# Patient Record
Sex: MALE | Race: WHITE | NOT HISPANIC OR LATINO | Employment: OTHER | URBAN - METROPOLITAN AREA
[De-identification: names, ages, dates, MRNs, and addresses within clinical notes are randomized per-mention and may not be internally consistent; named-entity substitution may affect disease eponyms.]

---

## 2020-12-20 ENCOUNTER — HOSPITAL ENCOUNTER (EMERGENCY)
Facility: HOSPITAL | Age: 60
Discharge: HOME | End: 2020-12-20
Attending: EMERGENCY MEDICINE

## 2020-12-20 VITALS
DIASTOLIC BLOOD PRESSURE: 80 MMHG | SYSTOLIC BLOOD PRESSURE: 143 MMHG | OXYGEN SATURATION: 99 % | WEIGHT: 160 LBS | TEMPERATURE: 98 F | RESPIRATION RATE: 18 BRPM | HEIGHT: 67 IN | BODY MASS INDEX: 25.11 KG/M2 | HEART RATE: 99 BPM

## 2020-12-20 DIAGNOSIS — J06.9 VIRAL URI: Primary | ICD-10-CM

## 2020-12-20 DIAGNOSIS — I10 HYPERTENSION, UNSPECIFIED TYPE: ICD-10-CM

## 2020-12-20 DIAGNOSIS — R09.81 NASAL CONGESTION: ICD-10-CM

## 2020-12-20 LAB — GROUP A STREP, MOLECULAR: NEGATIVE

## 2020-12-20 PROCEDURE — 87651 STREP A DNA AMP PROBE: CPT

## 2020-12-20 PROCEDURE — 25000003 PHARM REV CODE 250: Performed by: PHYSICIAN ASSISTANT

## 2020-12-20 PROCEDURE — 99284 EMERGENCY DEPT VISIT MOD MDM: CPT

## 2020-12-20 RX ORDER — HYDRALAZINE HYDROCHLORIDE 25 MG/1
25 TABLET, FILM COATED ORAL
Status: COMPLETED | OUTPATIENT
Start: 2020-12-20 | End: 2020-12-20

## 2020-12-20 RX ORDER — IBUPROFEN 600 MG/1
600 TABLET ORAL EVERY 6 HOURS PRN
Qty: 20 TABLET | Refills: 0 | Status: SHIPPED | OUTPATIENT
Start: 2020-12-20 | End: 2022-01-13

## 2020-12-20 RX ORDER — AMLODIPINE BESYLATE 5 MG/1
5 TABLET ORAL DAILY
Qty: 30 TABLET | Refills: 0 | Status: ON HOLD | OUTPATIENT
Start: 2020-12-20 | End: 2022-01-14 | Stop reason: HOSPADM

## 2020-12-20 RX ORDER — FLUTICASONE PROPIONATE 50 MCG
1 SPRAY, SUSPENSION (ML) NASAL 2 TIMES DAILY PRN
Qty: 15 G | Refills: 0 | Status: SHIPPED | OUTPATIENT
Start: 2020-12-20 | End: 2022-01-13

## 2020-12-20 RX ADMIN — HYDRALAZINE HYDROCHLORIDE 25 MG: 25 TABLET, FILM COATED ORAL at 02:12

## 2020-12-20 NOTE — ED PROVIDER NOTES
Encounter Date: 12/20/2020       History     Chief Complaint   Patient presents with    Nasal Congestion     c/o runny nose, sinus congestion, and salty taste when eating or drinking anything. symptoms began about 1-2 months ago. also requests prescription for medication for chronic right shoulder pain     60-year-old male presents to ED with complaint of runny nose, sinus congestion, itchy eyes, dry throat, and intermittent frontal headaches that have continued intermittently over past 1-2 months.  He has attempted OTC allergy medications with minimal improvement of his symptoms.  He also reports having occasional salty taste in his mouth after eating or drinking.  He denies fever, chills, nausea, vomiting, ear pain, facial swelling, drooling, voice changes, visual changes, chest pain, cough, shortness of breath, abdominal pain, diarrhea, sick contacts.  He also reports history of chronic right shoulder arthritis, and is requesting medication to help with his daily right shoulder discomfort.  No recent shoulder injury or trauma. No other acute complaints at this time.       The history is provided by the patient.     Review of patient's allergies indicates:  No Known Allergies  Past Medical History:   Diagnosis Date    Hypertension      History reviewed. No pertinent surgical history.  No family history on file.  Social History     Tobacco Use    Smoking status: Never Smoker   Substance Use Topics    Alcohol use: Yes     Comment: everyday    Drug use: No     Review of Systems   Constitutional: Negative for chills and fever.   HENT: Positive for congestion, rhinorrhea and sore throat. Negative for drooling, ear pain, facial swelling, trouble swallowing and voice change.    Eyes: Negative for visual disturbance.   Respiratory: Negative for cough and shortness of breath.    Cardiovascular: Negative for chest pain.   Gastrointestinal: Negative for abdominal pain, nausea and vomiting.   Musculoskeletal: Negative  for myalgias.   Neurological: Positive for headaches. Negative for dizziness and light-headedness.       Physical Exam     Initial Vitals [12/20/20 1107]   BP Pulse Resp Temp SpO2   (!) 224/105 80 18 98 °F (36.7 °C) 97 %      MAP       --         Physical Exam    Nursing note and vitals reviewed.  Constitutional: He appears well-developed and well-nourished. He is cooperative.  Non-toxic appearance. He does not have a sickly appearance. He does not appear ill. No distress.   HENT:   Head: Normocephalic and atraumatic.   Right Ear: External ear normal.   Left Ear: External ear normal.   Nose: Rhinorrhea (clear) present.   Mouth/Throat: Uvula is midline.   No frontal or maxillary sinus tenderness.  Moist mucous membranes.  Oropharynx does have erythema but no edema or exudates.  Uvula midline with no swelling.  No trismus.   Eyes: Conjunctivae and EOM are normal.   Neck: Normal range of motion. Neck supple.   Cardiovascular: Normal rate, regular rhythm and normal heart sounds.   Pulmonary/Chest: Effort normal and breath sounds normal.   Abdominal: Soft. Normal appearance.   Musculoskeletal: No tenderness.   Neurological: He is alert and oriented to person, place, and time. GCS score is 15. GCS eye subscore is 4. GCS verbal subscore is 5. GCS motor subscore is 6.   Skin: Skin is warm and dry.   Psychiatric: He has a normal mood and affect. His speech is normal and behavior is normal. Thought content normal.         ED Course   Procedures  Labs Reviewed   GROUP A STREP, MOLECULAR          Imaging Results    None          Medical Decision Making:   Differential Diagnosis:   URI, viral, sinusitis, allergy/irritant, pharyngitis  ED Management:  Patient presents with history of URI symptoms including nasal congestion, rhinorrhea, intermittent headaches, dry throat, itchy eyes that have continued intermittently over past 1-2 months.  No sick contacts.  Minimal prevent from OTC medications.  Afebrile on arrival.  On exam,  mild oropharyngeal erythema noted with no swelling or exudates.  Remaining exam unremarkable.  Strep test negative.  Patient did not wish to proceed with COVID testing today.  I do suspect symptoms are viral URI verses allergy.  He will be prescribed Motrin and Flonase, encouraged Tylenol as needed, oral hydration, monitor symptoms closely, close PCP follow-up.  ED return precautions discussed.  Patient understands instructions and agrees with plan.    Patient was noted to have elevated blood pressure while in the ED today.  The patient has no associated signs or symptoms of hypertension.  Patient's blood pressure is likely elevated due to situation.  He was given hydralazine in ED with significant improvement.  He will be discharged home with Norvasc.  Advised blood pressure recheck by PCP within 1 week.    Other:   I have discussed this case with another health care provider.       <> Summary of the Discussion: Patient discussed with Dr. Kyle, who agrees with ED course and dispo                             Clinical Impression:     ICD-10-CM ICD-9-CM   1. Viral URI  J06.9 465.9   2. Nasal congestion  R09.81 478.19   3. Hypertension, unspecified type  I10 401.9                          ED Disposition Condition    Discharge Stable        ED Prescriptions     Medication Sig Dispense Start Date End Date Auth. Provider    amLODIPine (NORVASC) 5 MG tablet Take 1 tablet (5 mg total) by mouth once daily. 30 tablet 12/20/2020 1/19/2021 Marc Joy PA-C    fluticasone propionate (FLONASE) 50 mcg/actuation nasal spray 1 spray (50 mcg total) by Each Nostril route 2 (two) times daily as needed for Rhinitis. 15 g 12/20/2020  Marc Joy PA-C    ibuprofen (ADVIL,MOTRIN) 600 MG tablet Take 1 tablet (600 mg total) by mouth every 6 (six) hours as needed. 20 tablet 12/20/2020  Marc Joy PA-C        Follow-up Information     Follow up With Specialties Details Why Contact Info    Your Doctor  Call                                           Marc Joy PA-C  12/20/20 1505       Marc Joy PA-C  12/20/20 1507       Marc Joy PA-C  12/20/20 1505

## 2020-12-20 NOTE — DISCHARGE INSTRUCTIONS
Take medications as discussed, follow-up with your doctor    Return to closest emergency department if symptoms do not improve, change, worsen, or for any new concerns.

## 2021-04-07 ENCOUNTER — IMMUNIZATION (OUTPATIENT)
Dept: PHARMACY | Facility: CLINIC | Age: 61
End: 2021-04-07

## 2021-04-07 DIAGNOSIS — Z23 NEED FOR VACCINATION: Primary | ICD-10-CM

## 2021-05-05 ENCOUNTER — IMMUNIZATION (OUTPATIENT)
Dept: PHARMACY | Facility: CLINIC | Age: 61
End: 2021-05-05

## 2021-05-05 DIAGNOSIS — Z23 NEED FOR VACCINATION: Primary | ICD-10-CM

## 2022-01-13 ENCOUNTER — HOSPITAL ENCOUNTER (OUTPATIENT)
Facility: HOSPITAL | Age: 62
Discharge: HOME | End: 2022-01-16
Attending: EMERGENCY MEDICINE | Admitting: INTERNAL MEDICINE
Payer: MEDICAID

## 2022-01-13 DIAGNOSIS — U07.1 COVID: ICD-10-CM

## 2022-01-13 DIAGNOSIS — I10 HYPERTENSION: ICD-10-CM

## 2022-01-13 DIAGNOSIS — R07.9 CHEST PAIN, UNSPECIFIED TYPE: Primary | ICD-10-CM

## 2022-01-13 DIAGNOSIS — I16.0 HYPERTENSIVE URGENCY: ICD-10-CM

## 2022-01-13 DIAGNOSIS — R51.9 NONINTRACTABLE HEADACHE, UNSPECIFIED CHRONICITY PATTERN, UNSPECIFIED HEADACHE TYPE: ICD-10-CM

## 2022-01-13 LAB
ALBUMIN SERPL BCP-MCNC: 3.6 G/DL (ref 3.5–5.2)
ALP SERPL-CCNC: 109 U/L (ref 55–135)
ALT SERPL W/O P-5'-P-CCNC: 101 U/L (ref 10–44)
ANION GAP SERPL CALC-SCNC: 10 MMOL/L (ref 8–16)
AST SERPL-CCNC: 131 U/L (ref 10–40)
BASOPHILS # BLD AUTO: 0.04 K/UL (ref 0–0.2)
BASOPHILS NFR BLD: 0.7 % (ref 0–1.9)
BILIRUB SERPL-MCNC: 1.6 MG/DL (ref 0.1–1)
BNP SERPL-MCNC: 87 PG/ML (ref 0–99)
BUN SERPL-MCNC: 13 MG/DL (ref 8–23)
CALCIUM SERPL-MCNC: 9.1 MG/DL (ref 8.7–10.5)
CHLORIDE SERPL-SCNC: 103 MMOL/L (ref 95–110)
CO2 SERPL-SCNC: 25 MMOL/L (ref 23–29)
CREAT SERPL-MCNC: 0.8 MG/DL (ref 0.5–1.4)
DIFFERENTIAL METHOD: ABNORMAL
EOSINOPHIL # BLD AUTO: 0.1 K/UL (ref 0–0.5)
EOSINOPHIL NFR BLD: 1.9 % (ref 0–8)
ERYTHROCYTE [DISTWIDTH] IN BLOOD BY AUTOMATED COUNT: 13.1 % (ref 11.5–14.5)
EST. GFR  (AFRICAN AMERICAN): >60 ML/MIN/1.73 M^2
EST. GFR  (NON AFRICAN AMERICAN): >60 ML/MIN/1.73 M^2
GLUCOSE SERPL-MCNC: 127 MG/DL (ref 70–110)
HCT VFR BLD AUTO: 45.5 % (ref 40–54)
HGB BLD-MCNC: 16.3 G/DL (ref 14–18)
IMM GRANULOCYTES # BLD AUTO: 0.01 K/UL (ref 0–0.04)
IMM GRANULOCYTES NFR BLD AUTO: 0.2 % (ref 0–0.5)
LYMPHOCYTES # BLD AUTO: 2.5 K/UL (ref 1–4.8)
LYMPHOCYTES NFR BLD: 46 % (ref 18–48)
MCH RBC QN AUTO: 34.1 PG (ref 27–31)
MCHC RBC AUTO-ENTMCNC: 35.8 G/DL (ref 32–36)
MCV RBC AUTO: 95 FL (ref 82–98)
MONOCYTES # BLD AUTO: 0.9 K/UL (ref 0.3–1)
MONOCYTES NFR BLD: 16.4 % (ref 4–15)
NEUTROPHILS # BLD AUTO: 1.9 K/UL (ref 1.8–7.7)
NEUTROPHILS NFR BLD: 34.8 % (ref 38–73)
NRBC BLD-RTO: 0 /100 WBC
PLATELET # BLD AUTO: 92 K/UL (ref 150–450)
PMV BLD AUTO: 12.2 FL (ref 9.2–12.9)
POCT GLUCOSE: 127 MG/DL (ref 70–110)
POTASSIUM SERPL-SCNC: 3.9 MMOL/L (ref 3.5–5.1)
PROT SERPL-MCNC: 7.5 G/DL (ref 6–8.4)
RBC # BLD AUTO: 4.78 M/UL (ref 4.6–6.2)
SARS-COV-2 RDRP RESP QL NAA+PROBE: POSITIVE
SODIUM SERPL-SCNC: 138 MMOL/L (ref 136–145)
TROPONIN I SERPL DL<=0.01 NG/ML-MCNC: 0.03 NG/ML (ref 0–0.03)
TROPONIN I SERPL DL<=0.01 NG/ML-MCNC: 0.04 NG/ML (ref 0–0.03)
WBC # BLD AUTO: 5.35 K/UL (ref 3.9–12.7)

## 2022-01-13 PROCEDURE — 93010 EKG 12-LEAD: ICD-10-PCS | Mod: ,,, | Performed by: INTERNAL MEDICINE

## 2022-01-13 PROCEDURE — 80053 COMPREHEN METABOLIC PANEL: CPT | Performed by: NURSE PRACTITIONER

## 2022-01-13 PROCEDURE — U0002 COVID-19 LAB TEST NON-CDC: HCPCS | Performed by: EMERGENCY MEDICINE

## 2022-01-13 PROCEDURE — G0378 HOSPITAL OBSERVATION PER HR: HCPCS

## 2022-01-13 PROCEDURE — 84484 ASSAY OF TROPONIN QUANT: CPT | Mod: 91 | Performed by: STUDENT IN AN ORGANIZED HEALTH CARE EDUCATION/TRAINING PROGRAM

## 2022-01-13 PROCEDURE — 83880 ASSAY OF NATRIURETIC PEPTIDE: CPT | Performed by: NURSE PRACTITIONER

## 2022-01-13 PROCEDURE — 93005 ELECTROCARDIOGRAM TRACING: CPT

## 2022-01-13 PROCEDURE — 99285 EMERGENCY DEPT VISIT HI MDM: CPT | Mod: 25

## 2022-01-13 PROCEDURE — 84484 ASSAY OF TROPONIN QUANT: CPT | Performed by: NURSE PRACTITIONER

## 2022-01-13 PROCEDURE — 93010 ELECTROCARDIOGRAM REPORT: CPT | Mod: ,,, | Performed by: INTERNAL MEDICINE

## 2022-01-13 PROCEDURE — 25000003 PHARM REV CODE 250: Performed by: STUDENT IN AN ORGANIZED HEALTH CARE EDUCATION/TRAINING PROGRAM

## 2022-01-13 PROCEDURE — 85025 COMPLETE CBC W/AUTO DIFF WBC: CPT | Performed by: NURSE PRACTITIONER

## 2022-01-13 PROCEDURE — 25000003 PHARM REV CODE 250: Performed by: EMERGENCY MEDICINE

## 2022-01-13 RX ORDER — NALOXONE HCL 0.4 MG/ML
0.02 VIAL (ML) INJECTION
Status: DISCONTINUED | OUTPATIENT
Start: 2022-01-13 | End: 2022-01-16 | Stop reason: HOSPADM

## 2022-01-13 RX ORDER — HYDRALAZINE HYDROCHLORIDE 25 MG/1
25 TABLET, FILM COATED ORAL EVERY 8 HOURS
Status: DISCONTINUED | OUTPATIENT
Start: 2022-01-13 | End: 2022-01-13

## 2022-01-13 RX ORDER — GLUCAGON 1 MG
1 KIT INJECTION
Status: DISCONTINUED | OUTPATIENT
Start: 2022-01-13 | End: 2022-01-16 | Stop reason: HOSPADM

## 2022-01-13 RX ORDER — ACETAMINOPHEN 325 MG/1
650 TABLET ORAL EVERY 6 HOURS PRN
Status: DISCONTINUED | OUTPATIENT
Start: 2022-01-13 | End: 2022-01-15

## 2022-01-13 RX ORDER — ASPIRIN 325 MG
325 TABLET, DELAYED RELEASE (ENTERIC COATED) ORAL
Status: COMPLETED | OUTPATIENT
Start: 2022-01-13 | End: 2022-01-13

## 2022-01-13 RX ORDER — IBUPROFEN 200 MG
16 TABLET ORAL
Status: DISCONTINUED | OUTPATIENT
Start: 2022-01-13 | End: 2022-01-16 | Stop reason: HOSPADM

## 2022-01-13 RX ORDER — ENOXAPARIN SODIUM 100 MG/ML
40 INJECTION SUBCUTANEOUS EVERY 24 HOURS
Status: DISCONTINUED | OUTPATIENT
Start: 2022-01-14 | End: 2022-01-16 | Stop reason: HOSPADM

## 2022-01-13 RX ORDER — LISINOPRIL 5 MG/1
10 TABLET ORAL DAILY
Status: DISCONTINUED | OUTPATIENT
Start: 2022-01-14 | End: 2022-01-14

## 2022-01-13 RX ORDER — SODIUM CHLORIDE 0.9 % (FLUSH) 0.9 %
10 SYRINGE (ML) INJECTION EVERY 12 HOURS PRN
Status: DISCONTINUED | OUTPATIENT
Start: 2022-01-13 | End: 2022-01-16 | Stop reason: HOSPADM

## 2022-01-13 RX ORDER — IBUPROFEN 200 MG
24 TABLET ORAL
Status: DISCONTINUED | OUTPATIENT
Start: 2022-01-13 | End: 2022-01-16 | Stop reason: HOSPADM

## 2022-01-13 RX ORDER — HYDRALAZINE HYDROCHLORIDE 25 MG/1
25 TABLET, FILM COATED ORAL ONCE
Status: COMPLETED | OUTPATIENT
Start: 2022-01-13 | End: 2022-01-13

## 2022-01-13 RX ADMIN — ASPIRIN 325 MG: 325 TABLET, COATED ORAL at 03:01

## 2022-01-13 RX ADMIN — HYDRALAZINE HYDROCHLORIDE 25 MG: 25 TABLET, FILM COATED ORAL at 08:01

## 2022-01-13 NOTE — ED PROVIDER NOTES
Encounter Date: 1/13/2022    SCRIBE #1 NOTE: I, Bisi Aguilar and am scribing for, and in the presence of, Jason Hewitt MD.       History     Chief Complaint   Patient presents with    Headache     Pt presents to ED with c/o HA for 4 weeks. Pt states it feels like his head is being squeezed. Pt was seen in  yesterday and tested negative for COVID. Pt is requesting a CT.      Eitan Cody is a 61 y.o. male who  has a past medical history of Hypertension.    The patient presents to the ED due to headache.   Patient reports symptoms started 4 weeks ago, are located diffusely to his head, and have been present constantly. Described as squeezing. Associated symptoms include subjective fevers, trouble sleeping, night sweats, and dizziness with getting up from rest. Patient has not tried anything for relief. Patient denies one-sided weakness, visual disturbance, gait problems, or speech difficulty. Patient denies history of head trauma. He reports he drinks 5 to 6 beers every night but denies history of smoking cigarettes or drug usage. Patient notes he was seen at an Urgent Care yesterday and tested negative for COVID-19. He denies taking any daily medications or any other medical problems.         The history is provided by the patient.     Review of patient's allergies indicates:  No Known Allergies  Past Medical History:   Diagnosis Date    Hypertension      No past surgical history on file.  No family history on file.  Social History     Tobacco Use    Smoking status: Never Smoker   Substance Use Topics    Alcohol use: Yes     Comment: everyday    Drug use: No     Review of Systems   Constitutional: Positive for diaphoresis and fever. Negative for chills.   HENT: Negative for rhinorrhea, sore throat and trouble swallowing.    Eyes: Negative for visual disturbance.   Respiratory: Negative for cough and shortness of breath.    Cardiovascular: Negative for chest pain.   Gastrointestinal: Negative for  abdominal pain, diarrhea and vomiting.   Genitourinary: Negative for dysuria and hematuria.   Musculoskeletal: Negative for back pain and gait problem.   Skin: Negative for rash.   Neurological: Positive for dizziness and headaches. Negative for speech difficulty, weakness and numbness.   Hematological: Negative for adenopathy.   Psychiatric/Behavioral: Positive for sleep disturbance.   All other systems reviewed and are negative.      Physical Exam     Initial Vitals   BP Pulse Resp Temp SpO2   01/13/22 1034 01/13/22 1103 01/13/22 1103 01/13/22 1034 01/13/22 1034   (!) 213/137 75 18 98.6 °F (37 °C) 98 %      MAP       --                Physical Exam    Nursing note and vitals reviewed.  Constitutional: He appears well-developed and well-nourished. He is not diaphoretic. No distress.   HENT:   Head: Normocephalic and atraumatic.   Mouth/Throat: Oropharynx is clear and moist.   Eyes: EOM are normal. Pupils are equal, round, and reactive to light.   Neck: No tracheal deviation present.   Cardiovascular: Normal rate, regular rhythm, normal heart sounds and intact distal pulses.   Pulmonary/Chest: Breath sounds normal. No stridor. No respiratory distress.   Abdominal: Abdomen is soft. He exhibits no distension and no mass. There is no abdominal tenderness.   Musculoskeletal:         General: No edema. Normal range of motion.     Neurological: He is alert and oriented to person, place, and time. No cranial nerve deficit or sensory deficit.   Skin: Skin is warm and dry. Capillary refill takes less than 2 seconds. No rash noted.   Psychiatric: He has a normal mood and affect. His behavior is normal. Thought content normal.         ED Course   Procedures  Labs Reviewed   CBC W/ AUTO DIFFERENTIAL - Abnormal; Notable for the following components:       Result Value    MCH 34.1 (*)     Platelets 92 (*)     Gran % 34.8 (*)     Mono % 16.4 (*)     All other components within normal limits   COMPREHENSIVE METABOLIC PANEL -  Abnormal; Notable for the following components:    Glucose 127 (*)     Total Bilirubin 1.6 (*)      (*)      (*)     All other components within normal limits   TROPONIN I - Abnormal; Notable for the following components:    Troponin I 0.040 (*)     All other components within normal limits   SARS-COV-2 RNA AMPLIFICATION, QUAL - Abnormal; Notable for the following components:    SARS-CoV-2 RNA, Amplification, Qual Positive (*)     All other components within normal limits    Narrative:     For [id number]   B-TYPE NATRIURETIC PEPTIDE   TROPONIN I     EKG Readings: (Independently Interpreted)   Normal sinus rhythm. No pathological ST elevations. Normal intervals. QTC of 428     ECG Results          EKG 12-lead (In process)  Result time 01/13/22 11:28:50    In process by Interface, Lab In [facility] (01/13/22 11:28:50)                 Narrative:    Test Reason : I10,    Vent. Rate : 074 BPM     Atrial Rate : 074 BPM     P-R Int : 150 ms          QRS Dur : 096 ms      QT Int : 386 ms       P-R-T Axes : 045 -19 040 degrees     QTc Int : 428 ms    Normal sinus rhythm  Normal ECG  No previous ECGs available    Referred By: [email or web address]   SELF           Confirmed By:                   In process by Interface, Lab In [facility] (01/13/22 11:23:43)                 Narrative:    Test Reason : I10,    Vent. Rate : 074 BPM     Atrial Rate : 074 BPM     P-R Int : 150 ms          QRS Dur : 096 ms      QT Int : 386 ms       P-R-T Axes : 045 -19 040 degrees     QTc Int : 428 ms    Normal sinus rhythm  Normal ECG  No previous ECGs available    Referred By: [email or web address]   SELF           Confirmed By:                             Imaging Results          X-Ray Chest 1 View (Final result)  Result time 01/13/22 14:39:00    Final result by Rodrigo Lancaster MD (01/13/22 14:39:00)                 Impression:      No radiographic evidence of pneumonia or other source of cough/shortness of breath on this single view, noting that early/mild  viral pneumonia may be radiographically occult.      Electronically signed by: Rodrigo Lancaster MD  Date:    01/13/2022  Time:    14:39             Narrative:    EXAMINATION:  XR CHEST 1 VIEW    CLINICAL HISTORY:  COVID-19    TECHNIQUE:  Single frontal view of the chest was performed.    COMPARISON:  Chest radiograph 10/26/2016    FINDINGS:  Monitoring leads overlie the chest.  Patient is slightly rotated.  No detrimental change.    Chronic nonspecific elevation the right hemidiaphragm similar to prior.  Cardiomediastinal silhouette is midline and stable.  Pulmonary vasculature and hilar contours are within normal limits.  Lungs are well expanded without consolidation, pleural effusion or pneumothorax.  Osseous structures appear stable without acute process seen.  PA and lateral views can be obtained.                               CT Head Without Contrast (Final result)  Result time 01/13/22 12:28:37    Final result by Rodrigo Lancaster MD (01/13/22 12:28:37)                 Impression:      No acute infarct or intracranial hemorrhage seen.      Electronically signed by: Rodrigo Lancaster MD  Date:    01/13/2022  Time:    12:28             Narrative:    EXAMINATION:  CT HEAD WITHOUT CONTRAST    CLINICAL HISTORY:  Headache, new or worsening (Age >= 50y);    TECHNIQUE:  Low dose axial CT images obtained throughout the head without intravenous contrast. Sagittal and coronal reconstructions were performed.    COMPARISON:  Head CT 10/26/2016    FINDINGS:  Intracranial compartment:    Ventricles and sulci are normal in size for age without evidence of hydrocephalus. No extra-axial blood or fluid collections.    Mild patchy hypoattenuation of the supratentorial white matter suggesting sequela of chronic microvascular ischemic change.  No definite new focal areas of abnormal parenchymal attenuation.  Skull base atherosclerotic vascular calcifications noted.  No parenchymal mass, hemorrhage, edema or major vascular distribution  infarct.    Skull/extracranial contents (limited evaluation): No fracture. Mastoid air cells and paranasal sinuses are essentially clear.  Imaged portions of the orbits are within normal limits.                                 Medications   sodium chloride 0.9% flush 10 mL (has no administration in time range)   glucose chewable tablet 16 g (has no administration in time range)   glucose chewable tablet 24 g (has no administration in time range)   dextrose 50% injection 12.5 g (has no administration in time range)   dextrose 50% injection 25 g (has no administration in time range)   glucagon (human recombinant) injection 1 mg (has no administration in time range)   naloxone 0.4 mg/mL injection 0.02 mg (has no administration in time range)   enoxaparin injection 40 mg (has no administration in time range)   aspirin EC tablet 325 mg (325 mg Oral Given 1/13/22 1531)     Medical Decision Making:   Initial Assessment:   The patient presents to the ED due to headache.   Clinical Tests:   Lab Tests: Ordered and Reviewed  Radiological Study: Ordered and Reviewed  Medical Tests: Ordered and Reviewed  ED Management:  Elevated troponin noted.  Upon further discussion, patient disclosed that he does indeed experience occasional heaviness in his chest that is nonexertional in nature.  COVID test returned positive which likely accounts for his headache complaint.  No acute findings on CT.  No focal neurologic deficits.  Will admit to [facility] for trending of troponins and further management.  An aspirin has been given.  Patient remains hemodynamically stable.  Blood pressure improved though patient will require hypertension control.          Scribe Attestation:   Scribe #1: I performed the above scribed service and the documentation accurately describes the services I performed. I attest to the accuracy of the note.        ED Course as of 01/13/22 1605   Thu Jan 13, 2022   1253 Upon further questioning patient  reports intermittent chest heaviness regardless of no activity. No shortness of breath or nausea. [GT]      ED Course User Index  [GT] Bisi Aguilar             Clinical Impression:   Final diagnoses:  [I10] Hypertension  [U07.1] COVID  [R07.9] Chest pain, unspecified type (Primary)  [R51.9] Nonintractable headache, unspecified chronicity pattern, unspecified headache type          ED Disposition Condition    Observation             I, Dr. Jason Hewitt, personally performed the services described in this documentation. All medical record entries made by the scribe were at my direction and in my presence.  I have reviewed the chart and agree that the record reflects my personal performance and is accurate and complete    I, Dr. Jason Hewitt, personally performed the services described in this documentation. All medical record entries made by the scribe were at my direction and in my presence.  I have reviewed the chart and agree that the record reflects my personal performance and is accurate and complete     Jason Hewitt MD  01/13/22 [id number]

## 2022-01-13 NOTE — PHARMACY MED REC
"Admission Medication History     The home medication history was taken by Angelia Darnell CPhT.    Medication history obtained from,Patient states he is not taking any medications    You may go to "Admission" then "Reconcile Home Medications" tabs to review and/or act upon these items.      The home medication list has been updated by the Pharmacy department.    Please read ALL comments highlighted in yellow.    Please address this information as you see fit.     Feel free to contact us if you have any questions or require assistance.      The medications listed below were removed from the home medication list.  Please reorder if appropriate:  Patient reports no longer taking the following medication(s):   flonase nasal spray   Ibuprofen 600mg   Naproxen 375mg    Phenergan 25mg        Angelia Darnell CPhT.  Ext [phone]                  .          "

## 2022-01-13 NOTE — H&P
[facility] Medicine H&P Note     Admitting Team: [facility] Hospitalist Team B  Attending Physician: Dr. Chai Renee   Resident:  Marissa  Intern: Wellington       Date of Admit: 1/13/2022    Chief Complaint   Headache for 4 weeks, COVID +ve     Subjective:      History of Present Illness:  Eitan Cody is a 61 y.o. male  has a past medical history of Hypertension. The [place] [facility] [place] with a primary complaint of diffuse constant squeezing headache for 4 weeks. Patient did not try to any medication to relief the headache. Patient denies any focal weakness, visual disturbance, gait problems, speech difficulty or any history of head trauma. The headache was affecting his sleep. He also notice that he begin to feel more dizzy recently when he is getting up from seating position. He recently start having chest heaviness, which inconstant, can come in rest and stays for few seconds and goes by itself, the last time he felt it was early today. He denies any SOB , palpations or leg swellings. He also has been tested +ve for COVID with complains of subjective fever, night sweats. He denies any nausea, vomiting or any change in his BMs or urinary habits. He denies ever taking his BP medications which might by the cause of his BP of 213/137 on presentation. He also denies taking any other meds on daily basis.     Past Medical History:  Past Medical History:   Diagnosis Date    Hypertension        Past Surgical History:  No past surgical history on file.    Allergies:  Review of patient's allergies indicates:  No Known Allergies    Home Medications:  He denies taking any home meds     Family History:  No family history on file.    Social History:  Social History     Tobacco Use    Smoking status: Never Smoker   Substance Use Topics    Alcohol use: Yes     Comment: everyday    Drug use: No       Review of Systems:    Review of Systems   Constitutional: Positive for diaphoresis and fever.   HENT: Positive  "for ear pain. Negative for ear discharge, hearing loss and sinus pain.    Eyes: Positive for redness. Negative for blurred vision and double vision.   Respiratory: Negative for cough, shortness of breath and wheezing.    Cardiovascular: Positive for chest pain. Negative for palpitations, orthopnea, claudication, leg swelling and PND.   Gastrointestinal: Negative for abdominal pain, constipation, diarrhea, nausea and vomiting.   Genitourinary: Negative for dysuria and flank pain.   Musculoskeletal: Negative for falls and myalgias.   Skin: Negative for itching and rash.   Neurological: Positive for headaches. Negative for dizziness, tingling, sensory change, focal weakness and loss of consciousness.   Endo/Heme/Allergies: Does not bruise/bleed easily.   Psychiatric/Behavioral: Negative for depression and suicidal ideas.      Health Maintaince :   Primary Care Physician: Do not have one.      Immunizations:   TDap UTD   Flu UTD ,   COVID:  had both doses of Moderna Vaccine ( by 6 moths between both doses ).     Cancer Screening:  Colonoscopy: never.     Objective:   Last 24 Hour Vital Signs:  BP  Min: 154/93  Max: 213/137  Temp  Av.6 °F (37 °C)  Min: 98.6 °F (37 °C)  Max: 98.6 °F (37 °C)  Pulse  Av.1  Min: 66  Max: 110  Resp  Av.8  Min: 16  Max: 25  SpO2  Av.1 %  Min: 95 %  Max: 98 %  Height  Av' 7" (170.2 cm)  Min: 5' 7" (170.2 cm)  Max: 5' 7" (170.2 cm)  Weight  Av.6 kg (160 lb)  Min: 72.6 kg (160 lb)  Max: 72.6 kg (160 lb)  Body mass index is 25.06 kg/m².  No intake/output data recorded.    Physical Examination:  Physical Exam    Constitutional: He appears well-developed. He is not diaphoretic. No distress.   HENT:   Head: Normocephalic and atraumatic.   Eyes: Conjunctivae and EOM are normal. Pupils are equal, round, and reactive to light. Right eye exhibits no discharge. Left eye exhibits no discharge.   Neck: Neck supple. No thyromegaly present.   Normal range of " motion.  Cardiovascular: Normal rate, regular rhythm and normal heart sounds. Exam reveals no gallop.    No murmur heard.  Pulmonary/Chest: No respiratory distress. He has no wheezes. He has no rhonchi. He exhibits no tenderness.   Abdominal: Abdomen is soft. Bowel sounds are normal. He exhibits no distension. There is no abdominal tenderness.   Musculoskeletal:         General: No tenderness or edema. Normal range of motion.      Cervical back: Normal range of motion and neck supple.     Lymphadenopathy:     He has no cervical adenopathy.   Neurological: He is alert and oriented to person, place, and time. He has normal strength. No cranial nerve deficit.   Skin: Skin is warm. Capillary refill takes 2 to 3 seconds. There is erythema.   Psychiatric: He has a normal mood and affect. His behavior is normal.        Laboratory:  CBC:   Lab Results   Component Value Date    WBC 5.35 01/13/2022    HGB 16.3 01/13/2022    HCT 45.5 01/13/2022    PLT 92 (L) 01/13/2022    MCV 95 01/13/2022    RDW 13.1 01/13/2022     BMP:   Lab Results   Component Value Date     01/13/2022    K 3.9 01/13/2022     01/13/2022    CO2 25 01/13/2022    BUN 13 01/13/2022    CREATININE 0.8 01/13/2022     (H) 01/13/2022    CALCIUM 9.1 01/13/2022     LFTs:   Lab Results   Component Value Date    PROT 7.5 01/13/2022    ALBUMIN 3.6 01/13/2022    BILITOT 1.6 (H) 01/13/2022     (H) 01/13/2022    ALKPHOS 109 01/13/2022     (H) 01/13/2022     Coags: No results found for: INR, PROTIME, PTT  FLP: No results found for: CHOL, HDL, LDLCALC, TRIG, CHOLHDL  DM:   Lab Results   Component Value Date    CREATININE 0.8 01/13/2022     Thyroid: No results found for: TSH, FREET4, R2MJDYC, S8RUIKQ, THYROIDAB  Anemia: No results found for: IRON, TIBC, FERRITIN, PRVAGLGT99, FOLATE  Cardiac:   Lab Results   Component Value Date    TROPONINI 0.034 (H) 01/13/2022    BNP 87 01/13/2022     Urinalysis: No results found for: LABURIN, COLORU,  CLARITYU, SPECGRAV, LABSPEC, NITRITE, PROTEINUR, GLUCOSEU, KETONESU, UROBILINOGEN, BILIRUBINUR, BLOODU, RBCU, WBCUA    Trended Lab Data:  Recent Labs   Lab 01/13/22  1104   WBC 5.35   HGB 16.3   HCT 45.5   PLT 92*   MCV 95   RDW 13.1      K 3.9      CO2 25   BUN 13   CREATININE 0.8   *   PROT 7.5   ALBUMIN 3.6   BILITOT 1.6*   *   ALKPHOS 109   *       Trended Cardiac Data:  Recent Labs   Lab 01/13/22  1104 01/13/22  1650   TROPONINI 0.040* 0.034*   BNP 87  --        Microbiology:  Microbiology Results (last 7 days)     ** No results found for the last 168 hours. **           Other Results:  EKG :   ECG Results          EKG 12-lead (In process)  Result time 01/13/22 11:28:50    In process by Interface, Lab In [facility] (01/13/22 11:28:50)                 Narrative:    Test Reason : I10,    Vent. Rate : 074 BPM     Atrial Rate : 074 BPM     P-R Int : 150 ms          QRS Dur : 096 ms      QT Int : 386 ms       P-R-T Axes : 045 -19 040 degrees     QTc Int : 428 ms    Normal sinus rhythm  Normal ECG  No previous ECGs available    Referred By: [email or web address]   SELF           Confirmed By:                   In process by Interface, Lab In [facility] (01/13/22 11:23:43)                 Narrative:    Test Reason : I10,    Vent. Rate : 074 BPM     Atrial Rate : 074 BPM     P-R Int : 150 ms          QRS Dur : 096 ms      QT Int : 386 ms       P-R-T Axes : 045 -19 040 degrees     QTc Int : 428 ms    Normal sinus rhythm  Normal ECG  No previous ECGs available    Referred By: [email or web address]   SELF           Confirmed By:                                Radiology:  Imaging Results          X-Ray Chest 1 View (Final result)  Result time 01/13/22 14:39:00    Final result by Rodrigo Lancaster MD (01/13/22 14:39:00)                 Impression:      No radiographic evidence of pneumonia or other source of cough/shortness of breath on this single view, noting that early/mild viral pneumonia may be radiographically  occult.      Electronically signed by: Rodrigo Lancaster MD  Date:    01/13/2022  Time:    14:39             Narrative:    EXAMINATION:  XR CHEST 1 VIEW    CLINICAL HISTORY:  COVID-19    TECHNIQUE:  Single frontal view of the chest was performed.    COMPARISON:  Chest radiograph 10/26/2016    FINDINGS:  Monitoring leads overlie the chest.  Patient is slightly rotated.  No detrimental change.    Chronic nonspecific elevation the right hemidiaphragm similar to prior.  Cardiomediastinal silhouette is midline and stable.  Pulmonary vasculature and hilar contours are within normal limits.  Lungs are well expanded without consolidation, pleural effusion or pneumothorax.  Osseous structures appear stable without acute process seen.  PA and lateral views can be obtained.                               CT Head Without Contrast (Final result)  Result time 01/13/22 12:28:37    Final result by Rodrigo Lancaster MD (01/13/22 12:28:37)                 Impression:      No acute infarct or intracranial hemorrhage seen.      Electronically signed by: Rodrigo Lancaster MD  Date:    01/13/2022  Time:    12:28             Narrative:    EXAMINATION:  CT HEAD WITHOUT CONTRAST    CLINICAL HISTORY:  Headache, new or worsening (Age >= 50y);    TECHNIQUE:  Low dose axial CT images obtained throughout the head without intravenous contrast. Sagittal and coronal reconstructions were performed.    COMPARISON:  Head CT 10/26/2016    FINDINGS:  Intracranial compartment:    Ventricles and sulci are normal in size for age without evidence of hydrocephalus. No extra-axial blood or fluid collections.    Mild patchy hypoattenuation of the supratentorial white matter suggesting sequela of chronic microvascular ischemic change.  No definite new focal areas of abnormal parenchymal attenuation.  Skull base atherosclerotic vascular calcifications noted.  No parenchymal mass, hemorrhage, edema or major vascular distribution infarct.    Skull/extracranial contents  (limited evaluation): No fracture. Mastoid air cells and paranasal sinuses are essentially clear.  Imaged portions of the orbits are within normal limits.                                Assessment:      Eitan Cody is a 61 y.o. male with past medical history of Hypertension presented to [facility] on 1/13/2022 with a primary complaint of diffuse constant squeezing headache for 4 weeks.  Patient denies any focal weakness, visual disturbance, gait problems, speech difficulty or history of head trauma.  He recently start having in constant chest heaviness which can have it in rest and stays for few seconds and goes by itself, the last time he feels it was early today.. He also has been tested +ve for COVID with complains of subjective fever, night sweats. SBP on presentation was 213/137. mildly elevated trops of 0.04. EKG with normal Sinus rhythm. CT head with no acute abnormalities. [facility] medicine where consulted for admission for chest pain with elevated trops in COVID +ve patient.    Plan:     Chest pain:   - He recently start having in constant chest heaviness, can have it  rest and stays for few seconds and goes by itself.  - The last time he feels it was early today.   -He also has been tested +ve for COVID yesterday .  - SBP on presentation was 213/137.   - mildly elevated trops of 0.04. , BNP of 87  - EKG with normal Sinus rhythm.  - Will order seral trops and EKG   - Will order TTE   - Will order Stress test   - Start on ASA, Lisinopril   - PRN tylenol.      Hypertension  - Patient ha been diagnosed with hypertension, has prescriptions for Norvasc 5 mg, hydrochlorothiazide 25 mg.  - He denies taking any meds   - SBP on presentation was 213/137.   - Had persistent headache for 4 weeks   - He denies any SOB or Palpations or leg swellings.  - Will start on lisinopril   - ordered lipid panel, HbA1C    Headache   -Diffuse constant squeezing headache for 4 weeks.    - Patient denies any focal weakness, visual  disturbance, gait problems, speech difficulty or history of head trauma.   - No Nausea or vomiting  - Did not try any medications   - SBP on presentation was 213/137.   - report feeling better in the ER once his BP has been controlled    - Tylenol PRN    COVID   - Tested +ve for COVID with complains of subjective fever, night sweats. -  - He denies any SOB or Palpations or leg swellings.  - SpO2 of 98 % on RA, temp 98.6  - Chest Xray  - no need for oxygen now  - will continue to monitor by SpO2 checks.      Health Maintaince   - Need to establish a primary care physician at discharge for good blood pressure control.  - COVID booster dose needed.     Diet: Cardiac Diet   Code: Full   PPx: Enoxaparin   Dispo: observation for chest pain with elevated trops. Pending echo and stress test.      Clement Paris MD  [facility] Neurology  HO-1  [facility] Internal Medicine Service  [facility] Hospitalist Medicine Team B    [facility] Medicine Hospitalist Pager numbers:   [facility] Hospitalist Medicine Team A (Margarito/Thien): [phone]  [facility] Hospitalist Medicine Team B (Víctor/Deep):  [phone]

## 2022-01-13 NOTE — ED TRIAGE NOTES
"Pt reports having " squeezing type  Headache" x several weeks, denies change in vision, no n/v pt denies any medical hx of daily meds use   "

## 2022-01-13 NOTE — FIRST PROVIDER EVALUATION
Emergency Department TeleTriage Encounter Note      CHIEF COMPLAINT    Chief Complaint   Patient presents with    Headache     Pt presents to ED with c/o HA for 4 weeks. Pt states it feels like his head is being squeezed. Pt was seen in  yesterday and tested negative for COVID. Pt is requesting a CT.        VITAL SIGNS   Initial Vitals [01/13/22 1034]   BP Pulse Resp Temp SpO2   (!) 213/137 -- -- 98.6 °F (37 °C) 98 %      MAP       --            ALLERGIES    Review of patient's allergies indicates:  No Known Allergies    PROVIDER TRIAGE NOTE  TeleTriage Note: Eitan Cody, a nontoxic/well appearing, 61 y.o. male, presented to the ED with c/o headaches for the past several months. Worsened over the past 4 weeks limiting his daily activities.     All ED beds are full at present; patient notified of this status.  Patient seen and medically screened by Nurse Practitioner via teletriage. Orders initiated at triage to expedite care.  Patient is stable to return to the waiting room and will be placed in an ED bed when available.  Care will be transferred to an alternate provider when patient has been placed in an Exam Room from the [facility] for physical exam, additional orders, and disposition.  10:38 AM Ashlyn Donaldson, DNP, FNP-C        ORDERS  Labs Reviewed - No data to display    ED Orders (720h ago, onward)    Start Ordered     Status Ordering Provider    01/13/22 1040 01/13/22 1039  Insert peripheral IV  Continuous         Ordered ASHLYN DONALDSON    01/13/22 1040 01/13/22 1039  CBC auto differential  STAT         Ordered ASHLYN DONALDSON    01/13/22 1040 01/13/22 1039  Comprehensive metabolic panel  STAT         Ordered ASHLYN DONALDSON    01/13/22 1040 01/13/22 1039  Troponin I  STAT         Ordered ASHLYN DONALDSON    01/13/22 1040 01/13/22 1039  Brain natriuretic peptide  STAT         Ordered ASHLYN DONALDSON    01/13/22 1040 01/13/22 1039  EKG 12-lead  Once         Ordered ASHLYN DONALDSON             Virtual Visit Note: The provider triage portion of this emergency department evaluation and documentation was performed via [employer], a HIPAA-compliant telemedicine application, in concert with a tele-presenter in the room. A face to face patient evaluation with one of my colleagues will occur once the patient is placed in an emergency department room.      DISCLAIMER: This note was prepared with [employer] voice recognition transcription software. Garbled syntax, mangled pronouns, and other bizarre constructions may be attributed to that software system.

## 2022-01-14 PROBLEM — I16.0 HYPERTENSIVE URGENCY: Status: ACTIVE | Noted: 2022-01-14

## 2022-01-14 PROBLEM — I16.0 HYPERTENSIVE URGENCY: Status: RESOLVED | Noted: 2022-01-14 | Resolved: 2022-01-14

## 2022-01-14 PROBLEM — R07.9 CHEST PAIN: Status: RESOLVED | Noted: 2022-01-14 | Resolved: 2022-01-14

## 2022-01-14 PROBLEM — R07.9 CHEST PAIN: Status: ACTIVE | Noted: 2022-01-14

## 2022-01-14 LAB
ALBUMIN SERPL BCP-MCNC: 3.3 G/DL (ref 3.5–5.2)
ALP SERPL-CCNC: 97 U/L (ref 55–135)
ALT SERPL W/O P-5'-P-CCNC: 85 U/L (ref 10–44)
ANION GAP SERPL CALC-SCNC: 15 MMOL/L (ref 8–16)
AST SERPL-CCNC: 105 U/L (ref 10–40)
AV INDEX (PROSTH): 0.91
AV MEAN GRADIENT: 5 MMHG
AV PEAK GRADIENT: 8 MMHG
AV VALVE AREA: 3.89 CM2
AV VELOCITY RATIO: 0.84
BASOPHILS # BLD AUTO: 0.03 K/UL (ref 0–0.2)
BASOPHILS NFR BLD: 0.6 % (ref 0–1.9)
BILIRUB SERPL-MCNC: 1.5 MG/DL (ref 0.1–1)
BSA FOR ECHO PROCEDURE: 1.85 M2
BUN SERPL-MCNC: 14 MG/DL (ref 8–23)
CALCIUM SERPL-MCNC: 8.6 MG/DL (ref 8.7–10.5)
CHLORIDE SERPL-SCNC: 103 MMOL/L (ref 95–110)
CHOLEST SERPL-MCNC: 139 MG/DL (ref 120–199)
CHOLEST/HDLC SERPL: 3.7 {RATIO} (ref 2–5)
CO2 SERPL-SCNC: 20 MMOL/L (ref 23–29)
CREAT SERPL-MCNC: 0.8 MG/DL (ref 0.5–1.4)
CV ECHO LV RWT: 0.59 CM
DIFFERENTIAL METHOD: ABNORMAL
DOP CALC AO PEAK VEL: 1.4 M/S
DOP CALC AO VTI: 22.41 CM
DOP CALC LVOT AREA: 4.3 CM2
DOP CALC LVOT DIAMETER: 2.33 CM
DOP CALC LVOT PEAK VEL: 1.17 M/S
DOP CALC LVOT STROKE VOLUME: 87.15 CM3
DOP CALC MV VTI: 15.59 CM
DOP CALCLVOT PEAK VEL VTI: 20.45 CM
E WAVE DECELERATION TIME: 270.48 MSEC
E/A RATIO: 0.45
E/E' RATIO: 5.18 M/S
ECHO LV POSTERIOR WALL: 1.17 CM (ref 0.6–1.1)
EJECTION FRACTION: 75 %
EOSINOPHIL # BLD AUTO: 0.1 K/UL (ref 0–0.5)
EOSINOPHIL NFR BLD: 1.3 % (ref 0–8)
ERYTHROCYTE [DISTWIDTH] IN BLOOD BY AUTOMATED COUNT: 13.2 % (ref 11.5–14.5)
EST. GFR  (AFRICAN AMERICAN): >60 ML/MIN/1.73 M^2
EST. GFR  (NON AFRICAN AMERICAN): >60 ML/MIN/1.73 M^2
ESTIMATED AVG GLUCOSE: 105 MG/DL (ref 68–131)
FRACTIONAL SHORTENING: 33 % (ref 28–44)
GLUCOSE SERPL-MCNC: 115 MG/DL (ref 70–110)
HBA1C MFR BLD: 5.3 % (ref 4–5.6)
HCT VFR BLD AUTO: 43.1 % (ref 40–54)
HDLC SERPL-MCNC: 38 MG/DL (ref 40–75)
HDLC SERPL: 27.3 % (ref 20–50)
HGB BLD-MCNC: 15.5 G/DL (ref 14–18)
IMM GRANULOCYTES # BLD AUTO: 0.01 K/UL (ref 0–0.04)
IMM GRANULOCYTES NFR BLD AUTO: 0.2 % (ref 0–0.5)
INTERVENTRICULAR SEPTUM: 1.3 CM (ref 0.6–1.1)
LA MAJOR: 4.19 CM
LA MINOR: 5.24 CM
LA WIDTH: 3.59 CM
LDLC SERPL CALC-MCNC: 87 MG/DL (ref 63–159)
LEFT ATRIUM SIZE: 3.54 CM
LEFT ATRIUM VOLUME INDEX MOD: 21.7 ML/M2
LEFT ATRIUM VOLUME INDEX: 27.5 ML/M2
LEFT ATRIUM VOLUME MOD: 39.75 CM3
LEFT ATRIUM VOLUME: 50.3 CM3
LEFT INTERNAL DIMENSION IN SYSTOLE: 2.66 CM (ref 2.1–4)
LEFT VENTRICLE DIASTOLIC VOLUME INDEX: 38.12 ML/M2
LEFT VENTRICLE DIASTOLIC VOLUME: 69.76 ML
LEFT VENTRICLE MASS INDEX: 94 G/M2
LEFT VENTRICLE SYSTOLIC VOLUME INDEX: 14.2 ML/M2
LEFT VENTRICLE SYSTOLIC VOLUME: 25.93 ML
LEFT VENTRICULAR INTERNAL DIMENSION IN DIASTOLE: 3.99 CM (ref 3.5–6)
LEFT VENTRICULAR MASS: 172.05 G
LV LATERAL E/E' RATIO: 4 M/S
LV SEPTAL E/E' RATIO: 7.33 M/S
LYMPHOCYTES # BLD AUTO: 1.9 K/UL (ref 1–4.8)
LYMPHOCYTES NFR BLD: 34.8 % (ref 18–48)
MCH RBC QN AUTO: 34.4 PG (ref 27–31)
MCHC RBC AUTO-ENTMCNC: 36 G/DL (ref 32–36)
MCV RBC AUTO: 96 FL (ref 82–98)
MONOCYTES # BLD AUTO: 0.8 K/UL (ref 0.3–1)
MONOCYTES NFR BLD: 14.2 % (ref 4–15)
MV MEAN GRADIENT: 1 MMHG
MV PEAK A VEL: 0.98 M/S
MV PEAK E VEL: 0.44 M/S
MV PEAK GRADIENT: 4 MMHG
MV STENOSIS PRESSURE HALF TIME: 78.44 MS
MV VALVE AREA BY CONTINUITY EQUATION: 5.59 CM2
MV VALVE AREA P 1/2 METHOD: 2.8 CM2
NEUTROPHILS # BLD AUTO: 2.6 K/UL (ref 1.8–7.7)
NEUTROPHILS NFR BLD: 48.9 % (ref 38–73)
NONHDLC SERPL-MCNC: 101 MG/DL
NRBC BLD-RTO: 0 /100 WBC
PISA TR MAX VEL: 2.66 M/S
PLATELET # BLD AUTO: 88 K/UL (ref 150–450)
PMV BLD AUTO: 13 FL (ref 9.2–12.9)
POTASSIUM SERPL-SCNC: 3.8 MMOL/L (ref 3.5–5.1)
PROT SERPL-MCNC: 6.8 G/DL (ref 6–8.4)
PV PEAK VELOCITY: 1.23 CM/S
RA MAJOR: 4.4 CM
RA PRESSURE: 8 MMHG
RA WIDTH: 3.71 CM
RBC # BLD AUTO: 4.5 M/UL (ref 4.6–6.2)
RIGHT VENTRICULAR END-DIASTOLIC DIMENSION: 2.56 CM
RV TISSUE DOPPLER FREE WALL SYSTOLIC VELOCITY 1 (APICAL 4 CHAMBER VIEW): 18.66 CM/S
SINUS: 3.8 CM
SODIUM SERPL-SCNC: 138 MMOL/L (ref 136–145)
STJ: 3.61 CM
TDI LATERAL: 0.11 M/S
TDI SEPTAL: 0.06 M/S
TDI: 0.09 M/S
TR MAX PG: 28 MMHG
TRIGL SERPL-MCNC: 70 MG/DL (ref 30–150)
TSH SERPL DL<=0.005 MIU/L-ACNC: 3.05 UIU/ML (ref 0.4–4)
TV REST PULMONARY ARTERY PRESSURE: 36 MMHG
WBC # BLD AUTO: 5.37 K/UL (ref 3.9–12.7)

## 2022-01-14 PROCEDURE — 80061 LIPID PANEL: CPT | Performed by: STUDENT IN AN ORGANIZED HEALTH CARE EDUCATION/TRAINING PROGRAM

## 2022-01-14 PROCEDURE — 83036 HEMOGLOBIN GLYCOSYLATED A1C: CPT | Performed by: STUDENT IN AN ORGANIZED HEALTH CARE EDUCATION/TRAINING PROGRAM

## 2022-01-14 PROCEDURE — 87389 HIV-1 AG W/HIV-1&-2 AB AG IA: CPT | Performed by: STUDENT IN AN ORGANIZED HEALTH CARE EDUCATION/TRAINING PROGRAM

## 2022-01-14 PROCEDURE — 36415 COLL VENOUS BLD VENIPUNCTURE: CPT | Performed by: STUDENT IN AN ORGANIZED HEALTH CARE EDUCATION/TRAINING PROGRAM

## 2022-01-14 PROCEDURE — 96376 TX/PRO/DX INJ SAME DRUG ADON: CPT | Mod: 59

## 2022-01-14 PROCEDURE — 25000003 PHARM REV CODE 250: Performed by: STUDENT IN AN ORGANIZED HEALTH CARE EDUCATION/TRAINING PROGRAM

## 2022-01-14 PROCEDURE — 63600175 PHARM REV CODE 636 W HCPCS: Performed by: STUDENT IN AN ORGANIZED HEALTH CARE EDUCATION/TRAINING PROGRAM

## 2022-01-14 PROCEDURE — 80053 COMPREHEN METABOLIC PANEL: CPT | Performed by: STUDENT IN AN ORGANIZED HEALTH CARE EDUCATION/TRAINING PROGRAM

## 2022-01-14 PROCEDURE — 96374 THER/PROPH/DIAG INJ IV PUSH: CPT | Mod: 59

## 2022-01-14 PROCEDURE — 85025 COMPLETE CBC W/AUTO DIFF WBC: CPT | Performed by: STUDENT IN AN ORGANIZED HEALTH CARE EDUCATION/TRAINING PROGRAM

## 2022-01-14 PROCEDURE — G0378 HOSPITAL OBSERVATION PER HR: HCPCS

## 2022-01-14 PROCEDURE — 86803 HEPATITIS C AB TEST: CPT | Performed by: STUDENT IN AN ORGANIZED HEALTH CARE EDUCATION/TRAINING PROGRAM

## 2022-01-14 PROCEDURE — 96361 HYDRATE IV INFUSION ADD-ON: CPT

## 2022-01-14 PROCEDURE — 84443 ASSAY THYROID STIM HORMONE: CPT | Performed by: STUDENT IN AN ORGANIZED HEALTH CARE EDUCATION/TRAINING PROGRAM

## 2022-01-14 PROCEDURE — 96372 THER/PROPH/DIAG INJ SC/IM: CPT | Mod: 59

## 2022-01-14 PROCEDURE — 96375 TX/PRO/DX INJ NEW DRUG ADDON: CPT

## 2022-01-14 RX ORDER — LISINOPRIL 5 MG/1
10 TABLET ORAL ONCE
Status: COMPLETED | OUTPATIENT
Start: 2022-01-14 | End: 2022-01-14

## 2022-01-14 RX ORDER — NAPROXEN SODIUM 220 MG/1
81 TABLET, FILM COATED ORAL DAILY
Qty: 30 TABLET | Refills: 1 | Status: SHIPPED | OUTPATIENT
Start: 2022-01-14 | End: 2023-04-06 | Stop reason: SDUPTHER

## 2022-01-14 RX ORDER — SODIUM CHLORIDE, SODIUM LACTATE, POTASSIUM CHLORIDE, CALCIUM CHLORIDE 600; 310; 30; 20 MG/100ML; MG/100ML; MG/100ML; MG/100ML
INJECTION, SOLUTION INTRAVENOUS CONTINUOUS
Status: ACTIVE | OUTPATIENT
Start: 2022-01-14 | End: 2022-01-15

## 2022-01-14 RX ORDER — LISINOPRIL 20 MG/1
20 TABLET ORAL DAILY
Status: DISCONTINUED | OUTPATIENT
Start: 2022-01-15 | End: 2022-01-15

## 2022-01-14 RX ORDER — LISINOPRIL 5 MG/1
10 TABLET ORAL DAILY
Status: DISCONTINUED | OUTPATIENT
Start: 2022-01-14 | End: 2022-01-14

## 2022-01-14 RX ORDER — ATORVASTATIN CALCIUM 20 MG/1
20 TABLET, FILM COATED ORAL DAILY
Status: DISCONTINUED | OUTPATIENT
Start: 2022-01-14 | End: 2022-01-14

## 2022-01-14 RX ORDER — LISINOPRIL 20 MG/1
20 TABLET ORAL DAILY
Qty: 90 TABLET | Refills: 3 | Status: SHIPPED | OUTPATIENT
Start: 2022-01-15 | End: 2022-01-16 | Stop reason: HOSPADM

## 2022-01-14 RX ORDER — HYDRALAZINE HYDROCHLORIDE 25 MG/1
25 TABLET, FILM COATED ORAL EVERY 8 HOURS
Status: DISCONTINUED | OUTPATIENT
Start: 2022-01-14 | End: 2022-01-15

## 2022-01-14 RX ORDER — ONDANSETRON 2 MG/ML
4 INJECTION INTRAMUSCULAR; INTRAVENOUS EVERY 6 HOURS PRN
Status: DISCONTINUED | OUTPATIENT
Start: 2022-01-14 | End: 2022-01-16 | Stop reason: HOSPADM

## 2022-01-14 RX ORDER — NAPROXEN SODIUM 220 MG/1
81 TABLET, FILM COATED ORAL DAILY
Status: DISCONTINUED | OUTPATIENT
Start: 2022-01-14 | End: 2022-01-16 | Stop reason: HOSPADM

## 2022-01-14 RX ADMIN — ACETAMINOPHEN 650 MG: 325 TABLET ORAL at 03:01

## 2022-01-14 RX ADMIN — ONDANSETRON 4 MG: 2 INJECTION INTRAMUSCULAR; INTRAVENOUS at 03:01

## 2022-01-14 RX ADMIN — ASPIRIN 81 MG CHEWABLE TABLET 81 MG: 81 TABLET CHEWABLE at 01:01

## 2022-01-14 RX ADMIN — SODIUM CHLORIDE, SODIUM LACTATE, POTASSIUM CHLORIDE, AND CALCIUM CHLORIDE: .6; .31; .03; .02 INJECTION, SOLUTION INTRAVENOUS at 04:01

## 2022-01-14 RX ADMIN — ENOXAPARIN SODIUM 40 MG: 100 INJECTION SUBCUTANEOUS at 04:01

## 2022-01-14 RX ADMIN — LISINOPRIL 10 MG: 5 TABLET ORAL at 08:01

## 2022-01-14 RX ADMIN — HYDRALAZINE HYDROCHLORIDE 25 MG: 25 TABLET, FILM COATED ORAL at 09:01

## 2022-01-14 RX ADMIN — LORAZEPAM 1 MG: 2 INJECTION INTRAMUSCULAR; INTRAVENOUS at 03:01

## 2022-01-14 RX ADMIN — HYDRALAZINE HYDROCHLORIDE 25 MG: 25 TABLET, FILM COATED ORAL at 01:01

## 2022-01-14 RX ADMIN — LISINOPRIL 10 MG: 5 TABLET ORAL at 06:01

## 2022-01-14 RX ADMIN — SODIUM CHLORIDE, SODIUM LACTATE, POTASSIUM CHLORIDE, AND CALCIUM CHLORIDE 500 ML: .6; .31; .03; .02 INJECTION, SOLUTION INTRAVENOUS at 03:01

## 2022-01-14 RX ADMIN — HYDRALAZINE HYDROCHLORIDE 25 MG: 25 TABLET, FILM COATED ORAL at 08:01

## 2022-01-14 RX ADMIN — LORAZEPAM 1 MG: 2 INJECTION INTRAMUSCULAR; INTRAVENOUS at 09:01

## 2022-01-14 RX ADMIN — ACETAMINOPHEN 650 MG: 325 TABLET ORAL at 08:01

## 2022-01-14 RX ADMIN — SODIUM CHLORIDE, SODIUM LACTATE, POTASSIUM CHLORIDE, AND CALCIUM CHLORIDE: .6; .31; .03; .02 INJECTION, SOLUTION INTRAVENOUS at 09:01

## 2022-01-14 NOTE — PLAN OF CARE
Plan of care reviewed with the patient. Scheduled medicines given and the patient tolerated well. Blood Pressure was 178/97, given Hydralazine 25 mg on the orders of Dr. Nathan. Acu check was 127 mg/dl. Fall and safety precautions are in place. Patient's BP was 186/102, @ 0530 hrs, informed . Not in acute distress throughout the shift. Advised the patient to call for assistance. Continued monitoring the patient.

## 2022-01-14 NOTE — PROGRESS NOTES
"[facility] Resident HO-I Progress Note  Subjective:      Eitan Cody is a 61 y.o. male who is [place] the [facility] [place] [facility], Hypertension and Chest Pain .     Patient had no acute events overnight. This morning Mr. Cody still has headache and high blood pressure of 186/102 at time of evaluation. He denies any SOB , cough, chilis. No fever. No chest pain. Added hydralazine 25 mg and increased his lisinopril to 20 mg. Pending echo and stress test.    Objective:   Last 24 Hour Vital Signs:  BP  Min: 154/93  Max: 213/137  Temp  Av.1 °F (36.7 °C)  Min: 96.5 °F (35.8 °C)  Max: 99.7 °F (37.6 °C)  Pulse  Av.7  Min: 66  Max: 110  Resp  Av.6  Min: 16  Max: 25  SpO2  Av.9 %  Min: 95 %  Max: 98 %  Height  Av' 7" (170.2 cm)  Min: 5' 7" (170.2 cm)  Max: 5' 7" (170.2 cm)  Weight  Av.9 kg (158 lb 9.5 oz)  Min: 71.3 kg (157 lb 3 oz)  Max: 72.6 kg (160 lb)  No intake/output data recorded.    Physical Examination:    Constitutional: He appears well-developed. He is not diaphoretic. No distress.   HENT:   Head: Normocephalic and atraumatic.   Eyes: Conjunctivae and EOM are normal. Pupils are equal, round, and reactive to light. Right eye exhibits no discharge. Left eye exhibits no discharge.   Neck: Neck supple. No thyromegaly present.   Normal range of motion.  Cardiovascular: Normal rate, regular rhythm and normal heart sounds. Exam reveals no gallop.    No murmur heard.  Pulmonary/Chest: No respiratory distress. He has no wheezes. He has no rhonchi. He exhibits no tenderness.   Abdominal: Abdomen is soft. Bowel sounds are normal. He exhibits no distension. There is no abdominal tenderness.   Musculoskeletal:         General: No tenderness or edema. Normal range of motion.      Cervical back: Normal range of motion and neck supple.     Lymphadenopathy:     He has no cervical adenopathy.   Neurological: He is alert and oriented to person, place, and time. He has normal strength. " No cranial nerve deficit.   Skin: Skin is warm. Capillary refill takes 2 to 3 seconds. There is erythema.   Psychiatric: He has a normal mood and affect. His behavior is normal.     Laboratory:  Trended Lab Data:  Recent Labs   Lab 01/13/22  1104   WBC 5.35   HGB 16.3   HCT 45.5   PLT 92*   MCV 95   RDW 13.1      K 3.9      CO2 25   BUN 13   CREATININE 0.8   *   PROT 7.5   ALBUMIN 3.6   BILITOT 1.6*   *   ALKPHOS 109   *       Trended Cardiac Data:  Recent Labs   Lab 01/13/22  1104 01/13/22  1650   TROPONINI 0.040* 0.034*   BNP 87  --        Microbiology:  Microbiology Results (last 7 days)     ** No results found for the last 168 hours. **           Other Results:  EKG (my interpretation):  ECG Results          EKG 12-lead (Final result)  Result time 01/14/22 05:05:34    Final result by Interface, Lab In [facility] (01/14/22 05:05:34)                 Narrative:    Test Reason : I10,    Vent. Rate : 074 BPM     Atrial Rate : 074 BPM     P-R Int : 150 ms          QRS Dur : 096 ms      QT Int : 386 ms       P-R-T Axes : 045 -19 040 degrees     QTc Int : 428 ms    Normal sinus rhythm  Normal ECG  No previous ECGs available  Confirmed by MISSY REYNAGA MD ([id number]) on 1/14/2022 5:05:26 AM    Referred By: [email or web address]   SELF           Confirmed By:MISSY REYNAGA MD                               Radiology:  Imaging Results          X-Ray Chest 1 View (Final result)  Result time 01/13/22 14:39:00    Final result by Rodrigo Lancaster MD (01/13/22 14:39:00)                 Impression:      No radiographic evidence of pneumonia or other source of cough/shortness of breath on this single view, noting that early/mild viral pneumonia may be radiographically occult.      Electronically signed by: Rodrigo Lancaster MD  Date:    01/13/2022  Time:    14:39             Narrative:    EXAMINATION:  XR CHEST 1 VIEW    CLINICAL HISTORY:  COVID-19    TECHNIQUE:  Single frontal view of the chest was  performed.    COMPARISON:  Chest radiograph 10/26/2016    FINDINGS:  Monitoring leads overlie the chest.  Patient is slightly rotated.  No detrimental change.    Chronic nonspecific elevation the right hemidiaphragm similar to prior.  Cardiomediastinal silhouette is midline and stable.  Pulmonary vasculature and hilar contours are within normal limits.  Lungs are well expanded without consolidation, pleural effusion or pneumothorax.  Osseous structures appear stable without acute process seen.  PA and lateral views can be obtained.                               CT Head Without Contrast (Final result)  Result time 01/13/22 12:28:37    Final result by Rodrigo Lancaster MD (01/13/22 12:28:37)                 Impression:      No acute infarct or intracranial hemorrhage seen.      Electronically signed by: Rodrigo Lancaster MD  Date:    01/13/2022  Time:    12:28             Narrative:    EXAMINATION:  CT HEAD WITHOUT CONTRAST    CLINICAL HISTORY:  Headache, new or worsening (Age >= 50y);    TECHNIQUE:  Low dose axial CT images obtained throughout the head without intravenous contrast. Sagittal and coronal reconstructions were performed.    COMPARISON:  Head CT 10/26/2016    FINDINGS:  Intracranial compartment:    Ventricles and sulci are normal in size for age without evidence of hydrocephalus. No extra-axial blood or fluid collections.    Mild patchy hypoattenuation of the supratentorial white matter suggesting sequela of chronic microvascular ischemic change.  No definite new focal areas of abnormal parenchymal attenuation.  Skull base atherosclerotic vascular calcifications noted.  No parenchymal mass, hemorrhage, edema or major vascular distribution infarct.    Skull/extracranial contents (limited evaluation): No fracture. Mastoid air cells and paranasal sinuses are essentially clear.  Imaged portions of the orbits are within normal limits.                                 Current Medications:     Infusions:       Scheduled:   enoxaparin  40 mg Subcutaneous Daily    lisinopriL  10 mg Oral Daily        PRN:  acetaminophen, dextrose 50%, dextrose 50%, glucagon (human recombinant), glucose, glucose, naloxone, sars-cov-2 (covid-19), sodium chloride 0.9%    Assessment:    Eitan Cody is a 61 y.o. male with past medical history of Hypertension presented to [facility] on 1/13/2022 with a primary complaint of diffuse constant squeezing headache for 4 weeks.  Patient denies any focal weakness, visual disturbance, gait problems, speech difficulty or history of head trauma.  He recently start having in constant chest heaviness which can have it in rest and stays for few seconds and goes by itself, the last time he feels it was early today.. He also has been tested +ve for COVID with complains of subjective fever, night sweats. SBP on presentation was 213/137. mildly elevated trops of 0.04. EKG with normal Sinus rhythm. CT head with no acute abnormalities. [facility] internal medicine where consulted for admission for chest pain with elevated trops in COVID +ve patient.       Plan:     Chest pain:   - He recently start having in constant chest heaviness, can have it  rest and stays for few seconds and goes by itself.  - The last time he feels it was early today.   -He also has been tested +ve for COVID yesterday .  - SBP on presentation was 213/137.   - mildly elevated trops of 0.04. , BNP of 87  - EKG with normal Sinus rhythm.  - trend seral trops of shows decrease of trop to 0.034  - pending TTE   - pending Stress test   - Received Hydralazine 25 mg   - Started on ASA, Lisinopril 10 mg and increased to 20 mg today   - PRN tylenol.       Hypertension  - Patient ha been diagnosed with hypertension, has prescriptions for Norvasc 5 mg, hydrochlorothiazide 25 mg.  - He denies taking any meds   - SBP on presentation was 213/137.   - Had persistent headache for 4 weeks   - He denies any SOB or Palpations or leg swellings.  - Received Hydralazine 25 mg    - Started on ASA, Lisinopril 10 mg  - pending lipid panel, HbA1C     Headache   -Diffuse constant squeezing headache for 4 weeks.    - Patient denies any focal weakness, visual disturbance, gait problems, speech difficulty or history of head trauma.   - No Nausea or vomiting  - Did not try any medications   - SBP on presentation was 213/137.   - report feeling better in the ER once his BP has been controlled    - Tylenol PRN     COVID   - Tested +ve for COVID with complains of subjective fever, night sweats. -  - He denies any SOB or Palpations or leg swellings.  - SpO2 of 98 % on RA, temp 98.6  - Chest Xray  - no need for oxygen now  - will continue to monitor by SpO2 checks.       Health Maintaince   - Need to establish a primary care physician at discharge for good blood pressure control.  - COVID booster dose needed.      Diet: Cardiac Diet   Code: Full   PPx: Enoxaparin   Dispo: observation for chest pain with elevated trops. Pending echo and stress test.    Clement Paris MD  [facility] HO-1  [facility] Internal Medicine Service   [facility] Hospitalist Team     [facility] Medicine Hospitalist Pager numbers:   [facility] Hospitalist Medicine Team A (Margarito/Thien): [phone]  [facility] Hospitalist Medicine Team B (Víctor/Deep):  [phone]

## 2022-01-14 NOTE — PROGRESS NOTES
"Pt is c/o of headache 10/10. Wife stated, "I've been watching him go down since I got here. He was talking and joking around. He doesn't look good. I don't feel ok taking him home like this." Pt skin is flushed and warm to touch. Pt can follow directions but eyes remain closed through out assessment. HR sustaining 110s-120s. Notified team. No further orders. Wellington ADAM at bedside.       01/14/22 1409   Vital Signs   Temp 96.3 °F (35.7 °C)   Temp src Oral   Pulse (!) 118   Resp 18   SpO2 97 %   BP (!) 161/79   MAP (mmHg) 110   BP Location Right arm   BP Method Automatic   Patient Position Lying     "

## 2022-01-14 NOTE — PLAN OF CARE
I have talked with Dr. Johnson about Mr. Cody. That he has been presented with headaches 10/10 and chest heaviness and chest pains, blood BP in admission was 213/137. And echo from today shows Left ventricular mid-cavity obstruction is present. She advised the echo finding might be due to Left ventricular hypertrophy. Advice to give fluids and the patient might be withdrawing. She confrimed that  will be seen by the cardiology team today. Started orders to cover for the withdrawal symptoms.

## 2022-01-14 NOTE — PLAN OF CARE
Plan of care reviewed with patient. Patient voiced understanding. ST on monitor, sustaining 110s-120s with c/o of nausea, headaches and fatigue. MD aware. Side rails x 2, bed in lowest position, call bell within reach, pt advised to call for assistance. Maintain bed alarm for patient safety.

## 2022-01-14 NOTE — PLAN OF CARE
"Pt was to DC today but will be observed overnight. F/Us obtained. [facility] Cards referral info sent. Self pay Cards appt scheduled in case not seen by [facility]. Pt was to call and apply for Medicaid today.    Future Appointments   Date Time Provider Department Center   1/31/2022  2:00 PM Ney Hernandez NP [facility] CARDIO [place]   2/2/2022  1:30 PM Cheri Bhakta MD [facility] IMPRI [facility]     BP (!) 161/79 (BP Location: Right arm, Patient Position: Lying)   Pulse (!) 118   Temp 96.3 °F (35.7 °C) (Oral)   Resp 18   Ht 5' 7" (1.702 m)   Wt 71.3 kg (157 lb 3 oz)   SpO2 97%   BMI 24.62 kg/m²     [facility]  Go on 2/2/2022  FOLLOW UP WITH PCP AFTER PRIORITY CARE CLINIC APPT  [address], [address]  [facility] [address] [phone]   [facility]  Call today  ESTABLISH CARE WITH PRIMARY PROVIDER APPOINTMENT  [address]  [facility] [address]  [phone]   [facility] - Cardiology  Schedule an appointment as soon as possible for a visit  FOLLOW UP WITH CARDIOLOGIST AFTER DISCHARGE, CLINICALS FAXED TO OFFICE, If date/time not listed,  will contact  [address] [facility] [address] [phone]   [facility] - Cardiology  Go on 1/31/2022  at 2 pm; FOLLOW UP WITH CARDIOLOGIST AFTER DISCHARGE  [address] , [address]  [facility] [address] [phone]        01/14/22 1537   Post-Acute Status   Post-Acute Authorization Other   Other Status Awaiting f/u Appts   Discharge Delays None known at this time   Discharge Plan   Discharge Plan A Home        Medication List        START taking these medications      aspirin 81 MG Chew  Take 1 tablet (81 mg total) by mouth once daily.     lisinopriL 20 MG tablet  Commonly known as: PRINIVIL,ZESTRIL  Take 1 tablet (20 mg total) by mouth once daily.  Start taking on: January 15, 2022            CONTINUE taking these medications      ofloxacin 0.3 % ophthalmic " solution  Commonly known as: OCUFLOX  Instill 2 drops in both eyes four times a day; apply for 7 days            STOP taking these medications      amLODIPine 5 MG tablet  Commonly known as: NORVASC     hydroCHLOROthiazide 25 MG tablet  Commonly known as: HYDRODIURIL               Where to Get Your Medications        These medications were sent to [facility] [address], [place] [address]      Hours: Mon-Fri, 8a-5:30p Phone: [phone]   aspirin 81 MG Chew  lisinopriL 20 MG tablet

## 2022-01-14 NOTE — PLAN OF CARE
"Pt under Covid isolation. DCA done virtually with patient with [employer] on unit. Pt granted permission to virtually enter the room. Demographics/Ins/NextofKin/PCP verified with patient/family. Denies any DME needs at present from pt/family. Patient/family plans to return home with family. Educated on bedside RX. Patient consents for [place] to discuss discharge plan with next of kin. Preferences appointment times and location obtained. Patient reports they will have transportation home upon discharge.    Pt given Medicaid yocasta line number to call. Pt agreeable to [facility] program and F/U with PCC. Text link to pt for sign up.    No future appointments.    BP (!) 152/81   Pulse 103   Temp 96.3 °F (35.7 °C) (Oral)   Resp 18   Ht 5' 7" (1.702 m)   Wt 71.3 kg (157 lb 3 oz)   SpO2 95%   BMI 24.62 kg/m²      aspirin  81 mg Oral Daily    enoxaparin  40 mg Subcutaneous Daily    hydrALAZINE  25 mg Oral Q8H    [START ON 1/15/2022] lisinopriL  20 mg Oral Daily   '     01/14/22 1152   Discharge Planning   Assessment Type Discharge Planning Brief Assessment   Resource/Environmental Concerns financial   Financial Concerns insurance, none   Support Systems Spouse/significant other   Equipment Currently Used at Home none   Current Living Arrangements home/apartment/condo   Patient/Family Anticipates Transition to home;home with family   Patient/Family Anticipated Services at Transition none   DME Needed Upon Discharge  none   Discharge Plan A Home;Home with family     "

## 2022-01-15 LAB
ALBUMIN SERPL BCP-MCNC: 3.1 G/DL (ref 3.5–5.2)
ALP SERPL-CCNC: 88 U/L (ref 55–135)
ALT SERPL W/O P-5'-P-CCNC: 75 U/L (ref 10–44)
ANION GAP SERPL CALC-SCNC: 12 MMOL/L (ref 8–16)
AST SERPL-CCNC: 90 U/L (ref 10–40)
BILIRUB SERPL-MCNC: 1.5 MG/DL (ref 0.1–1)
BUN SERPL-MCNC: 16 MG/DL (ref 8–23)
CALCIUM SERPL-MCNC: 8.4 MG/DL (ref 8.7–10.5)
CHLORIDE SERPL-SCNC: 105 MMOL/L (ref 95–110)
CO2 SERPL-SCNC: 20 MMOL/L (ref 23–29)
CREAT SERPL-MCNC: 0.9 MG/DL (ref 0.5–1.4)
ERYTHROCYTE [DISTWIDTH] IN BLOOD BY AUTOMATED COUNT: 13.3 % (ref 11.5–14.5)
EST. GFR  (AFRICAN AMERICAN): >60 ML/MIN/1.73 M^2
EST. GFR  (NON AFRICAN AMERICAN): >60 ML/MIN/1.73 M^2
GLUCOSE SERPL-MCNC: 112 MG/DL (ref 70–110)
HCT VFR BLD AUTO: 41.9 % (ref 40–54)
HGB BLD-MCNC: 15.1 G/DL (ref 14–18)
MCH RBC QN AUTO: 34.6 PG (ref 27–31)
MCHC RBC AUTO-ENTMCNC: 36 G/DL (ref 32–36)
MCV RBC AUTO: 96 FL (ref 82–98)
PLATELET # BLD AUTO: 94 K/UL (ref 150–450)
PMV BLD AUTO: 11.8 FL (ref 9.2–12.9)
POTASSIUM SERPL-SCNC: 4.1 MMOL/L (ref 3.5–5.1)
PROT SERPL-MCNC: 6.5 G/DL (ref 6–8.4)
RBC # BLD AUTO: 4.37 M/UL (ref 4.6–6.2)
SODIUM SERPL-SCNC: 137 MMOL/L (ref 136–145)
WBC # BLD AUTO: 7.07 K/UL (ref 3.9–12.7)

## 2022-01-15 PROCEDURE — G0378 HOSPITAL OBSERVATION PER HR: HCPCS

## 2022-01-15 PROCEDURE — 80053 COMPREHEN METABOLIC PANEL: CPT | Performed by: STUDENT IN AN ORGANIZED HEALTH CARE EDUCATION/TRAINING PROGRAM

## 2022-01-15 PROCEDURE — 63600175 PHARM REV CODE 636 W HCPCS: Performed by: STUDENT IN AN ORGANIZED HEALTH CARE EDUCATION/TRAINING PROGRAM

## 2022-01-15 PROCEDURE — 96376 TX/PRO/DX INJ SAME DRUG ADON: CPT

## 2022-01-15 PROCEDURE — 25000003 PHARM REV CODE 250: Performed by: STUDENT IN AN ORGANIZED HEALTH CARE EDUCATION/TRAINING PROGRAM

## 2022-01-15 PROCEDURE — 85027 COMPLETE CBC AUTOMATED: CPT | Performed by: STUDENT IN AN ORGANIZED HEALTH CARE EDUCATION/TRAINING PROGRAM

## 2022-01-15 PROCEDURE — 96372 THER/PROPH/DIAG INJ SC/IM: CPT | Mod: 59

## 2022-01-15 PROCEDURE — 36415 COLL VENOUS BLD VENIPUNCTURE: CPT | Performed by: STUDENT IN AN ORGANIZED HEALTH CARE EDUCATION/TRAINING PROGRAM

## 2022-01-15 PROCEDURE — 96361 HYDRATE IV INFUSION ADD-ON: CPT

## 2022-01-15 RX ORDER — GABAPENTIN 300 MG/1
600 CAPSULE ORAL 3 TIMES DAILY
Status: DISCONTINUED | OUTPATIENT
Start: 2022-01-15 | End: 2022-01-15

## 2022-01-15 RX ORDER — ATORVASTATIN CALCIUM 20 MG/1
20 TABLET, FILM COATED ORAL NIGHTLY
Status: DISCONTINUED | OUTPATIENT
Start: 2022-01-15 | End: 2022-01-16 | Stop reason: HOSPADM

## 2022-01-15 RX ORDER — ACETAMINOPHEN 325 MG/1
650 TABLET ORAL EVERY 6 HOURS PRN
Status: DISCONTINUED | OUTPATIENT
Start: 2022-01-15 | End: 2022-01-16 | Stop reason: HOSPADM

## 2022-01-15 RX ORDER — LOSARTAN POTASSIUM 50 MG/1
100 TABLET ORAL DAILY
Status: DISCONTINUED | OUTPATIENT
Start: 2022-01-15 | End: 2022-01-16 | Stop reason: HOSPADM

## 2022-01-15 RX ORDER — GABAPENTIN 300 MG/1
300 CAPSULE ORAL 3 TIMES DAILY
Status: DISCONTINUED | OUTPATIENT
Start: 2022-01-15 | End: 2022-01-16

## 2022-01-15 RX ADMIN — GABAPENTIN 300 MG: 300 CAPSULE ORAL at 08:01

## 2022-01-15 RX ADMIN — ASPIRIN 81 MG CHEWABLE TABLET 81 MG: 81 TABLET CHEWABLE at 09:01

## 2022-01-15 RX ADMIN — LORAZEPAM 1 MG: 2 INJECTION INTRAMUSCULAR; INTRAVENOUS at 12:01

## 2022-01-15 RX ADMIN — LOSARTAN POTASSIUM 100 MG: 50 TABLET, FILM COATED ORAL at 09:01

## 2022-01-15 RX ADMIN — ATORVASTATIN CALCIUM 20 MG: 20 TABLET, FILM COATED ORAL at 08:01

## 2022-01-15 RX ADMIN — SODIUM CHLORIDE, SODIUM LACTATE, POTASSIUM CHLORIDE, AND CALCIUM CHLORIDE 500 ML: .6; .31; .03; .02 INJECTION, SOLUTION INTRAVENOUS at 05:01

## 2022-01-15 RX ADMIN — ACETAMINOPHEN 650 MG: 325 TABLET ORAL at 05:01

## 2022-01-15 RX ADMIN — GABAPENTIN 300 MG: 300 CAPSULE ORAL at 05:01

## 2022-01-15 RX ADMIN — HYDRALAZINE HYDROCHLORIDE 25 MG: 25 TABLET, FILM COATED ORAL at 05:01

## 2022-01-15 RX ADMIN — ENOXAPARIN SODIUM 40 MG: 100 INJECTION SUBCUTANEOUS at 05:01

## 2022-01-15 NOTE — PLAN OF CARE
"Plan of care reviewed with the patient. Scheduled medicines given and the patient tolerated well. Given Lorazepam 1 mg IV for anxiety.  Patient's HR was in 140s, reported to MD, Dr. Sharpe. On continuous IV infusion lactated Ringer 150 ml/hr. Fall and safety precautions are in place. On Telemetry monitoring with Sinus rhythm to Sinus Tachycardia, no true "red alarms" noted in the shift. Not in acute distress in the shift. Advised the patient to call for assistance. Continued monitoring the patient.  "

## 2022-01-15 NOTE — PLAN OF CARE
Seen at bedside. Chart reviewed. BP better controlled from admission but still high.     Chest pain has resolved with better BP control.     Recommendations:  Outpatient stress test.   Outpatient cardiology follow up.   Needs better blood pressure control. Defer to internal medicine.   Sinus tachycardia likely secondary to active COVID infection (febrile at 101.4 this afternoon). HR will improve with resolution of acute process.    Cardiology will sign off.   Please call with questions.

## 2022-01-15 NOTE — CONSULTS
[facility] CARDIOLOGY Initial Consultation Note    Reason for Consultation:  Chest pain    HPI:  61 y.o. admitted for evalution and treatment of headache x 4 weeks, Covid 19 positivity and frequent episodes of chest pain along with postural dizziness.  Episodes of chest pain are characterized as fleeting with spontaneous onset and offset.  He denies orthopnea, PND, lower extremity edema, palpitations, hemoptysis.  He admits to non adherence to antihypertensive therapy.    Past Medical History:   Diagnosis Date    Hypertension      History reviewed. No pertinent surgical history.  Social History     Socioeconomic History    Marital status: Single   Tobacco Use    Smoking status: Never Smoker    Smokeless tobacco: Never Used   Substance and Sexual Activity    Alcohol use: Yes     Alcohol/week: 6.0 standard drinks     Types: 6 Cans of beer per week     Comment: everyday last drink Monday night    Drug use: No     History reviewed. No pertinent family history.    Review of patient's allergies indicates:  No Known Allergies    ROS:  Cardiovascular system reviewed in detail as per HPI.   No bowel or bladder issues.  +night sweats.  Other systems negative.       Current Hospital Medications  Prior to Admission medications    Medication Sig Start Date End Date Taking? Authorizing Provider   aspirin 81 MG Chew Take 1 tablet (81 mg total) by mouth once daily. 1/14/22 1/14/23  Venita Ann MD   lisinopriL (PRINIVIL,ZESTRIL) 20 MG tablet Take 1 tablet (20 mg total) by mouth once daily. 1/15/22 1/15/23  Venita Ann MD   ofloxacin (OCUFLOX) 0.3 % ophthalmic solution Instill 2 drops in both eyes four times a day; apply for 7 days 1/12/22      pulse oximeter (PULSE OXIMETER) device by Apply Externally route 2 (two) times a day. Use twice daily at 8 AM and 3 PM and record the value in MyChart as directed. 1/14/22   Chai Renee MD   amLODIPine (NORVASC) 5 MG tablet Take 1 tablet (5 mg total) by mouth once  "daily.  Patient not taking: Reported on 1/13/2022 12/20/20 1/14/22  Marc Joy PA-C   hydrochlorothiazide (HYDRODIURIL) 25 MG tablet Take 1 tablet (25 mg total) by mouth once daily.  Patient not taking: Reported on 1/13/2022 2/25/16 1/14/22  Stacie Talbot MD       Scheduled Meds:   aspirin  81 mg Oral Daily    enoxaparin  40 mg Subcutaneous Daily    hydrALAZINE  25 mg Oral Q8H    [START ON 1/15/2022] lisinopriL  20 mg Oral Daily     Continuous Infusions:   lactated ringers 150 mL/hr at 01/14/22 1655     PRN: acetaminophen, dextrose 50%, dextrose 50%, glucagon (human recombinant), glucose, glucose, lorazepam, naloxone, ondansetron, sars-cov-2 (covid-19), sodium chloride 0.9%    VITAL SIGNS  BP (!) 140/77 (Patient Position: Lying)   Pulse 95   Temp 96.4 °F (35.8 °C) (Oral)   Resp 18   Ht 5' 7" (1.702 m)   Wt 71.3 kg (157 lb 3 oz)   SpO2 97%   BMI 24.62 kg/m²   No intake or output data in the 24 hours ending 01/14/22 1923    OBJECTIVE    Physical Exam  Temp:  [96.3 °F (35.7 °C)-99.7 °F (37.6 °C)] 96.4 °F (35.8 °C)  Pulse:  [] 95  Resp:  [18] 18  SpO2:  [95 %-98 %] 97 %  BP: (140-203)/() 140/77    Gen: Patient awake and alert, in NAD  Eyes: Pupils equal and round.  Sclerae anicteric, noninjected conjunctivae.  HENT: NC/AT, nasal septum midline, MMM, OP clear and without exudates.  CV: Regular rhythm.  Normal S1, S2.  No murmurs, rub or gallop.  JVP normal.  Chest:  Symmetric chest wall expansion.  Good air movement.  No wheezes or crackles.  Abd:  Soft, Non-tender.  Non distended.  Normoactive bowel sounds, no rebound  Ext: +2 radial pulses, no C/C/E, warm to touch  Skin: intact, no lesions or rashes noted.  No decubitus ulcer.  Neuro:  Moves all extremities grossly.  Tongue midline.  Psych: Appropriate affect    Lab Results  Recent Labs   Lab 01/14/22  0344      K 3.8      CO2 20*   BUN 14   CREATININE 0.8     No results for input(s): CPK, CPKMB, TROPONINI, MB in the last 24 " hours.  Recent Labs   Lab 01/14/22  0344   WBC 5.37   RBC 4.50*   HGB 15.5   HCT 43.1   PLT 88*   MCV 96   MCH 34.4*   MCHC 36.0     No results for input(s): PT, INR, APTT in the last 24 hours.    EKG 1/13/22:  NSR.  Normal limits EKG.      Echo 1/14/22:  · Concentric remodeling and hyperdynamic systolic function.  · The estimated ejection fraction is 75%.  · Left ventricular mid-cavity obstruction is present. Mid-cavity gradient 23 mmHg.  · Normal left ventricular diastolic function.  · Normal right ventricular size with normal right ventricular systolic function.  · The sinuses of Valsalva is mildly dilated.  · Intermediate central venous pressure (8 mmHg).  · The estimated PA systolic pressure is 36 mmHg.      LABS:         ASSESSMENT/PLAN    1.  Chest pain:  Patient has symptoms that are likely non-cardiac to possibly cardiac in nature.  Given the duration of symptoms and the negativity of blood work, EKG and echo, and based upon October 2021 chest pain guidelines, additional work up for ischemic heart disease can be conducted in the outpatient setting.  --  Can stop ACS protocol.  --  No additional inpatient testing.    2.  HTN:  Patient has HTN and needs to get his systolic blood pressures below 130 mm Hg.  He is currently on hydralazine and lisinopril.  We believe that his blood pressure is to a large extent related to his chest pain.  --  Hydralazine compliance will be an issue.  --  Might be best due to give him losartan 100 mg once daily instead of lisinopril as planned for the outpatient setting.  --  His LV function is hyperdynamic.     3.  Covid test positivity:  Patient tested positive for Covid 19 on 1/13/22.  There is no lab, ECG or echo evidence of adverse cardiac effect from Covid 19 to the extent this can be determined.       Osbaldo Muniz MD  [facility]

## 2022-01-15 NOTE — PROGRESS NOTES
[facility] Resident KATE Progress Note  Subjective:      Eitan Cody is a 61 y.o. male who is being [place] by the [facility] [place] [facility] [place], [place]pertension and Chest Pain.     Patient still with headaches this morning. States it is a aching all over headache. Denies visual changes, dizziness, and focal weakness. Denies any further episodes of chest pain. Denies palpitations, SOB, anxiety, and tremulousness.     Objective:   Last 24 Hour Vital Signs:  BP  Min: 140/77  Max: 178/84  Temp  Av.9 °F (36.6 °C)  Min: 96.3 °F (35.7 °C)  Max: 99.9 °F (37.7 °C)  Pulse  Av.2  Min: 77  Max: 129  Resp  Av  Min: 18  Max: 18  SpO2  Av %  Min: 93 %  Max: 97 %  No intake/output data recorded.    Physical Examination:    Constitutional: He is not diaphoretic. No distress.   HENT:   Head: Normocephalic and atraumatic.   Eyes: Conjunctivae and EOM are normal. Pupils are equal, round, and reactive to light. Right eye exhibits no discharge. Left eye exhibits no discharge.   Neck: Neck supple. No thyromegaly present. Normal range of motion.  Cardiovascular: Tachycardic. Difficult to assess murmurs.   Pulmonary/Chest: No respiratory distress. He has no wheezes. He has no rhonchi. He exhibits no tenderness.   Abdominal: Abdomen is soft. Bowel sounds are normal. He exhibits no distension. There is no abdominal tenderness.   Musculoskeletal:         General: No tenderness or edema. Normal range of motion.      Cervical back: Normal range of motion and neck supple.   Neurological: He is alert and oriented to person, place, and time. He has normal strength in upper and lower extremities. He is mild resting tremor.   Skin: Skin is warm. Capillary refill <3 sec  Psychiatric: He has a normal mood and affect. His behavior is normal.      Laboratory:  Trended Lab Data:  Recent Labs   Lab 22  1104 22  0344 01/15/22  0758   WBC 5.35 5.37 7.07   HGB 16.3 15.5 15.1   HCT 45.5 43.1 41.9   PLT 92* 88* 94*   MCV 95 96  96   RDW 13.1 13.2 13.3    138 137   K 3.9 3.8 4.1    103 105   CO2 25 20* 20*   BUN 13 14 16   CREATININE 0.8 0.8 0.9   * 115* 112*   PROT 7.5 6.8 6.5   ALBUMIN 3.6 3.3* 3.1*   BILITOT 1.6* 1.5* 1.5*   * 105* 90*   ALKPHOS 109 97 88   * 85* 75*       Trended Cardiac Data:  Recent Labs   Lab 01/13/22  1104 01/13/22  1650   TROPONINI 0.040* 0.034*   BNP 87  --        Microbiology:  Microbiology Results (last 7 days)     ** No results found for the last 168 hours. **           Other Results:  EKG (my interpretation):  ECG Results          EKG 12-lead (Final result)  Result time 01/14/22 05:05:34    Final result by Interface, Lab In [facility] (01/14/22 05:05:34)                 Narrative:    Test Reason : I10,    Vent. Rate : 074 BPM     Atrial Rate : 074 BPM     P-R Int : 150 ms          QRS Dur : 096 ms      QT Int : 386 ms       P-R-T Axes : 045 -19 040 degrees     QTc Int : 428 ms    Normal sinus rhythm  Normal ECG  No previous ECGs available  Confirmed by RONNELL ADAM, MISSY ([id number]) on 1/14/2022 5:05:26 AM    Referred By: [email or web address]   SELF           Confirmed By:MISSY REYNAGA MD                               Radiology:  Imaging Results          X-Ray Chest 1 View (Final result)  Result time 01/13/22 14:39:00    Final result by Rodrigo Lancaster MD (01/13/22 14:39:00)                 Impression:      No radiographic evidence of pneumonia or other source of cough/shortness of breath on this single view, noting that early/mild viral pneumonia may be radiographically occult.      Electronically signed by: Rodrigo Lancaster MD  Date:    01/13/2022  Time:    14:39             Narrative:    EXAMINATION:  XR CHEST 1 VIEW    CLINICAL HISTORY:  COVID-19    TECHNIQUE:  Single frontal view of the chest was performed.    COMPARISON:  Chest radiograph 10/26/2016    FINDINGS:  Monitoring leads overlie the chest.  Patient is slightly rotated.  No detrimental change.    Chronic nonspecific elevation the right  hemidiaphragm similar to prior.  Cardiomediastinal silhouette is midline and stable.  Pulmonary vasculature and hilar contours are within normal limits.  Lungs are well expanded without consolidation, pleural effusion or pneumothorax.  Osseous structures appear stable without acute process seen.  PA and lateral views can be obtained.                               CT Head Without Contrast (Final result)  Result time 01/13/22 12:28:37    Final result by Rodrigo Lancaster MD (01/13/22 12:28:37)                 Impression:      No acute infarct or intracranial hemorrhage seen.      Electronically signed by: Rodrigo Lancaster MD  Date:    01/13/2022  Time:    12:28             Narrative:    EXAMINATION:  CT HEAD WITHOUT CONTRAST    CLINICAL HISTORY:  Headache, new or worsening (Age >= 50y);    TECHNIQUE:  Low dose axial CT images obtained throughout the head without intravenous contrast. Sagittal and coronal reconstructions were performed.    COMPARISON:  Head CT 10/26/2016    FINDINGS:  Intracranial compartment:    Ventricles and sulci are normal in size for age without evidence of hydrocephalus. No extra-axial blood or fluid collections.    Mild patchy hypoattenuation of the supratentorial white matter suggesting sequela of chronic microvascular ischemic change.  No definite new focal areas of abnormal parenchymal attenuation.  Skull base atherosclerotic vascular calcifications noted.  No parenchymal mass, hemorrhage, edema or major vascular distribution infarct.    Skull/extracranial contents (limited evaluation): No fracture. Mastoid air cells and paranasal sinuses are essentially clear.  Imaged portions of the orbits are within normal limits.                                 Current Medications:     Infusions:      Scheduled:   aspirin  81 mg Oral Daily    enoxaparin  40 mg Subcutaneous Daily    lorazepam  1 mg Intravenous Once    losartan  100 mg Oral Daily        PRN:  acetaminophen, dextrose 50%, dextrose 50%,  glucagon (human recombinant), glucose, glucose, lorazepam, naloxone, ondansetron, sars-cov-2 (covid-19), sodium chloride 0.9%    Assessment:   Eitan Cody is a 61 y.o. male with past medical history of Hypertension [place] [facility] [place] with a primary complaint of diffuse constant squeezing headache for 4 weeks and chest pain. SBP on presentation was 213/137. Mildly elevated trops of 0.04. EKG with normal Sinus rhythm. CT head with no acute abnormalities. Admitted to [facility] medicine for ACS rule out.     Plan:     Chest pain with troponemia   - He recently start having in constant chest heaviness, can have it rest and stays for few seconds and goes by itself.  - He also has been tested COVID+ yesterday.  - SBP on presentation was 213/137.   - mildly elevated trops of 0.04 > 0.034, BNP of 87  - elevated troponin likely due to demand ischemia in the setting of hypertensive emergency  - EKG with normal Sinus rhythm.  - Received Hydralazine 25 mg and lisinopril yesterday with better control of Bps,   - TTE: hyperdynamic systolic function EF 75%, LV mid-cavitary obstruction, normal LV diastolic function; normal RV systolic function, PAP 36  - Cardiology consulted for LV mid-cavitary lesion- likely due to ventricular hypertrophy  - will need cardiology follow up to discuss outpatient ischemic work up  - ASCVD 17.3%, moderate-high intensity statin recommended- will discuss risks/benefits with patient and start if amendable   - started on asa daily    Hypertensive emergency, resolved  - SBP on presentation was 213/137. Had persistent headache for 4 weeks. He denies any SOB or Palpations or leg swellings.  - Home meds: prescriptions for Norvasc 5 mg, hydrochlorothiazide 25 mg. He denies taking any meds   - Received Hydralazine 25 mg and lisinopril with better blood pressure control, will switch to losartan 100 daily today as patient mildly tachycardic     Headache   - Diffuse constant squeezing headache for 4 weeks.  Denies any focal weakness, visual disturbance, gait problems, speech difficulty or history of head trauma. No Nausea or vomiting  - Did not try any medications   - SBP on presentation was 213/137.   - report feeling better in the ER once his BP has been controlled   - suspect headaches 2/2 to uncontrolled BPs and likely component of alcohol withdrawal and caffeine headache withdrawal  - Tylenol PRN     COVID   - Tested +COVID with complains of subjective fever, night sweats.  - He denies any SOB or Palpations or leg swellings.  - SpO2 of 98 % on RA, temp 98.6  - Chest Xray: lungs without consolidation, pleural effusion, opr pneumothorax.   - remdesivir/dex not indicated  - will continue to monitor by SpO2 checks     Alcohol use  - drinks 6 beers every night for the last year. Prior to he was drinking 12 pack + every day. States he has cut back to 6 pack and does not feel he needs to cut back any further.  - likely contributing to tachycardia seen on vitals  - Start Ativan PRN for withdrawal symptoms/vitals  - required x2 PRN in past 24 hours  - patient not interested in reducing alcohol intake or gabapentin taper     Health Maintaince   - Need to establish a primary care physician  - COVID booster dose needed.   - colonoscopy not UTD     Diet: Cardiac Diet   Code: Full   PPx: Enoxaparin   Dispo: likely home today, will need PCP and cardiology follow up    Harika Thompson MD  [facility] HO-I  [facility] Hospitalist Team B    [facility] Hospitalist Pager numbers:   [facility] Hospital[facility] Medicine Team A (Margarito/Thien): [phone]  [facility] Hospitalist Medicine Team B (Víctor/Deep):  [phone]

## 2022-01-15 NOTE — DISCHARGE SUMMARY
[facility] Hospital Medicine Discharge Summary    Primary Team: [facility] Hospitalist Team B  Attending Physician: Chai Renee MD  Resident: De Churchill MD  Intern: JESSICA Thompson    Date of Admit: 1/13/2022  Date of Discharge: 1/16/2022    Discharge to: Home, quarantine for additional 6 days  Condition: Stable    Discharge Diagnoses     Patient Active Problem List   Diagnosis    Hypertensive urgency    COVID    Nonintractable headache       Consultants and Procedures     Consultants:  Cardiology    Procedures:   TTE    Imaging:  CT head wout contrast  CXR    Brief History of Present Illness      Eitan Cody is a 61 y.o. male with past medical history of Hypertension [place] [facility] [place]13/[place]22 with a primary complaint of diffuse constant squeezing headache for 4 weeks and chest pain. SBP on presentation was 213/137. Mildly elevated trops of 0.04 which downtrended on repeat. EKG with normal Sinus rhythm. TTE withy hyperdynamic LV function. CT head with no acute abnormalities. [facility] for ACS rule out. Given characterization/duration of chest pain, labs, echo, and EKG low suspicion for ACS. Further work up for ischemic heart disease deemed to be appropriate on outpatient basis.    For the full HPI please refer to the History & Physical from this admission.    Hospital Course By Problem with Pertinent Findings     Chest pain with troponemia   - He recently started having constant chest heaviness, can have it at rest and stays for few seconds  - He also has been tested COVID+ yesterday.  - SBP on presentation was 213/137.   - mildly elevated trops of 0.04 > 0.034, BNP of 87; elevated troponin likely due to demand ischemia in the setting of hypertensive emergency  - EKG with normal Sinus rhythm.  - Received Hydralazine 25 mg and lisinopril yesterday with better control of Bps   - TTE: hyperdynamic systolic function EF 75%, LV mid-cavitary obstruction, normal LV diastolic function; normal RV systolic function,  PAP 36  - Cardiology consulted for LV mid-cavitary lesion- likely due to ventricular hypertrophy  - will need cardiology follow up to discuss outpatient ischemic work up  - ASCVD 17.3%, moderate-high intensity statin recommended  - Will discharge with asa, statin, losartan, amlodipine  - cardiology appointment made     Hypertensive emergency, resolved  - SBP on presentation was 213/137. Had persistent headache for 4 weeks. He denies any SOB or Palpations or leg swellings.  - Home meds: prescriptions for Norvasc 5 mg, hydrochlorothiazide 25 mg. He denies taking any meds   - Received Hydralazine 25 mg and lisinopril with better blood pressure control, will switch to losartan 100 daily as patient mildly tachycardic and likely better medication adherence with once a day dosing  - BP better controlled with losartan daily though still elevated. Will add additional antihypertensive  - Discharge with losartan 100 daily, amlodipine 10 mg daily. Up titrate on outpatient basis.   - Spoke extensively about importance of medication adherence  - cardiology and PCP appointments made     Headache   - Diffuse constant squeezing headache for 4 weeks. Denies any focal weakness, visual disturbance, gait problems, speech difficulty or history of head trauma. No Nausea or vomiting. Did not try any medications   - SBP on presentation was 213/137.   - reports feeling better in the ER once his BP has been controlled   - suspect headaches 2/2 to uncontrolled BPs and likely component of alcohol withdrawal and caffeine headache withdrawal  - Tylenol PRN  - improved over last 2 days     COVID positive  - Tested +COVID with complains of subjective fever, night sweats.  - He denies any SOB or Palpations or leg swellings.  - SpO2 of 98 % on RA, temp 98.6  - Chest Xray: lungs without consolidation, pleural effusion, opr pneumothorax.   - remdesivir/dex not indicated  - Quarantine for 10 days. COVID monitoring program on d/c- patient has pulse ox at  bedside     Alcohol use  - drinks 6 beers every night for the last year. Prior to he was drinking 12 pack + every day. States he has cut back to 6 pack and does not feel he needs to cut back any further.  - likely contributing to tachycardia seen on vitals  - Started Ativan PRN for withdrawal symptoms/vitals; Required x2 PRN overnight during hospital stay  - tachycardia improved with low dose gabapentin  - patient not interested in reducing alcohol intake or gabapentin taper on discharge     Health Maintaince   - Need to establish a primary care physician- appointment made with Dr. Bhakta priority clinic  - COVID booster dose needed- will receive outpatient after acute illness  - colonoscopy not UTD, discuss with PCP about routine screening      Discharge Medications        Medication List      START taking these medications    aspirin 81 MG Chew  Take 1 tablet (81 mg total) by mouth once daily.     atorvastatin 20 MG tablet  Commonly known as: LIPITOR  Take 1 tablet (20 mg total) by mouth every evening.     losartan 100 MG tablet  Commonly known as: COZAAR  Take 1 tablet (100 mg total) by mouth once daily.  Start taking on: January 17, 2022     * pulse oximeter device  Commonly known as: pulse oximeter  by Apply Externally route 2 (two) times a day. Use twice daily at 8 AM and 3 PM and record the value in MyChart as directed.     * pulse oximeter device  Commonly known as: pulse oximeter  by Apply Externally route 2 (two) times a day. Use twice daily at 8 AM and 3 PM and record the value in MyChart as directed.         * This list has 2 medication(s) that are the same as other medications prescribed for you. Read the directions carefully, and ask your doctor or other care provider to review them with you.            CHANGE how you take these medications    amLODIPine 5 MG tablet  Commonly known as: NORVASC  Take 2 tablets (10 mg total) by mouth once daily.  What changed: how much to take        CONTINUE taking  these medications    ofloxacin 0.3 % ophthalmic solution  Commonly known as: OCUFLOX  Instill 2 drops in both eyes four times a day; apply for 7 days        STOP taking these medications    hydroCHLOROthiazide 25 MG tablet  Commonly known as: HYDRODIURIL           Where to Get Your Medications      These medications were sent to [facility]  [address] [address]    Hours: Mon-Fri, 8a-5:30p Phone: [phone]   · aspirin 81 MG Chew  · pulse oximeter device  · pulse oximeter device     These medications were sent to [facility] [facility], [place] - [address]  [address] [place] [address]    Phone: [phone]   · amLODIPine 5 MG tablet  · atorvastatin 20 MG tablet  · losartan 100 MG tablet          Discharge Information:   Diet:  Low sodium/ cardiac    Physical Activity:  As tolerated             Instructions:  1. Take all medications as prescribed  2. Keep all follow-up appointments  3. Return to the hospital or call your primary care physicians if any worsening symptoms such as fever, chest pain, shortness of breath, return of symptoms, or any other concerns.    Follow-Up Appointments:  PCP and Cardiology. Appontments made.    Harika Thompson MD  [facility], [facility]

## 2022-01-16 VITALS
DIASTOLIC BLOOD PRESSURE: 102 MMHG | BODY MASS INDEX: 24.67 KG/M2 | TEMPERATURE: 98 F | WEIGHT: 157.19 LBS | HEART RATE: 82 BPM | RESPIRATION RATE: 20 BRPM | SYSTOLIC BLOOD PRESSURE: 186 MMHG | HEIGHT: 67 IN | OXYGEN SATURATION: 97 %

## 2022-01-16 PROCEDURE — 25000003 PHARM REV CODE 250: Performed by: STUDENT IN AN ORGANIZED HEALTH CARE EDUCATION/TRAINING PROGRAM

## 2022-01-16 PROCEDURE — G0378 HOSPITAL OBSERVATION PER HR: HCPCS

## 2022-01-16 RX ORDER — AMLODIPINE BESYLATE 5 MG/1
5 TABLET ORAL DAILY
Qty: 90 TABLET | Refills: 0 | Status: SHIPPED | OUTPATIENT
Start: 2022-01-16 | End: 2022-01-16

## 2022-01-16 RX ORDER — AMLODIPINE BESYLATE 5 MG/1
5 TABLET ORAL DAILY
Status: DISCONTINUED | OUTPATIENT
Start: 2022-01-16 | End: 2022-01-16 | Stop reason: HOSPADM

## 2022-01-16 RX ORDER — GABAPENTIN 300 MG/1
300 CAPSULE ORAL 3 TIMES DAILY
Status: DISCONTINUED | OUTPATIENT
Start: 2022-01-16 | End: 2022-01-16 | Stop reason: HOSPADM

## 2022-01-16 RX ORDER — LOSARTAN POTASSIUM 100 MG/1
100 TABLET ORAL DAILY
Qty: 90 TABLET | Refills: 3 | Status: SHIPPED | OUTPATIENT
Start: 2022-01-17 | End: 2022-03-25 | Stop reason: SDUPTHER

## 2022-01-16 RX ORDER — AMLODIPINE BESYLATE 5 MG/1
5 TABLET ORAL ONCE
Status: COMPLETED | OUTPATIENT
Start: 2022-01-16 | End: 2022-01-16

## 2022-01-16 RX ORDER — AMLODIPINE BESYLATE 5 MG/1
10 TABLET ORAL DAILY
Qty: 180 TABLET | Refills: 0 | Status: SHIPPED | OUTPATIENT
Start: 2022-01-16 | End: 2022-02-02 | Stop reason: SDUPTHER

## 2022-01-16 RX ORDER — ATORVASTATIN CALCIUM 20 MG/1
20 TABLET, FILM COATED ORAL NIGHTLY
Qty: 90 TABLET | Refills: 3 | Status: SHIPPED | OUTPATIENT
Start: 2022-01-16 | End: 2023-04-06 | Stop reason: SDUPTHER

## 2022-01-16 RX ADMIN — GABAPENTIN 300 MG: 300 CAPSULE ORAL at 09:01

## 2022-01-16 RX ADMIN — AMLODIPINE BESYLATE 5 MG: 5 TABLET ORAL at 03:01

## 2022-01-16 RX ADMIN — ASPIRIN 81 MG CHEWABLE TABLET 81 MG: 81 TABLET CHEWABLE at 09:01

## 2022-01-16 RX ADMIN — AMLODIPINE BESYLATE 5 MG: 5 TABLET ORAL at 01:01

## 2022-01-16 RX ADMIN — GABAPENTIN 300 MG: 300 CAPSULE ORAL at 03:01

## 2022-01-16 RX ADMIN — LOSARTAN POTASSIUM 100 MG: 50 TABLET, FILM COATED ORAL at 09:01

## 2022-01-16 NOTE — PROGRESS NOTES
[facility] Resident KATE Progress Note  Subjective:      Eitan Cody is a 61 y.o. male who is being followed by the [facility] IM service at [facility] for headaches, Hypertension and Chest Pain.     Patient states headache has improved. No further episodes of chest pain. Denies shortness of breath or other complaints. States he feels at his baseline.    Objective:   Last 24 Hour Vital Signs:  BP  Min: 138/71  Max: 178/84  Temp  Av.6 °F (37 °C)  Min: 97.5 °F (36.4 °C)  Max: 101.4 °F (38.6 °C)  Pulse  Av  Min: 80  Max: 129  Resp  Av  Min: 17  Max: 20  SpO2  Av.8 %  Min: 93 %  Max: 98 %  I/O last 3 completed shifts:  In: -   Out: 850 [Urine:850]    Physical Examination:    Constitutional: He is not diaphoretic. No distress.   HENT:   Head: Normocephalic and atraumatic.   Eyes: Conjunctivae and EOM are normal. Pupils are equal, round, and reactive to light. Right eye exhibits no discharge. Left eye exhibits no discharge.   Neck: Neck supple. No thyromegaly present. Normal range of motion.  Cardiovascular: Tachycardic. Difficult to assess murmurs.   Pulmonary/Chest: No respiratory distress. He has no wheezes. He has no rhonchi. He exhibits no tenderness.   Abdominal: Abdomen is soft. Bowel sounds are normal. He exhibits no distension. There is no abdominal tenderness.   Musculoskeletal:         General: No tenderness or edema. Normal range of motion.      Cervical back: Normal range of motion and neck supple.   Neurological: He is alert and oriented to person, place, and time. He has normal strength in upper and lower extremities. He is mild resting tremor.   Skin: Skin is warm. Capillary refill <3 sec  Psychiatric: He has a normal mood and affect. His behavior is normal.      Laboratory:  Trended Lab Data:  Recent Labs   Lab 22  1104 22  0344 01/15/22  0758   WBC 5.35 5.37 7.07   HGB 16.3 15.5 15.1   HCT 45.5 43.1 41.9   PLT 92* 88* 94*   MCV 95 96 96   RDW 13.1 13.2 13.3    138 137   K 3.9 3.8  4.1    103 105   CO2 25 20* 20*   BUN 13 14 16   CREATININE 0.8 0.8 0.9   * 115* 112*   PROT 7.5 6.8 6.5   ALBUMIN 3.6 3.3* 3.1*   BILITOT 1.6* 1.5* 1.5*   * 105* 90*   ALKPHOS 109 97 88   * 85* 75*       Trended Cardiac Data:  Recent Labs   Lab 01/13/22  1104 01/13/22  1650   TROPONINI 0.040* 0.034*   BNP 87  --        Microbiology:  Microbiology Results (last 7 days)     ** No results found for the last 168 hours. **           Other Results:  EKG (my interpretation):  ECG Results          EKG 12-lead (Final result)  Result time 01/14/22 05:05:34    Final result by Interface, Lab In [facility] (01/14/22 05:05:34)                 Narrative:    Test Reason : I10,    Vent. Rate : 074 BPM     Atrial Rate : 074 BPM     P-R Int : 150 ms          QRS Dur : 096 ms      QT Int : 386 ms       P-R-T Axes : 045 -19 040 degrees     QTc Int : 428 ms    Normal sinus rhythm  Normal ECG  No previous ECGs available  Confirmed by MISSY REYNAGA MD ([id number]) on 1/14/2022 5:05:26 AM    Referred By: [email or web address]   SELF           Confirmed By:MISSY REYNAGA MD                               Radiology:  Imaging Results          X-Ray Chest 1 View (Final result)  Result time 01/13/22 14:39:00    Final result by Rodrigo Lancaster MD (01/13/22 14:39:00)                 Impression:      No radiographic evidence of pneumonia or other source of cough/shortness of breath on this single view, noting that early/mild viral pneumonia may be radiographically occult.      Electronically signed by: Rodrigo Lancaster MD  Date:    01/13/2022  Time:    14:39             Narrative:    EXAMINATION:  XR CHEST 1 VIEW    CLINICAL HISTORY:  COVID-19    TECHNIQUE:  Single frontal view of the chest was performed.    COMPARISON:  Chest radiograph 10/26/2016    FINDINGS:  Monitoring leads overlie the chest.  Patient is slightly rotated.  No detrimental change.    Chronic nonspecific elevation the right hemidiaphragm similar to prior.  Cardiomediastinal  silhouette is midline and stable.  Pulmonary vasculature and hilar contours are within normal limits.  Lungs are well expanded without consolidation, pleural effusion or pneumothorax.  Osseous structures appear stable without acute process seen.  PA and lateral views can be obtained.                               CT Head Without Contrast (Final result)  Result time 01/13/22 12:28:37    Final result by Rodrigo Lancaster MD (01/13/22 12:28:37)                 Impression:      No acute infarct or intracranial hemorrhage seen.      Electronically signed by: Rodrigo Lancaster MD  Date:    01/13/2022  Time:    12:28             Narrative:    EXAMINATION:  CT HEAD WITHOUT CONTRAST    CLINICAL HISTORY:  Headache, new or worsening (Age >= 50y);    TECHNIQUE:  Low dose axial CT images obtained throughout the head without intravenous contrast. Sagittal and coronal reconstructions were performed.    COMPARISON:  Head CT 10/26/2016    FINDINGS:  Intracranial compartment:    Ventricles and sulci are normal in size for age without evidence of hydrocephalus. No extra-axial blood or fluid collections.    Mild patchy hypoattenuation of the supratentorial white matter suggesting sequela of chronic microvascular ischemic change.  No definite new focal areas of abnormal parenchymal attenuation.  Skull base atherosclerotic vascular calcifications noted.  No parenchymal mass, hemorrhage, edema or major vascular distribution infarct.    Skull/extracranial contents (limited evaluation): No fracture. Mastoid air cells and paranasal sinuses are essentially clear.  Imaged portions of the orbits are within normal limits.                                 Current Medications:     Infusions:      Scheduled:   aspirin  81 mg Oral Daily    atorvastatin  20 mg Oral QHS    enoxaparin  40 mg Subcutaneous Daily    gabapentin  300 mg Oral TID    losartan  100 mg Oral Daily        PRN:  acetaminophen, dextrose 50%, dextrose 50%, glucagon (human recombinant),  glucose, glucose, lorazepam, naloxone, ondansetron, sars-cov-2 (covid-19), sodium chloride 0.9%    Assessment:   Eitan Cody is a 61 y.o. male with past medical history of Hypertension presented to [facility] on 1/13/2022 with a primary complaint of diffuse constant squeezing headache for 4 weeks and chest pain. SBP on presentation was 213/137. Mildly elevated trops of 0.04. EKG with normal Sinus rhythm. CT head with no acute abnormalities. Admitted to [facility] medicine for ACS rule out.     Plan:     Chest pain with troponemia   - He recently start having in constant chest heaviness, can have it rest and stays for few seconds and goes by itself.  - He also has been tested COVID+ yesterday.  - SBP on presentation was 213/137.   - mildly elevated trops of 0.04 > 0.034, BNP of 87  - elevated troponin likely due to demand ischemia in the setting of hypertensive emergency  - EKG with normal Sinus rhythm.  - Received Hydralazine 25 mg and lisinopril yesterday with better control of Bps,   - TTE: hyperdynamic systolic function EF 75%, LV mid-cavitary obstruction, normal LV diastolic function; normal RV systolic function, PAP 36  - Cardiology consulted for LV mid-cavitary lesion- likely due to ventricular hypertrophy  - will need cardiology follow up to discuss outpatient ischemic work up  - ASCVD 17.3%, moderate-high intensity statin recommended  - continue asa/statin    Hypertensive emergency, resolved  - SBP on presentation was 213/137. Had persistent headache for 4 weeks. He denies any SOB or Palpations or leg swellings.  - Home meds: prescriptions for Norvasc 5 mg, hydrochlorothiazide 25 mg. He denies taking any meds   - Received Hydralazine 25 mg and lisinopril with better blood pressure control, will switch to losartan 100 yesterday as patient mildly tachycardic  - BP improving 140s-150s, HR 80s. Will need further titration of antihypertensives on outpatient basis.     Headache, resolved   - Diffuse constant squeezing headache  for 4 weeks. Denies any focal weakness, visual disturbance, gait problems, speech difficulty or history of head trauma. No Nausea or vomiting  - Did not try any medications   - SBP on presentation was 213/137.   - report feeling better in the ER once his BP has been controlled   - suspect headaches 2/2 to uncontrolled BPs and likely component of alcohol withdrawal and caffeine headache withdrawal  - Tylenol PRN     COVID (1/13/21)  - Tested +COVID with complains of subjective fever, night sweats.  - He denies any SOB or Palpations or leg swellings.  - SpO2 of 98 % on RA, temp 98.6  - Chest Xray: lungs without consolidation, pleural effusion, opr pneumothorax.   - remdesivir/dex not indicated  - will continue to monitor by SpO2 checks     Alcohol use  - drinks 6 beers every night for the last year. Prior to he was drinking 12 pack + every day. States he has cut back to 6 pack and does not feel he needs to cut back any further.  - likely contributing to tachycardia seen on vitals  - Start Ativan PRN for withdrawal symptoms/vitals  - required x2 PRN in past 24 hours  - started on low dose gabapentin yesterday with resolution of tachycardia and improvement in BPs  - patient not interested in reducing alcohol intake or gabapentin taper     Health Maintaince   - Need to establish a primary care physician  - COVID booster dose needed.   - colonoscopy not UTD     Diet: Cardiac Diet   Code: Full   PPx: Enoxaparin   Dispo: likely home today, will need PCP and cardiology follow up    Harika Thompson MD  [facility] HO-I  U Hospitalist Team B    [facility] Hospitalist Pager numbers:   [facility] Hospitalist Medicine Team A (Margarito/Thien): [phone]  [facility] Hospitalist Medicine Team B (Víctor/Deep):  [phone]

## 2022-01-16 NOTE — PLAN OF CARE
Problem: Adult Inpatient Plan of Care  Goal: Plan of Care Review  Outcome: Ongoing, Progressing     Problem: Alcohol Withdrawal  Goal: Alcohol Withdrawal Symptom Control  Outcome: Ongoing, Progressing

## 2022-01-16 NOTE — NURSING
Blood Pressure medications administered. IV removed. AVS reviewed and questions answered. Medications delivered at the bedside Friday.

## 2022-01-16 NOTE — PLAN OF CARE
Discharge orders noted, no HH or HME ordered.    Future Appointments   Date Time Provider Department Center   1/31/2022  2:00 PM Ney Hernandez NP [facility] [place]   2/2/2022  1:30 PM Cheri Bhakta MD [facility]       Pt's nurse will go over medications/signs and symptoms prior to discharge       01/16/22 1233   Final Note   Assessment Type Final Discharge Note   Anticipated Discharge Disposition Home   What phone number can be called within the next 1-3 days to see how you are doing after discharge? [phone]   Hospital Resources/Appts/Education Provided   (Offices closed for weekend. Patient to schedule own follow up appointment.)   Post-Acute Status   Discharge Delays None known at this time     Jelly Christine RN Transitional Navigator  [phone]

## 2022-01-16 NOTE — PLAN OF CARE
POC REVIEWED WITH PATIENT. HE V/U. MEDS ADMINISTERED PER MD ORDER. VSS. BED LOCKED AND IN LOWEST POSITION. BED ALARM ON. CALL BELL WITHIN REACH. INSTRUCTED TO CALL FOR ANY NEEDS. HE V/U.

## 2022-01-17 ENCOUNTER — TELEPHONE (OUTPATIENT)
Dept: ADMINISTRATIVE | Facility: CLINIC | Age: 62
End: 2022-01-17
Payer: MEDICAID

## 2022-01-17 LAB
HCV AB SERPL QL IA: POSITIVE
HIV 1+2 AB+HIV1 P24 AG SERPL QL IA: NEGATIVE

## 2022-01-19 ENCOUNTER — PATIENT MESSAGE (OUTPATIENT)
Dept: ADMINISTRATIVE | Facility: OTHER | Age: 62
End: 2022-01-19
Payer: MEDICAID

## 2022-01-19 ENCOUNTER — PATIENT MESSAGE (OUTPATIENT)
Dept: ADMINISTRATIVE | Facility: CLINIC | Age: 62
End: 2022-01-19
Payer: MEDICAID

## 2022-01-19 ENCOUNTER — TELEPHONE (OUTPATIENT)
Dept: ADMINISTRATIVE | Facility: CLINIC | Age: 62
End: 2022-01-19
Payer: MEDICAID

## 2022-01-19 ENCOUNTER — NURSE TRIAGE (OUTPATIENT)
Dept: ADMINISTRATIVE | Facility: CLINIC | Age: 62
End: 2022-01-19
Payer: MEDICAID

## 2022-01-19 NOTE — TELEPHONE ENCOUNTER
Pt called and was just enrolled and he escalated for fever symptoms pt said that his temp is 98 and not having any problems answered question wrong will continue to monitor and call us back as needed.  Reason for Disposition   Information only question and nurse able to answer    Protocols used: INFORMATION ONLY CALL - NO TRIAGE-A-[place]

## 2022-01-20 ENCOUNTER — PATIENT MESSAGE (OUTPATIENT)
Dept: ADMINISTRATIVE | Facility: OTHER | Age: 62
End: 2022-01-20
Payer: MEDICAID

## 2022-01-21 ENCOUNTER — PATIENT MESSAGE (OUTPATIENT)
Dept: ADMINISTRATIVE | Facility: CLINIC | Age: 62
End: 2022-01-21
Payer: MEDICAID

## 2022-01-21 ENCOUNTER — PATIENT MESSAGE (OUTPATIENT)
Dept: ADMINISTRATIVE | Facility: OTHER | Age: 62
End: 2022-01-21
Payer: MEDICAID

## 2022-01-22 ENCOUNTER — PATIENT MESSAGE (OUTPATIENT)
Dept: ADMINISTRATIVE | Facility: OTHER | Age: 62
End: 2022-01-22
Payer: MEDICAID

## 2022-01-23 ENCOUNTER — PATIENT MESSAGE (OUTPATIENT)
Dept: ADMINISTRATIVE | Facility: OTHER | Age: 62
End: 2022-01-23
Payer: MEDICAID

## 2022-01-24 ENCOUNTER — PATIENT MESSAGE (OUTPATIENT)
Dept: ADMINISTRATIVE | Facility: OTHER | Age: 62
End: 2022-01-24
Payer: MEDICAID

## 2022-01-25 ENCOUNTER — PATIENT MESSAGE (OUTPATIENT)
Dept: ADMINISTRATIVE | Facility: OTHER | Age: 62
End: 2022-01-25
Payer: MEDICAID

## 2022-01-26 ENCOUNTER — PATIENT MESSAGE (OUTPATIENT)
Dept: ADMINISTRATIVE | Facility: OTHER | Age: 62
End: 2022-01-26
Payer: MEDICAID

## 2022-01-27 ENCOUNTER — PATIENT MESSAGE (OUTPATIENT)
Dept: ADMINISTRATIVE | Facility: OTHER | Age: 62
End: 2022-01-27
Payer: MEDICAID

## 2022-01-27 ENCOUNTER — TELEPHONE (OUTPATIENT)
Dept: PRIMARY CARE CLINIC | Facility: CLINIC | Age: 62
End: 2022-01-27
Payer: MEDICAID

## 2022-01-28 ENCOUNTER — PATIENT MESSAGE (OUTPATIENT)
Dept: ADMINISTRATIVE | Facility: OTHER | Age: 62
End: 2022-01-28
Payer: MEDICAID

## 2022-01-28 ENCOUNTER — PATIENT MESSAGE (OUTPATIENT)
Dept: ADMINISTRATIVE | Facility: CLINIC | Age: 62
End: 2022-01-28
Payer: MEDICAID

## 2022-01-29 ENCOUNTER — PATIENT MESSAGE (OUTPATIENT)
Dept: ADMINISTRATIVE | Facility: OTHER | Age: 62
End: 2022-01-29
Payer: MEDICAID

## 2022-01-30 ENCOUNTER — PATIENT MESSAGE (OUTPATIENT)
Dept: ADMINISTRATIVE | Facility: OTHER | Age: 62
End: 2022-01-30
Payer: MEDICAID

## 2022-01-31 ENCOUNTER — OFFICE VISIT (OUTPATIENT)
Dept: CARDIOLOGY | Facility: CLINIC | Age: 62
End: 2022-01-31
Payer: MEDICAID

## 2022-01-31 VITALS
SYSTOLIC BLOOD PRESSURE: 134 MMHG | HEART RATE: 101 BPM | WEIGHT: 157.13 LBS | BODY MASS INDEX: 24.66 KG/M2 | HEIGHT: 67 IN | DIASTOLIC BLOOD PRESSURE: 80 MMHG | OXYGEN SATURATION: 97 %

## 2022-01-31 DIAGNOSIS — I24.89 DEMAND ISCHEMIA: ICD-10-CM

## 2022-01-31 DIAGNOSIS — I10 HYPERTENSION, UNSPECIFIED TYPE: ICD-10-CM

## 2022-01-31 DIAGNOSIS — I10 PRIMARY HYPERTENSION: ICD-10-CM

## 2022-01-31 DIAGNOSIS — I16.0 HYPERTENSIVE URGENCY: ICD-10-CM

## 2022-01-31 DIAGNOSIS — U07.1 COVID: ICD-10-CM

## 2022-01-31 DIAGNOSIS — R51.9 NONINTRACTABLE HEADACHE, UNSPECIFIED CHRONICITY PATTERN, UNSPECIFIED HEADACHE TYPE: ICD-10-CM

## 2022-01-31 DIAGNOSIS — R07.9 CHEST PAIN, UNSPECIFIED TYPE: Primary | ICD-10-CM

## 2022-01-31 PROCEDURE — 1160F RVW MEDS BY RX/DR IN RCRD: CPT | Mod: CPTII,,,

## 2022-01-31 PROCEDURE — 99214 OFFICE O/P EST MOD 30 MIN: CPT | Mod: PBBFAC,PN

## 2022-01-31 PROCEDURE — 99999 PR PBB SHADOW E&M-EST. PATIENT-LVL IV: ICD-10-PCS | Mod: PBBFAC,,,

## 2022-01-31 PROCEDURE — 3079F DIAST BP 80-89 MM HG: CPT | Mod: CPTII,,,

## 2022-01-31 PROCEDURE — 1160F PR REVIEW ALL MEDS BY PRESCRIBER/CLIN PHARMACIST DOCUMENTED: ICD-10-PCS | Mod: CPTII,,,

## 2022-01-31 PROCEDURE — 3075F PR MOST RECENT SYSTOLIC BLOOD PRESS GE 130-139MM HG: ICD-10-PCS | Mod: CPTII,,,

## 2022-01-31 PROCEDURE — 3044F HG A1C LEVEL LT 7.0%: CPT | Mod: CPTII,,,

## 2022-01-31 PROCEDURE — 99214 OFFICE O/P EST MOD 30 MIN: CPT | Mod: S$PBB,,,

## 2022-01-31 PROCEDURE — 1159F MED LIST DOCD IN RCRD: CPT | Mod: CPTII,,,

## 2022-01-31 PROCEDURE — 99214 PR OFFICE/OUTPT VISIT, EST, LEVL IV, 30-39 MIN: ICD-10-PCS | Mod: S$PBB,,,

## 2022-01-31 PROCEDURE — 3008F BODY MASS INDEX DOCD: CPT | Mod: CPTII,,,

## 2022-01-31 PROCEDURE — 3075F SYST BP GE 130 - 139MM HG: CPT | Mod: CPTII,,,

## 2022-01-31 PROCEDURE — 4010F PR ACE/ARB THEARPY RXD/TAKEN: ICD-10-PCS | Mod: CPTII,,,

## 2022-01-31 PROCEDURE — 4010F ACE/ARB THERAPY RXD/TAKEN: CPT | Mod: CPTII,,,

## 2022-01-31 PROCEDURE — 1159F PR MEDICATION LIST DOCUMENTED IN MEDICAL RECORD: ICD-10-PCS | Mod: CPTII,,,

## 2022-01-31 PROCEDURE — 3079F PR MOST RECENT DIASTOLIC BLOOD PRESSURE 80-89 MM HG: ICD-10-PCS | Mod: CPTII,,,

## 2022-01-31 PROCEDURE — 3044F PR MOST RECENT HEMOGLOBIN A1C LEVEL <7.0%: ICD-10-PCS | Mod: CPTII,,,

## 2022-01-31 PROCEDURE — 99999 PR PBB SHADOW E&M-EST. PATIENT-LVL IV: CPT | Mod: PBBFAC,,,

## 2022-01-31 PROCEDURE — 3008F PR BODY MASS INDEX (BMI) DOCUMENTED: ICD-10-PCS | Mod: CPTII,,,

## 2022-01-31 NOTE — ASSESSMENT & PLAN NOTE
HTN emergency on admission 1/13/22 /137  -Controlled with medications and discharged on losartan 100 mg daily, amlodipine 10 mg daily   -Continue medication regimen    -Discussed  extensively about importance of medication adherence  -Cardiac echo :1/14/22   Summary    · Concentric remodeling and hyperdynamic systolic function.  · The estimated ejection fraction is 75%.  · Left ventricular mid-cavity obstruction is present. Mid-cavity gradient 23 mmHg.  · Normal left ventricular diastolic function.  · Normal right ventricular size with normal right ventricular systolic function.  · The sinuses of Valsalva is mildly dilated.  · Intermediate central venous pressure (8 mmHg).  · The estimated PA systolic pressure is 36 mmHg.   - Exercise stress test to evaluate for ischemia

## 2022-01-31 NOTE — PATIENT INSTRUCTIONS
"Patient Education       Low Salt Diet   About this topic   Sodium is a type of mineral found in many foods. It may also be called "salt." Sodium helps balance fluids in your body. Too much sodium may be bad for your health. You may have to limit the amount of sodium in your food.  Salt is known as sodium chloride. It is measured in grams (g) or milligrams (mg). Salt or sodium in our diet comes from 3 main sources:  · Some sodium is naturally found in food.  · We may add salt to our food when we eat or cook.  · Processed foods give us most of the sodium in our diet.         What will the results be?   This diet may help lower your blood pressure. It may also help reduce extra water in your body. This may help kidney, heart, or liver problems.  What changes to diet are needed?   You need to know how much sodium is in the food you eat. Read food labels with care. Choose foods that have 5% or less sodium in one serving. Remember, if you eat more than one serving, you will be getting more sodium. It may take a while for your sense of taste to get used to food with less sodium. Be patient with yourself. You may be surprised at how well you will do.  Try to aim for a diet that has 2,300 mg (2.3 g) or less sodium in it each day. The Food & Drug Administration (FDA) has set up guidelines for food labels. These will help you make healthy choices. Look for these terms on food packages:  · Sodium-free: Less than 5 mg in each serving. These are safe to eat.  · Very low sodium: 35 mg of sodium or less in each serving. These are safe to eat.  · Low sodium: 140 mg of sodium or less in each serving. You need to eat these with care.  · Reduced sodium: At least 25% less sodium than is most often found in each serving. These foods can still be high in sodium.  · Light in sodium: 50% less sodium in each serving.  · Unsalted, no added salt, and without added salt: No salt is added during processing, but the food may still have sodium. " Read the food label and check the sodium content before eating.  What foods are good to eat?   You can control the amount of sodium in foods you make at home. Fresh foods that you cook are most often lower in sodium.  · Regular bread, unsalted crackers, dry cereal, cooked rice, pasta, quinoa, and corn tortillas.  · Fresh, frozen, low sodium, or salt-free canned vegetables. Limit vegetable juice or tomato juice to 1/2 cup (120 mL) each day.  · Fresh, frozen, canned, or dried fruit without salt added  · Nonfat or low-fat milk and yogurt, low-sodium cottage cheese, and other cheeses low in sodium.  · Fresh or frozen beef, veal, lamb, pork, poultry, fish, shellfish  · Low sodium canned meats, frozen dinners with less than 600 mg sodium  · Corn, safflower, sunflower, and soybean oils and unsalted nuts and seeds  · Egg and egg substitutes without added sodium  · Dried peas, beans, and low-sodium peanut butter  · All plain oils and low-sodium salad dressing  · Low-sodium broths, soups, soy sauce, condiments, and snack foods  · Pepper, herbs, spices, vinegar, lemon or lime juice  · Low-sodium carbonated drinks  What foods should be limited or avoided?   Foods that are prepackaged or canned are most often high in sodium. Foods to limit or avoid include:  · Salted breads, rolls, crackers, biscuits, cornbread  · Quick breads, self-rising flours, biscuit mixes, regular bread crumbs, instant hot cereals  · Commercially prepared rice, pasta, or stuffing mixes; potatoes and vegetable mixes  · Regular canned vegetables and juices, vegetables with sauce, and pickled vegetables  · Frozen vegetables with seasonings and sauces  · Processed fruits with salt or sodium  · Malted and chocolate milk and buttermilk  · Regular and processed cheese and spreads  · Smoked, cured, salted, or canned meat, fish, or poultry such as sharma, sausages, sardines, chipped beef, hot dogs, cold cuts, and frozen breaded meats  · Salted and canned peas,  beans, and olives  · Salted snack foods and nuts  · Oils mixed with high-sodium parts such as salad dressing  · Meat tenderizers, seasoning salt, and most flavored vinegars  · Ketchup, bouillon cubes, salt, sea salt, kosher salt, onion salt, garlic salt, and pink Himalayan salt  What can be done to prevent this health problem?   Check with your doctor before using salt substitutes. Also, check the labels when taking over-the-counter (OTC) drugs such as laxatives and antacids. These can have a high sodium content.  When do I need to call the doctor?   Call your doctor if you have questions about this diet. Talk to a dietitian. They can help you find hidden sources of sodium in the food you eat.  Helpful tips   · Use the nutrition facts labels as a guide to look for foods lower in sodium.  · Do not use salt when eating or cooking.  · Select fresh fruits and vegetables for snacks.  · If you are eating out, ask the  to cook your food without salt, or choose foods without sauces.  · Season your food with herbs and spices.  · Drain and rinse canned beans and vegetables that contain sodium.  · Eat foods with potassium. Potassium helps counter the effects of sodium. This may help lower your blood pressure. Foods high in potassium include: Potatoes, tomatoes, bananas, oranges, cantaloupe, beans, and greens.  Where can I learn more?   American Heart Association  https://www.heart.org/en/healthy-living/healthy-eating/eat-smart/sodium/how-to-reduce-sodium   American Heart Association  https://www.heart.org/en/healthy-living/healthy-eating/eat-smart/nutrition-basics/food-packaging-claims   NHS  https://www.nhs.uk/live-well/eat-well/tips-for-a-lower-salt-diet/   UpToDate  http://www.[employer]/contents/low-sodium-diet-beyond-the-basics   Last Reviewed Date   2021-10-11  Consumer Information Use and Disclaimer   This information is not specific medical advice and does not replace information you receive from your health care  "provider. This is only a brief summary of general information. It does NOT include all information about conditions, illnesses, injuries, tests, procedures, treatments, therapies, discharge instructions or life-style choices that may apply to you. You must talk with your health care provider for complete information about your health and treatment options. This information should not be used to decide whether or not to accept your health care providers advice, instructions or recommendations. Only your health care provider has the knowledge and training to provide advice that is right for you.  Copyright   Copyright © 2021 Up[employer], Inc. and its affiliates and/or licensors. All rights reserved.  Patient Education       Low Cholesterol, Saturated Fat, and Trans Fat Diet   About this topic   Cholesterol, saturated fat, and trans fat are in many foods. These may raise your blood cholesterol levels. If your cholesterol is too high, this can cause health problems in your heart, liver, kidneys, and even your eyes. The key to lowering your risk of heart problems is to lower your bad fat intake.  Saturated fats and trans fats are the bad fats. These fats clog your arteries and raise your bad cholesterol. Saturated fats and trans fats are solid fats at room temperature. Saturated fats are animal fats. Trans fats are manmade fats. They add flavor to a lot of packaged foods. Staying away from saturated and trans fats will help your heart.  When you do eat foods with fat, make sure they have the good fats. Monounsaturated and polyunsaturated fats are good fats. These fats help raise your good cholesterol and protect your heart.  General   How to Lower Fat and Cholesterol in Your Diet   · Read the labels of the foods you buy from the market to find out how much fat is present. Under 5% of total fat on a label means it is "low fat". Over 20% of total fat on a label means it is high fat.  · Eat high fiber foods, like soluble " fiber. This type of fiber helps lower cholesterol in the body. Choose oatmeal, fruits (like apples), beans, and nuts to get the most soluble fiber.  · Eat foods high in omega-3 fatty acids like nikky seeds, walnuts, salmon, tuna, trout, herring, flaxseed, and soybeans. These foods help keep the heart healthy.  · Limit your bad fat and oil intake.  · Stay away from butter, stick margarine, shortening, lard, and palm and coconut oil. Pick plant-based spreads instead.  · Limit mayonnaise, salad dressings, gravies, and sauces, unless it is made from low-fat ingredients.  · Limit chocolate.  · Do not eat high-fat processed foods like hot dogs, sharma, sausage, ham and other luncheon meats high in fat, and some frozen foods. Pick fish, chicken, turkey, and lean meats instead.  · Eat more dried beans, lentils, and tofu to get your protein.  · Do not eat organ meats, like liver.  · Choose nonfat or low-fat milk, yogurt, and cheese.  · Use light or fat-free cream cheese and sour cream.  · Eat lots of fruits and vegetables.  · Pick whole grain breads, cereals, pastas, and rice.  · Do not eat snacks that are high in fats like granola, cookies, pies, pastries, doughnuts, and croissants.  · Stay away from deep fried foods.  Help When Cooking   · Remove the fat portion of meats and the skin from poultry before cooking.  · Bake, broil, grill, poach, or roast poultry, fish, and lean meats.  · Drain and throw away the fat that drains out of meat as you cook it.  · Try to add little or no fat to foods.  · Use olive or canola oil for cooking or baking.  · Steam your vegetables.  · Use herbs or no-oil marinades to flavor foods.         Who should use this diet?   This diet is for people who are at high risk of getting health problems like heart disease, high blood pressure, diabetes, and others. This diet is also good for all people to follow to keep your heart healthy.  What foods are good to eat?   Foods with good fats are:  · Canola,  peanut, and olive oil  · Safflower, soybean, and corn oil  · Walnuts, almonds, cashews, and peanuts  · Pumpkin and sunflower seeds  · [place] and tuna  · Tofu  · Soymilk  · Avocado  What foods should be limited or avoided?   Stay away from these types of foods that have saturated fats:  · Whole fat dairy products like cheese, ice cream, whole milk, and cream  · Palm and coconut oils  · High-fat meats like beef, lamb, poultry with the skin, sharma, and sausage  · Butter and lard  Stay away from these types of foods that may have trans fat:  · Cookies, cakes, candy, doughnuts, baked goods, muffins, pizza dough, and pie crusts that are packaged  · Fried foods  · Frozen dinners  · Chips and crackers  · Microwave popcorn  · Stick margarine and vegetable shortenings  Helpful tips   To help stay away from saturated fat:  · Pick lean cuts of meat  · Take the skin off chicken and turkey or pick skinless  · Pick low-fat cheese, milk, and ice cream  · Use liquid oils when cooking and baking, such as olive oil and canola oil  To help stay away from trans fat:  · Look at your labels. Choose foods with 0% trans fat. Read the ingredient list. Avoid foods with partially hydrogenated oil in the ingredient list. This means there is trans fat in the product.  Where can I learn more?   American Heart Association   http://www.heart.org/HEARTORG/Conditions/Cholesterol/PreventionTreatmentofHighCholesterol/Know-Your-Fats_[facility]_305628_Article.jsp   Last Reviewed Date   2021-10-05  Consumer Information Use and Disclaimer   This information is not specific medical advice and does not replace information you receive from your health care provider. This is only a brief summary of general information. It does NOT include all information about conditions, illnesses, injuries, tests, procedures, treatments, therapies, discharge instructions or life-style choices that may apply to you. You must talk with your health care provider for complete  information about your health and treatment options. This information should not be used to decide whether or not to accept your health care providers advice, instructions or recommendations. Only your health care provider has the knowledge and training to provide advice that is right for you.   Copyright   Copyright © 2021 [employer] and its affiliates and/or licensors. All rights reserved.

## 2022-01-31 NOTE — ASSESSMENT & PLAN NOTE
-Goal < 130/80  -Continue current medication regimen: losartan, amlodipine, ASA  -Instructed to monitor home BP 2x day and record, bring log to all appts  -Discussed lifestyle modifications- diet and exercise   -Low salt/ Low fat diet   -ASCVD score 10.4%

## 2022-01-31 NOTE — ASSESSMENT & PLAN NOTE
-Recent hospital admission, SBP on arrival to ED was  213/137.   -medicall manage in hospital and was discharged home on : Losartan 100 mg and amlodipine 5 mg   - medication adherence with once a day dosing  - Educated on the  importance of medication adherence  -BP goal < 130/84

## 2022-01-31 NOTE — PROGRESS NOTES
Subjective:    Patient ID:  Eitan Cody is a 61 y.o. male who presents for evaluation of Hospital Follow Up      PCP: Primary Doctor No         HPI  62 yo M w/PMH HTN, prior  ETOH abuse, sober presents to clinic today for hospital follow up. Recent hospitalization 1/13/22-  1/16/22  for HTN emergency /137 with C/O severe headache X 4 weeks ,  elevated troponin and tachycardia secondary to COVID-19 virus. EKG NSR. Reports intermittent CP 7/10 mid-sternal sharp, lasting a few seconds, non-radiating, not associated with SOB, diaphoresis,palpitations, or lightheadedness. Reports compliance with medications since discharge without side effects. Currently denies CP, SOB, palpitations, pre-syncope, LOC, or claudication. Reports increased walking but experiences NARAYANAN after 2-3 blocks.     Past Medical History:   Diagnosis Date    Hypertension      No past surgical history on file.  Social History     Socioeconomic History    Marital status: Single   Tobacco Use    Smoking status: Never Smoker    Smokeless tobacco: Never Used   Substance and Sexual Activity    Alcohol use: Yes     Alcohol/week: 6.0 standard drinks     Types: 6 Cans of beer per week     Comment: everyday last drink Monday night    Drug use: No     No family history on file.    Review of patient's allergies indicates:  No Known Allergies    Medication List with Changes/Refills   Current Medications    AMLODIPINE (NORVASC) 5 MG TABLET    Take 2 tablets (10 mg total) by mouth once daily.    ASPIRIN 81 MG CHEW    Take 1 tablet (81 mg total) by mouth once daily.    ATORVASTATIN (LIPITOR) 20 MG TABLET    Take 1 tablet (20 mg total) by mouth every evening.    LOSARTAN (COZAAR) 100 MG TABLET    Take 1 tablet (100 mg total) by mouth once daily.    OFLOXACIN (OCUFLOX) 0.3 % OPHTHALMIC SOLUTION    Instill 2 drops in both eyes four times a day; apply for 7 days    PULSE OXIMETER (PULSE OXIMETER) DEVICE    by Apply Externally route 2 (two) times a day. Use  "twice daily at 8 AM and 3 PM and record the value in MyChart as directed.    PULSE OXIMETER (PULSE OXIMETER) DEVICE    by Apply Externally route 2 (two) times a day. Use twice daily at 8 AM and 3 PM and record the value in MyChart as directed.       Review of Systems   Constitutional: Positive for malaise/fatigue. Negative for diaphoresis and fever.   HENT: Negative for congestion and hearing loss.    Eyes: Negative for blurred vision and pain.   Cardiovascular: Positive for chest pain. Negative for claudication, dyspnea on exertion, leg swelling, near-syncope, palpitations and syncope.   Respiratory: Negative for shortness of breath and sleep disturbances due to breathing.    Endocrine: Polyphagia:    Hematologic/Lymphatic: Negative for bleeding problem. Does not bruise/bleed easily.   Skin: Negative for color change and poor wound healing.   Gastrointestinal: Negative for abdominal pain and nausea.   Genitourinary: Negative for bladder incontinence and flank pain.   Neurological: Negative for focal weakness and light-headedness.        Objective:   /80   Pulse 101   Ht 5' 7" (1.702 m)   Wt 71.3 kg (157 lb 1.6 oz)   SpO2 97%   BMI 24.61 kg/m²    Physical Exam  Vitals reviewed.   Constitutional:       Appearance: He is well-developed and well-nourished. He is not diaphoretic.   HENT:      Head: Normocephalic and atraumatic.      Mouth/Throat:      Mouth: Oropharynx is clear and moist.   Eyes:      General: No scleral icterus.     Extraocular Movements: EOM normal.      Pupils: Pupils are equal, round, and reactive to light.   Neck:      Vascular: No JVD.   Cardiovascular:      Rate and Rhythm: Normal rate and regular rhythm.      Pulses: Intact distal pulses.           Radial pulses are 2+ on the right side and 2+ on the left side.        Dorsalis pedis pulses are 2+ on the right side and 2+ on the left side.        Posterior tibial pulses are 2+ on the right side and 2+ on the left side.      Heart " sounds: S1 normal and S2 normal. No murmur heard.  No friction rub. No gallop.    Pulmonary:      Effort: Pulmonary effort is normal. No respiratory distress.      Breath sounds: Normal breath sounds. No wheezing or rales.   Chest:      Chest wall: No tenderness.   Abdominal:      General: Bowel sounds are normal. There is no distension.      Palpations: Abdomen is soft. There is no mass.      Tenderness: There is no abdominal tenderness. There is no rebound.   Musculoskeletal:         General: No tenderness or edema. Normal range of motion.      Cervical back: Normal range of motion and neck supple.   Skin:     General: Skin is warm and dry.      Coloration: Skin is not pale.   Neurological:      Mental Status: He is alert and oriented to person, place, and time.      Coordination: Coordination normal.      Deep Tendon Reflexes: Reflexes normal.   Psychiatric:         Mood and Affect: Mood and affect normal.         Behavior: Behavior normal.         Judgment: Judgment normal.           Assessment:       1. Chest pain, unspecified type    2. Hypertension    3. Hypertensive urgency    4. Primary hypertension    5. Demand ischemia    6. Nonintractable headache, unspecified chronicity pattern, unspecified headache type    7. COVID         Plan:         Primary hypertension  -Goal < 130/80  -Continue current medication regimen: losartan, amlodipine, ASA  -Instructed to monitor home BP 2x day and record, bring log to all appts  -Discussed lifestyle modifications- diet and exercise   -Low salt/ Low fat diet   -ASCVD score 10.4%     Demand ischemia  HTN emergency on admission 1/13/22 /137  -Controlled with medications and discharged on losartan 100 mg daily, amlodipine 10 mg daily   -Continue medication regimen    -Discussed  extensively about importance of medication adherence  -Cardiac echo :1/14/22   Summary    · Concentric remodeling and hyperdynamic systolic function.  · The estimated ejection fraction is  75%.  · Left ventricular mid-cavity obstruction is present. Mid-cavity gradient 23 mmHg.  · Normal left ventricular diastolic function.  · Normal right ventricular size with normal right ventricular systolic function.  · The sinuses of Valsalva is mildly dilated.  · Intermediate central venous pressure (8 mmHg).  · The estimated PA systolic pressure is 36 mmHg.   - Exercise stress test to evaluate for ischemia         Hypertensive urgency  -Recent hospital admission, SBP on arrival to ED was  213/137.   -medicall manage in hospital and was discharged home on : Losartan 100 mg and amlodipine 5 mg   - medication adherence with once a day dosing  - Educated on the  importance of medication adherence  -BP goal < 130/84          Nonintractable headache  -Since BP management started patient reports resolution of symptoms     COVID  -Reports some intermittent residual fatigue       Total duration of face to face visit time 40 minutes.  Total time spent counseling greater than fifty percent of total visit time.  Counseling included discussion regarding imaging findings, diagnosis, possibilities, treatment options, risks and benefits.  The patient had many questions regarding the options and long-term effects      Ney Hernandez NP  Cardiology

## 2022-02-02 ENCOUNTER — OFFICE VISIT (OUTPATIENT)
Dept: PRIMARY CARE CLINIC | Facility: CLINIC | Age: 62
End: 2022-02-02
Payer: MEDICAID

## 2022-02-02 VITALS
OXYGEN SATURATION: 97 % | HEIGHT: 67 IN | DIASTOLIC BLOOD PRESSURE: 85 MMHG | SYSTOLIC BLOOD PRESSURE: 138 MMHG | HEART RATE: 95 BPM | TEMPERATURE: 98 F | BODY MASS INDEX: 24.63 KG/M2 | WEIGHT: 156.94 LBS

## 2022-02-02 DIAGNOSIS — R51.9 NONINTRACTABLE HEADACHE, UNSPECIFIED CHRONICITY PATTERN, UNSPECIFIED HEADACHE TYPE: ICD-10-CM

## 2022-02-02 DIAGNOSIS — I16.1 HYPERTENSIVE EMERGENCY: Primary | ICD-10-CM

## 2022-02-02 DIAGNOSIS — R07.9 CHEST PAIN, UNSPECIFIED TYPE: ICD-10-CM

## 2022-02-02 DIAGNOSIS — U07.1 COVID: ICD-10-CM

## 2022-02-02 DIAGNOSIS — I10 HYPERTENSION: ICD-10-CM

## 2022-02-02 DIAGNOSIS — J30.2 SEASONAL ALLERGIES: ICD-10-CM

## 2022-02-02 DIAGNOSIS — H57.89 EYE DISCHARGE: ICD-10-CM

## 2022-02-02 PROCEDURE — 3075F SYST BP GE 130 - 139MM HG: CPT | Mod: CPTII,,, | Performed by: INTERNAL MEDICINE

## 2022-02-02 PROCEDURE — 99205 OFFICE O/P NEW HI 60 MIN: CPT | Mod: S$PBB,,, | Performed by: INTERNAL MEDICINE

## 2022-02-02 PROCEDURE — 4010F PR ACE/ARB THEARPY RXD/TAKEN: ICD-10-PCS | Mod: CPTII,,, | Performed by: INTERNAL MEDICINE

## 2022-02-02 PROCEDURE — 3008F BODY MASS INDEX DOCD: CPT | Mod: CPTII,,, | Performed by: INTERNAL MEDICINE

## 2022-02-02 PROCEDURE — 1159F MED LIST DOCD IN RCRD: CPT | Mod: CPTII,,, | Performed by: INTERNAL MEDICINE

## 2022-02-02 PROCEDURE — 1160F PR REVIEW ALL MEDS BY PRESCRIBER/CLIN PHARMACIST DOCUMENTED: ICD-10-PCS | Mod: CPTII,,, | Performed by: INTERNAL MEDICINE

## 2022-02-02 PROCEDURE — 3044F HG A1C LEVEL LT 7.0%: CPT | Mod: CPTII,,, | Performed by: INTERNAL MEDICINE

## 2022-02-02 PROCEDURE — 3079F PR MOST RECENT DIASTOLIC BLOOD PRESSURE 80-89 MM HG: ICD-10-PCS | Mod: CPTII,,, | Performed by: INTERNAL MEDICINE

## 2022-02-02 PROCEDURE — 3075F PR MOST RECENT SYSTOLIC BLOOD PRESS GE 130-139MM HG: ICD-10-PCS | Mod: CPTII,,, | Performed by: INTERNAL MEDICINE

## 2022-02-02 PROCEDURE — 3079F DIAST BP 80-89 MM HG: CPT | Mod: CPTII,,, | Performed by: INTERNAL MEDICINE

## 2022-02-02 PROCEDURE — 99999 PR PBB SHADOW E&M-EST. PATIENT-LVL IV: ICD-10-PCS | Mod: PBBFAC,,, | Performed by: INTERNAL MEDICINE

## 2022-02-02 PROCEDURE — 99214 OFFICE O/P EST MOD 30 MIN: CPT | Mod: PBBFAC,PO | Performed by: INTERNAL MEDICINE

## 2022-02-02 PROCEDURE — 99999 PR PBB SHADOW E&M-EST. PATIENT-LVL IV: CPT | Mod: PBBFAC,,, | Performed by: INTERNAL MEDICINE

## 2022-02-02 PROCEDURE — 1160F RVW MEDS BY RX/DR IN RCRD: CPT | Mod: CPTII,,, | Performed by: INTERNAL MEDICINE

## 2022-02-02 PROCEDURE — 1159F PR MEDICATION LIST DOCUMENTED IN MEDICAL RECORD: ICD-10-PCS | Mod: CPTII,,, | Performed by: INTERNAL MEDICINE

## 2022-02-02 PROCEDURE — 4010F ACE/ARB THERAPY RXD/TAKEN: CPT | Mod: CPTII,,, | Performed by: INTERNAL MEDICINE

## 2022-02-02 PROCEDURE — 99205 PR OFFICE/OUTPT VISIT, NEW, LEVL V, 60-74 MIN: ICD-10-PCS | Mod: S$PBB,,, | Performed by: INTERNAL MEDICINE

## 2022-02-02 PROCEDURE — 3008F PR BODY MASS INDEX (BMI) DOCUMENTED: ICD-10-PCS | Mod: CPTII,,, | Performed by: INTERNAL MEDICINE

## 2022-02-02 PROCEDURE — 3044F PR MOST RECENT HEMOGLOBIN A1C LEVEL <7.0%: ICD-10-PCS | Mod: CPTII,,, | Performed by: INTERNAL MEDICINE

## 2022-02-02 RX ORDER — CETIRIZINE HYDROCHLORIDE 10 MG/1
10 TABLET ORAL DAILY
Qty: 30 TABLET | Refills: 3 | Status: SHIPPED | OUTPATIENT
Start: 2022-02-02 | End: 2022-06-28 | Stop reason: SDUPTHER

## 2022-02-02 RX ORDER — AMLODIPINE BESYLATE 10 MG/1
10 TABLET ORAL DAILY
Qty: 90 TABLET | Refills: 3 | Status: SHIPPED | OUTPATIENT
Start: 2022-02-02 | End: 2022-03-07 | Stop reason: DRUGHIGH

## 2022-02-02 NOTE — PROGRESS NOTES
[facility]   New Visit Progress Note   Recent Hospital Discharge     PRESENTING HISTORY     Chief Complaint/Reason for Admission:  Follow up Hospital Discharge   PCP: Primary Doctor No    History of Present Illness:  Mr. Eitan Cody is a 61 y.o. male who was recently admitted to the hospital.    [facility] Medicine Discharge Summary  Primary Team: [facility] Hospitalist Team B  Attending Physician: Chai Renee MD  Resident: De Churchill MD  Intern: JESSICA Thompson  Date of Admit: 1/13/2022  Date of Discharge: 1/16/2022  Discharge to: Home, quarantine for additional 6 days  Condition: Stable  ___________________________________________________________________    Today:  [facility] for initial hospital follow up.  Recently hospitalized for evaluation of chest pain, headache, and hypertensive emergency.   Blood pressure on arrival 213/137.  Mild troponin elevation 0.04, downtrended.   EKG non ischemic.  Condition complicated by COVID-19 infection, diagnosed in ED.  Respiratory status was stable on room air and COVID-19 was managed conservatively.   No indication for Remdesivir/dexamethasone.   Anti hypertensive regimen adjusted and patient discharged to home.  Patient enrolled in COVID-19 surveillance program.     Unaccompanied today.  Ambulatory and independent with ADL's.   Reports compliance with all medication.  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  Had hospital follow up with Cardiology team- cardiac stress pending.  No additional chest pain since hospitalization.  Endorses intermittent, mild headaches.       Review of Systems  General ROS: negative for chills, fever or weight loss  Psychological ROS: negative for hallucination, depression or suicidal ideation  Ophthalmic ROS:+ bilateral eye irritation, excessive tearing, discharge  ENT ROS: negative for epistaxis, sore throat or rhinorrhea  Respiratory ROS: no cough, shortness of breath, or  "wheezing  Cardiovascular ROS: no chest pain or dyspnea on exertion  Gastrointestinal ROS: no abdominal pain, change in bowel habits, or black/ bloody stools  Genito-Urinary ROS: no dysuria, trouble voiding, or hematuria  Musculoskeletal ROS: negative for gait disturbance or muscular weakness  Neurological ROS: no syncope or seizures; no ataxia  Dermatological ROS: negative for pruritis, rash and jaundice      PAST HISTORY:     Past Medical History:   Diagnosis Date    Hypertension        No past surgical history on file.    No family history on file.      MEDICATIONS & ALLERGIES:     Current Outpatient Medications on File Prior to Visit   Medication Sig Dispense Refill    amLODIPine (NORVASC) 5 MG tablet Take 2 tablets (10 mg total) by mouth once daily. 180 tablet 0    aspirin 81 MG Chew Take 1 tablet (81 mg total) by mouth once daily. 30 tablet 1    atorvastatin (LIPITOR) 20 MG tablet Take 1 tablet (20 mg total) by mouth every evening. 90 tablet 3    losartan (COZAAR) 100 MG tablet Take 1 tablet (100 mg total) by mouth once daily. 90 tablet 3    ofloxacin (OCUFLOX) 0.3 % ophthalmic solution Instill 2 drops in both eyes four times a day; apply for 7 days 5 mL 0    pulse oximeter (PULSE OXIMETER) device by Apply Externally route 2 (two) times a day. Use twice daily at 8 AM and 3 PM and record the value in MyChart as directed. 1 each 0    pulse oximeter (PULSE OXIMETER) device by Apply Externally route 2 (two) times a day. Use twice daily at 8 AM and 3 PM and record the value in MyChart as directed. 1 each 0     No current facility-administered medications on file prior to visit.        Review of patient's allergies indicates:  No Known Allergies    OBJECTIVE:     Vital Signs:  /85   Pulse 95   Temp 98.2 °F (36.8 °C)   Ht 5' 7" (1.702 m)   Wt 71.2 kg (156 lb 15.5 oz)   SpO2 97%   BMI 24.58 kg/m²   Wt Readings from Last 3 Encounters:   02/02/22 1331 71.2 kg (156 lb 15.5 oz)   01/31/22 1357 71.3 kg " "(157 lb 1.6 oz)   01/13/22 1839 71.3 kg (157 lb 3 oz)   01/13/22 1034 72.6 kg (160 lb)     Body mass index is 24.58 kg/m².        Physical Exam:  /85   Pulse 95   Temp 98.2 °F (36.8 °C)   Ht 5' 7" (1.702 m)   Wt 71.2 kg (156 lb 15.5 oz)   SpO2 97%   BMI 24.58 kg/m²   General appearance: alert, cooperative, no distress  Constitutional:Oriented to person, place, and time  + appears well-developed and well-nourished.   HEENT: Normocephalic, atraumatic, neck symmetrical, no nasal discharge   Eyes: conjunctivae/corneas clear, PERRL, EOM's intact  Lungs: clear to auscultation bilaterally, no dullness to percussion bilaterally  Heart: regular rate and rhythm without rub; no displacement of the PMI   Abdomen: soft, non-tender; bowel sounds normoactive; no organomegaly  Extremities: extremities symmetric; no clubbing, cyanosis, or edema  Integument: Skin color, texture, turgor normal; no rashes; hair distrubution normal  Neurologic: Alert and oriented X 3, normal strength, normal coordination and gait  Psychiatric: no pressured speech; normal affect; no evidence of impaired cognition     Laboratory  Lab Results   Component Value Date    WBC 7.07 01/15/2022    HGB 15.1 01/15/2022    HCT 41.9 01/15/2022    MCV 96 01/15/2022    PLT 94 (L) 01/15/2022     BMP  Lab Results   Component Value Date     01/15/2022    K 4.1 01/15/2022     01/15/2022    CO2 20 (L) 01/15/2022    BUN 16 01/15/2022    CREATININE 0.9 01/15/2022    CALCIUM 8.4 (L) 01/15/2022    ANIONGAP 12 01/15/2022    ESTGFRAFRICA >60 01/15/2022    EGFRNONAA >60 01/15/2022     Lab Results   Component Value Date    ALT 75 (H) 01/15/2022    AST 90 (H) 01/15/2022    ALKPHOS 88 01/15/2022    BILITOT 1.5 (H) 01/15/2022     No results found for: INR, PROTIME  Lab Results   Component Value Date    HGBA1C 5.3 01/14/2022       Diagnostic Results:    2 D echo 1/14/22:  · Concentric remodeling and hyperdynamic systolic function.  · The estimated ejection " fraction is 75%.  · Left ventricular mid-cavity obstruction is present. Mid-cavity gradient 23 mmHg.  · Normal left ventricular diastolic function.  · Normal right ventricular size with normal right ventricular systolic function.  · The sinuses of Valsalva is mildly dilated.  · Intermediate central venous pressure (8 mmHg).  · The estimated PA systolic pressure is 36 mmHg.        ASSESSMENT & PLAN:       Hypertensive emergency  Hypertension  - blood pressure at goal on current medication regimen, continue as directed  - sodium restriction   -     Ambulatory referral/consult to Internal Medicine  -     amLODIPine (NORVASC) 10 MG tablet; Take 1 tablet (10 mg total) by mouth once daily.  Dispense: 90 tablet; Refill: 3    Chest pain, unspecified type  - resolved  - continue Aspirin, Statin, anti hypertensive regimen  - cardiac stress test 2/10/22  - cardiology follow up 5/2/22  -     Ambulatory referral/consult to Internal Medicine    Nonintractable headache, unspecified chronicity pattern, unspecified headache type  - now mild, intermittent    COVID  - managed conservatively     Eye discharge  -     Ambulatory referral/consult to Optometry; Future; Expected date: 02/09/2022    Seasonal allergies  -     cetirizine (ZYRTEC) 10 MG tablet; Take 1 tablet (10 mg total) by mouth once daily.  Dispense: 30 tablet; Refill: 3    [facility] will [facility].  He will see Dr Jonana Torres, 3/30/22, to establish new Primary Care.     Instructions for the patient:      Scheduled Follow-up :  Future Appointments   Date Time Provider Department Center   2/10/2022 11:00 AM ECHO TECH, [facility] CARDIOLOGY [facility] CAR PRD [facility]   3/7/2022 11:00 AM Cheri Bhakta MD [facility] IMPRI [facility]   3/30/2022  1:40 PM Joanna Torres, [facility] OPTOMTY [place]   5/2/2022  1:30 PM Thierno Kidd III, MD [facility] CARDIO [place]       Post Visit Medication List:     Medication List          Accurate as of February 2, 2022  2:06 PM. If you  have any questions, ask your nurse or doctor.            START taking these medications    cetirizine 10 MG tablet  Commonly known as: ZYRTEC  Take 1 tablet (10 mg total) by mouth once daily.  Started by: Cheri Bhakta MD        CHANGE how you take these medications    amLODIPine 10 MG tablet  Commonly known as: NORVASC  Take 1 tablet (10 mg total) by mouth once daily.  What changed: medication strength  Changed by: Cheri Bhakta MD        CONTINUE taking these medications    aspirin 81 MG Chew  Take 1 tablet (81 mg total) by mouth once daily.     atorvastatin 20 MG tablet  Commonly known as: LIPITOR  Take 1 tablet (20 mg total) by mouth every evening.     losartan 100 MG tablet  Commonly known as: COZAAR  Take 1 tablet (100 mg total) by mouth once daily.     ofloxacin 0.3 % ophthalmic solution  Commonly known as: OCUFLOX  Instill 2 drops in both eyes four times a day; apply for 7 days     * pulse oximeter device  Commonly known as: pulse oximeter  by Apply Externally route 2 (two) times a day. Use twice daily at 8 AM and 3 PM and record the value in MyChart as directed.     * pulse oximeter device  Commonly known as: pulse oximeter  by Apply Externally route 2 (two) times a day. Use twice daily at 8 AM and 3 PM and record the value in MyChart as directed.         * This list has 2 medication(s) that are the same as other medications prescribed for you. Read the directions carefully, and ask your doctor or other care provider to review them with you.               Where to Get Your Medications      These medications were sent to [facility]  [address] - [address]  [address] [address]    Phone: [phone]   · amLODIPine 10 MG tablet  · cetirizine 10 MG tablet         Signing Physician:  Cheri Bhakta MD

## 2022-02-10 ENCOUNTER — PATIENT MESSAGE (OUTPATIENT)
Dept: CARDIOLOGY | Facility: CLINIC | Age: 62
End: 2022-02-10

## 2022-02-10 ENCOUNTER — TELEPHONE (OUTPATIENT)
Dept: CARDIOLOGY | Facility: CLINIC | Age: 62
End: 2022-02-10

## 2022-02-11 ENCOUNTER — PATIENT MESSAGE (OUTPATIENT)
Dept: ADMINISTRATIVE | Facility: OTHER | Age: 62
End: 2022-02-11
Payer: MEDICAID

## 2022-02-11 ENCOUNTER — TELEPHONE (OUTPATIENT)
Dept: CARDIOLOGY | Facility: CLINIC | Age: 62
End: 2022-02-11

## 2022-02-11 ENCOUNTER — OFFICE VISIT (OUTPATIENT)
Dept: CARDIOLOGY | Facility: CLINIC | Age: 62
End: 2022-02-11
Payer: MEDICAID

## 2022-02-11 VITALS
DIASTOLIC BLOOD PRESSURE: 80 MMHG | BODY MASS INDEX: 24.48 KG/M2 | OXYGEN SATURATION: 98 % | SYSTOLIC BLOOD PRESSURE: 134 MMHG | HEIGHT: 67 IN | HEART RATE: 93 BPM | WEIGHT: 156 LBS

## 2022-02-11 DIAGNOSIS — Z01.818 PRE-OP TESTING: ICD-10-CM

## 2022-02-11 DIAGNOSIS — I42.1 MILD HOCM (HYPERTROPHIC OBSTRUCTIVE CARDIOMYOPATHY): ICD-10-CM

## 2022-02-11 DIAGNOSIS — I24.89 DEMAND ISCHEMIA: ICD-10-CM

## 2022-02-11 DIAGNOSIS — R94.39 ABNORMAL STRESS ECG WITH TREADMILL: ICD-10-CM

## 2022-02-11 DIAGNOSIS — I10 PRIMARY HYPERTENSION: Primary | ICD-10-CM

## 2022-02-11 PROBLEM — I16.1 HYPERTENSIVE EMERGENCY: Status: RESOLVED | Noted: 2022-01-14 | Resolved: 2022-02-11

## 2022-02-11 PROCEDURE — 99213 OFFICE O/P EST LOW 20 MIN: CPT | Mod: PBBFAC,PN | Performed by: INTERNAL MEDICINE

## 2022-02-11 PROCEDURE — 99214 PR OFFICE/OUTPT VISIT, EST, LEVL IV, 30-39 MIN: ICD-10-PCS | Mod: S$PBB,,, | Performed by: INTERNAL MEDICINE

## 2022-02-11 PROCEDURE — 1159F PR MEDICATION LIST DOCUMENTED IN MEDICAL RECORD: ICD-10-PCS | Mod: CPTII,,, | Performed by: INTERNAL MEDICINE

## 2022-02-11 PROCEDURE — 3075F SYST BP GE 130 - 139MM HG: CPT | Mod: CPTII,,, | Performed by: INTERNAL MEDICINE

## 2022-02-11 PROCEDURE — 1160F PR REVIEW ALL MEDS BY PRESCRIBER/CLIN PHARMACIST DOCUMENTED: ICD-10-PCS | Mod: CPTII,,, | Performed by: INTERNAL MEDICINE

## 2022-02-11 PROCEDURE — 99999 PR PBB SHADOW E&M-EST. PATIENT-LVL III: ICD-10-PCS | Mod: PBBFAC,,, | Performed by: INTERNAL MEDICINE

## 2022-02-11 PROCEDURE — 3044F PR MOST RECENT HEMOGLOBIN A1C LEVEL <7.0%: ICD-10-PCS | Mod: CPTII,,, | Performed by: INTERNAL MEDICINE

## 2022-02-11 PROCEDURE — 3075F PR MOST RECENT SYSTOLIC BLOOD PRESS GE 130-139MM HG: ICD-10-PCS | Mod: CPTII,,, | Performed by: INTERNAL MEDICINE

## 2022-02-11 PROCEDURE — 3079F PR MOST RECENT DIASTOLIC BLOOD PRESSURE 80-89 MM HG: ICD-10-PCS | Mod: CPTII,,, | Performed by: INTERNAL MEDICINE

## 2022-02-11 PROCEDURE — 3008F PR BODY MASS INDEX (BMI) DOCUMENTED: ICD-10-PCS | Mod: CPTII,,, | Performed by: INTERNAL MEDICINE

## 2022-02-11 PROCEDURE — 3044F HG A1C LEVEL LT 7.0%: CPT | Mod: CPTII,,, | Performed by: INTERNAL MEDICINE

## 2022-02-11 PROCEDURE — 3079F DIAST BP 80-89 MM HG: CPT | Mod: CPTII,,, | Performed by: INTERNAL MEDICINE

## 2022-02-11 PROCEDURE — 99214 OFFICE O/P EST MOD 30 MIN: CPT | Mod: S$PBB,,, | Performed by: INTERNAL MEDICINE

## 2022-02-11 PROCEDURE — 99999 PR PBB SHADOW E&M-EST. PATIENT-LVL III: CPT | Mod: PBBFAC,,, | Performed by: INTERNAL MEDICINE

## 2022-02-11 PROCEDURE — 1159F MED LIST DOCD IN RCRD: CPT | Mod: CPTII,,, | Performed by: INTERNAL MEDICINE

## 2022-02-11 PROCEDURE — 3008F BODY MASS INDEX DOCD: CPT | Mod: CPTII,,, | Performed by: INTERNAL MEDICINE

## 2022-02-11 PROCEDURE — 4010F ACE/ARB THERAPY RXD/TAKEN: CPT | Mod: CPTII,,, | Performed by: INTERNAL MEDICINE

## 2022-02-11 PROCEDURE — 1160F RVW MEDS BY RX/DR IN RCRD: CPT | Mod: CPTII,,, | Performed by: INTERNAL MEDICINE

## 2022-02-11 PROCEDURE — 4010F PR ACE/ARB THEARPY RXD/TAKEN: ICD-10-PCS | Mod: CPTII,,, | Performed by: INTERNAL MEDICINE

## 2022-02-11 RX ORDER — DIPHENHYDRAMINE HCL 25 MG
50 CAPSULE ORAL ONCE
Status: CANCELLED | OUTPATIENT
Start: 2022-02-11 | End: 2022-02-11

## 2022-02-11 RX ORDER — SODIUM CHLORIDE 9 MG/ML
INJECTION, SOLUTION INTRAVENOUS CONTINUOUS
Status: CANCELLED | OUTPATIENT
Start: 2022-02-11 | End: 2022-02-11

## 2022-02-11 RX ORDER — CARVEDILOL 12.5 MG/1
12.5 TABLET ORAL 2 TIMES DAILY WITH MEALS
Qty: 60 TABLET | Refills: 11 | Status: SHIPPED | OUTPATIENT
Start: 2022-02-11 | End: 2023-02-21 | Stop reason: SDUPTHER

## 2022-02-11 NOTE — ASSESSMENT & PLAN NOTE
See above.  Given elevated trop and abnormal stress w/ cp will plan for angiogram.  - add carvedilol as above  1. Cardiac catheterization with probable PCI.   2. Antiplatelets: ASA, load clopidogrel prn  3. Access: R radial 5/6 Fr sheath  4. Catheters: 5 Fr Tig  5. Pt is a AJIT candidate and understands the importance of taking plavix for at least one year in ACS cases and 6 months in stable CAD. The patient understands that in case of receiving a drug coated stent the failure to comply with dual anti-platelet therapy as prescribed is likely to result in stent clothing, heart attack and death.   6. The risks, benefits, and alternatives of coronary vascular angiography and possible intervention were discussed with the patient. All questions were answered and informed consent was obtained. I had a detailed discussion with the patient regarding risk of stroke, MI, bleeding access site complications including limb loss, allergy, kidney failure including dialysis and death.  7. The patient understands the risks and benefits and wishes to go ahead with the procedure.  8. CSHA Clinical Frailty Scale: Managing well  9. All patient's questions were answered  10. Patient denies personal and family history of anesthesia reactions in past.

## 2022-02-11 NOTE — ASSESSMENT & PLAN NOTE
Pt noted to have mid LV obstruction w/ gradient of 23 mmHg.  Pt also notes perkins.    - start carvedilol 12.5 mg daily

## 2022-02-11 NOTE — ASSESSMENT & PLAN NOTE
Uncontrolled.  Goal BP < 130/80.  Compliant w/ meds.    - add carvedilol 12.5 mg BID  - continue other meds  - enroll in digital medicine prgm  - risk factor and lifestyle modifications

## 2022-02-11 NOTE — PROGRESS NOTES
Subjective:    Patient ID:  Eitan Cody is a 61 y.o. male who presents for follow-up of Results      PCP: Primary Doctor No     HPI  Pt is a 62 yo M w/ PMH of HTN and ETOH abuse (quit 1/12/2022) who presents for abnormal stress test.  Pt was admitted 1/13/22-1/16/22 for HTN emergency w/ /137 and elevated trop and was also found to have COVID.  He was seen by cardiology as an o/p and had an ETT which was abnormal and referred to IC.  He notes that he has episodes of midsternal cp and perkins at times however may not always be related to exertion x1 month.  These episodes may last for 5 secs and resolve spontaneously.  He also had an echo which noted a mid LV cavity obstruction w/ a gradient of 23 mmHg.  He denies edema, orthopnea, PND, presyncope, palpitations, LOC, and claudication.  He notes compliance w/ meds and denies side effects.  He monitors his BP at home and has been running 130-170s/70-80s.  He has not been exercising however notes he was functional prior.          Past Medical History:   Diagnosis Date    Hypertension      History reviewed. No pertinent surgical history.  Social History     Socioeconomic History    Marital status: Single   Tobacco Use    Smoking status: Never Smoker    Smokeless tobacco: Never Used   Substance and Sexual Activity    Alcohol use: Yes     Alcohol/week: 6.0 standard drinks     Types: 6 Cans of beer per week     Comment: everyday last drink Monday night    Drug use: No     History reviewed. No pertinent family history.    Review of patient's allergies indicates:  No Known Allergies    Medication List with Changes/Refills   New Medications    CARVEDILOL (COREG) 12.5 MG TABLET    Take 1 tablet (12.5 mg total) by mouth 2 (two) times daily with meals.   Current Medications    AMLODIPINE (NORVASC) 10 MG TABLET    Take 1 tablet (10 mg total) by mouth once daily.    ASPIRIN 81 MG CHEW    Take 1 tablet (81 mg total) by mouth once daily.    ATORVASTATIN (LIPITOR) 20 MG TABLET   "  Take 1 tablet (20 mg total) by mouth every evening.    CETIRIZINE (ZYRTEC) 10 MG TABLET    Take 1 tablet (10 mg total) by mouth once daily.    LOSARTAN (COZAAR) 100 MG TABLET    Take 1 tablet (100 mg total) by mouth once daily.    OFLOXACIN (OCUFLOX) 0.3 % OPHTHALMIC SOLUTION    Instill 2 drops in both eyes four times a day; apply for 7 days    PULSE OXIMETER (PULSE OXIMETER) DEVICE    by Apply Externally route 2 (two) times a day. Use twice daily at 8 AM and 3 PM and record the value in MyChart as directed.    PULSE OXIMETER (PULSE OXIMETER) DEVICE    by Apply Externally route 2 (two) times a day. Use twice daily at 8 AM and 3 PM and record the value in MyChart as directed.       Review of Systems   Constitutional: Negative for diaphoresis and fever.   HENT: Negative for congestion and hearing loss.    Eyes: Negative for blurred vision and pain.   Cardiovascular: Positive for chest pain and dyspnea on exertion. Negative for claudication, leg swelling, near-syncope, palpitations and syncope.   Respiratory: Negative for shortness of breath and sleep disturbances due to breathing.    Hematologic/Lymphatic: Negative for bleeding problem. Does not bruise/bleed easily.   Skin: Negative for color change and poor wound healing.   Gastrointestinal: Negative for abdominal pain and nausea.   Genitourinary: Negative for bladder incontinence and flank pain.   Neurological: Negative for focal weakness and light-headedness.        Objective:   /80   Pulse 93   Ht 5' 7" (1.702 m)   Wt 70.8 kg (156 lb)   SpO2 98%   BMI 24.43 kg/m²    Physical Exam  Constitutional:       Appearance: He is well-developed and well-nourished. He is not diaphoretic.   HENT:      Head: Normocephalic and atraumatic.      Mouth/Throat:      Mouth: Oropharynx is clear and moist.   Eyes:      General: No scleral icterus.     Extraocular Movements: EOM normal.      Pupils: Pupils are equal, round, and reactive to light.   Neck:      Vascular: No " JVD.   Cardiovascular:      Rate and Rhythm: Normal rate and regular rhythm.      Pulses: Intact distal pulses.      Heart sounds: S1 normal and S2 normal. No murmur heard.  No friction rub. No gallop.    Pulmonary:      Effort: Pulmonary effort is normal. No respiratory distress.      Breath sounds: Normal breath sounds. No wheezing or rales.   Chest:      Chest wall: No tenderness.   Abdominal:      General: Bowel sounds are normal. There is no distension.      Palpations: Abdomen is soft. There is no mass.      Tenderness: There is no abdominal tenderness. There is no rebound.   Musculoskeletal:         General: No tenderness or edema. Normal range of motion.      Cervical back: Normal range of motion and neck supple.   Skin:     General: Skin is warm and dry.      Coloration: Skin is not pale.   Neurological:      Mental Status: He is alert and oriented to person, place, and time.      Coordination: Coordination normal.      Deep Tendon Reflexes: Reflexes normal.   Psychiatric:         Mood and Affect: Mood and affect normal.         Behavior: Behavior normal.         Judgment: Judgment normal.           EC2022- reviewed.  NSR, NLE ECG.      Echo: 2022- reviewed.    Summary    · Concentric remodeling and hyperdynamic systolic function.  · The estimated ejection fraction is 75%.  · Left ventricular mid-cavity obstruction is present. Mid-cavity gradient 23 mmHg.  · Normal left ventricular diastolic function.  · Normal right ventricular size with normal right ventricular systolic function.  · The sinuses of Valsalva is mildly dilated.  · Intermediate central venous pressure (8 mmHg).  · The estimated PA systolic pressure is 36 mmHg.    ETT: 2/10/2022- reviewed.    Conclusion         The EKG portion of this study is positive for ischemia.    The patient reported no chest pain during the stress test.    The blood pressure response to stress was normal.    The Duke Treadmill Score was 1.    The  exercise capacity was average.    Assessment:       1. Primary hypertension    2. Demand ischemia    3. Abnormal stress ECG with treadmill    4. Mild HOCM (hypertrophic obstructive cardiomyopathy)    5. Pre-op testing         Plan:         Primary hypertension  Uncontrolled.  Goal BP < 130/80.  Compliant w/ meds.    - add carvedilol 12.5 mg BID  - continue other meds  - enroll in digital medicine prgm  - risk factor and lifestyle modifications     Demand ischemia  Pt was noted to have hypertensive emergency w/ elevated trop.  Pt also noted to have abnormal ETT.    - plan for angiogram +/- PCI    Abnormal stress ECG with treadmill  See above.  Given elevated trop and abnormal stress w/ cp will plan for angiogram.  - add carvedilol as above  1. Cardiac catheterization with probable PCI.   2. Antiplatelets: ASA, load clopidogrel prn  3. Access: R radial 5/6 Fr sheath  4. Catheters: 5 Fr Tig  5. Pt is a AJIT candidate and understands the importance of taking plavix for at least one year in ACS cases and 6 months in stable CAD. The patient understands that in case of receiving a drug coated stent the failure to comply with dual anti-platelet therapy as prescribed is likely to result in stent clothing, heart attack and death.   6. The risks, benefits, and alternatives of coronary vascular angiography and possible intervention were discussed with the patient. All questions were answered and informed consent was obtained. I had a detailed discussion with the patient regarding risk of stroke, MI, bleeding access site complications including limb loss, allergy, kidney failure including dialysis and death.  7. The patient understands the risks and benefits and wishes to go ahead with the procedure.  8. CSHA Clinical Frailty Scale: Managing well  9. All patient's questions were answered  10. Patient denies personal and family history of anesthesia reactions in past.      Mild HOCM (hypertrophic obstructive cardiomyopathy)  Pt  noted to have mid LV obstruction w/ gradient of 23 mmHg.  Pt also notes perkins.    - start carvedilol 12.5 mg daily       Total duration of face to face visit time 30 minutes.  Total time spent counseling greater than fifty percent of total visit time.  Counseling included discussion regarding imaging findings, diagnosis, possibilities, treatment options, risks and benefits.  The patient had many questions regarding the options and long-term effects      Thierno Kidd M.D.  Interventional Cardiology

## 2022-02-11 NOTE — ASSESSMENT & PLAN NOTE
Pt was noted to have hypertensive emergency w/ elevated trop.  Pt also noted to have abnormal ETT.    - plan for angiogram +/- PCI

## 2022-02-13 ENCOUNTER — LAB VISIT (OUTPATIENT)
Dept: URGENT CARE | Facility: CLINIC | Age: 62
End: 2022-02-13
Payer: MEDICAID

## 2022-02-13 DIAGNOSIS — Z01.818 PRE-OP TESTING: ICD-10-CM

## 2022-02-13 PROCEDURE — U0005 INFEC AGEN DETEC AMPLI PROBE: HCPCS | Performed by: INTERNAL MEDICINE

## 2022-02-13 PROCEDURE — U0003 INFECTIOUS AGENT DETECTION BY NUCLEIC ACID (DNA OR RNA); SEVERE ACUTE RESPIRATORY SYNDROME CORONAVIRUS 2 (SARS-COV-2) (CORONAVIRUS DISEASE [COVID-19]), AMPLIFIED PROBE TECHNIQUE, MAKING USE OF HIGH THROUGHPUT TECHNOLOGIES AS DESCRIBED BY CMS-2020-01-R: HCPCS | Performed by: INTERNAL MEDICINE

## 2022-02-14 LAB
SARS-COV-2 RNA RESP QL NAA+PROBE: NOT DETECTED
SARS-COV-2- CYCLE NUMBER: NORMAL

## 2022-02-23 ENCOUNTER — OFFICE VISIT (OUTPATIENT)
Dept: OPTOMETRY | Facility: CLINIC | Age: 62
End: 2022-02-23
Payer: MEDICAID

## 2022-02-23 DIAGNOSIS — H04.129 DRY EYE: Primary | ICD-10-CM

## 2022-02-23 PROCEDURE — 1159F MED LIST DOCD IN RCRD: CPT | Mod: CPTII,,,

## 2022-02-23 PROCEDURE — 4010F PR ACE/ARB THEARPY RXD/TAKEN: ICD-10-PCS | Mod: CPTII,,,

## 2022-02-23 PROCEDURE — 92002 PR EYE EXAM, NEW PATIENT,INTERMED: ICD-10-PCS | Mod: S$PBB,,,

## 2022-02-23 PROCEDURE — 1160F PR REVIEW ALL MEDS BY PRESCRIBER/CLIN PHARMACIST DOCUMENTED: ICD-10-PCS | Mod: CPTII,,,

## 2022-02-23 PROCEDURE — 99999 PR PBB SHADOW E&M-EST. PATIENT-LVL I: ICD-10-PCS | Mod: PBBFAC,,,

## 2022-02-23 PROCEDURE — 1159F PR MEDICATION LIST DOCUMENTED IN MEDICAL RECORD: ICD-10-PCS | Mod: CPTII,,,

## 2022-02-23 PROCEDURE — 3044F HG A1C LEVEL LT 7.0%: CPT | Mod: CPTII,,,

## 2022-02-23 PROCEDURE — 3044F PR MOST RECENT HEMOGLOBIN A1C LEVEL <7.0%: ICD-10-PCS | Mod: CPTII,,,

## 2022-02-23 PROCEDURE — 1160F RVW MEDS BY RX/DR IN RCRD: CPT | Mod: CPTII,,,

## 2022-02-23 PROCEDURE — 4010F ACE/ARB THERAPY RXD/TAKEN: CPT | Mod: CPTII,,,

## 2022-02-23 PROCEDURE — 99999 PR PBB SHADOW E&M-EST. PATIENT-LVL I: CPT | Mod: PBBFAC,,,

## 2022-02-23 PROCEDURE — 92002 INTRM OPH EXAM NEW PATIENT: CPT | Mod: S$PBB,,,

## 2022-02-23 PROCEDURE — 99211 OFF/OP EST MAY X REQ PHY/QHP: CPT | Mod: PBBFAC

## 2022-02-23 NOTE — PROGRESS NOTES
"HPI     Patient presents with itchy, red, watery eyes OU. Onset 8 months ago. No   hx of allergies. Eyes will have a burning sensation to the point where he   can't watch TV anymore. He does wipe his eyes constantly to get mucous   out. Will often fish out mucous balls or string. Rubbing eyes helps makes   it feel better. Vision is constantly blurry.     Is using an unknown "complete" eye drop for allergies. No improvement in   condition after using drops.     Was seen elsewhere and given Ocuflox about a month ago. Patient has been   on the drops since. No improvement in condition after using drops.     Last edited by Grayson Lu, OD on 2/23/2022 10:09 AM. (History)            Assessment /Plan     For exam results, see Encounter Report.    Dry eye  -Discontinue ocuflox and other allergy drop    -Start warm compresses (Abby mask) twice a day for 10-15 min.  -Start Systane Complete 2-3x/day  -Systane nighttime ointment before bed in both eyes    -Discussed chronicity of condition  -RTC 2 months for dry eye f/u and long-term plan                   "

## 2022-03-03 ENCOUNTER — TELEPHONE (OUTPATIENT)
Dept: PRIMARY CARE CLINIC | Facility: CLINIC | Age: 62
End: 2022-03-03
Payer: MEDICAID

## 2022-03-07 ENCOUNTER — OFFICE VISIT (OUTPATIENT)
Dept: PRIMARY CARE CLINIC | Facility: CLINIC | Age: 62
End: 2022-03-07
Payer: MEDICAID

## 2022-03-07 VITALS
TEMPERATURE: 98 F | OXYGEN SATURATION: 97 % | WEIGHT: 167.88 LBS | BODY MASS INDEX: 26.29 KG/M2 | SYSTOLIC BLOOD PRESSURE: 94 MMHG | DIASTOLIC BLOOD PRESSURE: 58 MMHG | HEART RATE: 69 BPM

## 2022-03-07 DIAGNOSIS — R07.9 CHEST PAIN, UNSPECIFIED TYPE: ICD-10-CM

## 2022-03-07 DIAGNOSIS — I10 PRIMARY HYPERTENSION: Primary | ICD-10-CM

## 2022-03-07 PROCEDURE — 4010F ACE/ARB THERAPY RXD/TAKEN: CPT | Mod: CPTII,,, | Performed by: INTERNAL MEDICINE

## 2022-03-07 PROCEDURE — 1159F MED LIST DOCD IN RCRD: CPT | Mod: CPTII,,, | Performed by: INTERNAL MEDICINE

## 2022-03-07 PROCEDURE — 4010F PR ACE/ARB THEARPY RXD/TAKEN: ICD-10-PCS | Mod: CPTII,,, | Performed by: INTERNAL MEDICINE

## 2022-03-07 PROCEDURE — 1160F PR REVIEW ALL MEDS BY PRESCRIBER/CLIN PHARMACIST DOCUMENTED: ICD-10-PCS | Mod: CPTII,,, | Performed by: INTERNAL MEDICINE

## 2022-03-07 PROCEDURE — 99214 PR OFFICE/OUTPT VISIT, EST, LEVL IV, 30-39 MIN: ICD-10-PCS | Mod: S$PBB,,, | Performed by: INTERNAL MEDICINE

## 2022-03-07 PROCEDURE — 99214 OFFICE O/P EST MOD 30 MIN: CPT | Mod: S$PBB,,, | Performed by: INTERNAL MEDICINE

## 2022-03-07 PROCEDURE — 3074F PR MOST RECENT SYSTOLIC BLOOD PRESSURE < 130 MM HG: ICD-10-PCS | Mod: CPTII,,, | Performed by: INTERNAL MEDICINE

## 2022-03-07 PROCEDURE — 3044F PR MOST RECENT HEMOGLOBIN A1C LEVEL <7.0%: ICD-10-PCS | Mod: CPTII,,, | Performed by: INTERNAL MEDICINE

## 2022-03-07 PROCEDURE — 3008F BODY MASS INDEX DOCD: CPT | Mod: CPTII,,, | Performed by: INTERNAL MEDICINE

## 2022-03-07 PROCEDURE — 99999 PR PBB SHADOW E&M-EST. PATIENT-LVL III: ICD-10-PCS | Mod: PBBFAC,,, | Performed by: INTERNAL MEDICINE

## 2022-03-07 PROCEDURE — 3074F SYST BP LT 130 MM HG: CPT | Mod: CPTII,,, | Performed by: INTERNAL MEDICINE

## 2022-03-07 PROCEDURE — 3008F PR BODY MASS INDEX (BMI) DOCUMENTED: ICD-10-PCS | Mod: CPTII,,, | Performed by: INTERNAL MEDICINE

## 2022-03-07 PROCEDURE — 1159F PR MEDICATION LIST DOCUMENTED IN MEDICAL RECORD: ICD-10-PCS | Mod: CPTII,,, | Performed by: INTERNAL MEDICINE

## 2022-03-07 PROCEDURE — 99999 PR PBB SHADOW E&M-EST. PATIENT-LVL III: CPT | Mod: PBBFAC,,, | Performed by: INTERNAL MEDICINE

## 2022-03-07 PROCEDURE — 3078F DIAST BP <80 MM HG: CPT | Mod: CPTII,,, | Performed by: INTERNAL MEDICINE

## 2022-03-07 PROCEDURE — 99213 OFFICE O/P EST LOW 20 MIN: CPT | Mod: PBBFAC,PO | Performed by: INTERNAL MEDICINE

## 2022-03-07 PROCEDURE — 3078F PR MOST RECENT DIASTOLIC BLOOD PRESSURE < 80 MM HG: ICD-10-PCS | Mod: CPTII,,, | Performed by: INTERNAL MEDICINE

## 2022-03-07 PROCEDURE — 1160F RVW MEDS BY RX/DR IN RCRD: CPT | Mod: CPTII,,, | Performed by: INTERNAL MEDICINE

## 2022-03-07 PROCEDURE — 3044F HG A1C LEVEL LT 7.0%: CPT | Mod: CPTII,,, | Performed by: INTERNAL MEDICINE

## 2022-03-07 NOTE — PROGRESS NOTES
Subjective:       Patient ID: Eitan Cody is a 61 y.o. male.    Chief Complaint: Hospital Follow Up    HPI:  Patient returns to [facility] for continued hospital follow up.  Recently hospitalized for evaluation of chest pain, headache, and hypertensive emergency.   Blood pressure on arrival 213/137.  Mild troponin elevation 0.04, downtrended.   EKG non ischemic.  Condition complicated by COVID-19 infection, diagnosed in ED.  Respiratory status was stable on room air and COVID-19 was managed conservatively.   No indication for Remdesivir/dexamethasone.   Anti hypertensive regimen adjusted and patient discharged to home.  Patient enrolled in COVID-19 surveillance program.     Abnormal cardiac treadmill stress 2/10/22.   Coreg 12.5 mg bid added to regimen.  Planned LHC performed 2/16/22-  Normal coronary arteries.   Prior abnormal stress thought to be false positive.   Continued medical management recommended.     Review of Systems   Constitutional: Positive for malaise/fatigue. Negative for chills and fever.   Respiratory: Negative for cough and shortness of breath.    Cardiovascular: Negative for chest pain and leg swelling.   Gastrointestinal: Negative for abdominal pain, nausea and vomiting.   Genitourinary: Negative for dysuria.   Neurological: Positive for dizziness. Negative for focal weakness and weakness.        + lightheadedness     Psychiatric/Behavioral: The patient is not nervous/anxious.            Objective:      Vital Signs:  Vitals:    03/07/22 1104   BP: (!) 94/58   Pulse: 69   Temp: 97.8 °F (36.6 °C)     Wt Readings from Last 3 Encounters:   03/07/22 1104 76.1 kg (167 lb 14.1 oz)   02/16/22 1042 72.6 kg (160 lb)   02/11/22 1038 70.8 kg (156 lb)     Body mass index is 26.29 kg/m².   SpO2 Readings from Last 1 Encounters:   02/16/22 100%       Physical Exam  Constitutional:       General: He is not in acute distress.     Appearance: He is not ill-appearing.   HENT:      Head: Normocephalic.   Eyes:       Conjunctiva/sclera: Conjunctivae normal.   Cardiovascular:      Rate and Rhythm: Normal rate.   Pulmonary:      Effort: Pulmonary effort is normal.      Breath sounds: No wheezing.   Abdominal:      Palpations: Abdomen is soft.   Musculoskeletal:         General: No swelling. Normal range of motion.      Cervical back: Neck supple.   Skin:     General: Skin is dry.   Neurological:      General: No focal deficit present.      Mental Status: He is oriented to person, place, and time.             Assessment:       1. Primary hypertension    2. Chest pain, unspecified type        Plan:       Diagnoses and all orders for this visit:      Hypertension  - marginal blood pressure presently, symptomatic with episodic dizziness/lightheadedness- more pronounced with positional changes   - discontinue Amlodipine  - continue Coreg, Losartan and current doses, monitor      Chest pain, unspecified type  - [facility]- normal coronary arteries  - continue medical management   - cardiology follow up 3/25/22    Patient will be released from [facility].  He will see Dr Malia Spears, 3/28/22 to establish new Primary Care.       Scheduled Follow-up :  Future Appointments   Date Time Provider Department Center   3/25/2022 10:30 AM Thierno Kidd III, MD [facility] [place]   3/28/2022 11:30 AM [facility]   3/30/2022  1:40 PM CAROLE Haq [facility] [place]       Post Visit Medication List:     Medication List          Accurate as of March 7, 2022  1:07 PM. If you have any questions, ask your nurse or doctor.            CONTINUE taking these medications    aspirin 81 MG Chew  Take 1 tablet (81 mg total) by mouth once daily.     atorvastatin 20 MG tablet  Commonly known as: LIPITOR  Take 1 tablet (20 mg total) by mouth every evening.     carvediloL 12.5 MG tablet  Commonly known as: COREG  Take 1 tablet (12.5 mg total) by mouth 2 (two) times daily with meals.     cetirizine 10 MG  tablet  Commonly known as: ZYRTEC  Take 1 tablet (10 mg total) by mouth once daily.     losartan 100 MG tablet  Commonly known as: COZAAR  Take 1 tablet (100 mg total) by mouth once daily.     ofloxacin 0.3 % ophthalmic solution  Commonly known as: OCUFLOX  Instill 2 drops in both eyes four times a day; apply for 7 days     * pulse oximeter device  Commonly known as: pulse oximeter  by Apply Externally route 2 (two) times a day. Use twice daily at 8 AM and 3 PM and record the value in MyChart as directed.     * pulse oximeter device  Commonly known as: pulse oximeter  by Apply Externally route 2 (two) times a day. Use twice daily at 8 AM and 3 PM and record the value in MyChart as directed.         * This list has 2 medication(s) that are the same as other medications prescribed for you. Read the directions carefully, and ask your doctor or other care provider to review them with you.            STOP taking these medications    amLODIPine 10 MG tablet  Commonly known as: NORVASC  Stopped by: Cheri Bhakta MD

## 2022-03-25 ENCOUNTER — OFFICE VISIT (OUTPATIENT)
Dept: CARDIOLOGY | Facility: CLINIC | Age: 62
End: 2022-03-25
Payer: MEDICAID

## 2022-03-25 VITALS
HEIGHT: 67 IN | SYSTOLIC BLOOD PRESSURE: 98 MMHG | DIASTOLIC BLOOD PRESSURE: 58 MMHG | OXYGEN SATURATION: 98 % | BODY MASS INDEX: 25.9 KG/M2 | HEART RATE: 71 BPM | WEIGHT: 165 LBS

## 2022-03-25 DIAGNOSIS — I42.1 MILD HOCM (HYPERTROPHIC OBSTRUCTIVE CARDIOMYOPATHY): ICD-10-CM

## 2022-03-25 DIAGNOSIS — R55 POSTURAL DIZZINESS WITH PRESYNCOPE: ICD-10-CM

## 2022-03-25 DIAGNOSIS — I10 PRIMARY HYPERTENSION: ICD-10-CM

## 2022-03-25 DIAGNOSIS — R94.39 ABNORMAL STRESS ECG WITH TREADMILL: ICD-10-CM

## 2022-03-25 DIAGNOSIS — R42 POSTURAL DIZZINESS WITH PRESYNCOPE: ICD-10-CM

## 2022-03-25 PROBLEM — I24.89 DEMAND ISCHEMIA: Status: RESOLVED | Noted: 2022-01-31 | Resolved: 2022-03-25

## 2022-03-25 PROCEDURE — 3044F PR MOST RECENT HEMOGLOBIN A1C LEVEL <7.0%: ICD-10-PCS | Mod: CPTII,,, | Performed by: INTERNAL MEDICINE

## 2022-03-25 PROCEDURE — 3078F DIAST BP <80 MM HG: CPT | Mod: CPTII,,, | Performed by: INTERNAL MEDICINE

## 2022-03-25 PROCEDURE — 3008F PR BODY MASS INDEX (BMI) DOCUMENTED: ICD-10-PCS | Mod: CPTII,,, | Performed by: INTERNAL MEDICINE

## 2022-03-25 PROCEDURE — 99214 OFFICE O/P EST MOD 30 MIN: CPT | Mod: S$PBB,,, | Performed by: INTERNAL MEDICINE

## 2022-03-25 PROCEDURE — 4010F ACE/ARB THERAPY RXD/TAKEN: CPT | Mod: CPTII,,, | Performed by: INTERNAL MEDICINE

## 2022-03-25 PROCEDURE — 3078F PR MOST RECENT DIASTOLIC BLOOD PRESSURE < 80 MM HG: ICD-10-PCS | Mod: CPTII,,, | Performed by: INTERNAL MEDICINE

## 2022-03-25 PROCEDURE — 3044F HG A1C LEVEL LT 7.0%: CPT | Mod: CPTII,,, | Performed by: INTERNAL MEDICINE

## 2022-03-25 PROCEDURE — 1159F MED LIST DOCD IN RCRD: CPT | Mod: CPTII,,, | Performed by: INTERNAL MEDICINE

## 2022-03-25 PROCEDURE — 1160F RVW MEDS BY RX/DR IN RCRD: CPT | Mod: CPTII,,, | Performed by: INTERNAL MEDICINE

## 2022-03-25 PROCEDURE — 99999 PR PBB SHADOW E&M-EST. PATIENT-LVL III: CPT | Mod: PBBFAC,,, | Performed by: INTERNAL MEDICINE

## 2022-03-25 PROCEDURE — 3008F BODY MASS INDEX DOCD: CPT | Mod: CPTII,,, | Performed by: INTERNAL MEDICINE

## 2022-03-25 PROCEDURE — 1160F PR REVIEW ALL MEDS BY PRESCRIBER/CLIN PHARMACIST DOCUMENTED: ICD-10-PCS | Mod: CPTII,,, | Performed by: INTERNAL MEDICINE

## 2022-03-25 PROCEDURE — 3074F PR MOST RECENT SYSTOLIC BLOOD PRESSURE < 130 MM HG: ICD-10-PCS | Mod: CPTII,,, | Performed by: INTERNAL MEDICINE

## 2022-03-25 PROCEDURE — 99213 OFFICE O/P EST LOW 20 MIN: CPT | Mod: PBBFAC,PN | Performed by: INTERNAL MEDICINE

## 2022-03-25 PROCEDURE — 3074F SYST BP LT 130 MM HG: CPT | Mod: CPTII,,, | Performed by: INTERNAL MEDICINE

## 2022-03-25 PROCEDURE — 99214 PR OFFICE/OUTPT VISIT, EST, LEVL IV, 30-39 MIN: ICD-10-PCS | Mod: S$PBB,,, | Performed by: INTERNAL MEDICINE

## 2022-03-25 PROCEDURE — 99999 PR PBB SHADOW E&M-EST. PATIENT-LVL III: ICD-10-PCS | Mod: PBBFAC,,, | Performed by: INTERNAL MEDICINE

## 2022-03-25 PROCEDURE — 1159F PR MEDICATION LIST DOCUMENTED IN MEDICAL RECORD: ICD-10-PCS | Mod: CPTII,,, | Performed by: INTERNAL MEDICINE

## 2022-03-25 PROCEDURE — 4010F PR ACE/ARB THEARPY RXD/TAKEN: ICD-10-PCS | Mod: CPTII,,, | Performed by: INTERNAL MEDICINE

## 2022-03-25 RX ORDER — LOSARTAN POTASSIUM 50 MG/1
50 TABLET ORAL DAILY
Qty: 90 TABLET | Refills: 3 | Status: SHIPPED | OUTPATIENT
Start: 2022-03-25 | End: 2023-03-08 | Stop reason: ALTCHOICE

## 2022-03-25 NOTE — PROGRESS NOTES
Subjective:    Patient ID:  Eitan Cody is a 61 y.o. male who presents for follow-up of Follow-up      PCP: Malia Spears PsyD     HPI    Pt is a 62 yo M w/ PMH of HTN and ETOH abuse (quit 1/12/2022) who presents for f/u appt.  Pt was admitted 1/13/22-1/16/22 for HTN emergency w/ /137 and elevated trop and was also found to have COVID.  He was seen by cardiology as an o/p and had an ETT which was abnormal and referred to IC.  He was last seen 2/11/2022 and noted that he has episodes of midsternal cp and perkins at times however may not always be related to exertion x1 month.  He also had an echo which noted a mid LV cavity obstruction w/ a gradient of 23 mmHg and was setup for an angiogram which was negative for CAD w/ an LVEDP of 11 mmHg.  He mentions that he has been doing ok however notes postural presyncope.  He notes sob at times but not always a/w exertion.  He was noted to have mild hypotension and his symptoms of orthostasis [facility] and his amlodipine was d/c'd.  He denies cp, edema, orthopnea, PND, palpitations, LOC, and claudication.  He notes compliance w/ meds and denies side effects.  He has not been monitoring his BP regularly at home.  He has not been exercising however is active at work as a  and walks often and denies limitation.          Past Medical History:   Diagnosis Date    Hypertension      Past Surgical History:   Procedure Laterality Date    LEFT HEART CATHETERIZATION Left 2/16/2022    Procedure: Left heart cath;  Surgeon: Thierno Kidd III, MD;  [facility];  Service: Cardiology;  Laterality: Left;     Social History     Socioeconomic History    Marital status: Single   Tobacco Use    Smoking status: Never Smoker    Smokeless tobacco: Never Used   Substance and Sexual Activity    Alcohol use: Yes     Alcohol/week: 6.0 standard drinks     Types: 6 Cans of beer per week     Comment: everyday last drink Monday night     Drug use: No     History reviewed. No pertinent family history.    Review of patient's allergies indicates:  No Known Allergies    Medication List with Changes/Refills   Current Medications    ASPIRIN 81 MG CHEW    Take 1 tablet (81 mg total) by mouth once daily.    ATORVASTATIN (LIPITOR) 20 MG TABLET    Take 1 tablet (20 mg total) by mouth every evening.    CARVEDILOL (COREG) 12.5 MG TABLET    Take 1 tablet (12.5 mg total) by mouth 2 (two) times daily with meals.    CETIRIZINE (ZYRTEC) 10 MG TABLET    Take 1 tablet (10 mg total) by mouth once daily.    OFLOXACIN (OCUFLOX) 0.3 % OPHTHALMIC SOLUTION    Instill 2 drops in both eyes four times a day; apply for 7 days    PULSE OXIMETER (PULSE OXIMETER) DEVICE    by Apply Externally route 2 (two) times a day. Use twice daily at 8 AM and 3 PM and record the value in MyChart as directed.    PULSE OXIMETER (PULSE OXIMETER) DEVICE    by Apply Externally route 2 (two) times a day. Use twice daily at 8 AM and 3 PM and record the value in MyChart as directed.   Changed and/or Refilled Medications    Modified Medication Previous Medication    LOSARTAN (COZAAR) 50 MG TABLET losartan (COZAAR) 100 MG tablet       Take 1 tablet (50 mg total) by mouth once daily.    Take 1 tablet (100 mg total) by mouth once daily.       Review of Systems   Constitutional: Negative for diaphoresis and fever.   HENT: Negative for congestion and hearing loss.    Eyes: Negative for blurred vision and pain.   Cardiovascular: Positive for near-syncope. Negative for chest pain, claudication, dyspnea on exertion, leg swelling, palpitations and syncope.   Respiratory: Positive for shortness of breath. Negative for sleep disturbances due to breathing.    Hematologic/Lymphatic: Negative for bleeding problem. Does not bruise/bleed easily.   Skin: Negative for color change and poor wound healing.   Gastrointestinal: Negative for abdominal pain and nausea.   Genitourinary: Negative for bladder incontinence and  "flank pain.   Neurological: Negative for focal weakness and light-headedness.        Objective:   BP (!) 98/58 (BP Location: Right arm, Patient Position: Sitting)   Pulse 71   Ht 5' 7" (1.702 m)   Wt 74.8 kg (165 lb)   SpO2 98%   BMI 25.84 kg/m²    Physical Exam  Constitutional:       Appearance: He is well-developed. He is not diaphoretic.   HENT:      Head: Normocephalic and atraumatic.   Eyes:      General: No scleral icterus.     Pupils: Pupils are equal, round, and reactive to light.   Neck:      Vascular: No JVD.   Cardiovascular:      Rate and Rhythm: Normal rate and regular rhythm.      Pulses: Intact distal pulses.      Heart sounds: S1 normal and S2 normal. No murmur heard.    No friction rub. No gallop.   Pulmonary:      Effort: Pulmonary effort is normal. No respiratory distress.      Breath sounds: Normal breath sounds. No wheezing or rales.   Chest:      Chest wall: No tenderness.   Abdominal:      General: Bowel sounds are normal. There is no distension.      Palpations: Abdomen is soft. There is no mass.      Tenderness: There is no abdominal tenderness. There is no rebound.   Musculoskeletal:         General: No tenderness. Normal range of motion.      Cervical back: Normal range of motion and neck supple.   Skin:     General: Skin is warm and dry.      Coloration: Skin is not pale.   Neurological:      Mental Status: He is alert and oriented to person, place, and time.      Coordination: Coordination normal.      Deep Tendon Reflexes: Reflexes normal.   Psychiatric:         Behavior: Behavior normal.         Judgment: Judgment normal.           EC2022- reviewed.  NSR, NLE ECG.      Echo: 2022- reviewed.    Summary    · Concentric remodeling and hyperdynamic systolic function.  · The estimated ejection fraction is 75%.  · Left ventricular mid-cavity obstruction is present. Mid-cavity gradient 23 mmHg.  · Normal left ventricular diastolic function.  · Normal right ventricular size " with normal right ventricular systolic function.  · The sinuses of Valsalva is mildly dilated.  · Intermediate central venous pressure (8 mmHg).  · The estimated PA systolic pressure is 36 mmHg.    ETT: 2/10/2022- reviewed.    Conclusion         The EKG portion of this study is positive for ischemia.    The patient reported no chest pain during the stress test.    The blood pressure response to stress was normal.    The Duke Treadmill Score was 1.    The exercise capacity was average.    LHC: 2/16/2022- reviewed.    Summary       · The coronary arteries were normal.  · The left ventricular end diastolic pressure was normal, LVEDP 11 mmHg.  · The estimated blood loss was <50 mL.       Assessment:       1. Primary hypertension    2. Mild HOCM (hypertrophic obstructive cardiomyopathy)    3. Abnormal stress ECG with treadmill    4. Postural dizziness with presyncope         Plan:         Primary hypertension  Overtreated.  Goal BP < 130/80.  Compliant w/ meds.    - continue carvedilol 12.5 mg BID but will decrease losartan to 50 mg daily  - continue to monitor BP at home and record  - risk factor and lifestyle modifications   - BP check in 1 wk    Mild HOCM (hypertrophic obstructive cardiomyopathy)  Pt noted to have mid LV obstruction w/ gradient of 23 mmHg.  Pt also notes perkins.    - cont carvedilol 12.5 mg daily     Abnormal stress ECG with treadmill  False positive.  Pt w/ angiogram negative for CAD.   - continue medical therapy for primary prevention of CAD     Postural dizziness with presyncope  2/2 orthostatic hypotension due to over treatment of BP.   - continue carvedilol at 12.5 mg BID however will decrease losartan to 50 mg daily  - pt to monitor BP at home and record   - RN BP check in 1 week       Total duration of face to face visit time 30 minutes.  Total time spent counseling greater than fifty percent of total visit time.  Counseling included discussion regarding imaging findings, diagnosis,  possibilities, treatment options, risks and benefits.  The patient had many questions regarding the options and long-term effects      Thierno Kidd M.D.  Interventional Cardiology

## 2022-03-25 NOTE — ASSESSMENT & PLAN NOTE
Pt noted to have mid LV obstruction w/ gradient of 23 mmHg.  Pt also notes perkins.    - cont carvedilol 12.5 mg daily

## 2022-03-25 NOTE — ASSESSMENT & PLAN NOTE
Overtreated.  Goal BP < 130/80.  Compliant w/ meds.    - continue carvedilol 12.5 mg BID but will decrease losartan to 50 mg daily  - continue to monitor BP at home and record  - risk factor and lifestyle modifications   - BP check in 1 wk

## 2022-03-25 NOTE — ASSESSMENT & PLAN NOTE
False positive.  Pt w/ angiogram negative for CAD.   - continue medical therapy for primary prevention of CAD

## 2022-03-25 NOTE — ASSESSMENT & PLAN NOTE
2/2 orthostatic hypotension due to over treatment of BP.   - continue carvedilol at 12.5 mg BID however will decrease losartan to 50 mg daily  - pt to monitor BP at home and record   - RN BP check in 1 week

## 2022-03-30 ENCOUNTER — OFFICE VISIT (OUTPATIENT)
Dept: OPTOMETRY | Facility: CLINIC | Age: 62
End: 2022-03-30
Payer: MEDICAID

## 2022-03-30 DIAGNOSIS — H57.89 EYE DISCHARGE: ICD-10-CM

## 2022-03-30 DIAGNOSIS — H01.004 BLEPHARITIS OF UPPER EYELIDS OF BOTH EYES, UNSPECIFIED TYPE: ICD-10-CM

## 2022-03-30 DIAGNOSIS — H02.889 MGD (MEIBOMIAN GLAND DYSFUNCTION): ICD-10-CM

## 2022-03-30 DIAGNOSIS — H01.001 BLEPHARITIS OF UPPER EYELIDS OF BOTH EYES, UNSPECIFIED TYPE: ICD-10-CM

## 2022-03-30 DIAGNOSIS — H10.13 ALLERGIC CONJUNCTIVITIS OF BOTH EYES: ICD-10-CM

## 2022-03-30 DIAGNOSIS — H04.123 DRY EYE SYNDROME OF BOTH EYES: Primary | ICD-10-CM

## 2022-03-30 PROCEDURE — 92002 INTRM OPH EXAM NEW PATIENT: CPT | Mod: S$PBB,,, | Performed by: OPTOMETRIST

## 2022-03-30 PROCEDURE — 3044F PR MOST RECENT HEMOGLOBIN A1C LEVEL <7.0%: ICD-10-PCS | Mod: CPTII,,, | Performed by: OPTOMETRIST

## 2022-03-30 PROCEDURE — 1159F PR MEDICATION LIST DOCUMENTED IN MEDICAL RECORD: ICD-10-PCS | Mod: CPTII,,, | Performed by: OPTOMETRIST

## 2022-03-30 PROCEDURE — 4010F ACE/ARB THERAPY RXD/TAKEN: CPT | Mod: CPTII,,, | Performed by: OPTOMETRIST

## 2022-03-30 PROCEDURE — 99999 PR PBB SHADOW E&M-EST. PATIENT-LVL III: CPT | Mod: PBBFAC,,, | Performed by: OPTOMETRIST

## 2022-03-30 PROCEDURE — 4010F PR ACE/ARB THEARPY RXD/TAKEN: ICD-10-PCS | Mod: CPTII,,, | Performed by: OPTOMETRIST

## 2022-03-30 PROCEDURE — 99213 OFFICE O/P EST LOW 20 MIN: CPT | Mod: PBBFAC,PN | Performed by: OPTOMETRIST

## 2022-03-30 PROCEDURE — 99999 PR PBB SHADOW E&M-EST. PATIENT-LVL III: ICD-10-PCS | Mod: PBBFAC,,, | Performed by: OPTOMETRIST

## 2022-03-30 PROCEDURE — 3044F HG A1C LEVEL LT 7.0%: CPT | Mod: CPTII,,, | Performed by: OPTOMETRIST

## 2022-03-30 PROCEDURE — 1159F MED LIST DOCD IN RCRD: CPT | Mod: CPTII,,, | Performed by: OPTOMETRIST

## 2022-03-30 PROCEDURE — 92002 PR EYE EXAM, NEW PATIENT,INTERMED: ICD-10-PCS | Mod: S$PBB,,, | Performed by: OPTOMETRIST

## 2022-03-30 RX ORDER — HYDROCHLOROTHIAZIDE 25 MG/1
TABLET ORAL
COMMUNITY
End: 2022-06-28 | Stop reason: SDUPTHER

## 2022-03-30 RX ORDER — FLUOXETINE HYDROCHLORIDE 20 MG/1
CAPSULE ORAL
COMMUNITY
End: 2022-06-28 | Stop reason: SDUPTHER

## 2022-03-30 RX ORDER — LISINOPRIL 20 MG/1
TABLET ORAL
COMMUNITY
End: 2022-06-28

## 2022-03-30 RX ORDER — LORATADINE 10 MG/1
TABLET ORAL
COMMUNITY
End: 2022-07-26

## 2022-03-30 RX ORDER — MECLIZINE HYDROCHLORIDE 25 MG/1
25 TABLET ORAL 3 TIMES DAILY PRN
COMMUNITY

## 2022-03-30 NOTE — PROGRESS NOTES
HPI     Pt is here today for a dry eye f/u.    He saw someone at [facility] for red, itchy, watering eyes. They told him   he had dry eye and he was told to follow up with us. His eyes are   irritated and he has been getting white, stringy blobs out of his eyes. He   has been fishing the debris out of his eye. He was given some Systane   drops and ointment to use. He states that the OTC Tx has helped, but they   are still a little irritated/have debris. He also reports itching eyes OU.       Eye Meds:  Systane gtts BID OU  Tear mary QHS OU    Patient is also using warm compresses Qday           Last edited by Joanna Torres, OD on 3/30/2022  2:48 PM. (History)            Assessment /Plan     For exam results, see Encounter Report.    Dry eye syndrome of both eyes    Eye discharge  -     Ambulatory referral/consult to Optometry    MGD (meibomian gland dysfunction)    Blepharitis of upper eyelids of both eyes, unspecified type    Allergic conjunctivitis of both eyes      1-2. Educated pt on findings. Recommend to increase ATs to QID and continue mary/gel QHS for added lubrication and comfort. May need restasis/xiidra in the future.   May have mucous fishing disorder ---- stressed importance of not fishing for and removing mucous/debris from eyes. May use ATs for added lubrication or saline eyewash to rinse eyes if excess debris noted.   If symptoms worsen or dont improve, RTC. Monitor 1-2 months.     3. Educated pt on findings. Recommend to continue warm compresses/corwin mask along eyelid margin with light massage for ~10 min Qday.  If symptoms worsen or dont improve, RTC. Monitor.     4. Educated pt on findings. Recommend J&J baby shampoo or Ocusoft lid scrubs along eyelid margin Qday. Monitor.     5. Educated pt on findings. Recommend OTC pataday prn for allergy symptoms. Monitor.         RTC in ~1 month for dry eye f/u + annual DFE or sooner if needed.

## 2022-03-31 ENCOUNTER — TELEPHONE (OUTPATIENT)
Dept: FAMILY MEDICINE | Facility: CLINIC | Age: 62
End: 2022-03-31

## 2022-03-31 ENCOUNTER — CLINICAL SUPPORT (OUTPATIENT)
Dept: FAMILY MEDICINE | Facility: CLINIC | Age: 62
End: 2022-03-31
Payer: MEDICAID

## 2022-03-31 VITALS — DIASTOLIC BLOOD PRESSURE: 72 MMHG | SYSTOLIC BLOOD PRESSURE: 126 MMHG

## 2022-03-31 NOTE — TELEPHONE ENCOUNTER
Patient came in for BP check.  Was within normal range. 126/72  Patient states he feels fine.  Will forward to Dr. Kidd.

## 2022-03-31 NOTE — PROGRESS NOTES
BP check done as ordered.  126/72.  Within normal range.  Patient denies any symptoms of hypotension. Will report to Dr. Kidd.

## 2022-04-06 ENCOUNTER — OFFICE VISIT (OUTPATIENT)
Dept: FAMILY MEDICINE | Facility: HOSPITAL | Age: 62
End: 2022-04-06
Attending: FAMILY MEDICINE
Payer: MEDICAID

## 2022-04-06 ENCOUNTER — LAB VISIT (OUTPATIENT)
Dept: LAB | Facility: HOSPITAL | Age: 62
End: 2022-04-06
Attending: FAMILY MEDICINE
Payer: MEDICAID

## 2022-04-06 VITALS
WEIGHT: 163.38 LBS | SYSTOLIC BLOOD PRESSURE: 129 MMHG | BODY MASS INDEX: 25.64 KG/M2 | HEART RATE: 68 BPM | HEIGHT: 67 IN | DIASTOLIC BLOOD PRESSURE: 76 MMHG

## 2022-04-06 DIAGNOSIS — I10 PRIMARY HYPERTENSION: ICD-10-CM

## 2022-04-06 DIAGNOSIS — R76.8 HEPATITIS C ANTIBODY POSITIVE IN BLOOD: ICD-10-CM

## 2022-04-06 DIAGNOSIS — R55 POSTURAL DIZZINESS WITH PRESYNCOPE: Primary | ICD-10-CM

## 2022-04-06 DIAGNOSIS — R42 POSTURAL DIZZINESS WITH PRESYNCOPE: Primary | ICD-10-CM

## 2022-04-06 DIAGNOSIS — Z12.11 COLON CANCER SCREENING: ICD-10-CM

## 2022-04-06 PROCEDURE — 99214 OFFICE O/P EST MOD 30 MIN: CPT | Performed by: STUDENT IN AN ORGANIZED HEALTH CARE EDUCATION/TRAINING PROGRAM

## 2022-04-06 PROCEDURE — 87522 HEPATITIS C REVRS TRNSCRPJ: CPT | Performed by: STUDENT IN AN ORGANIZED HEALTH CARE EDUCATION/TRAINING PROGRAM

## 2022-04-06 PROCEDURE — 36415 COLL VENOUS BLD VENIPUNCTURE: CPT | Performed by: STUDENT IN AN ORGANIZED HEALTH CARE EDUCATION/TRAINING PROGRAM

## 2022-04-06 NOTE — PROGRESS NOTES
[facility] Family [facility]  History & Physical    SUBJECTIVE:     Chief Complaint:   Chief Complaint   Patient presents with    Establish Care       History of Present Illness:  62 y.o. male who  has a past medical history of Hypertension. presents to clinic today for establishment of care and annual health visit. Patient is asymptomatic today from previously reported bouts of presyncope. Patient denies chest pain, shortness of breath, headache, LOC, nausea, and vomiting.      Allergies:  Review of patient's allergies indicates:  No Known Allergies    Home Medications:  Current Outpatient Medications on File Prior to Visit   Medication Sig    aspirin 81 MG Chew Take 1 tablet (81 mg total) by mouth once daily.    atorvastatin (LIPITOR) 20 MG tablet Take 1 tablet (20 mg total) by mouth every evening.    carvediloL (COREG) 12.5 MG tablet Take 1 tablet (12.5 mg total) by mouth 2 (two) times daily with meals.    cetirizine (ZYRTEC) 10 MG tablet Take 1 tablet (10 mg total) by mouth once daily.    FLUoxetine 20 MG capsule fluoxetine 20 mg capsule   Take 1 capsule every day by oral route.    hydroCHLOROthiazide (HYDRODIURIL) 25 MG tablet hydrochlorothiazide 25 mg tablet   Take 1 tablet every day by oral route.    lisinopriL (PRINIVIL,ZESTRIL) 20 MG tablet lisinopril 20 mg tablet   Take 1 tablet every day by oral route.    loratadine (CLARITIN) 10 mg tablet loratadine 10 mg tablet   Take 1 tablet every day by oral route.    losartan (COZAAR) 50 MG tablet Take 1 tablet (50 mg total) by mouth once daily.    meclizine (ANTIVERT) 25 mg tablet meclizine 25 mg tablet   Take 1 tablet 3 times a day by oral route as needed for dizziness.    ofloxacin (OCUFLOX) 0.3 % ophthalmic solution Instill 2 drops in both eyes four times a day; apply for 7 days    pulse oximeter (PULSE OXIMETER) device by Apply Externally route 2 (two) times a day. Use twice daily at 8 AM and 3 PM and record the value in MyChart as directed.    pulse  oximeter (PULSE OXIMETER) device by Apply Externally route 2 (two) times a day. Use twice daily at 8 AM and 3 PM and record the value in MyChart as directed.     No current facility-administered medications on file prior to visit.       Past Medical History:   Diagnosis Date    Hypertension      Past Surgical History:   Procedure Laterality Date    LEFT HEART CATHETERIZATION Left 2/16/2022    Procedure: Left heart cath;  Surgeon: Thierno Kidd III, MD;  Location: [facility] CATH LAB;  Service: Cardiology;  Laterality: Left;     No family history on file.  Social History     Tobacco Use    Smoking status: Never Smoker    Smokeless tobacco: Never Used   Substance Use Topics    Alcohol use: Yes     Alcohol/week: 6.0 standard drinks     Types: 6 Cans of beer per week     Comment: everyday last drink Monday night    Drug use: No        Review of Systems   Constitutional: Negative for fever and weight loss.   HENT: Negative for hearing loss and sore throat.    Eyes: Negative for blurred vision.   Respiratory: Negative for cough, shortness of breath and wheezing.    Cardiovascular: Negative for chest pain and leg swelling.   Gastrointestinal: Negative for heartburn, nausea and vomiting.   Genitourinary: Negative for dysuria.   Musculoskeletal: Negative for back pain, joint pain and myalgias.   Neurological: Negative for dizziness, weakness and headaches.   Psychiatric/Behavioral: Negative.         OBJECTIVE:     Vital Signs:  Pulse: 68 (04/06/22 1337)  BP: 129/76 (04/06/22 1337)    Physical Exam  Constitutional:       Appearance: Normal appearance.   HENT:      Head: Normocephalic.      Right Ear: External ear normal.      Left Ear: External ear normal.      Nose: Nose normal.      Mouth/Throat:      Mouth: Mucous membranes are moist.   Eyes:      Extraocular Movements: Extraocular movements intact.      Pupils: Pupils are equal, round, and reactive to light.   Cardiovascular:      Rate and Rhythm: Normal rate.       Pulses: Normal pulses.   Pulmonary:      Effort: Pulmonary effort is normal.   Abdominal:      General: Abdomen is flat.      Palpations: Abdomen is soft.   Musculoskeletal:         General: Normal range of motion.      Cervical back: Normal range of motion.   Skin:     General: Skin is warm.      Capillary Refill: Capillary refill takes less than 2 seconds.   Neurological:      General: No focal deficit present.      Mental Status: He is alert and oriented to person, place, and time.   Psychiatric:         Mood and Affect: Mood normal.         Behavior: Behavior normal.         Laboratory:  Hemoglobin A1C   Date Value Ref Range Status   01/14/2022 5.3 4.0 - 5.6 % Final     Comment:     ADA Screening Guidelines:  5.7-6.4%  Consistent with prediabetes  >or=6.5%  Consistent with diabetes    High levels of fetal hemoglobin interfere with the HbA1C  assay. Heterozygous hemoglobin variants (HbS, HgC, etc)do  not significantly interfere with this assay.   However, presence of multiple variants may affect accuracy.         A/P:  Eitan was seen today for establish care. Patient states that he has not had any syncopal events since changing his HTN medications to his current regimen. Patient tested positive for HCV antigen while inpatient on 1/14/22. Patient will receive shingrix and COVID booster in [facility] pharmacy today. Patient is UTD on other vaccinations.    Diagnoses and all orders for this visit:    Postural dizziness with presyncope    Primary hypertension    Colon cancer screening  -     Case Request Endoscopy: COLONOSCOPY    Hepatitis C antibody positive in blood  -     HEPATITIS C RNA, QUANTITATIVE, PCR; Future        -     If Quant lab comes back positive refer to Infectious disease clinic for treatment of Hepatitis  Other orders  -     (In Office Administered) Zoster Recombinant Vaccine        Follow up in about 6 months (around 10/6/2022).      Abilio Mtz MD  [facility] PGY-1  04/06/2022

## 2022-04-07 NOTE — PROGRESS NOTES
I have reviewed the notes, assessments, and/or procedures performed by Dr. Mtz, I concur with her/his documentation of Eitan Cody. New patient with recent treatment of HTN. Currently controlled. I agree with the plan

## 2022-04-08 LAB — HCV RNA SERPL NAA+PROBE-ACNC: ABNORMAL IU/ML

## 2022-04-11 DIAGNOSIS — B18.2 CHRONIC HEPATITIS C WITHOUT HEPATIC COMA: Primary | ICD-10-CM

## 2022-04-18 ENCOUNTER — LAB VISIT (OUTPATIENT)
Dept: LAB | Facility: HOSPITAL | Age: 62
End: 2022-04-18
Attending: STUDENT IN AN ORGANIZED HEALTH CARE EDUCATION/TRAINING PROGRAM
Payer: MEDICAID

## 2022-04-18 ENCOUNTER — TELEPHONE (OUTPATIENT)
Dept: FAMILY MEDICINE | Facility: HOSPITAL | Age: 62
End: 2022-04-18
Payer: MEDICAID

## 2022-04-18 DIAGNOSIS — B18.2 CHRONIC HEPATITIS C WITHOUT HEPATIC COMA: ICD-10-CM

## 2022-04-18 LAB
ALBUMIN SERPL BCP-MCNC: 3.7 G/DL (ref 3.5–5.2)
ALP SERPL-CCNC: 95 U/L (ref 55–135)
ALT SERPL W/O P-5'-P-CCNC: 80 U/L (ref 10–44)
ANION GAP SERPL CALC-SCNC: 10 MMOL/L (ref 8–16)
AST SERPL-CCNC: 111 U/L (ref 10–40)
BASOPHILS # BLD AUTO: 0.06 K/UL (ref 0–0.2)
BASOPHILS NFR BLD: 1.2 % (ref 0–1.9)
BILIRUB SERPL-MCNC: 0.9 MG/DL (ref 0.1–1)
BUN SERPL-MCNC: 10 MG/DL (ref 8–23)
CALCIUM SERPL-MCNC: 9.6 MG/DL (ref 8.7–10.5)
CHLORIDE SERPL-SCNC: 108 MMOL/L (ref 95–110)
CO2 SERPL-SCNC: 25 MMOL/L (ref 23–29)
CREAT SERPL-MCNC: 0.9 MG/DL (ref 0.5–1.4)
DIFFERENTIAL METHOD: ABNORMAL
EOSINOPHIL # BLD AUTO: 0.1 K/UL (ref 0–0.5)
EOSINOPHIL NFR BLD: 2.8 % (ref 0–8)
ERYTHROCYTE [DISTWIDTH] IN BLOOD BY AUTOMATED COUNT: 14.4 % (ref 11.5–14.5)
EST. GFR  (AFRICAN AMERICAN): >60 ML/MIN/1.73 M^2
EST. GFR  (NON AFRICAN AMERICAN): >60 ML/MIN/1.73 M^2
GLUCOSE SERPL-MCNC: 115 MG/DL (ref 70–110)
HCT VFR BLD AUTO: 35.4 % (ref 40–54)
HGB BLD-MCNC: 12.8 G/DL (ref 14–18)
IMM GRANULOCYTES # BLD AUTO: 0.01 K/UL (ref 0–0.04)
IMM GRANULOCYTES NFR BLD AUTO: 0.2 % (ref 0–0.5)
INR PPP: 1.2 (ref 0.8–1.2)
LYMPHOCYTES # BLD AUTO: 1.9 K/UL (ref 1–4.8)
LYMPHOCYTES NFR BLD: 37.1 % (ref 18–48)
MCH RBC QN AUTO: 34 PG (ref 27–31)
MCHC RBC AUTO-ENTMCNC: 36.2 G/DL (ref 32–36)
MCV RBC AUTO: 94 FL (ref 82–98)
MONOCYTES # BLD AUTO: 0.5 K/UL (ref 0.3–1)
MONOCYTES NFR BLD: 9.2 % (ref 4–15)
NEUTROPHILS # BLD AUTO: 2.5 K/UL (ref 1.8–7.7)
NEUTROPHILS NFR BLD: 49.5 % (ref 38–73)
NRBC BLD-RTO: 0 /100 WBC
PLATELET # BLD AUTO: 105 K/UL (ref 150–450)
PMV BLD AUTO: 12.8 FL (ref 9.2–12.9)
POTASSIUM SERPL-SCNC: 4.8 MMOL/L (ref 3.5–5.1)
PROT SERPL-MCNC: 7.5 G/DL (ref 6–8.4)
PROTHROMBIN TIME: 12.5 SEC (ref 9–12.5)
RBC # BLD AUTO: 3.77 M/UL (ref 4.6–6.2)
SODIUM SERPL-SCNC: 143 MMOL/L (ref 136–145)
WBC # BLD AUTO: 5.01 K/UL (ref 3.9–12.7)

## 2022-04-18 PROCEDURE — 85610 PROTHROMBIN TIME: CPT | Performed by: STUDENT IN AN ORGANIZED HEALTH CARE EDUCATION/TRAINING PROGRAM

## 2022-04-18 PROCEDURE — 86790 VIRUS ANTIBODY NOS: CPT | Performed by: STUDENT IN AN ORGANIZED HEALTH CARE EDUCATION/TRAINING PROGRAM

## 2022-04-18 PROCEDURE — 85025 COMPLETE CBC W/AUTO DIFF WBC: CPT | Performed by: STUDENT IN AN ORGANIZED HEALTH CARE EDUCATION/TRAINING PROGRAM

## 2022-04-18 PROCEDURE — 80053 COMPREHEN METABOLIC PANEL: CPT | Performed by: STUDENT IN AN ORGANIZED HEALTH CARE EDUCATION/TRAINING PROGRAM

## 2022-04-18 PROCEDURE — 36415 COLL VENOUS BLD VENIPUNCTURE: CPT | Performed by: STUDENT IN AN ORGANIZED HEALTH CARE EDUCATION/TRAINING PROGRAM

## 2022-04-18 PROCEDURE — 86704 HEP B CORE ANTIBODY TOTAL: CPT | Performed by: STUDENT IN AN ORGANIZED HEALTH CARE EDUCATION/TRAINING PROGRAM

## 2022-04-18 PROCEDURE — 87340 HEPATITIS B SURFACE AG IA: CPT | Performed by: STUDENT IN AN ORGANIZED HEALTH CARE EDUCATION/TRAINING PROGRAM

## 2022-04-18 NOTE — TELEPHONE ENCOUNTER
Called patient, left voice mail informing him that there are additional labs that need to be taken to continue his HCV evaluation. Informed patient he can go to [facility] at any point for this labwork         ----- Message from Cici William MA sent at 4/11/2022  3:07 PM CDT -----  Contact: Patient [phone]  Patient returning your call.  Please call again.  Thanks.

## 2022-04-19 LAB
HAV IGG SER QL IA: POSITIVE
HBV CORE AB SERPL QL IA: NEGATIVE
HBV SURFACE AG SERPL QL IA: NEGATIVE

## 2022-05-16 ENCOUNTER — TELEPHONE (OUTPATIENT)
Dept: ENDOSCOPY | Facility: HOSPITAL | Age: 62
End: 2022-05-16
Payer: MEDICAID

## 2022-05-16 RX ORDER — SODIUM, POTASSIUM,MAG SULFATES 17.5-3.13G
1 SOLUTION, RECONSTITUTED, ORAL ORAL DAILY
Qty: 1 KIT | Refills: 0 | Status: SHIPPED | OUTPATIENT
Start: 2022-05-16 | End: 2022-05-18

## 2022-05-16 NOTE — TELEPHONE ENCOUNTER
Endoscopy Scheduling Questionnaire:         1. Are you taking any blood thinners? n               If Yes  Have you been on them for longer than one year?     2. Have you been diagnosed with Diverticulitis in past three months?  n  3. Are you on dialysis or have Kidney Disease? n    4. Previous Colonoscopy?  n  n       If yes Do you have a history of colon polyps?        6. Are you a diabetic?  n  7. Do you have a history of constipation?  n      Procedure scheduled with Dr. Swanson  on  06/30/2022    The prep being used is Suprep     The patient's prep instructions were sent by [employer]

## 2022-06-28 ENCOUNTER — OFFICE VISIT (OUTPATIENT)
Dept: CARDIOLOGY | Facility: CLINIC | Age: 62
End: 2022-06-28
Payer: MEDICAID

## 2022-06-28 VITALS
HEART RATE: 69 BPM | WEIGHT: 171.88 LBS | HEIGHT: 67 IN | BODY MASS INDEX: 26.98 KG/M2 | DIASTOLIC BLOOD PRESSURE: 78 MMHG | SYSTOLIC BLOOD PRESSURE: 128 MMHG

## 2022-06-28 DIAGNOSIS — I10 PRIMARY HYPERTENSION: Primary | ICD-10-CM

## 2022-06-28 DIAGNOSIS — I42.1 MILD HOCM (HYPERTROPHIC OBSTRUCTIVE CARDIOMYOPATHY): ICD-10-CM

## 2022-06-28 DIAGNOSIS — R42 POSTURAL DIZZINESS WITH PRESYNCOPE: ICD-10-CM

## 2022-06-28 DIAGNOSIS — F41.9 ANXIETY: ICD-10-CM

## 2022-06-28 DIAGNOSIS — R94.39 ABNORMAL STRESS ECG WITH TREADMILL: ICD-10-CM

## 2022-06-28 DIAGNOSIS — J30.2 SEASONAL ALLERGIES: ICD-10-CM

## 2022-06-28 DIAGNOSIS — R55 POSTURAL DIZZINESS WITH PRESYNCOPE: ICD-10-CM

## 2022-06-28 PROCEDURE — 4010F ACE/ARB THERAPY RXD/TAKEN: CPT | Mod: CPTII,,, | Performed by: INTERNAL MEDICINE

## 2022-06-28 PROCEDURE — 1159F PR MEDICATION LIST DOCUMENTED IN MEDICAL RECORD: ICD-10-PCS | Mod: CPTII,,, | Performed by: INTERNAL MEDICINE

## 2022-06-28 PROCEDURE — 4010F PR ACE/ARB THEARPY RXD/TAKEN: ICD-10-PCS | Mod: CPTII,,, | Performed by: INTERNAL MEDICINE

## 2022-06-28 PROCEDURE — 3074F PR MOST RECENT SYSTOLIC BLOOD PRESSURE < 130 MM HG: ICD-10-PCS | Mod: CPTII,,, | Performed by: INTERNAL MEDICINE

## 2022-06-28 PROCEDURE — 3044F PR MOST RECENT HEMOGLOBIN A1C LEVEL <7.0%: ICD-10-PCS | Mod: CPTII,,, | Performed by: INTERNAL MEDICINE

## 2022-06-28 PROCEDURE — 1160F PR REVIEW ALL MEDS BY PRESCRIBER/CLIN PHARMACIST DOCUMENTED: ICD-10-PCS | Mod: CPTII,,, | Performed by: INTERNAL MEDICINE

## 2022-06-28 PROCEDURE — 3074F SYST BP LT 130 MM HG: CPT | Mod: CPTII,,, | Performed by: INTERNAL MEDICINE

## 2022-06-28 PROCEDURE — 99999 PR PBB SHADOW E&M-EST. PATIENT-LVL III: ICD-10-PCS | Mod: PBBFAC,,, | Performed by: INTERNAL MEDICINE

## 2022-06-28 PROCEDURE — 3078F DIAST BP <80 MM HG: CPT | Mod: CPTII,,, | Performed by: INTERNAL MEDICINE

## 2022-06-28 PROCEDURE — 1160F RVW MEDS BY RX/DR IN RCRD: CPT | Mod: CPTII,,, | Performed by: INTERNAL MEDICINE

## 2022-06-28 PROCEDURE — 3044F HG A1C LEVEL LT 7.0%: CPT | Mod: CPTII,,, | Performed by: INTERNAL MEDICINE

## 2022-06-28 PROCEDURE — 3008F PR BODY MASS INDEX (BMI) DOCUMENTED: ICD-10-PCS | Mod: CPTII,,, | Performed by: INTERNAL MEDICINE

## 2022-06-28 PROCEDURE — 3008F BODY MASS INDEX DOCD: CPT | Mod: CPTII,,, | Performed by: INTERNAL MEDICINE

## 2022-06-28 PROCEDURE — 99214 PR OFFICE/OUTPT VISIT, EST, LEVL IV, 30-39 MIN: ICD-10-PCS | Mod: S$PBB,,, | Performed by: INTERNAL MEDICINE

## 2022-06-28 PROCEDURE — 99213 OFFICE O/P EST LOW 20 MIN: CPT | Mod: PBBFAC,PN | Performed by: INTERNAL MEDICINE

## 2022-06-28 PROCEDURE — 99214 OFFICE O/P EST MOD 30 MIN: CPT | Mod: S$PBB,,, | Performed by: INTERNAL MEDICINE

## 2022-06-28 PROCEDURE — 99999 PR PBB SHADOW E&M-EST. PATIENT-LVL III: CPT | Mod: PBBFAC,,, | Performed by: INTERNAL MEDICINE

## 2022-06-28 PROCEDURE — 1159F MED LIST DOCD IN RCRD: CPT | Mod: CPTII,,, | Performed by: INTERNAL MEDICINE

## 2022-06-28 PROCEDURE — 3078F PR MOST RECENT DIASTOLIC BLOOD PRESSURE < 80 MM HG: ICD-10-PCS | Mod: CPTII,,, | Performed by: INTERNAL MEDICINE

## 2022-06-28 RX ORDER — HYDROCHLOROTHIAZIDE 25 MG/1
TABLET ORAL
Qty: 30 TABLET | Refills: 2 | Status: SHIPPED | OUTPATIENT
Start: 2022-06-28 | End: 2022-09-28 | Stop reason: SDUPTHER

## 2022-06-28 RX ORDER — FLUOXETINE HYDROCHLORIDE 20 MG/1
CAPSULE ORAL
Qty: 30 CAPSULE | Refills: 2 | Status: SHIPPED | OUTPATIENT
Start: 2022-06-28 | End: 2022-09-28 | Stop reason: SDUPTHER

## 2022-06-28 NOTE — PROGRESS NOTES
Subjective:    Patient ID:  Eitan Cody is a 62 y.o. male who presents for follow-up of Follow-up      PCP: Abilio Mtz MD     Follow-up  Associated symptoms include chest pain and numbness. Pertinent negatives include no abdominal pain, congestion, diaphoresis, fever or nausea.     Pt is a 61 yo M w/ PMH of HTN and ETOH abuse (quit 1/12/2022) who presents for f/u appt.  Pt was admitted 1/13/22-1/16/22 for HTN emergency w/ /137 and elevated trop and was also found to have COVID.  He was seen by cardiology as an o/p and had an ETT which was abnormal and referred to IC.  He was last seen 3/25/2022 and continued on medical therapy.  He also had an echo which noted a mid LV cavity obstruction w/ a gradient of 23 mmHg and was setup for an angiogram which was negative for CAD w/ an LVEDP of 11 mmHg.  He mentions that he has been doing ok however notes postural presyncope, cp, and perkins at times however this has improved and has decreased in frequency to be somewhat tolerable.  He denies edema, orthopnea, PND, palpitations, LOC, and claudication.  He notes compliance w/ meds and denies side effects.  He has not been monitoring his BP regularly at home.  He has not been exercising however is active at work as a  and walks often and denies limitation.          Past Medical History:   Diagnosis Date    Hypertension      Past Surgical History:   Procedure Laterality Date    LEFT HEART CATHETERIZATION Left 2/16/2022    Procedure: Left heart cath;  Surgeon: Thierno Kidd III, MD;  Location: [facility] CATH LAB;  Service: Cardiology;  Laterality: Left;     Social History     Socioeconomic History    Marital status: Single   Tobacco Use    Smoking status: Never Smoker    Smokeless tobacco: Never Used   Substance and Sexual Activity    Alcohol use: Yes     Alcohol/week: 6.0 standard drinks     Types: 6 Cans of beer per week     Comment: everyday last drink Monday night    Drug use: No     History  reviewed. No pertinent family history.    Review of patient's allergies indicates:  No Known Allergies    Medication List with Changes/Refills   Current Medications    ASPIRIN 81 MG CHEW    Take 1 tablet (81 mg total) by mouth once daily.    ATORVASTATIN (LIPITOR) 20 MG TABLET    Take 1 tablet (20 mg total) by mouth every evening.    CARVEDILOL (COREG) 12.5 MG TABLET    Take 1 tablet (12.5 mg total) by mouth 2 (two) times daily with meals.    CETIRIZINE (ZYRTEC) 10 MG TABLET    Take 1 tablet (10 mg total) by mouth once daily.    FLUOXETINE 20 MG CAPSULE    fluoxetine 20 mg capsule   Take 1 capsule every day by oral route.    HYDROCHLOROTHIAZIDE (HYDRODIURIL) 25 MG TABLET    hydrochlorothiazide 25 mg tablet   Take 1 tablet every day by oral route.    LORATADINE (CLARITIN) 10 MG TABLET    loratadine 10 mg tablet   Take 1 tablet every day by oral route.    LOSARTAN (COZAAR) 50 MG TABLET    Take 1 tablet (50 mg total) by mouth once daily.    MECLIZINE (ANTIVERT) 25 MG TABLET    meclizine 25 mg tablet   Take 1 tablet 3 times a day by oral route as needed for dizziness.    OFLOXACIN (OCUFLOX) 0.3 % OPHTHALMIC SOLUTION    Instill 2 drops in both eyes four times a day; apply for 7 days    PULSE OXIMETER (PULSE OXIMETER) DEVICE    by Apply Externally route 2 (two) times a day. Use twice daily at 8 AM and 3 PM and record the value in MyChart as directed.    PULSE OXIMETER (PULSE OXIMETER) DEVICE    by Apply Externally route 2 (two) times a day. Use twice daily at 8 AM and 3 PM and record the value in MyChart as directed.   Discontinued Medications    LISINOPRIL (PRINIVIL,ZESTRIL) 20 MG TABLET    lisinopril 20 mg tablet   Take 1 tablet every day by oral route.       Review of Systems   Constitutional: Negative for diaphoresis and fever.   HENT: Negative for congestion and hearing loss.    Eyes: Negative for blurred vision and pain.   Cardiovascular: Positive for chest pain, dyspnea on exertion and near-syncope. Negative for  "claudication, leg swelling, palpitations and syncope.   Respiratory: Positive for shortness of breath. Negative for sleep disturbances due to breathing.    Hematologic/Lymphatic: Negative for bleeding problem. Does not bruise/bleed easily.   Skin: Negative for color change and poor wound healing.   Gastrointestinal: Negative for abdominal pain and nausea.   Genitourinary: Negative for bladder incontinence and flank pain.   Neurological: Positive for numbness and paresthesias. Negative for focal weakness and light-headedness.        Objective:   /78   Pulse 69   Ht 5' 7" (1.702 m)   Wt 78 kg (171 lb 14.4 oz)   PF 97 L/min   BMI 26.92 kg/m²    Physical Exam  Constitutional:       Appearance: He is well-developed. He is not diaphoretic.   HENT:      Head: Normocephalic and atraumatic.   Eyes:      General: No scleral icterus.     Pupils: Pupils are equal, round, and reactive to light.   Neck:      Vascular: No JVD.   Cardiovascular:      Rate and Rhythm: Normal rate and regular rhythm.      Pulses: Intact distal pulses.      Heart sounds: S1 normal and S2 normal. No murmur heard.    No friction rub. No gallop.   Pulmonary:      Effort: Pulmonary effort is normal. No respiratory distress.      Breath sounds: Normal breath sounds. No wheezing or rales.   Chest:      Chest wall: No tenderness.   Abdominal:      General: Bowel sounds are normal. There is no distension.      Palpations: Abdomen is soft. There is no mass.      Tenderness: There is no abdominal tenderness. There is no rebound.   Musculoskeletal:         General: No tenderness. Normal range of motion.      Cervical back: Normal range of motion and neck supple.   Skin:     General: Skin is warm and dry.      Coloration: Skin is not pale.   Neurological:      Mental Status: He is alert and oriented to person, place, and time.      Coordination: Coordination normal.      Deep Tendon Reflexes: Reflexes normal.   Psychiatric:         Behavior: Behavior " normal.         Judgment: Judgment normal.           EC2022- reviewed.  NSR, NLE ECG.      Echo: 2022- reviewed.    Summary    · Concentric remodeling and hyperdynamic systolic function.  · The estimated ejection fraction is 75%.  · Left ventricular mid-cavity obstruction is present. Mid-cavity gradient 23 mmHg.  · Normal left ventricular diastolic function.  · Normal right ventricular size with normal right ventricular systolic function.  · The sinuses of Valsalva is mildly dilated.  · Intermediate central venous pressure (8 mmHg).  · The estimated PA systolic pressure is 36 mmHg.    ETT: 2/10/2022- reviewed.    Conclusion         The EKG portion of this study is positive for ischemia.    The patient reported no chest pain during the stress test.    The blood pressure response to stress was normal.    The Duke Treadmill Score was 1.    The exercise capacity was average.    LHC: 2022- reviewed.    Summary       · The coronary arteries were normal.  · The left ventricular end diastolic pressure was normal, LVEDP 11 mmHg.  · The estimated blood loss was <50 mL.       Assessment:       1. Primary hypertension    2. Mild HOCM (hypertrophic obstructive cardiomyopathy)    3. Postural dizziness with presyncope    4. Abnormal stress ECG with treadmill         Plan:         Primary hypertension  Improved.  Goal BP < 130/80.  Compliant w/ meds.    - continue carvedilol 12.5 mg BID and cont losartan to 50 mg daily  - continue to monitor BP at home and record  - risk factor and lifestyle modifications     Mild HOCM (hypertrophic obstructive cardiomyopathy)  Pt noted to have mid LV obstruction w/ gradient of 23 mmHg.  Pt also notes perkins.    - cont carvedilol 12.5 mg daily     Postural dizziness with presyncope  Improved.  2/2 orthostatic hypotension due to over treatment of BP.   - continue carvedilol at 12.5 mg BID and continue losartan to 50 mg daily  - pt to monitor BP at home and record   - enroll in  digital medicine prgm    Abnormal stress ECG with treadmill  False positive.  Pt w/ angiogram negative for CAD.   - continue medical therapy for primary prevention of CAD       Total duration of face to face visit time 30 minutes.  Total time spent counseling greater than fifty percent of total visit time.  Counseling included discussion regarding imaging findings, diagnosis, possibilities, treatment options, risks and benefits.  The patient had many questions regarding the options and long-term effects      Thierno Kidd M.D.  Interventional Cardiology

## 2022-06-28 NOTE — ASSESSMENT & PLAN NOTE
Improved.  2/2 orthostatic hypotension due to over treatment of BP.   - continue carvedilol at 12.5 mg BID and continue losartan to 50 mg daily  - pt to monitor BP at home and record   - enroll in digital medicine prgm

## 2022-06-28 NOTE — ASSESSMENT & PLAN NOTE
Improved.  Goal BP < 130/80.  Compliant w/ meds.    - continue carvedilol 12.5 mg BID and cont losartan to 50 mg daily  - continue to monitor BP at home and record  - risk factor and lifestyle modifications

## 2022-06-29 ENCOUNTER — OFFICE VISIT (OUTPATIENT)
Dept: OPTOMETRY | Facility: CLINIC | Age: 62
End: 2022-06-29
Payer: MEDICAID

## 2022-06-29 DIAGNOSIS — H25.13 NUCLEAR SCLEROSIS OF BOTH EYES: ICD-10-CM

## 2022-06-29 DIAGNOSIS — H02.889 MGD (MEIBOMIAN GLAND DYSFUNCTION): ICD-10-CM

## 2022-06-29 DIAGNOSIS — H16.223 KERATOCONJUNCTIVITIS SICCA NOT SPECIFIED AS SJOGREN'S, BILATERAL: Primary | ICD-10-CM

## 2022-06-29 PROCEDURE — 99999 PR PBB SHADOW E&M-EST. PATIENT-LVL II: CPT | Mod: PBBFAC,,, | Performed by: OPTOMETRIST

## 2022-06-29 PROCEDURE — 92014 COMPRE OPH EXAM EST PT 1/>: CPT | Mod: S$PBB,,, | Performed by: OPTOMETRIST

## 2022-06-29 PROCEDURE — 3044F HG A1C LEVEL LT 7.0%: CPT | Mod: CPTII,,, | Performed by: OPTOMETRIST

## 2022-06-29 PROCEDURE — 99999 PR PBB SHADOW E&M-EST. PATIENT-LVL II: ICD-10-PCS | Mod: PBBFAC,,, | Performed by: OPTOMETRIST

## 2022-06-29 PROCEDURE — 1159F PR MEDICATION LIST DOCUMENTED IN MEDICAL RECORD: ICD-10-PCS | Mod: CPTII,,, | Performed by: OPTOMETRIST

## 2022-06-29 PROCEDURE — 4010F PR ACE/ARB THEARPY RXD/TAKEN: ICD-10-PCS | Mod: CPTII,,, | Performed by: OPTOMETRIST

## 2022-06-29 PROCEDURE — 4010F ACE/ARB THERAPY RXD/TAKEN: CPT | Mod: CPTII,,, | Performed by: OPTOMETRIST

## 2022-06-29 PROCEDURE — 3044F PR MOST RECENT HEMOGLOBIN A1C LEVEL <7.0%: ICD-10-PCS | Mod: CPTII,,, | Performed by: OPTOMETRIST

## 2022-06-29 PROCEDURE — 1159F MED LIST DOCD IN RCRD: CPT | Mod: CPTII,,, | Performed by: OPTOMETRIST

## 2022-06-29 PROCEDURE — 92014 PR EYE EXAM, EST PATIENT,COMPREHESV: ICD-10-PCS | Mod: S$PBB,,, | Performed by: OPTOMETRIST

## 2022-06-29 PROCEDURE — 99212 OFFICE O/P EST SF 10 MIN: CPT | Mod: PBBFAC,PN | Performed by: OPTOMETRIST

## 2022-06-29 NOTE — PROGRESS NOTES
HPI     Presents today for dry eye follow up and DFE.   Pt reports no relief since last visit.   Still using eye meds as directed. Systane TID and mary QHS. No warm   compresses.       Last edited by Joanna Torres, OD on 6/29/2022  4:37 PM. (History)            Assessment /Plan     For exam results, see Encounter Report.    Keratoconjunctivitis sicca not specified as Sjogren's, bilateral  -     lifitegrast (XIIDRA) 5 % Dpet; Place 1 drop into both eyes 2 (two) times daily.  Dispense: 60 each; Refill: 11    MGD (meibomian gland dysfunction)    Nuclear sclerosis of both eyes      1-2. Educated pt on findings. Minimal relief with ATs and mary. Will call in Xiidra to be used 1 gtt OU BID --- discussed xiidra can take up to a month to reach full effectiveness. Recommend to continue ATs QID + mary/gel QHS. Stressed importance of starting warm compresses Qday as well. If symptoms worsen or dont improve, RTC. Monitor ~2 months.     3. Educated pt on findings. Not visually significant. No need for removal at this time. Monitor yearly.       RTC in 2 months for dry eye f/u or sooner if needed.

## 2022-07-26 ENCOUNTER — HOSPITAL ENCOUNTER (OUTPATIENT)
Facility: HOSPITAL | Age: 62
Discharge: HOME | End: 2022-07-28
Attending: EMERGENCY MEDICINE | Admitting: HOSPITALIST
Payer: MEDICAID

## 2022-07-26 DIAGNOSIS — R51.9 ACUTE INTRACTABLE HEADACHE, UNSPECIFIED HEADACHE TYPE: Primary | ICD-10-CM

## 2022-07-26 DIAGNOSIS — I10 ESSENTIAL HYPERTENSION, MALIGNANT: Primary | ICD-10-CM

## 2022-07-26 DIAGNOSIS — R07.9 CHEST PAIN: ICD-10-CM

## 2022-07-26 PROBLEM — E78.5 HYPERLIPIDEMIA: Status: ACTIVE | Noted: 2022-07-26

## 2022-07-26 PROBLEM — F10.20 ALCOHOL DEPENDENCE: Status: ACTIVE | Noted: 2022-07-26

## 2022-07-26 PROBLEM — R74.8 ABNORMAL LIVER ENZYMES: Status: ACTIVE | Noted: 2022-07-26

## 2022-07-26 LAB
ALBUMIN SERPL BCP-MCNC: 3.6 G/DL (ref 3.5–5.2)
ALP SERPL-CCNC: 88 U/L (ref 55–135)
ALT SERPL W/O P-5'-P-CCNC: 105 U/L (ref 10–44)
ANION GAP SERPL CALC-SCNC: 10 MMOL/L (ref 8–16)
AST SERPL-CCNC: 131 U/L (ref 10–40)
BASOPHILS # BLD AUTO: 0.05 K/UL (ref 0–0.2)
BASOPHILS NFR BLD: 0.9 % (ref 0–1.9)
BILIRUB SERPL-MCNC: 1.7 MG/DL (ref 0.1–1)
BUN SERPL-MCNC: 14 MG/DL (ref 8–23)
CALCIUM SERPL-MCNC: 9.4 MG/DL (ref 8.7–10.5)
CHLORIDE SERPL-SCNC: 99 MMOL/L (ref 95–110)
CO2 SERPL-SCNC: 25 MMOL/L (ref 23–29)
CREAT SERPL-MCNC: 0.9 MG/DL (ref 0.5–1.4)
CTP QC/QA: YES
DIFFERENTIAL METHOD: ABNORMAL
EOSINOPHIL # BLD AUTO: 0.2 K/UL (ref 0–0.5)
EOSINOPHIL NFR BLD: 3.6 % (ref 0–8)
ERYTHROCYTE [DISTWIDTH] IN BLOOD BY AUTOMATED COUNT: 12.3 % (ref 11.5–14.5)
EST. GFR  (AFRICAN AMERICAN): >60 ML/MIN/1.73 M^2
EST. GFR  (NON AFRICAN AMERICAN): >60 ML/MIN/1.73 M^2
GLUCOSE SERPL-MCNC: 138 MG/DL (ref 70–110)
HCT VFR BLD AUTO: 38.6 % (ref 40–54)
HGB BLD-MCNC: 14.1 G/DL (ref 14–18)
IMM GRANULOCYTES # BLD AUTO: 0.02 K/UL (ref 0–0.04)
IMM GRANULOCYTES NFR BLD AUTO: 0.4 % (ref 0–0.5)
LYMPHOCYTES # BLD AUTO: 1.5 K/UL (ref 1–4.8)
LYMPHOCYTES NFR BLD: 27.7 % (ref 18–48)
MAGNESIUM SERPL-MCNC: 1.7 MG/DL (ref 1.6–2.6)
MCH RBC QN AUTO: 33.5 PG (ref 27–31)
MCHC RBC AUTO-ENTMCNC: 36.5 G/DL (ref 32–36)
MCV RBC AUTO: 92 FL (ref 82–98)
MONOCYTES # BLD AUTO: 0.5 K/UL (ref 0.3–1)
MONOCYTES NFR BLD: 9.1 % (ref 4–15)
NEUTROPHILS # BLD AUTO: 3.2 K/UL (ref 1.8–7.7)
NEUTROPHILS NFR BLD: 58.3 % (ref 38–73)
NRBC BLD-RTO: 0 /100 WBC
PLATELET # BLD AUTO: 95 K/UL (ref 150–450)
PMV BLD AUTO: 12 FL (ref 9.2–12.9)
POCT GLUCOSE: 135 MG/DL (ref 70–110)
POTASSIUM SERPL-SCNC: 4.1 MMOL/L (ref 3.5–5.1)
PROT SERPL-MCNC: 7.3 G/DL (ref 6–8.4)
RBC # BLD AUTO: 4.21 M/UL (ref 4.6–6.2)
SARS-COV-2 RDRP RESP QL NAA+PROBE: NEGATIVE
SODIUM SERPL-SCNC: 134 MMOL/L (ref 136–145)
WBC # BLD AUTO: 5.49 K/UL (ref 3.9–12.7)

## 2022-07-26 PROCEDURE — 63600175 PHARM REV CODE 636 W HCPCS: Performed by: EMERGENCY MEDICINE

## 2022-07-26 PROCEDURE — 80053 COMPREHEN METABOLIC PANEL: CPT | Performed by: EMERGENCY MEDICINE

## 2022-07-26 PROCEDURE — G0378 HOSPITAL OBSERVATION PER HR: HCPCS

## 2022-07-26 PROCEDURE — 85025 COMPLETE CBC W/AUTO DIFF WBC: CPT | Performed by: EMERGENCY MEDICINE

## 2022-07-26 PROCEDURE — U0002 COVID-19 LAB TEST NON-CDC: HCPCS | Performed by: EMERGENCY MEDICINE

## 2022-07-26 PROCEDURE — 83735 ASSAY OF MAGNESIUM: CPT | Performed by: EMERGENCY MEDICINE

## 2022-07-26 PROCEDURE — 93005 ELECTROCARDIOGRAM TRACING: CPT

## 2022-07-26 PROCEDURE — 96374 THER/PROPH/DIAG INJ IV PUSH: CPT

## 2022-07-26 PROCEDURE — 93010 EKG 12-LEAD: ICD-10-PCS | Mod: ,,, | Performed by: INTERNAL MEDICINE

## 2022-07-26 PROCEDURE — 99900035 HC TECH TIME PER 15 MIN (STAT)

## 2022-07-26 PROCEDURE — 82962 GLUCOSE BLOOD TEST: CPT

## 2022-07-26 PROCEDURE — 99285 EMERGENCY DEPT VISIT HI MDM: CPT | Mod: 25

## 2022-07-26 PROCEDURE — 96361 HYDRATE IV INFUSION ADD-ON: CPT

## 2022-07-26 PROCEDURE — 96372 THER/PROPH/DIAG INJ SC/IM: CPT

## 2022-07-26 PROCEDURE — 96375 TX/PRO/DX INJ NEW DRUG ADDON: CPT

## 2022-07-26 PROCEDURE — 25000003 PHARM REV CODE 250

## 2022-07-26 PROCEDURE — 93010 ELECTROCARDIOGRAM REPORT: CPT | Mod: ,,, | Performed by: INTERNAL MEDICINE

## 2022-07-26 PROCEDURE — 25000003 PHARM REV CODE 250: Performed by: EMERGENCY MEDICINE

## 2022-07-26 PROCEDURE — 63600175 PHARM REV CODE 636 W HCPCS

## 2022-07-26 RX ORDER — ATORVASTATIN CALCIUM 20 MG/1
20 TABLET, FILM COATED ORAL NIGHTLY
Status: DISCONTINUED | OUTPATIENT
Start: 2022-07-26 | End: 2022-07-28 | Stop reason: HOSPADM

## 2022-07-26 RX ORDER — SODIUM CHLORIDE 0.9 % (FLUSH) 0.9 %
10 SYRINGE (ML) INJECTION EVERY 8 HOURS PRN
Status: DISCONTINUED | OUTPATIENT
Start: 2022-07-26 | End: 2022-07-28 | Stop reason: HOSPADM

## 2022-07-26 RX ORDER — ENOXAPARIN SODIUM 100 MG/ML
40 INJECTION SUBCUTANEOUS EVERY 24 HOURS
Status: DISCONTINUED | OUTPATIENT
Start: 2022-07-26 | End: 2022-07-28 | Stop reason: HOSPADM

## 2022-07-26 RX ORDER — IBUPROFEN 200 MG
200 TABLET ORAL EVERY 6 HOURS PRN
COMMUNITY
End: 2023-04-05

## 2022-07-26 RX ORDER — PROCHLORPERAZINE EDISYLATE 5 MG/ML
5 INJECTION INTRAMUSCULAR; INTRAVENOUS EVERY 6 HOURS PRN
Status: DISCONTINUED | OUTPATIENT
Start: 2022-07-26 | End: 2022-07-28 | Stop reason: HOSPADM

## 2022-07-26 RX ORDER — IPRATROPIUM BROMIDE AND ALBUTEROL SULFATE 2.5; .5 MG/3ML; MG/3ML
3 SOLUTION RESPIRATORY (INHALATION) EVERY 6 HOURS PRN
Status: DISCONTINUED | OUTPATIENT
Start: 2022-07-26 | End: 2022-07-28 | Stop reason: HOSPADM

## 2022-07-26 RX ORDER — SIMETHICONE 80 MG
1 TABLET,CHEWABLE ORAL 4 TIMES DAILY PRN
Status: DISCONTINUED | OUTPATIENT
Start: 2022-07-26 | End: 2022-07-28 | Stop reason: HOSPADM

## 2022-07-26 RX ORDER — METOCLOPRAMIDE HYDROCHLORIDE 5 MG/ML
10 INJECTION INTRAMUSCULAR; INTRAVENOUS
Status: COMPLETED | OUTPATIENT
Start: 2022-07-26 | End: 2022-07-26

## 2022-07-26 RX ORDER — NAPROXEN SODIUM 220 MG/1
81 TABLET, FILM COATED ORAL DAILY
Status: DISCONTINUED | OUTPATIENT
Start: 2022-07-27 | End: 2022-07-28 | Stop reason: HOSPADM

## 2022-07-26 RX ORDER — ACETAMINOPHEN 500 MG
1000 TABLET ORAL
Status: COMPLETED | OUTPATIENT
Start: 2022-07-26 | End: 2022-07-26

## 2022-07-26 RX ORDER — DIPHENHYDRAMINE HYDROCHLORIDE 50 MG/ML
50 INJECTION INTRAMUSCULAR; INTRAVENOUS
Status: COMPLETED | OUTPATIENT
Start: 2022-07-26 | End: 2022-07-26

## 2022-07-26 RX ORDER — KETOROLAC TROMETHAMINE 30 MG/ML
15 INJECTION, SOLUTION INTRAMUSCULAR; INTRAVENOUS
Status: COMPLETED | OUTPATIENT
Start: 2022-07-26 | End: 2022-07-26

## 2022-07-26 RX ORDER — IBUPROFEN 200 MG
16 TABLET ORAL
Status: DISCONTINUED | OUTPATIENT
Start: 2022-07-26 | End: 2022-07-28 | Stop reason: HOSPADM

## 2022-07-26 RX ORDER — ACETAMINOPHEN 325 MG/1
650 TABLET ORAL EVERY 4 HOURS PRN
Status: DISCONTINUED | OUTPATIENT
Start: 2022-07-26 | End: 2022-07-28 | Stop reason: HOSPADM

## 2022-07-26 RX ORDER — LOSARTAN POTASSIUM 50 MG/1
50 TABLET ORAL DAILY
Status: DISCONTINUED | OUTPATIENT
Start: 2022-07-27 | End: 2022-07-28 | Stop reason: HOSPADM

## 2022-07-26 RX ORDER — PROCHLORPERAZINE EDISYLATE 5 MG/ML
10 INJECTION INTRAMUSCULAR; INTRAVENOUS
Status: COMPLETED | OUTPATIENT
Start: 2022-07-26 | End: 2022-07-26

## 2022-07-26 RX ORDER — IBUPROFEN 200 MG
24 TABLET ORAL
Status: DISCONTINUED | OUTPATIENT
Start: 2022-07-26 | End: 2022-07-28 | Stop reason: HOSPADM

## 2022-07-26 RX ORDER — GLUCAGON 1 MG
1 KIT INJECTION
Status: DISCONTINUED | OUTPATIENT
Start: 2022-07-26 | End: 2022-07-28 | Stop reason: HOSPADM

## 2022-07-26 RX ORDER — DEXAMETHASONE SODIUM PHOSPHATE 4 MG/ML
6 INJECTION, SOLUTION INTRA-ARTICULAR; INTRALESIONAL; INTRAMUSCULAR; INTRAVENOUS; SOFT TISSUE
Status: COMPLETED | OUTPATIENT
Start: 2022-07-26 | End: 2022-07-26

## 2022-07-26 RX ORDER — MAG HYDROX/ALUMINUM HYD/SIMETH 200-200-20
30 SUSPENSION, ORAL (FINAL DOSE FORM) ORAL 4 TIMES DAILY PRN
Status: DISCONTINUED | OUTPATIENT
Start: 2022-07-26 | End: 2022-07-28 | Stop reason: HOSPADM

## 2022-07-26 RX ORDER — MORPHINE SULFATE 4 MG/ML
4 INJECTION, SOLUTION INTRAMUSCULAR; INTRAVENOUS
Status: COMPLETED | OUTPATIENT
Start: 2022-07-26 | End: 2022-07-26

## 2022-07-26 RX ORDER — TALC
6 POWDER (GRAM) TOPICAL NIGHTLY PRN
Status: DISCONTINUED | OUTPATIENT
Start: 2022-07-26 | End: 2022-07-28 | Stop reason: HOSPADM

## 2022-07-26 RX ORDER — FLUOXETINE HYDROCHLORIDE 20 MG/1
20 CAPSULE ORAL DAILY
Status: DISCONTINUED | OUTPATIENT
Start: 2022-07-27 | End: 2022-07-28 | Stop reason: HOSPADM

## 2022-07-26 RX ORDER — HYDROCHLOROTHIAZIDE 25 MG/1
25 TABLET ORAL DAILY
Status: DISCONTINUED | OUTPATIENT
Start: 2022-07-26 | End: 2022-07-28 | Stop reason: HOSPADM

## 2022-07-26 RX ORDER — CARVEDILOL 12.5 MG/1
12.5 TABLET ORAL 2 TIMES DAILY WITH MEALS
Status: DISCONTINUED | OUTPATIENT
Start: 2022-07-27 | End: 2022-07-28 | Stop reason: HOSPADM

## 2022-07-26 RX ORDER — ONDANSETRON 2 MG/ML
4 INJECTION INTRAMUSCULAR; INTRAVENOUS EVERY 8 HOURS PRN
Status: DISCONTINUED | OUTPATIENT
Start: 2022-07-26 | End: 2022-07-28 | Stop reason: HOSPADM

## 2022-07-26 RX ORDER — MECLIZINE HCL 12.5 MG 12.5 MG/1
25 TABLET ORAL 3 TIMES DAILY PRN
Status: DISCONTINUED | OUTPATIENT
Start: 2022-07-26 | End: 2022-07-28 | Stop reason: HOSPADM

## 2022-07-26 RX ORDER — IBUPROFEN 400 MG/1
400 TABLET ORAL EVERY 6 HOURS PRN
Status: DISCONTINUED | OUTPATIENT
Start: 2022-07-26 | End: 2022-07-28 | Stop reason: HOSPADM

## 2022-07-26 RX ADMIN — SODIUM CHLORIDE 1000 ML: 0.9 INJECTION, SOLUTION INTRAVENOUS at 03:07

## 2022-07-26 RX ADMIN — IBUPROFEN 400 MG: 400 TABLET, FILM COATED ORAL at 11:07

## 2022-07-26 RX ADMIN — PROCHLORPERAZINE EDISYLATE 10 MG: 5 INJECTION INTRAMUSCULAR; INTRAVENOUS at 03:07

## 2022-07-26 RX ADMIN — KETOROLAC TROMETHAMINE 15 MG: 30 INJECTION, SOLUTION INTRAMUSCULAR; INTRAVENOUS at 12:07

## 2022-07-26 RX ADMIN — ENOXAPARIN SODIUM 40 MG: 100 INJECTION SUBCUTANEOUS at 05:07

## 2022-07-26 RX ADMIN — HYDROCHLOROTHIAZIDE 25 MG: 25 TABLET ORAL at 09:07

## 2022-07-26 RX ADMIN — DEXAMETHASONE SODIUM PHOSPHATE 6 MG: 4 INJECTION, SOLUTION INTRA-ARTICULAR; INTRALESIONAL; INTRAMUSCULAR; INTRAVENOUS; SOFT TISSUE at 03:07

## 2022-07-26 RX ADMIN — ACETAMINOPHEN 1000 MG: 500 TABLET ORAL at 01:07

## 2022-07-26 RX ADMIN — DIPHENHYDRAMINE HYDROCHLORIDE 50 MG: 50 INJECTION, SOLUTION INTRAMUSCULAR; INTRAVENOUS at 12:07

## 2022-07-26 RX ADMIN — ATORVASTATIN CALCIUM 20 MG: 20 TABLET, FILM COATED ORAL at 09:07

## 2022-07-26 RX ADMIN — MORPHINE SULFATE 4 MG: 4 INJECTION INTRAVENOUS at 01:07

## 2022-07-26 RX ADMIN — METOCLOPRAMIDE 10 MG: 5 INJECTION, SOLUTION INTRAMUSCULAR; INTRAVENOUS at 12:07

## 2022-07-26 NOTE — ASSESSMENT & PLAN NOTE
-presents to ED with complaints of generalized headache since Friday  -reports home /100's PTA; initial BP in ED   -Etiology likely 2/2 to uncontrolled BP  -Received Benedryl 50mg IV x1, Reglan 10mg IV x1, Toradol 15mg IV x1, Tylenol 1g PO x1, Morphine 4mg IV x1, Decadron 6mg IV x1, Prochlorperazine 10mg IV x1 and 1L NS bolus x1 in ED  -MRI Brain ordered  -Resume home BP medications   -If headache does not improve by am, will consider neurology consult  -Analgesics PRN for headache

## 2022-07-26 NOTE — ASSESSMENT & PLAN NOTE
-,   -Abdominal exam negative; denies N/V, or abdominal pain  -Likely 2/2 to alcohol induced?  -Monitor and trend liver function with daily CMP

## 2022-07-26 NOTE — ED PROVIDER NOTES
Encounter Date: 7/26/2022    SCRIBE #1 NOTE: I, Alexander Mortensen, am scribing for, and in the presence of, Wilfred Fong.       History     Chief Complaint   Patient presents with    Hypertension     Elevated BP reading since Friday with +HA, no vision changes, no neuro deficits. Ambulated with normal gait and normal speech. HA has progressively worsened since Friday with fatigue. Patient is compliant with BP medications.      Eitan Cody is a 62 y.o. male who  has a past medical history of Hypertension.    The patient presents to the ED due to headache.   Patient reports experiencing a worsening headache for 4 days with fatigue. He took his blood pressure at 200 systolic prior to arrival. Denies chest pain, shortness of breath, abdominal pain, numbness, and weakness. Endorses compliance with medication. No change in diet, no increased sodium intake.      The history is provided by the patient.     Review of patient's allergies indicates:  No Known Allergies  Past Medical History:   Diagnosis Date    Hypertension      Past Surgical History:   Procedure Laterality Date    COLONOSCOPY N/A 6/30/2022    Procedure: COLONOSCOPY;  Surgeon: VIRIDIANA Swanson MD;  [facility];  Service: Endoscopy;  Laterality: N/A;    LEFT HEART CATHETERIZATION Left 2/16/2022    Procedure: Left heart cath;  Surgeon: Thierno Kidd III, MD;  [facility];  Service: Cardiology;  Laterality: Left;     No family history on file.  Social History     Tobacco Use    Smoking status: Never Smoker    Smokeless tobacco: Never Used   Substance Use Topics    Alcohol use: Yes     Alcohol/week: 6.0 standard drinks     Types: 6 Cans of beer per week     Comment: daily    Drug use: No     Review of Systems   Constitutional: Positive for fatigue.        Positive for hypertension.   HENT: Negative for sore throat.    Eyes: Negative for redness.   Respiratory: Negative for shortness of breath.    Cardiovascular: Negative for chest  pain.   Gastrointestinal: Negative for abdominal pain and nausea.   Genitourinary: Negative for dysuria.   Musculoskeletal: Negative for back pain.   Skin: Negative for rash.   Neurological: Positive for headaches. Negative for weakness and numbness.       Physical Exam     Initial Vitals [07/26/22 1128]   BP Pulse Resp Temp SpO2   (!) 191/11 78 20 98.5 °F (36.9 °C) 100 %      MAP       --         Physical Exam    Nursing note and vitals reviewed.  HENT:   Head: Atraumatic.   Eyes: Conjunctivae and EOM are normal.   Neck:   Normal range of motion.  Cardiovascular: Exam reveals no gallop and no friction rub.    No murmur heard.  Pulmonary/Chest: Breath sounds normal. No respiratory distress. He has no wheezes. He has no rales.   Abdominal: Abdomen is soft. Bowel sounds are normal. He exhibits no distension. There is no abdominal tenderness.   Musculoskeletal:         General: No edema. Normal range of motion.      Cervical back: Normal range of motion.     Neurological: He is alert and oriented to person, place, and time. He has normal strength. No cranial nerve deficit or sensory deficit.   Skin: Skin is warm and dry.   Psychiatric: He has a normal mood and affect.         ED Course   Procedures  Labs Reviewed   CBC W/ AUTO DIFFERENTIAL - Abnormal; Notable for the following components:       Result Value    RBC 4.21 (*)     Hematocrit 38.6 (*)     MCH 33.5 (*)     MCHC 36.5 (*)     Platelets 95 (*)     All other components within normal limits   COMPREHENSIVE METABOLIC PANEL - Abnormal; Notable for the following components:    Sodium 134 (*)     Glucose 138 (*)     Total Bilirubin 1.7 (*)      (*)      (*)     All other components within normal limits   MAGNESIUM   SARS-COV-2 RDRP GENE          Imaging Results          CT Head Without Contrast (Final result)  Result time 07/26/22 13:24:45    Final result by Baldo Garcia MD (07/26/22 13:24:45)                 Impression:      No acute intracranial  findings.      Electronically signed by: Baldo Garcia  Date:    07/26/2022  Time:    13:24             Narrative:    EXAMINATION:  CT HEAD WITHOUT CONTRAST    CLINICAL HISTORY:  headache;    TECHNIQUE:  Low dose axial images were obtained through the head.  Coronal and sagittal reformations were also performed. Contrast was not administered.    COMPARISON:  CT head performed 01/13/2022    FINDINGS:  Blood: No acute intracranial hemorrhage.    Parenchyma: No definite loss of gray-white differentiation to suggest acute or subacute transcortical infarct. Generalized pattern age-related parenchymal volume loss.  Nonspecific areas of white matter hypoattenuation could relate to sequela of chronic small vessel ischemic disease.    Ventricles/Extra-axial spaces: No abnormal extra-axial fluid collection. Basal cisterns are patent.    Vessels: Atherosclerosis    Orbits: Unremarkable.    Scalp: Unremarkable.    Skull: There are no depressed skull fractures or destructive bone lesions.    Sinuses and mastoids: Essentially clear.    Other findings: None                                 Medications   sodium chloride 0.9% bolus 1,000 mL (1,000 mLs Intravenous New Bag 7/26/22 1522)   diphenhydrAMINE injection 50 mg (50 mg Intravenous Given 7/26/22 1206)   metoclopramide HCl injection 10 mg (10 mg Intravenous Given 7/26/22 1206)   ketorolac injection 15 mg (15 mg Intravenous Given 7/26/22 1205)   acetaminophen tablet 1,000 mg (1,000 mg Oral Given 7/26/22 1311)   morphine injection 4 mg (4 mg Intravenous Given 7/26/22 1355)   dexamethasone injection 6 mg (6 mg Intravenous Given 7/26/22 1522)   prochlorperazine injection Soln 10 mg (10 mg Intravenous Given 7/26/22 1522)     Medical Decision Making:   Initial Assessment:   62-year-old male with history of hypertension presents with elevated blood pressure for the past few days as well as headache since Friday.  Also complaining of fatigue.  On exam is neuro exam nonfocal.  BP  198/101.  No distress.  Headache treated in the ED.  neck is supple.  No meningeal signs.  Doubt meningitis.  Doubt subarachnoid hemorrhage.  Doubt dural venous sinus thrombosis.    1:29 PM  Patient's labs are grossly unremarkable.  Patient given IV Toradol, Reglan, Benadryl and Tylenol for headache.  Continues to complain of headache.  CT head obtained showing no acute intracranial process.    2:56 PM  Patient without improvement after morphine.  Will admit for intractable headache.              Attending Attestation:           Physician Attestation for Scribe:      Comments: Physician Attestation for Scribe: I, drake beverly, reviewed documentation as scribed in my presence, which is both accurate and complete.                 Clinical Impression:   Final diagnoses:  [R51.9] Acute intractable headache, unspecified headache type (Primary)          ED Disposition Condition    Observation               Drake Beverly MD  07/26/22 3831

## 2022-07-26 NOTE — PHARMACY MED REC
"Admission Medication History     The home medication history was taken by Juli Rosas CPhT.    Medication history obtained from, Patient Verified    You may go to "Admission" then "Reconcile Home Medications" tabs to review and/or act upon these items.      The home medication list has been updated by the Pharmacy department.    Please read ALL comments highlighted in yellow.    Please address this information as you see fit.     Feel free to contact us if you have any questions or require assistance.      The medications listed below were removed from the home medication list.  Please reorder if appropriate:  Patient reports no longer taking the following medication(s):   Loratadine 10 mg   Ofloxacin 0.3% eye drops        Juli Rosas CPhT.  Ext [phone]                .          "

## 2022-07-26 NOTE — ASSESSMENT & PLAN NOTE
-admits to drinking 3-4 beers daily  -last drink was last light  -CIWA q8hr  -Monitor for s/s of alcohol withdrawals  -Will provide PRN valium for ETOH withdrawal symptoms

## 2022-07-26 NOTE — ASSESSMENT & PLAN NOTE
Last LDL was   Lab Results   Component Value Date    LDLCALC 87.0 01/14/2022      Patient is chronically on statin.will continue for now.   Monitor clinically.

## 2022-07-26 NOTE — ASSESSMENT & PLAN NOTE
Chronic, Latest blood pressure and vitals reviewed-   Temp:  [98.5 °F (36.9 °C)]   Pulse:  [52-78]   Resp:  [12-23]   BP: (136-191)/(11-89)   SpO2:  [96 %-100 %] .   Home meds for hypertension were reviewed and noted below.   Hypertension Medications             carvediloL (COREG) 12.5 MG tablet Take 1 tablet (12.5 mg total) by mouth 2 (two) times daily with meals.    hydroCHLOROthiazide (HYDRODIURIL) 25 MG tablet hydrochlorothiazide 25 mg tablet  Take 1 tablet every day by oral route.    losartan (COZAAR) 50 MG tablet Take 1 tablet (50 mg total) by mouth once daily.            -Resume home BP medications  -Monitor and trend vital signs q4hr  -Will utilize p.r.n. blood pressure medication only if patient's blood pressure greater than  180/110 and he develops symptoms such as worsening chest pain or shortness of breath.

## 2022-07-26 NOTE — SUBJECTIVE & OBJECTIVE
Past Medical History:   Diagnosis Date    Hypertension        Past Surgical History:   Procedure Laterality Date    COLONOSCOPY N/A 6/30/2022    Procedure: COLONOSCOPY;  Surgeon: VIRIDIANA Swanson MD;  Location: [facility] ENDO;  Service: Endoscopy;  Laterality: N/A;    LEFT HEART CATHETERIZATION Left 2/16/2022    Procedure: Left heart cath;  Surgeon: Thierno Kidd III, MD;  Location: [facility] CATH LAB;  Service: Cardiology;  Laterality: Left;       Review of patient's allergies indicates:  No Known Allergies    No current facility-administered medications on file prior to encounter.     Current Outpatient Medications on File Prior to Encounter   Medication Sig    aspirin 81 MG Chew Take 1 tablet (81 mg total) by mouth once daily.    atorvastatin (LIPITOR) 20 MG tablet Take 1 tablet (20 mg total) by mouth every evening.    carvediloL (COREG) 12.5 MG tablet Take 1 tablet (12.5 mg total) by mouth 2 (two) times daily with meals.    cetirizine (ZYRTEC) 10 MG tablet Take 1 tablet (10 mg total) by mouth once daily.    FLUoxetine 20 MG capsule fluoxetine 20 mg capsule  Take 1 capsule every day by oral route.    hydroCHLOROthiazide (HYDRODIURIL) 25 MG tablet hydrochlorothiazide 25 mg tablet  Take 1 tablet every day by oral route.    ibuprofen (ADVIL,MOTRIN) 200 MG tablet Take 200 mg by mouth every 6 (six) hours as needed for Pain.    lifitegrast (XIIDRA) 5 % Dpet Place 1 drop into both eyes 2 (two) times daily.    losartan (COZAAR) 50 MG tablet Take 1 tablet (50 mg total) by mouth once daily.    meclizine (ANTIVERT) 25 mg tablet meclizine 25 mg tablet   Take 1 tablet 3 times a day by oral route as needed for dizziness.    pulse oximeter (PULSE OXIMETER) device by Apply Externally route 2 (two) times a day. Use twice daily at 8 AM and 3 PM and record the value in MyChart as directed.    pulse oximeter (PULSE OXIMETER) device by Apply Externally route 2 (two) times a day. Use twice daily at 8 AM and 3 PM and record the value in  MyChart as directed.    [DISCONTINUED] loratadine (CLARITIN) 10 mg tablet loratadine 10 mg tablet   Take 1 tablet every day by oral route.    [DISCONTINUED] ofloxacin (OCUFLOX) 0.3 % ophthalmic solution Instill 2 drops in both eyes four times a day; apply for 7 days     Family History    None       Tobacco Use    Smoking status: Never Smoker    Smokeless tobacco: Never Used   Substance and Sexual Activity    Alcohol use: Yes     Alcohol/week: 6.0 standard drinks     Types: 6 Cans of beer per week     Comment: daily    Drug use: No    Sexual activity: Not on file     Review of Systems   Constitutional:  Positive for appetite change. Negative for chills, diaphoresis and fever.   HENT:  Negative for hearing loss and sore throat.    Eyes:  Negative for visual disturbance.   Respiratory:  Negative for cough and shortness of breath.    Cardiovascular:  Negative for chest pain and leg swelling.   Gastrointestinal:  Negative for abdominal pain, diarrhea, nausea and vomiting.   Genitourinary:  Negative for difficulty urinating and flank pain.   Musculoskeletal:  Negative for back pain.   Skin:  Negative for rash and wound.   Neurological:  Positive for headaches. Negative for dizziness and weakness.   Psychiatric/Behavioral:  Negative for agitation and confusion.    Objective:     Vital Signs (Most Recent):  Temp: 98.5 °F (36.9 °C) (07/26/22 1128)  Pulse: (!) 54 (07/26/22 1801)  Resp: 12 (07/26/22 1801)  BP: (!) 149/77 (07/26/22 1801)  SpO2: 97 % (07/26/22 1801)   Vital Signs (24h Range):  Temp:  [98.5 °F (36.9 °C)] 98.5 °F (36.9 °C)  Pulse:  [52-78] 54  Resp:  [12-23] 12  SpO2:  [96 %-100 %] 97 %  BP: (136-191)/(11-89) 149/77     Weight: 79.4 kg (175 lb)  Body mass index is 27.41 kg/m².    Physical Exam  Vitals and nursing note reviewed.   Constitutional:       General: He is not in acute distress.     Appearance: Normal appearance. He is not ill-appearing.   HENT:      Head: Normocephalic and atraumatic.      Mouth/Throat:       Mouth: Mucous membranes are moist.   Eyes:      Extraocular Movements: Extraocular movements intact.      Pupils: Pupils are equal, round, and reactive to light.   Cardiovascular:      Rate and Rhythm: Normal rate and regular rhythm.      Pulses: Normal pulses.      Heart sounds: Normal heart sounds. No murmur heard.    No friction rub. No gallop.   Pulmonary:      Effort: Pulmonary effort is normal. No respiratory distress.      Breath sounds: No wheezing or rhonchi.   Abdominal:      General: Bowel sounds are normal. There is no distension.      Palpations: Abdomen is soft.      Tenderness: There is no abdominal tenderness. There is no guarding.   Musculoskeletal:         General: No swelling.      Cervical back: Normal range of motion and neck supple.      Right lower leg: No edema.      Left lower leg: No edema.   Skin:     General: Skin is warm and dry.      Capillary Refill: Capillary refill takes less than 2 seconds.      Findings: No bruising, erythema or rash.   Neurological:      General: No focal deficit present.      Mental Status: He is alert and oriented to person, place, and time.      GCS: GCS eye subscore is 4. GCS verbal subscore is 5. GCS motor subscore is 6.   Psychiatric:         Mood and Affect: Mood normal.         Behavior: Behavior normal. Behavior is cooperative.         CRANIAL NERVES     CN III, IV, VI   Pupils are equal, round, and reactive to light.     Significant Labs: All pertinent labs within the past 24 hours have been reviewed.  Recent Results (from the past 24 hour(s))   CBC Auto Differential    Collection Time: 07/26/22 11:56 AM   Result Value Ref Range    WBC 5.49 3.90 - 12.70 K/uL    RBC 4.21 (L) 4.60 - 6.20 M/uL    Hemoglobin 14.1 14.0 - 18.0 g/dL    Hematocrit 38.6 (L) 40.0 - 54.0 %    MCV 92 82 - 98 fL    MCH 33.5 (H) 27.0 - 31.0 pg    MCHC 36.5 (H) 32.0 - 36.0 g/dL    RDW 12.3 11.5 - 14.5 %    Platelets 95 (L) 150 - 450 K/uL    MPV 12.0 9.2 - 12.9 fL    Immature  Granulocytes 0.4 0.0 - 0.5 %    Gran # (ANC) 3.2 1.8 - 7.7 K/uL    Immature Grans (Abs) 0.02 0.00 - 0.04 K/uL    Lymph # 1.5 1.0 - 4.8 K/uL    Mono # 0.5 0.3 - 1.0 K/uL    Eos # 0.2 0.0 - 0.5 K/uL    Baso # 0.05 0.00 - 0.20 K/uL    nRBC 0 0 /100 WBC    Gran % 58.3 38.0 - 73.0 %    Lymph % 27.7 18.0 - 48.0 %    Mono % 9.1 4.0 - 15.0 %    Eosinophil % 3.6 0.0 - 8.0 %    Basophil % 0.9 0.0 - 1.9 %    Differential Method Automated    Comprehensive Metabolic Panel    Collection Time: 07/26/22 11:56 AM   Result Value Ref Range    Sodium 134 (L) 136 - 145 mmol/L    Potassium 4.1 3.5 - 5.1 mmol/L    Chloride 99 95 - 110 mmol/L    CO2 25 23 - 29 mmol/L    Glucose 138 (H) 70 - 110 mg/dL    BUN 14 8 - 23 mg/dL    Creatinine 0.9 0.5 - 1.4 mg/dL    Calcium 9.4 8.7 - 10.5 mg/dL    Total Protein 7.3 6.0 - 8.4 g/dL    Albumin 3.6 3.5 - 5.2 g/dL    Total Bilirubin 1.7 (H) 0.1 - 1.0 mg/dL    Alkaline Phosphatase 88 55 - 135 U/L     (H) 10 - 40 U/L     (H) 10 - 44 U/L    Anion Gap 10 8 - 16 mmol/L    eGFR if African American >60 >60 mL/min/1.73 m^2    eGFR if non African American >60 >60 mL/min/1.73 m^2   Magnesium    Collection Time: 07/26/22 11:56 AM   Result Value Ref Range    Magnesium 1.7 1.6 - 2.6 mg/dL   POCT COVID-19 Rapid Screening    Collection Time: 07/26/22  2:07 PM   Result Value Ref Range    POC Rapid COVID Negative Negative     Acceptable Yes            Significant Imaging: I have reviewed all pertinent imaging results/findings within the past 24 hours.

## 2022-07-26 NOTE — H&P
"[facility] Medicine  History & Physical    Patient Name: Eitan Cody  MRN: [id number]  Patient Class: OP- Observation  Admission Date: 7/26/2022  Attending Physician: Khanh Beyer, *   Primary Care Provider: Abilio Mtz MD         Patient information was obtained from patient and ER records.     Subjective:     Principal Problem:Acute intractable headache    Chief Complaint:   Chief Complaint   Patient presents with    Hypertension     Elevated BP reading since Friday with +HA, no vision changes, no neuro deficits. Ambulated with normal gait and normal speech. HA has progressively worsened since Friday with fatigue. Patient is compliant with BP medications.         HPI: Eitan Cody is a 61 y/o male with HTN, HLD, and alcohol dependence presents to [facility] ED with complaints of generalized headache. He reports his headache started on Friday and also state he had decreased appetite. Today, he states his headache has worsened which prompted him to seek medical evaluation. He state he did not check his BP on Friday but when he checked it today at home, he reports /100's. He reports compliance with his medications. He state his headache was similar to when he had COVID-19 infection in January. He denies recent sick contacts. He admits he drinks 3-4 beers daily and his drink was last night. He denies tobacco use. He denies fever, chills, SOB, chest pain, palpitations, leg swelling, vision changes, falls, photosensitivity, or recent head trauma.       In the ED: BP (!) 149/77   Pulse (!) 54   Temp 98.5 °F (36.9 °C) (Oral)   Resp 12   Ht 5' 7" (1.702 m)   Wt 79.4 kg (175 lb)   SpO2 97%   BMI 27.41 kg/m² Labs remarkable for Na 134, T. Bili 1.7, , . CT head negative for acute intracranial findings. COVID negative. He was treated with Benedryl 50mg IV x1, Reglan 10mg IV x1, Toradol 15mg IV x1, Tylenol 1g PO x1, Morphine 4mg IV x1, Decadron 6mg IV x1, Prochlorperazine " 10mg IV x1 and 1L NS bolus x1 MRI brain ordered. Observation placed with hospital medicine for further evaluation and treatment.         Past Medical History:   Diagnosis Date    Hypertension        Past Surgical History:   Procedure Laterality Date    COLONOSCOPY N/A 6/30/2022    Procedure: COLONOSCOPY;  Surgeon: VIRIDIANA Swanson MD;  Location: [facility] ENDO;  Service: Endoscopy;  Laterality: N/A;    LEFT HEART CATHETERIZATION Left 2/16/2022    Procedure: Left heart cath;  Surgeon: Thierno Kidd III, MD;  Location: [facility] CATH LAB;  Service: Cardiology;  Laterality: Left;       Review of patient's allergies indicates:  No Known Allergies    No current facility-administered medications on file prior to encounter.     Current Outpatient Medications on File Prior to Encounter   Medication Sig    aspirin 81 MG Chew Take 1 tablet (81 mg total) by mouth once daily.    atorvastatin (LIPITOR) 20 MG tablet Take 1 tablet (20 mg total) by mouth every evening.    carvediloL (COREG) 12.5 MG tablet Take 1 tablet (12.5 mg total) by mouth 2 (two) times daily with meals.    cetirizine (ZYRTEC) 10 MG tablet Take 1 tablet (10 mg total) by mouth once daily.    FLUoxetine 20 MG capsule fluoxetine 20 mg capsule  Take 1 capsule every day by oral route.    hydroCHLOROthiazide (HYDRODIURIL) 25 MG tablet hydrochlorothiazide 25 mg tablet  Take 1 tablet every day by oral route.    ibuprofen (ADVIL,MOTRIN) 200 MG tablet Take 200 mg by mouth every 6 (six) hours as needed for Pain.    lifitegrast (XIIDRA) 5 % Dpet Place 1 drop into both eyes 2 (two) times daily.    losartan (COZAAR) 50 MG tablet Take 1 tablet (50 mg total) by mouth once daily.    meclizine (ANTIVERT) 25 mg tablet meclizine 25 mg tablet   Take 1 tablet 3 times a day by oral route as needed for dizziness.    pulse oximeter (PULSE OXIMETER) device by Apply Externally route 2 (two) times a day. Use twice daily at 8 AM and 3 PM and record the value in [facility] as  directed.    pulse oximeter (PULSE OXIMETER) device by Apply Externally route 2 (two) times a day. Use twice daily at 8 AM and 3 PM and record the value in [facility] as directed.    [DISCONTINUED] loratadine (CLARITIN) 10 mg tablet loratadine 10 mg tablet   Take 1 tablet every day by oral route.    [DISCONTINUED] ofloxacin (OCUFLOX) 0.3 % ophthalmic solution Instill 2 drops in both eyes four times a day; apply for 7 days     Family History    None       Tobacco Use    Smoking status: Never Smoker    Smokeless tobacco: Never Used   Substance and Sexual Activity    Alcohol use: Yes     Alcohol/week: 6.0 standard drinks     Types: 6 Cans of beer per week     Comment: daily    Drug use: No    Sexual activity: Not on file     Review of Systems   Constitutional:  Positive for appetite change. Negative for chills, diaphoresis and fever.   HENT:  Negative for hearing loss and sore throat.    Eyes:  Negative for visual disturbance.   Respiratory:  Negative for cough and shortness of breath.    Cardiovascular:  Negative for chest pain and leg swelling.   Gastrointestinal:  Negative for abdominal pain, diarrhea, nausea and vomiting.   Genitourinary:  Negative for difficulty urinating and flank pain.   Musculoskeletal:  Negative for back pain.   Skin:  Negative for rash and wound.   Neurological:  Positive for headaches. Negative for dizziness and weakness.   Psychiatric/Behavioral:  Negative for agitation and confusion.    Objective:     Vital Signs (Most Recent):  Temp: 98.5 °F (36.9 °C) (07/26/22 1128)  Pulse: (!) 54 (07/26/22 1801)  Resp: 12 (07/26/22 1801)  BP: (!) 149/77 (07/26/22 1801)  SpO2: 97 % (07/26/22 1801)   Vital Signs (24h Range):  Temp:  [98.5 °F (36.9 °C)] 98.5 °F (36.9 °C)  Pulse:  [52-78] 54  Resp:  [12-23] 12  SpO2:  [96 %-100 %] 97 %  BP: (136-191)/(11-89) 149/77     Weight: 79.4 kg (175 lb)  Body mass index is 27.41 kg/m².    Physical Exam  Vitals and nursing note reviewed.   Constitutional:        General: He is not in acute distress.     Appearance: Normal appearance. He is not ill-appearing.   HENT:      Head: Normocephalic and atraumatic.      Mouth/Throat:      Mouth: Mucous membranes are moist.   Eyes:      Extraocular Movements: Extraocular movements intact.      Pupils: Pupils are equal, round, and reactive to light.   Cardiovascular:      Rate and Rhythm: Normal rate and regular rhythm.      Pulses: Normal pulses.      Heart sounds: Normal heart sounds. No murmur heard.    No friction rub. No gallop.   Pulmonary:      Effort: Pulmonary effort is normal. No respiratory distress.      Breath sounds: No wheezing or rhonchi.   Abdominal:      General: Bowel sounds are normal. There is no distension.      Palpations: Abdomen is soft.      Tenderness: There is no abdominal tenderness. There is no guarding.   Musculoskeletal:         General: No swelling.      Cervical back: Normal range of motion and neck supple.      Right lower leg: No edema.      Left lower leg: No edema.   Skin:     General: Skin is warm and dry.      Capillary Refill: Capillary refill takes less than 2 seconds.      Findings: No bruising, erythema or rash.   Neurological:      General: No focal deficit present.      Mental Status: He is alert and oriented to person, place, and time.      GCS: GCS eye subscore is 4. GCS verbal subscore is 5. GCS motor subscore is 6.   Psychiatric:         Mood and Affect: Mood normal.         Behavior: Behavior normal. Behavior is cooperative.         CRANIAL NERVES     CN III, IV, VI   Pupils are equal, round, and reactive to light.     Significant Labs: All pertinent labs within the past 24 hours have been reviewed.  Recent Results (from the past 24 hour(s))   CBC Auto Differential    Collection Time: 07/26/22 11:56 AM   Result Value Ref Range    WBC 5.49 3.90 - 12.70 K/uL    RBC 4.21 (L) 4.60 - 6.20 M/uL    Hemoglobin 14.1 14.0 - 18.0 g/dL    Hematocrit 38.6 (L) 40.0 - 54.0 %    MCV 92 82 - 98 fL     MCH 33.5 (H) 27.0 - 31.0 pg    MCHC 36.5 (H) 32.0 - 36.0 g/dL    RDW 12.3 11.5 - 14.5 %    Platelets 95 (L) 150 - 450 K/uL    MPV 12.0 9.2 - 12.9 fL    Immature Granulocytes 0.4 0.0 - 0.5 %    Gran # (ANC) 3.2 1.8 - 7.7 K/uL    Immature Grans (Abs) 0.02 0.00 - 0.04 K/uL    Lymph # 1.5 1.0 - 4.8 K/uL    Mono # 0.5 0.3 - 1.0 K/uL    Eos # 0.2 0.0 - 0.5 K/uL    Baso # 0.05 0.00 - 0.20 K/uL    nRBC 0 0 /100 WBC    Gran % 58.3 38.0 - 73.0 %    Lymph % 27.7 18.0 - 48.0 %    Mono % 9.1 4.0 - 15.0 %    Eosinophil % 3.6 0.0 - 8.0 %    Basophil % 0.9 0.0 - 1.9 %    Differential Method Automated    Comprehensive Metabolic Panel    Collection Time: 07/26/22 11:56 AM   Result Value Ref Range    Sodium 134 (L) 136 - 145 mmol/L    Potassium 4.1 3.5 - 5.1 mmol/L    Chloride 99 95 - 110 mmol/L    CO2 25 23 - 29 mmol/L    Glucose 138 (H) 70 - 110 mg/dL    BUN 14 8 - 23 mg/dL    Creatinine 0.9 0.5 - 1.4 mg/dL    Calcium 9.4 8.7 - 10.5 mg/dL    Total Protein 7.3 6.0 - 8.4 g/dL    Albumin 3.6 3.5 - 5.2 g/dL    Total Bilirubin 1.7 (H) 0.1 - 1.0 mg/dL    Alkaline Phosphatase 88 55 - 135 U/L     (H) 10 - 40 U/L     (H) 10 - 44 U/L    Anion Gap 10 8 - 16 mmol/L    eGFR if African American >60 >60 mL/min/1.73 m^2    eGFR if non African American >60 >60 mL/min/1.73 m^2   Magnesium    Collection Time: 07/26/22 11:56 AM   Result Value Ref Range    Magnesium 1.7 1.6 - 2.6 mg/dL   POCT COVID-19 Rapid Screening    Collection Time: 07/26/22  2:07 PM   Result Value Ref Range    POC Rapid COVID Negative Negative     Acceptable Yes            Significant Imaging: I have reviewed all pertinent imaging results/findings within the past 24 hours.    Assessment/Plan:     * Acute intractable headache  -presents to ED with complaints of generalized headache since Friday  -reports home /100's PTA; initial BP in ED   -Etiology likely 2/2 to uncontrolled BP  -Received Benedryl 50mg IV x1, Reglan 10mg IV x1, Toradol 15mg  IV x1, Tylenol 1g PO x1, Morphine 4mg IV x1, Decadron 6mg IV x1, Prochlorperazine 10mg IV x1 and 1L NS bolus x1 in ED  -MRI Brain ordered  -Resume home BP medications   -If headache does not improve by am, will consider neurology consult  -Analgesics PRN for headache      Alcohol dependence  -admits to drinking 3-4 beers daily  -last drink was last light  -CIWA q8hr  -Monitor for s/s of alcohol withdrawals  -Will provide PRN valium for ETOH withdrawal symptoms      Abnormal liver enzymes  -,   -Abdominal exam negative; denies N/V, or abdominal pain  -Likely 2/2 to alcohol induced?  -Monitor and trend liver function with daily CMP       Hyperlipidemia  Last LDL was   Lab Results   Component Value Date    LDLCALC 87.0 01/14/2022      Patient is chronically on statin.will continue for now.   Monitor clinically.          Primary hypertension  Chronic, Latest blood pressure and vitals reviewed-   Temp:  [98.5 °F (36.9 °C)]   Pulse:  [52-78]   Resp:  [12-23]   BP: (136-191)/(11-89)   SpO2:  [96 %-100 %] .   Home meds for hypertension were reviewed and noted below.   Hypertension Medications             carvediloL (COREG) 12.5 MG tablet Take 1 tablet (12.5 mg total) by mouth 2 (two) times daily with meals.    hydroCHLOROthiazide (HYDRODIURIL) 25 MG tablet hydrochlorothiazide 25 mg tablet  Take 1 tablet every day by oral route.    losartan (COZAAR) 50 MG tablet Take 1 tablet (50 mg total) by mouth once daily.            -Resume home BP medications  -Monitor and trend vital signs q4hr  -Will utilize p.r.n. blood pressure medication only if patient's blood pressure greater than  180/110 and he develops symptoms such as worsening chest pain or shortness of breath.          VTE Risk Mitigation (From admission, onward)         Ordered     enoxaparin injection 40 mg  Daily         07/26/22 1558     IP VTE HIGH RISK PATIENT  Once         07/26/22 1558     Place sequential compression device  Until discontinued          07/26/22 1558                 Jelly Tavarez DNP, ACNPC-AG, CCRN  Hospitalist  Department of [facility] Medicine  [facility]   Pager: [phone]

## 2022-07-26 NOTE — Clinical Note
Diagnosis: Acute intractable headache, unspecified headache type [[id number]]   Future Attending Provider: MENDOZA KIRKLAND [[id number]]   Admitting Provider:: MENDOZA KIRKLAND [[id number]]

## 2022-07-26 NOTE — HPI
"Eitan Cody is a 61 y/o male with HTN, HLD, and alcohol dependence presents to [facility] ED with complaints of generalized headache. He reports his headache started on Friday and also state he had decreased appetite. Today, he states his headache has worsened which prompted him to seek medical evaluation. He state he did not check his BP on Friday but when he checked it today at home, he reports /100's. He reports compliance with his medications. He state his headache was similar to when he had COVID-19 infection in January. He denies recent sick contacts. He admits he drinks 3-4 beers daily and his drink was last night. He denies tobacco use. He denies fever, chills, SOB, chest pain, palpitations, leg swelling, vision changes, falls, photosensitivity, or recent head trauma.       In the ED: BP (!) 149/77   Pulse (!) 54   Temp 98.5 °F (36.9 °C) (Oral)   Resp 12   Ht 5' 7" (1.702 m)   Wt 79.4 kg (175 lb)   SpO2 97%   BMI 27.41 kg/m² Labs remarkable for Na 134, T. Bili 1.7, , . CT head negative for acute intracranial findings. COVID negative. He was treated with Benedryl 50mg IV x1, Reglan 10mg IV x1, Toradol 15mg IV x1, Tylenol 1g PO x1, Morphine 4mg IV x1, Decadron 6mg IV x1, Prochlorperazine 10mg IV x1 and 1L NS bolus x1 MRI brain ordered. Observation placed with hospital medicine for further evaluation and treatment.     "

## 2022-07-27 LAB
ALBUMIN SERPL BCP-MCNC: 3 G/DL (ref 3.5–5.2)
ALP SERPL-CCNC: 74 U/L (ref 55–135)
ALT SERPL W/O P-5'-P-CCNC: 88 U/L (ref 10–44)
ANION GAP SERPL CALC-SCNC: 10 MMOL/L (ref 8–16)
AST SERPL-CCNC: 98 U/L (ref 10–40)
BILIRUB SERPL-MCNC: 1.2 MG/DL (ref 0.1–1)
BUN SERPL-MCNC: 21 MG/DL (ref 8–23)
CALCIUM SERPL-MCNC: 9.1 MG/DL (ref 8.7–10.5)
CHLORIDE SERPL-SCNC: 100 MMOL/L (ref 95–110)
CO2 SERPL-SCNC: 24 MMOL/L (ref 23–29)
CREAT SERPL-MCNC: 1.1 MG/DL (ref 0.5–1.4)
EST. GFR  (AFRICAN AMERICAN): >60 ML/MIN/1.73 M^2
EST. GFR  (NON AFRICAN AMERICAN): >60 ML/MIN/1.73 M^2
GLUCOSE SERPL-MCNC: 185 MG/DL (ref 70–110)
POCT GLUCOSE: 100 MG/DL (ref 70–110)
POCT GLUCOSE: 113 MG/DL (ref 70–110)
POCT GLUCOSE: 133 MG/DL (ref 70–110)
POCT GLUCOSE: 152 MG/DL (ref 70–110)
POTASSIUM SERPL-SCNC: 4.8 MMOL/L (ref 3.5–5.1)
PROT SERPL-MCNC: 6.6 G/DL (ref 6–8.4)
SODIUM SERPL-SCNC: 134 MMOL/L (ref 136–145)

## 2022-07-27 PROCEDURE — 25000003 PHARM REV CODE 250

## 2022-07-27 PROCEDURE — 99900035 HC TECH TIME PER 15 MIN (STAT)

## 2022-07-27 PROCEDURE — 80053 COMPREHEN METABOLIC PANEL: CPT

## 2022-07-27 PROCEDURE — 63600175 PHARM REV CODE 636 W HCPCS: Performed by: FAMILY MEDICINE

## 2022-07-27 PROCEDURE — 36415 COLL VENOUS BLD VENIPUNCTURE: CPT

## 2022-07-27 PROCEDURE — 63600175 PHARM REV CODE 636 W HCPCS

## 2022-07-27 PROCEDURE — 99204 PR OFFICE/OUTPT VISIT, NEW, LEVL IV, 45-59 MIN: ICD-10-PCS | Mod: ,,, | Performed by: PSYCHIATRY & NEUROLOGY

## 2022-07-27 PROCEDURE — 96361 HYDRATE IV INFUSION ADD-ON: CPT

## 2022-07-27 PROCEDURE — 99204 OFFICE O/P NEW MOD 45 MIN: CPT | Mod: ,,, | Performed by: PSYCHIATRY & NEUROLOGY

## 2022-07-27 PROCEDURE — 96372 THER/PROPH/DIAG INJ SC/IM: CPT

## 2022-07-27 PROCEDURE — 94761 N-INVAS EAR/PLS OXIMETRY MLT: CPT

## 2022-07-27 PROCEDURE — G0378 HOSPITAL OBSERVATION PER HR: HCPCS

## 2022-07-27 PROCEDURE — 96375 TX/PRO/DX INJ NEW DRUG ADDON: CPT

## 2022-07-27 PROCEDURE — 25000003 PHARM REV CODE 250: Performed by: FAMILY MEDICINE

## 2022-07-27 RX ORDER — HYDRALAZINE HYDROCHLORIDE 20 MG/ML
10 INJECTION INTRAMUSCULAR; INTRAVENOUS EVERY 6 HOURS PRN
Status: DISCONTINUED | OUTPATIENT
Start: 2022-07-27 | End: 2022-07-28 | Stop reason: HOSPADM

## 2022-07-27 RX ORDER — NAPROXEN 500 MG/1
500 TABLET ORAL ONCE
Status: COMPLETED | OUTPATIENT
Start: 2022-07-27 | End: 2022-07-27

## 2022-07-27 RX ORDER — SODIUM CHLORIDE, SODIUM LACTATE, POTASSIUM CHLORIDE, CALCIUM CHLORIDE 600; 310; 30; 20 MG/100ML; MG/100ML; MG/100ML; MG/100ML
INJECTION, SOLUTION INTRAVENOUS CONTINUOUS
Status: DISCONTINUED | OUTPATIENT
Start: 2022-07-27 | End: 2022-07-27

## 2022-07-27 RX ORDER — HYDRALAZINE HYDROCHLORIDE 25 MG/1
25 TABLET, FILM COATED ORAL EVERY 8 HOURS
Status: DISCONTINUED | OUTPATIENT
Start: 2022-07-27 | End: 2022-07-28 | Stop reason: HOSPADM

## 2022-07-27 RX ORDER — SUMATRIPTAN SUCCINATE 25 MG/1
50 TABLET ORAL ONCE
Status: COMPLETED | OUTPATIENT
Start: 2022-07-27 | End: 2022-07-27

## 2022-07-27 RX ADMIN — LOSARTAN POTASSIUM 50 MG: 50 TABLET, FILM COATED ORAL at 08:07

## 2022-07-27 RX ADMIN — ACETAMINOPHEN 650 MG: 325 TABLET ORAL at 09:07

## 2022-07-27 RX ADMIN — CARVEDILOL 12.5 MG: 12.5 TABLET, FILM COATED ORAL at 04:07

## 2022-07-27 RX ADMIN — ENOXAPARIN SODIUM 40 MG: 100 INJECTION SUBCUTANEOUS at 04:07

## 2022-07-27 RX ADMIN — IBUPROFEN 400 MG: 400 TABLET, FILM COATED ORAL at 08:07

## 2022-07-27 RX ADMIN — NAPROXEN 500 MG: 500 TABLET ORAL at 02:07

## 2022-07-27 RX ADMIN — ASPIRIN 81 MG 81 MG: 81 TABLET ORAL at 08:07

## 2022-07-27 RX ADMIN — ATORVASTATIN CALCIUM 20 MG: 20 TABLET, FILM COATED ORAL at 08:07

## 2022-07-27 RX ADMIN — FLUOXETINE 20 MG: 20 CAPSULE ORAL at 08:07

## 2022-07-27 RX ADMIN — SUMATRIPTAN SUCCINATE 50 MG: 25 TABLET ORAL at 02:07

## 2022-07-27 RX ADMIN — HYDRALAZINE HYDROCHLORIDE 10 MG: 20 INJECTION, SOLUTION INTRAMUSCULAR; INTRAVENOUS at 04:07

## 2022-07-27 RX ADMIN — HYDRALAZINE HYDROCHLORIDE 25 MG: 25 TABLET, FILM COATED ORAL at 05:07

## 2022-07-27 RX ADMIN — SODIUM CHLORIDE, SODIUM LACTATE, POTASSIUM CHLORIDE, AND CALCIUM CHLORIDE: .6; .31; .03; .02 INJECTION, SOLUTION INTRAVENOUS at 02:07

## 2022-07-27 RX ADMIN — IBUPROFEN 400 MG: 400 TABLET, FILM COATED ORAL at 12:07

## 2022-07-27 RX ADMIN — CARVEDILOL 12.5 MG: 12.5 TABLET, FILM COATED ORAL at 08:07

## 2022-07-27 RX ADMIN — HYDROCHLOROTHIAZIDE 25 MG: 25 TABLET ORAL at 08:07

## 2022-07-27 NOTE — SUBJECTIVE & OBJECTIVE
Interval History: VSS. Reports improvement without complete resolution of headache. Neurology consulted    Review of Systems   Constitutional:  Positive for appetite change. Negative for chills, diaphoresis and fever.   HENT:  Negative for congestion, hearing loss, rhinorrhea and sore throat.    Eyes:  Negative for visual disturbance.   Respiratory:  Negative for cough and shortness of breath.    Cardiovascular:  Negative for chest pain and leg swelling.   Gastrointestinal:  Negative for abdominal pain, diarrhea, nausea and vomiting.   Genitourinary:  Negative for difficulty urinating, flank pain and hematuria.   Musculoskeletal:  Negative for back pain.   Skin:  Negative for pallor and wound.   Neurological:  Positive for headaches. Negative for dizziness, speech difficulty, weakness, light-headedness and numbness.   Psychiatric/Behavioral:  Negative for agitation and confusion. The patient is not nervous/anxious.    Objective:     Vital Signs (Most Recent):  Temp: 98.4 °F (36.9 °C) (07/27/22 1113)  Pulse: 75 (07/27/22 1113)  Resp: 16 (07/27/22 1113)  BP: 136/72 (07/27/22 1113)  SpO2: 96 % (07/27/22 1113) Vital Signs (24h Range):  Temp:  [97 °F (36.1 °C)-98.5 °F (36.9 °C)] 98.4 °F (36.9 °C)  Pulse:  [52-75] 75  Resp:  [12-39] 16  SpO2:  [96 %-99 %] 96 %  BP: (136-174)/(65-89) 136/72     Weight: 76.9 kg (169 lb 8.5 oz)  Body mass index is 26.55 kg/m².    Intake/Output Summary (Last 24 hours) at 7/27/2022 1302  Last data filed at 7/27/2022 0600  Gross per 24 hour   Intake 240 ml   Output 200 ml   Net 40 ml      Physical Exam  Vitals and nursing note reviewed.   Constitutional:       General: He is not in acute distress.     Appearance: Normal appearance. He is not ill-appearing.   HENT:      Head: Normocephalic and atraumatic.      Mouth/Throat:      Mouth: Mucous membranes are dry.   Eyes:      Extraocular Movements: Extraocular movements intact.      Pupils: Pupils are equal, round, and reactive to light.    Cardiovascular:      Rate and Rhythm: Normal rate and regular rhythm.      Pulses: Normal pulses.      Heart sounds: No murmur heard.  Pulmonary:      Effort: Pulmonary effort is normal. No respiratory distress.   Abdominal:      General: Bowel sounds are normal.      Palpations: Abdomen is soft.      Tenderness: There is no abdominal tenderness.   Musculoskeletal:         General: No swelling.      Cervical back: Normal range of motion and neck supple.      Right lower leg: No edema.      Left lower leg: No edema.   Skin:     General: Skin is warm and dry.      Capillary Refill: Capillary refill takes less than 2 seconds.   Neurological:      Mental Status: He is alert and oriented to person, place, and time.   Psychiatric:         Mood and Affect: Mood normal.         Behavior: Behavior normal. Behavior is cooperative.       Significant Labs: All pertinent labs within the past 24 hours have been reviewed.    Significant Imaging: I have reviewed all pertinent imaging results/findings within the past 24 hours.

## 2022-07-27 NOTE — PROGRESS NOTES
"[facility] Medicine  Progress Note    Patient Name: Eitan Cody  MRN: [id number]  Patient Class: OP- Observation   Admission Date: 7/26/2022  Length of Stay: 0 days  Attending Physician: Khanh Beyer, *  Primary Care Provider: Abilio Mtz MD    Subjective:     Principal Problem:Acute intractable headache    HPI:  Eitan Cody is a 61 y/o male with HTN, HLD, and alcohol dependence presents to [facility] ED with complaints of generalized headache. He reports his headache started on Friday and also state he had decreased appetite. Today, he states his headache has worsened which prompted him to seek medical evaluation. He state he did not check his BP on Friday but when he checked it today at home, he reports /100's. He reports compliance with his medications. He state his headache was similar to when he had COVID-19 infection in January. He denies recent sick contacts. He admits he drinks 3-4 beers daily and his drink was last night. He denies tobacco use. He denies fever, chills, SOB, chest pain, palpitations, leg swelling, vision changes, falls, photosensitivity, or recent head trauma.       In the ED: BP (!) 149/77   Pulse (!) 54   Temp 98.5 °F (36.9 °C) (Oral)   Resp 12   Ht 5' 7" (1.702 m)   Wt 79.4 kg (175 lb)   SpO2 97%   BMI 27.41 kg/m² Labs remarkable for Na 134, T. Bili 1.7, , . CT head negative for acute intracranial findings. COVID negative. He was treated with Benedryl 50mg IV x1, Reglan 10mg IV x1, Toradol 15mg IV x1, Tylenol 1g PO x1, Morphine 4mg IV x1, Decadron 6mg IV x1, Prochlorperazine 10mg IV x1 and 1L NS bolus x1 MRI brain ordered. Observation placed with hospital medicine for further evaluation and treatment.       Overview/Hospital Course:  Patient admitted for intractable tension-type headache. CT head negative for acute abnormality. MRI brain negative for acute abnormality, noting small remote lacunar infarction in left thalamus with " micro-hemorrhage. Pain improved from 10/10 to 6/10 overnight with Ibuprofen and Tylenol. Neurology consulted, headache likely secondary to hypertensive disease and dehydration from chronic alcohol ingestion.       Interval History: VSS. Reports improvement without complete resolution of headache. Neurology consulted    Review of Systems   Constitutional:  Positive for appetite change. Negative for chills, diaphoresis and fever.   HENT:  Negative for congestion, hearing loss, rhinorrhea and sore throat.    Eyes:  Negative for visual disturbance.   Respiratory:  Negative for cough and shortness of breath.    Cardiovascular:  Negative for chest pain and leg swelling.   Gastrointestinal:  Negative for abdominal pain, diarrhea, nausea and vomiting.   Genitourinary:  Negative for difficulty urinating, flank pain and hematuria.   Musculoskeletal:  Negative for back pain.   Skin:  Negative for pallor and wound.   Neurological:  Positive for headaches. Negative for dizziness, speech difficulty, weakness, light-headedness and numbness.   Psychiatric/Behavioral:  Negative for agitation and confusion. The patient is not nervous/anxious.    Objective:     Vital Signs (Most Recent):  Temp: 98.4 °F (36.9 °C) (07/27/22 1113)  Pulse: 75 (07/27/22 1113)  Resp: 16 (07/27/22 1113)  BP: 136/72 (07/27/22 1113)  SpO2: 96 % (07/27/22 1113) Vital Signs (24h Range):  Temp:  [97 °F (36.1 °C)-98.5 °F (36.9 °C)] 98.4 °F (36.9 °C)  Pulse:  [52-75] 75  Resp:  [12-39] 16  SpO2:  [96 %-99 %] 96 %  BP: (136-174)/(65-89) 136/72     Weight: 76.9 kg (169 lb 8.5 oz)  Body mass index is 26.55 kg/m².    Intake/Output Summary (Last 24 hours) at 7/27/2022 1302  Last data filed at 7/27/2022 0600  Gross per 24 hour   Intake 240 ml   Output 200 ml   Net 40 ml      Physical Exam  Vitals and nursing note reviewed.   Constitutional:       General: He is not in acute distress.     Appearance: Normal appearance. He is not ill-appearing.   HENT:      Head:  Normocephalic and atraumatic.      Mouth/Throat:      Mouth: Mucous membranes are dry.   Eyes:      Extraocular Movements: Extraocular movements intact.      Pupils: Pupils are equal, round, and reactive to light.   Cardiovascular:      Rate and Rhythm: Normal rate and regular rhythm.      Pulses: Normal pulses.      Heart sounds: No murmur heard.  Pulmonary:      Effort: Pulmonary effort is normal. No respiratory distress.   Abdominal:      General: Bowel sounds are normal.      Palpations: Abdomen is soft.      Tenderness: There is no abdominal tenderness.   Musculoskeletal:         General: No swelling.      Cervical back: Normal range of motion and neck supple.      Right lower leg: No edema.      Left lower leg: No edema.   Skin:     General: Skin is warm and dry.      Capillary Refill: Capillary refill takes less than 2 seconds.   Neurological:      Mental Status: He is alert and oriented to person, place, and time.   Psychiatric:         Mood and Affect: Mood normal.         Behavior: Behavior normal. Behavior is cooperative.       Significant Labs: All pertinent labs within the past 24 hours have been reviewed.    Significant Imaging: I have reviewed all pertinent imaging results/findings within the past 24 hours.      Assessment/Plan:      * Acute intractable headache  -presents to ED with complaints of generalized headache since Friday  -reports home /100's PTA; initial BP in ED   -Etiology likely 2/2 to uncontrolled BP vs dehydration from chronic alcohol ingestion   -Received Benedryl 50mg IV x1, Reglan 10mg IV x1, Toradol 15mg IV x1, Tylenol 1g PO x1, Morphine 4mg IV x1, Decadron 6mg IV x1, Prochlorperazine 10mg IV x1 and 1L NS bolus x1 in ED  -MRI Brain negative for acute abnormality, noting small remote lacunar infarct  -Resume home BP medications   -Neurology consulted, recommend continuing NSAIDs and hydration  -Analgesics PRN for headache  -Dispo pending resolution of  headache    Alcohol dependence  -admits to drinking 3-4 beers daily  -last drink was last light  -CIWA q8hr  -Monitor for s/s of alcohol withdrawals  -Will provide PRN valium for ETOH withdrawal symptoms      Abnormal liver enzymes  -,  on admission, trending down  -Abdominal exam negative; denies N/V, or abdominal pain  -Likely 2/2 to alcohol induced  -Monitor and trend liver function with daily CMP     Hyperlipidemia  Last LDL was   Lab Results   Component Value Date    LDLCALC 87.0 01/14/2022      Patient is chronically on statin.will continue for now.   Monitor clinically.          Primary hypertension  Chronic, Latest blood pressure and vitals reviewed-   Temp:  [97 °F (36.1 °C)-98.5 °F (36.9 °C)]   Pulse:  [52-75]   Resp:  [12-39]   BP: (136-174)/(65-89)   SpO2:  [96 %-99 %] .   Home meds for hypertension were reviewed and noted below.   Hypertension Medications             carvediloL (COREG) 12.5 MG tablet Take 1 tablet (12.5 mg total) by mouth 2 (two) times daily with meals.    hydroCHLOROthiazide (HYDRODIURIL) 25 MG tablet hydrochlorothiazide 25 mg tablet  Take 1 tablet every day by oral route.    losartan (COZAAR) 50 MG tablet Take 1 tablet (50 mg total) by mouth once daily.            -Resume home BP medications  -Monitor and trend vital signs q4hr  -Will utilize p.r.n. blood pressure medication only if patient's blood pressure greater than  180/110 and he develops symptoms such as worsening chest pain or shortness of breath.          VTE Risk Mitigation (From admission, onward)         Ordered     enoxaparin injection 40 mg  Daily         07/26/22 1558     IP VTE HIGH RISK PATIENT  Once         07/26/22 1558     Place sequential compression device  Until discontinued         07/26/22 1558                Discharge Planning   BABATUNDE:      Code Status: Full Code   Is the patient medically ready for discharge?:     Reason for patient still in hospital (select all that apply): Patient trending  condition                     Lucretia Moore PA-C  Department of [facility] Medicine   [place] [facility]

## 2022-07-27 NOTE — ASSESSMENT & PLAN NOTE
-,  on admission, trending down  -Abdominal exam negative; denies N/V, or abdominal pain  -Likely 2/2 to alcohol induced  -Monitor and trend liver function with daily CMP

## 2022-07-27 NOTE — ASSESSMENT & PLAN NOTE
Chronic, Latest blood pressure and vitals reviewed-   Temp:  [97 °F (36.1 °C)-98.5 °F (36.9 °C)]   Pulse:  [52-75]   Resp:  [12-39]   BP: (136-174)/(65-89)   SpO2:  [96 %-99 %] .   Home meds for hypertension were reviewed and noted below.   Hypertension Medications             carvediloL (COREG) 12.5 MG tablet Take 1 tablet (12.5 mg total) by mouth 2 (two) times daily with meals.    hydroCHLOROthiazide (HYDRODIURIL) 25 MG tablet hydrochlorothiazide 25 mg tablet  Take 1 tablet every day by oral route.    losartan (COZAAR) 50 MG tablet Take 1 tablet (50 mg total) by mouth once daily.            -Resume home BP medications  -Monitor and trend vital signs q4hr  -Will utilize p.r.n. blood pressure medication only if patient's blood pressure greater than  180/110 and he develops symptoms such as worsening chest pain or shortness of breath.

## 2022-07-27 NOTE — HOSPITAL COURSE
Patient admitted for intractable tension-type headache. CT head negative for acute abnormality. MRI brain negative for acute abnormality, noting small remote lacunar infarction in left thalamus with micro-hemorrhage. Pain improved from 10/10 to 6/10 over first night of admission dehydration from chronic alcohol ingestion. Over second night, became hypertensive to 200/100s, hydralazine added to BP regimen, and hypertension resolved. Throughout second day of admission, patient reported 5/10 headache unaffected by IV toradol or diclofenac gel to apply on forehead. Received fioricet. Prior to discharge, patient was able to sleep and rest comfortably. Upon awakening, he was unable to remember if he had fallen asleep with a headache and reported current pain as less than 5/10. Continued to deny developing symptoms such as changes in vision, nausea, vomiting, lightheadedness, or dizziness. Patient advised on recommendations to alleviate headaches in the future and counseled on return precautions. All questions and concerns answered at this time. Patient discharged home in stable condition with PCP follow up.

## 2022-07-27 NOTE — CONSULTS
"NEUROLOGY FLOOR CONSULT    Reason for consult:  headache      HPI:     63 yo Male with hx poorly controlled HTN, daily alcohol use (3-4 beers nightly) presented 7/26/22 to [facility] after 4 days of severe intractable headache in the setting of poorly controlled BP. Bi-frontotemporal "squeezing like a vice " headache that is non-positional, constant, not affected by lights/sounds, not associated with NV. Despite taking his BP meds, his SBP was ~200 at home prior to arrival, and ~190s on arrival. He does admit to drinking 3-4 beers nightly, and he works construction during the day. Drinks Gatorade and about 1 soda per day. Reports restful sleep. No tobacco use. He had the same headache back in 1/2022 when he had COVID and also had elevated BP at that time.     The headache was 10/10 on arrival and now is 6/10 today after given Toradol, dexamethasone, I L IVFs, morphine, Reglan and Compazine, and his BP has been better controlled.        Histories:     Allergies:  Patient has no known allergies.    Current Medications:    Current Facility-Administered Medications   Medication Dose Route Frequency Provider Last Rate Last Admin    acetaminophen tablet 650 mg  650 mg Oral Q4H PRN Jelly T. Hobbs-Wellington, NP   650 mg at 07/27/22 0914    albuterol-ipratropium 2.5 mg-0.5 mg/3 mL nebulizer solution 3 mL  3 mL Nebulization Q6H PRN Jelly T. Hobbs-Wellington, NP        aluminum-magnesium hydroxide-simethicone 200-200-20 mg/5 mL suspension 30 mL  30 mL Oral QID PRN Jelly T. Hobbs-Wellington, NP        aspirin chewable tablet 81 mg  81 mg Oral Daily Jelly T. Hobbs-Wellington, NP   81 mg at 07/27/22 0822    atorvastatin tablet 20 mg  20 mg Oral QHS Jelly T. Hobbs-Wellington, NP   20 mg at 07/26/22 2152    carvediloL tablet 12.5 mg  12.5 mg Oral BID WM Jelly T. Hobbs-Wellington, NP   12.5 mg at 07/27/22 0823    enoxaparin injection 40 mg  40 mg Subcutaneous Daily Jelly T. Hobbs-Wellington, NP   40 mg at 07/26/22 1734    FLUoxetine capsule 20 mg  20 mg " Oral Daily Jelly T. Hobbs-Wellington, NP   20 mg at 07/27/22 0822    glucagon (human recombinant) injection 1 mg  1 mg Intramuscular PRN Jelly T. Hobbs-Wellington, NP        glucose chewable tablet 16 g  16 g Oral PRN Jelly T. Hobbs-Wellington, NP        glucose chewable tablet 24 g  24 g Oral PRN Jelly T. Hobbs-Wellington, NP        hydroCHLOROthiazide tablet 25 mg  25 mg Oral Daily Jelly T. Hobbs-Wellington, NP   25 mg at 07/27/22 0822    ibuprofen tablet 400 mg  400 mg Oral Q6H PRN Jelly T. Hobbs-Wellington, NP   400 mg at 07/26/22 2315    losartan tablet 50 mg  50 mg Oral Daily Jelly T. Hobbs-Wellington, NP   50 mg at 07/27/22 0822    meclizine tablet 25 mg  25 mg Oral TID PRN Jelly T. Hobbs-Wellington, NP        melatonin tablet 6 mg  6 mg Oral Nightly PRN Jelly T. Hobbs-Wellington, NP        ondansetron injection 4 mg  4 mg Intravenous Q8H PRN Jelly T. Hobbs-Wellington, NP        prochlorperazine injection Soln 5 mg  5 mg Intravenous Q6H PRN Jelly T. Hobbs-Wellington, NP        sars-cov-2 (covid-19) (MODERNA COVID-19) 100 mcg/0.5 ml injection 0.25 mL  0.25 mL Intramuscular vaccine x 1 dose Khanh Beyer MD        simethicone chewable tablet 80 mg  1 tablet Oral QID PRN Jelly T. Hobbs-Wellington, NP        sodium chloride 0.9% flush 10 mL  10 mL Intravenous Q8H PRN Jelly T. Hobbs-Wellington, NP           Past Medical/Surgical/Family History:  Medical:   Past Medical History:   Diagnosis Date    Hypertension       Surgeries:   Past Surgical History:   Procedure Laterality Date    COLONOSCOPY N/A 6/30/2022    Procedure: COLONOSCOPY;  Surgeon: VIRIDIANA Swanson MD;  Location: [facility] ENDO;  Service: Endoscopy;  Laterality: N/A;    LEFT HEART CATHETERIZATION Left 2/16/2022    Procedure: Left heart cath;  Surgeon: Thierno Kidd III, MD;  Location: [facility] CATH LAB;  Service: Cardiology;  Laterality: Left;      Family: No family history on file.      Current Evaluation:     Vital Signs:   Vitals:    07/27/22 1113   BP: 136/72   Pulse: 75   Resp: 16    Temp: 98.4 °F (36.9 °C)        Neurological Examination   Mental status  AAOx4  Language is fluent with intact comprehension  Working and remote memory intact    Cranial nerves   Pupils symmetric; VFs full  EOMIs  No facial asymmetry  Symmetric shoulder shrug    Motor  Normal, symmetric bulk tone   No pronator drift     Sensory  SILT    Cerebellar/Gait:  Normal FTN       LABORATORY STUDIES:  Reviewed       RADIOLOGY STUDIES:  I have personally reviewed the images performed.     MRI brain w/o (7/26/22)  1. No acute intracranial abnormality.  2. Small remote lacunar infarction in the left thalamus with microhemorrhage.      Assessment:  P     63 yo Male with hx poorly controlled HTN, daily alcohol use (3-4 beers nightly) presented 7/26/22 to [facility] after 4 days of severe intractable headache in the setting of poorly controlled BP. Non-focal exam, normal MRI except small L-thalamic microhemorrhage reflective of chronic hypertensive disease. Headache much improved since admission 10/10 --> 6/10 today.     Impression: severe episodic headache with TTH-features in the setting of hypertensive disease, chronic alcohol use (improving)    Recommendations  -continue NSAIDs and fluid repletion as tolerated  -counseled on alcohol use as contributor to dehydration and exacerbation of headaches and poorly controlled BP       Differential diagnosis was explained to the patient. All questions were answered. Patient understood and agreed to adhere to plan.       Case to be discussed with Dr. Moore.       Barry Shin DO  [facility], PGY-IV

## 2022-07-27 NOTE — PLAN OF CARE
Problem: Adult Inpatient Plan of Care  Goal: Plan of Care Review  Outcome: Ongoing, Progressing  Goal: Absence of Hospital-Acquired Illness or Injury  Outcome: Ongoing, Progressing  Goal: Optimal Comfort and Wellbeing  Outcome: Ongoing, Progressing     Problem: Fall Injury Risk  Goal: Absence of Fall and Fall-Related Injury  Outcome: Ongoing, Progressing     Problem: Pain Acute  Goal: Acceptable Pain Control and Functional Ability  Outcome: Ongoing, Progressing     POC reviewed with patient. All questions and concerns reviewed. Vital signs stable throughout shift. For full assessment please refer to flowsheet. Fall/ safety precautions implemented & maintained. Bed locked in lowest position, bed alarm activated & audible, & call light in reach. Will continue to monitor.

## 2022-07-27 NOTE — PROGRESS NOTES
07/26/22 2213   Admission   Communication Issues? None   Shift   Virtual Nurse - Patient Verbalized Approval Of Camera Use   Safety/Activity   Patient Rounds visualized patient   Safety Promotion/Fall Prevention instructed to call staff for mobility;Fall Risk reviewed with patient/family;side rails raised x 2   VIRTUAL NURSE: Admission questions completed with patient at this time. Care plan and safety precautions reviewed. Pt verbalized no complaints, no needs expressed. Instructed to use call light for assistance, he verbalized understanding.

## 2022-07-27 NOTE — PLAN OF CARE
[place] - Telemetry  Initial Discharge Assessment       Primary Care Provider: Abilio Mtz MD    Admission Diagnosis: Chest pain [R07.9]  Acute intractable headache, unspecified headache type [R51.9]    Admission Date: 7/26/2022  Expected Discharge Date: 7/28/2022    Consult: neuro    Payor: MEDICAID / Plan: [facility] ([place] MEDICAID) / Product Type: Managed Medicaid /     Extended Emergency Contact Information  Primary Emergency Contact: Grace Bowers   [place]  Mobile Phone: [phone]  Relation: Significant other    Discharge Plan A: (P) Home with family  Discharge Plan B: (P) Home with family      [facility]  [address]  [place] [address]  Phone: [phone] Fax: [phone]    [facility] - [place] - [address]  [address]  [place] [address]  Phone: [phone] Fax: [phone]      Initial Assessment (most recent)       Adult Discharge Assessment - 07/27/22 1215          Discharge Assessment    Assessment Type Discharge Planning Assessment (P)      Confirmed/corrected address, phone number and insurance Yes (P)      Confirmed Demographics Correct on Facesheet (P)      Source of Information patient (P)      Communicated BABATUNDE with patient/caregiver Date not available/Unable to determine (P)      Lives With significant other (P)    Grace Bowers ([phone])    Do you expect to return to your current living situation? Yes (P)      Do you have help at home or someone to help you manage your care at home? Yes (P)      Prior to hospitilization cognitive status: Alert/Oriented (P)      Current cognitive status: Alert/Oriented (P)      Walking or Climbing Stairs Difficulty none (P)      Dressing/Bathing Difficulty none (P)      Equipment Currently Used at Home other (see comments) (P)    BP machine    Readmission within 30 days? No (P)      Patient currently being followed by outpatient case management? No (P)      Do you  currently have service(s) that help you manage your care at home? No (P)      Do you take prescription medications? Yes (P)      Do you have prescription coverage? Yes (P)      Do you have any problems affording any of your prescribed medications? No (P)      Is the patient taking medications as prescribed? yes (P)      How do you get to doctors appointments? family or friend will provide;car, drives self (P)      Are you on dialysis? No (P)      Do you take coumadin? No (P)      Discharge Plan A Home with family (P)      Discharge Plan B Home with family (P)      DME Needed Upon Discharge  none (P)      Discharge Plan discussed with: Patient (P)                       1215  Patient resting quietly in bed when CM rounded. No family present. Patient was admitted with acute intractable headaches & is being followed by neuro.    Patient lives with his s.o., Grace Bowers, is independent of all ADLs, & denied the need for assistance with transportation at time of discharge.     CM updated patient's whiteboard with CM name & contact information.       Will continue to follow.

## 2022-07-27 NOTE — ASSESSMENT & PLAN NOTE
-presents to ED with complaints of generalized headache since Friday  -reports home /100's PTA; initial BP in ED   -Etiology likely 2/2 to uncontrolled BP vs dehydration from chronic alcohol ingestion   -Received Benedryl 50mg IV x1, Reglan 10mg IV x1, Toradol 15mg IV x1, Tylenol 1g PO x1, Morphine 4mg IV x1, Decadron 6mg IV x1, Prochlorperazine 10mg IV x1 and 1L NS bolus x1 in ED  -MRI Brain negative for acute abnormality, noting small remote lacunar infarct  -Resume home BP medications   -Neurology consulted, recommend continuing NSAIDs and hydration  -Analgesics PRN for headache  -Dispo pending resolution of headache

## 2022-07-28 VITALS
HEIGHT: 67 IN | RESPIRATION RATE: 18 BRPM | BODY MASS INDEX: 26.61 KG/M2 | WEIGHT: 169.56 LBS | SYSTOLIC BLOOD PRESSURE: 135 MMHG | DIASTOLIC BLOOD PRESSURE: 71 MMHG | OXYGEN SATURATION: 98 % | TEMPERATURE: 99 F | HEART RATE: 66 BPM

## 2022-07-28 LAB
ALBUMIN SERPL BCP-MCNC: 3.2 G/DL (ref 3.5–5.2)
ALP SERPL-CCNC: 71 U/L (ref 55–135)
ALT SERPL W/O P-5'-P-CCNC: 72 U/L (ref 10–44)
ANION GAP SERPL CALC-SCNC: 11 MMOL/L (ref 8–16)
AST SERPL-CCNC: 75 U/L (ref 10–40)
BASOPHILS # BLD AUTO: 0.04 K/UL (ref 0–0.2)
BASOPHILS NFR BLD: 0.4 % (ref 0–1.9)
BILIRUB SERPL-MCNC: 0.9 MG/DL (ref 0.1–1)
BUN SERPL-MCNC: 25 MG/DL (ref 8–23)
CALCIUM SERPL-MCNC: 8.8 MG/DL (ref 8.7–10.5)
CHLORIDE SERPL-SCNC: 104 MMOL/L (ref 95–110)
CO2 SERPL-SCNC: 22 MMOL/L (ref 23–29)
CREAT SERPL-MCNC: 0.8 MG/DL (ref 0.5–1.4)
DIFFERENTIAL METHOD: ABNORMAL
EOSINOPHIL # BLD AUTO: 0.1 K/UL (ref 0–0.5)
EOSINOPHIL NFR BLD: 0.6 % (ref 0–8)
ERYTHROCYTE [DISTWIDTH] IN BLOOD BY AUTOMATED COUNT: 12.8 % (ref 11.5–14.5)
EST. GFR  (AFRICAN AMERICAN): >60 ML/MIN/1.73 M^2
EST. GFR  (NON AFRICAN AMERICAN): >60 ML/MIN/1.73 M^2
GLUCOSE SERPL-MCNC: 114 MG/DL (ref 70–110)
HCT VFR BLD AUTO: 37.7 % (ref 40–54)
HGB BLD-MCNC: 13.3 G/DL (ref 14–18)
IMM GRANULOCYTES # BLD AUTO: 0.02 K/UL (ref 0–0.04)
IMM GRANULOCYTES NFR BLD AUTO: 0.2 % (ref 0–0.5)
LYMPHOCYTES # BLD AUTO: 3.3 K/UL (ref 1–4.8)
LYMPHOCYTES NFR BLD: 30.2 % (ref 18–48)
MCH RBC QN AUTO: 33.5 PG (ref 27–31)
MCHC RBC AUTO-ENTMCNC: 35.3 G/DL (ref 32–36)
MCV RBC AUTO: 95 FL (ref 82–98)
MONOCYTES # BLD AUTO: 0.9 K/UL (ref 0.3–1)
MONOCYTES NFR BLD: 7.9 % (ref 4–15)
NEUTROPHILS # BLD AUTO: 6.7 K/UL (ref 1.8–7.7)
NEUTROPHILS NFR BLD: 60.7 % (ref 38–73)
NRBC BLD-RTO: 0 /100 WBC
PLATELET # BLD AUTO: 146 K/UL (ref 150–450)
PLATELET BLD QL SMEAR: ABNORMAL
PMV BLD AUTO: 12.6 FL (ref 9.2–12.9)
POCT GLUCOSE: 100 MG/DL (ref 70–110)
POCT GLUCOSE: 122 MG/DL (ref 70–110)
POCT GLUCOSE: 99 MG/DL (ref 70–110)
POTASSIUM SERPL-SCNC: 3.9 MMOL/L (ref 3.5–5.1)
PROT SERPL-MCNC: 6.4 G/DL (ref 6–8.4)
RBC # BLD AUTO: 3.97 M/UL (ref 4.6–6.2)
SODIUM SERPL-SCNC: 137 MMOL/L (ref 136–145)
WBC # BLD AUTO: 10.94 K/UL (ref 3.9–12.7)

## 2022-07-28 PROCEDURE — G0378 HOSPITAL OBSERVATION PER HR: HCPCS

## 2022-07-28 PROCEDURE — 99900035 HC TECH TIME PER 15 MIN (STAT)

## 2022-07-28 PROCEDURE — 96376 TX/PRO/DX INJ SAME DRUG ADON: CPT

## 2022-07-28 PROCEDURE — 94761 N-INVAS EAR/PLS OXIMETRY MLT: CPT

## 2022-07-28 PROCEDURE — 25000003 PHARM REV CODE 250: Performed by: FAMILY MEDICINE

## 2022-07-28 PROCEDURE — 96372 THER/PROPH/DIAG INJ SC/IM: CPT

## 2022-07-28 PROCEDURE — 63600175 PHARM REV CODE 636 W HCPCS

## 2022-07-28 PROCEDURE — 80053 COMPREHEN METABOLIC PANEL: CPT

## 2022-07-28 PROCEDURE — 25000003 PHARM REV CODE 250

## 2022-07-28 PROCEDURE — 36415 COLL VENOUS BLD VENIPUNCTURE: CPT

## 2022-07-28 PROCEDURE — 85025 COMPLETE CBC W/AUTO DIFF WBC: CPT

## 2022-07-28 RX ORDER — KETOROLAC TROMETHAMINE 30 MG/ML
15 INJECTION, SOLUTION INTRAMUSCULAR; INTRAVENOUS ONCE
Status: COMPLETED | OUTPATIENT
Start: 2022-07-28 | End: 2022-07-28

## 2022-07-28 RX ORDER — DICLOFENAC SODIUM 10 MG/G
4 GEL TOPICAL DAILY
Status: DISCONTINUED | OUTPATIENT
Start: 2022-07-28 | End: 2022-07-28 | Stop reason: HOSPADM

## 2022-07-28 RX ORDER — BUTALBITAL, ACETAMINOPHEN AND CAFFEINE 50; 325; 40 MG/1; MG/1; MG/1
1 TABLET ORAL ONCE
Status: COMPLETED | OUTPATIENT
Start: 2022-07-28 | End: 2022-07-28

## 2022-07-28 RX ORDER — BUTALBITAL, ACETAMINOPHEN AND CAFFEINE 50; 325; 40 MG/1; MG/1; MG/1
1 TABLET ORAL EVERY 4 HOURS PRN
Qty: 30 TABLET | Refills: 0 | Status: SHIPPED | OUTPATIENT
Start: 2022-07-28 | End: 2022-08-27

## 2022-07-28 RX ORDER — HYDRALAZINE HYDROCHLORIDE 25 MG/1
25 TABLET, FILM COATED ORAL EVERY 8 HOURS
Qty: 90 TABLET | Refills: 11 | Status: SHIPPED | OUTPATIENT
Start: 2022-07-28 | End: 2023-04-06 | Stop reason: HOSPADM

## 2022-07-28 RX ADMIN — CARVEDILOL 12.5 MG: 12.5 TABLET, FILM COATED ORAL at 08:07

## 2022-07-28 RX ADMIN — DICLOFENAC SODIUM 4 G: 10 GEL TOPICAL at 12:07

## 2022-07-28 RX ADMIN — IBUPROFEN 400 MG: 400 TABLET, FILM COATED ORAL at 08:07

## 2022-07-28 RX ADMIN — ASPIRIN 81 MG 81 MG: 81 TABLET ORAL at 08:07

## 2022-07-28 RX ADMIN — HYDROCHLOROTHIAZIDE 25 MG: 25 TABLET ORAL at 08:07

## 2022-07-28 RX ADMIN — BUTALBITAL, ACETAMINOPHEN, AND CAFFEINE 1 TABLET: 50; 325; 40 TABLET ORAL at 02:07

## 2022-07-28 RX ADMIN — LOSARTAN POTASSIUM 50 MG: 50 TABLET, FILM COATED ORAL at 08:07

## 2022-07-28 RX ADMIN — HYDRALAZINE HYDROCHLORIDE 25 MG: 25 TABLET, FILM COATED ORAL at 05:07

## 2022-07-28 RX ADMIN — ENOXAPARIN SODIUM 40 MG: 100 INJECTION SUBCUTANEOUS at 04:07

## 2022-07-28 RX ADMIN — KETOROLAC TROMETHAMINE 15 MG: 30 INJECTION, SOLUTION INTRAMUSCULAR; INTRAVENOUS at 12:07

## 2022-07-28 RX ADMIN — CARVEDILOL 12.5 MG: 12.5 TABLET, FILM COATED ORAL at 04:07

## 2022-07-28 RX ADMIN — ACETAMINOPHEN 650 MG: 325 TABLET ORAL at 07:07

## 2022-07-28 RX ADMIN — FLUOXETINE 20 MG: 20 CAPSULE ORAL at 08:07

## 2022-07-28 NOTE — ASSESSMENT & PLAN NOTE
-presents to ED with complaints of generalized headache since Friday  -reports home /100's PTA; initial BP in ED   -Etiology likely 2/2 to uncontrolled BP vs dehydration from chronic alcohol ingestion   -Received Benedryl 50mg IV x1, Reglan 10mg IV x1, Toradol 15mg IV x1, Tylenol 1g PO x1, Morphine 4mg IV x1, Decadron 6mg IV x1, Prochlorperazine 10mg IV x1 and 1L NS bolus x1 in ED  -MRI Brain negative for acute abnormality, noting small remote lacunar infarct  -Resume home BP medications   -Neurology consulted, recommend continuing NSAIDs and hydration  -Analgesics PRN for headache  - 7/28, reports as 5/10 this AM (decreased from 10/10 on admission and 6/10 yesterday), unchanged throughout the day despite administration of IV toradol, diclofenac gel application  - Will try fioricet  -Dispo pending resolution of headache

## 2022-07-28 NOTE — PROGRESS NOTES
"[facility] Medicine  Progress Note    Patient Name: Eitan Cody  MRN: [id number]  Patient Class: OP- Observation   Admission Date: 7/26/2022  Length of Stay: 0 days  Attending Physician: Bhupendra Roberts MD  Primary Care Provider: Abilio Mtz MD      Subjective:     Principal Problem:Acute intractable headache      HPI:  Eitan Cody is a 63 y/o male with HTN, HLD, and alcohol dependence presents to [facility] ED with complaints of generalized headache. He reports his headache started on Friday and also state he had decreased appetite. Today, he states his headache has worsened which prompted him to seek medical evaluation. He state he did not check his BP on Friday but when he checked it today at home, he reports /100's. He reports compliance with his medications. He state his headache was similar to when he had COVID-19 infection in January. He denies recent sick contacts. He admits he drinks 3-4 beers daily and his drink was last night. He denies tobacco use. He denies fever, chills, SOB, chest pain, palpitations, leg swelling, vision changes, falls, photosensitivity, or recent head trauma.       In the ED: BP (!) 149/77   Pulse (!) 54   Temp 98.5 °F (36.9 °C) (Oral)   Resp 12   Ht 5' 7" (1.702 m)   Wt 79.4 kg (175 lb)   SpO2 97%   BMI 27.41 kg/m² Labs remarkable for Na 134, T. Bili 1.7, , . CT head negative for acute intracranial findings. COVID negative. He was treated with Benedryl 50mg IV x1, Reglan 10mg IV x1, Toradol 15mg IV x1, Tylenol 1g PO x1, Morphine 4mg IV x1, Decadron 6mg IV x1, Prochlorperazine 10mg IV x1 and 1L NS bolus x1 MRI brain ordered. Observation placed with hospital medicine for further evaluation and treatment.         Overview/Hospital Course:  Patient admitted for intractable tension-type headache. CT head negative for acute abnormality. MRI brain negative for acute abnormality, noting small remote lacunar infarction in left thalamus with " micro-hemorrhage. Pain improved from 10/10 to 6/10 overnight with Ibuprofen and Tylenol. Neurology consulted, headache likely secondary to hypertensive disease and dehydration from chronic alcohol ingestion.       Interval History: Hypertensive overnight 200/100, resolved with administration of BP medications. VSS this AM. Patient reports mild improvement in HA down to 5/10.     Review of Systems   Constitutional:  Negative for appetite change, chills, diaphoresis and fever.   HENT:  Negative for congestion, hearing loss, rhinorrhea and sore throat.    Eyes:  Negative for visual disturbance.   Respiratory:  Negative for cough and shortness of breath.    Cardiovascular:  Negative for chest pain and leg swelling.   Gastrointestinal:  Negative for abdominal pain, diarrhea, nausea and vomiting.   Genitourinary:  Negative for difficulty urinating, flank pain and hematuria.   Musculoskeletal:  Negative for back pain.   Skin:  Negative for pallor and wound.   Neurological:  Positive for headaches (5/10 compared to 6/10 yesterday). Negative for dizziness, speech difficulty, weakness, light-headedness and numbness.   Psychiatric/Behavioral:  Negative for agitation and confusion. The patient is not nervous/anxious.    Objective:     Vital Signs (Most Recent):  Temp: 98.4 °F (36.9 °C) (07/28/22 1136)  Pulse: 80 (07/28/22 1200)  Resp: 18 (07/28/22 1136)  BP: 107/62 (07/28/22 1350)  SpO2: 97 % (07/28/22 1136) Vital Signs (24h Range):  Temp:  [96.2 °F (35.7 °C)-98.5 °F (36.9 °C)] 98.4 °F (36.9 °C)  Pulse:  [68-83] 80  Resp:  [15-20] 18  SpO2:  [96 %-98 %] 97 %  BP: (107-206)/() 107/62     Weight: 76.9 kg (169 lb 8.5 oz)  Body mass index is 26.55 kg/m².    Intake/Output Summary (Last 24 hours) at 7/28/2022 1411  Last data filed at 7/28/2022 0541  Gross per 24 hour   Intake 240 ml   Output 200 ml   Net 40 ml      Physical Exam  Vitals and nursing note reviewed.   Constitutional:       General: He is not in acute distress.      Appearance: Normal appearance. He is not ill-appearing.   HENT:      Head: Normocephalic and atraumatic.      Mouth/Throat:      Mouth: Mucous membranes are dry.   Eyes:      Extraocular Movements: Extraocular movements intact.      Pupils: Pupils are equal, round, and reactive to light.   Cardiovascular:      Rate and Rhythm: Normal rate and regular rhythm.      Pulses: Normal pulses.      Heart sounds: No murmur heard.  Pulmonary:      Effort: Pulmonary effort is normal. No respiratory distress.   Abdominal:      General: Bowel sounds are normal.      Palpations: Abdomen is soft.      Tenderness: There is no abdominal tenderness.   Musculoskeletal:         General: No swelling.      Cervical back: Normal range of motion and neck supple.      Right lower leg: No edema.      Left lower leg: No edema.   Skin:     General: Skin is warm and dry.      Capillary Refill: Capillary refill takes less than 2 seconds.   Neurological:      Mental Status: He is alert and oriented to person, place, and time.   Psychiatric:         Mood and Affect: Mood normal.         Behavior: Behavior normal. Behavior is cooperative.       Significant Labs: All pertinent labs within the past 24 hours have been reviewed.  BMP:   Recent Labs   Lab 07/28/22  0745   *      K 3.9      CO2 22*   BUN 25*   CREATININE 0.8   CALCIUM 8.8     CBC:   Recent Labs   Lab 07/28/22  0745   WBC 10.94   HGB 13.3*   HCT 37.7*   *     CMP:   Recent Labs   Lab 07/27/22  0307 07/28/22  0745   * 137   K 4.8 3.9    104   CO2 24 22*   * 114*   BUN 21 25*   CREATININE 1.1 0.8   CALCIUM 9.1 8.8   PROT 6.6 6.4   ALBUMIN 3.0* 3.2*   BILITOT 1.2* 0.9   ALKPHOS 74 71   AST 98* 75*   ALT 88* 72*   ANIONGAP 10 11   EGFRNONAA >60 >60       Significant Imaging: I have reviewed all pertinent imaging results/findings within the past 24 hours.      Assessment/Plan:      * Acute intractable headache  -presents to ED with complaints of  generalized headache since Friday  -reports home /100's PTA; initial BP in ED   -Etiology likely 2/2 to uncontrolled BP vs dehydration from chronic alcohol ingestion   -Received Benedryl 50mg IV x1, Reglan 10mg IV x1, Toradol 15mg IV x1, Tylenol 1g PO x1, Morphine 4mg IV x1, Decadron 6mg IV x1, Prochlorperazine 10mg IV x1 and 1L NS bolus x1 in ED  -MRI Brain negative for acute abnormality, noting small remote lacunar infarct  -Resume home BP medications   -Neurology consulted, recommend continuing NSAIDs and hydration  -Analgesics PRN for headache  - 7/28, reports as 5/10 this AM (decreased from 10/10 on admission and 6/10 yesterday), unchanged throughout the day despite administration of IV toradol, diclofenac gel application  - Will try fioricet  -Dispo pending resolution of headache    Alcohol dependence  -admits to drinking 3-4 beers daily  -last drink was night prior to admission  -CIWA q8hr  -Monitor for s/s of alcohol withdrawals  -Will provide PRN valium for ETOH withdrawal symptoms    Abnormal liver enzymes  -,  on admission, trending down  -Abdominal exam negative; denies N/V, or abdominal pain  -Likely 2/2 to alcohol induced  -Monitor and trend liver function with daily CMP     Hyperlipidemia  Last LDL was   Lab Results   Component Value Date    LDLCALC 87.0 01/14/2022      Patient is chronically on statin.will continue for now.   Monitor clinically.          Primary hypertension  Chronic, Latest blood pressure and vitals reviewed-   Temp:  [96.2 °F (35.7 °C)-98.5 °F (36.9 °C)]   Pulse:  [68-83]   Resp:  [15-20]   BP: (107-206)/()   SpO2:  [96 %-98 %] .   Home meds for hypertension were reviewed and noted below.   Hypertension Medications             carvediloL (COREG) 12.5 MG tablet Take 1 tablet (12.5 mg total) by mouth 2 (two) times daily with meals.    hydroCHLOROthiazide (HYDRODIURIL) 25 MG tablet hydrochlorothiazide 25 mg tablet  Take 1 tablet every day by oral  route.    losartan (COZAAR) 50 MG tablet Take 1 tablet (50 mg total) by mouth once daily.            -Resume home BP medications  - Started on Hydralazine  -Monitor and trend vital signs q4hr  -Will utilize p.r.n. blood pressure medication only if patient's blood pressure greater than  180/110 and he develops symptoms such as worsening chest pain or shortness of breath.          VTE Risk Mitigation (From admission, onward)         Ordered     enoxaparin injection 40 mg  Daily         07/26/22 1558     IP VTE HIGH RISK PATIENT  Once         07/26/22 1558     Place sequential compression device  Until discontinued         07/26/22 1558                Discharge Planning   BABATUNDE: 7/29/2022     Code Status: Full Code   Is the patient medically ready for discharge?:     Reason for patient still in hospital (select all that apply): Patient trending condition  Discharge Plan A: Home with family                  Lucretia Moore PA-C  Department of Hospital Medicine   [facility]

## 2022-07-28 NOTE — ASSESSMENT & PLAN NOTE
Chronic, Latest blood pressure and vitals reviewed-   Temp:  [96.2 °F (35.7 °C)-98.5 °F (36.9 °C)]   Pulse:  [68-83]   Resp:  [15-20]   BP: (107-206)/()   SpO2:  [96 %-98 %] .   Home meds for hypertension were reviewed and noted below.   Hypertension Medications             carvediloL (COREG) 12.5 MG tablet Take 1 tablet (12.5 mg total) by mouth 2 (two) times daily with meals.    hydroCHLOROthiazide (HYDRODIURIL) 25 MG tablet hydrochlorothiazide 25 mg tablet  Take 1 tablet every day by oral route.    losartan (COZAAR) 50 MG tablet Take 1 tablet (50 mg total) by mouth once daily.            -Resume home BP medications  - Started on Hydralazine  -Monitor and trend vital signs q4hr  -Will utilize p.r.n. blood pressure medication only if patient's blood pressure greater than  180/110 and he develops symptoms such as worsening chest pain or shortness of breath.

## 2022-07-28 NOTE — PLAN OF CARE
Problem: Adult Inpatient Plan of Care  Goal: Plan of Care Review  Outcome: Ongoing, Progressing     Problem: Fall Injury Risk  Goal: Absence of Fall and Fall-Related Injury  Outcome: Ongoing, Progressing     Problem: Pain Acute  Goal: Acceptable Pain Control and Functional Ability  Outcome: Ongoing, Progressing

## 2022-07-28 NOTE — PLAN OF CARE
Discharge orders noted. Additional clinical references attached. Patient's discharge instructions given by bedside RN and reviewed by this VN with patient. Education provided on home care instructions, new and previous medications, diagnosis, when to seek medical attention, and follow-up appointments. New medications delivered by pharmacy. Patient verbalized understanding and teach back method was used. Patient's family to provide ride/transportation home. All questions answered. Transport to [facility] requested. Floor nurse notified.

## 2022-07-28 NOTE — ASSESSMENT & PLAN NOTE
-admits to drinking 3-4 beers daily  -last drink was night prior to admission  -CIWA q8hr  -Monitor for s/s of alcohol withdrawals  -Will provide PRN valium for ETOH withdrawal symptoms

## 2022-07-28 NOTE — DISCHARGE INSTRUCTIONS
If headache reoccurs, use wet compress, lying down in dark room, taking tylenol or ibuprofen, drinking water, if persists call PCP.

## 2022-07-28 NOTE — PLAN OF CARE
Problem: Adult Inpatient Plan of Care  Goal: Plan of Care Review  7/28/2022 1743 by Stacy Schmitz RN  Outcome: Ongoing, Progressing  7/28/2022 1701 by Stacy Schmitz RN  Outcome: Ongoing, Progressing  Goal: Patient-Specific Goal (Individualized)  7/28/2022 1743 by Stacy Schmitz RN  Outcome: Ongoing, Not Progressing  7/28/2022 1701 by Stacy Schmitz RN  Outcome: Ongoing, Progressing  Goal: Absence of Hospital-Acquired Illness or Injury  7/28/2022 1743 by Stacy Schmitz RN  Outcome: Ongoing, Not Progressing  7/28/2022 1701 by Stacy Schmitz RN  Outcome: Ongoing, Progressing  Goal: Optimal Comfort and Wellbeing  7/28/2022 1743 by Stacy Schmitz RN  Outcome: Ongoing, Not Progressing  7/28/2022 1701 by Stacy Schmitz RN  Outcome: Ongoing, Progressing  Goal: Readiness for Transition of Care  7/28/2022 1743 by Stacy Schmitz RN  Outcome: Ongoing, Not Progressing  7/28/2022 1701 by Stacy Schmitz RN  Outcome: Ongoing, Progressing     Problem: Adult Inpatient Plan of Care  Goal: Patient-Specific Goal (Individualized)  7/28/2022 1743 by Stacy Schmitz RN  Outcome: Ongoing, Not Progressing  7/28/2022 1701 by Stacy Schmitz RN  Outcome: Ongoing, Progressing     Problem: Fall Injury Risk  Goal: Absence of Fall and Fall-Related Injury  7/28/2022 1743 by Stacy Schmitz RN  Outcome: Ongoing, Not Progressing  7/28/2022 1701 by Stacy Schmitz RN  Outcome: Ongoing, Progressing     Problem: Behavior Regulation Impairment (Excessive Substance Use)  Goal: Improved Behavioral Control (Excessive Substance Use)  7/28/2022 1743 by Stacy Schmitz RN  Outcome: Ongoing, Not Progressing  7/28/2022 1701 by Stacy Schmitz RN  Outcome: Ongoing, Progressing

## 2022-07-28 NOTE — DISCHARGE SUMMARY
"[facility] Medicine  Discharge Summary      Patient Name: Eitan Cody  MRN: [id number]  Patient Class: OP- Observation  Admission Date: 7/26/2022  Hospital Length of Stay: 0 days  Discharge Date and Time:  07/28/2022 4:17 PM  Attending Physician: Bhupendra Roberts MD   Discharging Provider: Lucretia Moore PA-C  Primary Care Provider: Abilio Mtz MD      HPI:   Eitan Cody is a 61 y/o male with HTN, HLD, and alcohol dependence presents to [facility] ED with complaints of generalized headache. He reports his headache started on Friday and also state he had decreased appetite. Today, he states his headache has worsened which prompted him to seek medical evaluation. He state he did not check his BP on Friday but when he checked it today at home, he reports /100's. He reports compliance with his medications. He state his headache was similar to when he had COVID-19 infection in January. He denies recent sick contacts. He admits he drinks 3-4 beers daily and his drink was last night. He denies tobacco use. He denies fever, chills, SOB, chest pain, palpitations, leg swelling, vision changes, falls, photosensitivity, or recent head trauma.       In the ED: BP (!) 149/77   Pulse (!) 54   Temp 98.5 °F (36.9 °C) (Oral)   Resp 12   Ht 5' 7" (1.702 m)   Wt 79.4 kg (175 lb)   SpO2 97%   BMI 27.41 kg/m² Labs remarkable for Na 134, T. Bili 1.7, , . CT head negative for acute intracranial findings. COVID negative. He was treated with Benedryl 50mg IV x1, Reglan 10mg IV x1, Toradol 15mg IV x1, Tylenol 1g PO x1, Morphine 4mg IV x1, Decadron 6mg IV x1, Prochlorperazine 10mg IV x1 and 1L NS bolus x1 MRI brain ordered. Observation placed with hospital medicine for further evaluation and treatment.         * No surgery found *      Hospital Course:   Patient admitted for intractable tension-type headache. CT head negative for acute abnormality. MRI brain negative for acute abnormality, noting small " remote lacunar infarction in left thalamus with micro-hemorrhage. Pain improved from 10/10 to 6/10 over first night of admission dehydration from chronic alcohol ingestion. Over second night, became hypertensive to 200/100s, hydralazine added to BP regimen, and hypertension resolved. Throughout second day of admission, patient reported 5/10 headache unaffected by IV toradol or diclofenac gel to apply on forehead. Received fioricet. Prior to discharge, patient was able to sleep and rest comfortably. Upon awakening, he was unable to remember if he had fallen asleep with a headache and reported current pain as less than 5/10. Continued to deny developing symptoms such as changes in vision, nausea, vomiting, lightheadedness, or dizziness. Patient advised on recommendations to alleviate headaches in the future and counseled on return precautions. All questions and concerns answered at this time. Patient discharged home in stable condition with PCP follow up.        Goals of Care Treatment Preferences:  Code Status: Full Code      Consults:   Consults (From admission, onward)        Status Ordering Provider     Inpatient consult to Neurology  Once        Provider:  (Not yet assigned)    Completed DANIEL TOPETE     Inpatient consult to Social Work  Once        Provider:  (Not yet assigned)    Acknowledged MENDOZA KIRKLAND          No new Assessment & Plan notes have been filed under this hospital service since the last note was generated.  Service: Hospital Medicine    Final Active Diagnoses:    Diagnosis Date Noted POA    PRINCIPAL PROBLEM:  Acute intractable headache [R51.9]  Yes    Hyperlipidemia [E78.5] 07/26/2022 Yes    Abnormal liver enzymes [R74.8] 07/26/2022 Yes    Alcohol dependence [F10.20] 07/26/2022 Yes    Primary hypertension [I10] 01/31/2022 Yes      Problems Resolved During this Admission:       Discharged Condition: stable    Disposition: Home or Self Care    Follow Up:   Follow-up Information      José Manuel Montoya PA-C Follow up on 8/4/2022.    Specialty: Family Medicine  Why: at 8:30 AM; hospital follow up appointment  Contact information:  [address]  [address]  [place] [id number] [phone]                       Patient Instructions:      Diet Cardiac     Notify your health care provider if you experience any of the following:  increased confusion or weakness     Notify your health care provider if you experience any of the following:  severe uncontrolled pain     Notify your health care provider if you experience any of the following:  temperature >100.4     Notify your health care provider if you experience any of the following:  severe persistent headache     Activity as tolerated       Significant Diagnostic Studies: Labs:   BMP:   Recent Labs   Lab 07/27/22  0307 07/28/22  0745   * 114*   * 137   K 4.8 3.9    104   CO2 24 22*   BUN 21 25*   CREATININE 1.1 0.8   CALCIUM 9.1 8.8   , CMP   Recent Labs   Lab 07/27/22  0307 07/28/22  0745   * 137   K 4.8 3.9    104   CO2 24 22*   * 114*   BUN 21 25*   CREATININE 1.1 0.8   CALCIUM 9.1 8.8   PROT 6.6 6.4   ALBUMIN 3.0* 3.2*   BILITOT 1.2* 0.9   ALKPHOS 74 71   AST 98* 75*   ALT 88* 72*   ANIONGAP 10 11   ESTGFRAFRICA >60 >60   EGFRNONAA >60 >60   , CBC   Recent Labs   Lab 07/28/22  0745   WBC 10.94   HGB 13.3*   HCT 37.7*   *    and All labs within the past 24 hours have been reviewed    Pending Diagnostic Studies:     None         Medications:  Reconciled Home Medications:      Medication List      START taking these medications    butalbital-acetaminophen-caffeine -40 mg -40 mg per tablet  Commonly known as: FIORICET, ESGIC  Take 1 tablet by mouth every 4 (four) hours as needed for Headaches.     hydrALAZINE 25 MG tablet  Commonly known as: APRESOLINE  Take 1 tablet (25 mg total) by mouth every 8 (eight) hours.        CONTINUE taking these medications    aspirin 81 MG Chew  Take 1 tablet (81  mg total) by mouth once daily.     atorvastatin 20 MG tablet  Commonly known as: LIPITOR  Take 1 tablet (20 mg total) by mouth every evening.     carvediloL 12.5 MG tablet  Commonly known as: COREG  Take 1 tablet (12.5 mg total) by mouth 2 (two) times daily with meals.     cetirizine 10 MG tablet  Commonly known as: ZYRTEC  Take 1 tablet (10 mg total) by mouth once daily.     FLUoxetine 20 MG capsule  fluoxetine 20 mg capsule  Take 1 capsule every day by oral route.     hydroCHLOROthiazide 25 MG tablet  Commonly known as: HYDRODIURIL  hydrochlorothiazide 25 mg tablet  Take 1 tablet every day by oral route.     ibuprofen 200 MG tablet  Commonly known as: ADVIL,MOTRIN  Take 200 mg by mouth every 6 (six) hours as needed for Pain.     lifitegrast 5 % Dpet  Commonly known as: XIIDRA  Place 1 drop into both eyes 2 (two) times daily.     losartan 50 MG tablet  Commonly known as: COZAAR  Take 1 tablet (50 mg total) by mouth once daily.     meclizine 25 mg tablet  Commonly known as: ANTIVERT  meclizine 25 mg tablet   Take 1 tablet 3 times a day by oral route as needed for dizziness.     * pulse oximeter device  Commonly known as: pulse oximeter  by Apply Externally route 2 (two) times a day. Use twice daily at 8 AM and 3 PM and record the value in MyChart as directed.     * pulse oximeter device  Commonly known as: pulse oximeter  by Apply Externally route 2 (two) times a day. Use twice daily at 8 AM and 3 PM and record the value in MyChart as directed.         * This list has 2 medication(s) that are the same as other medications prescribed for you. Read the directions carefully, and ask your doctor or other care provider to review them with you.                Indwelling Lines/Drains at time of discharge:   Lines/Drains/Airways     None                 Time spent on the discharge of patient: 35 minutes         Lucretia Moore PA-C  Department of [facility] Medicine  [facility]

## 2022-07-28 NOTE — SUBJECTIVE & OBJECTIVE
Interval History: Hypertensive overnight 200/100, resolved with administration of BP medications. VSS this AM. Patient reports mild improvement in HA down to 5/10.     Review of Systems   Constitutional:  Negative for appetite change, chills, diaphoresis and fever.   HENT:  Negative for congestion, hearing loss, rhinorrhea and sore throat.    Eyes:  Negative for visual disturbance.   Respiratory:  Negative for cough and shortness of breath.    Cardiovascular:  Negative for chest pain and leg swelling.   Gastrointestinal:  Negative for abdominal pain, diarrhea, nausea and vomiting.   Genitourinary:  Negative for difficulty urinating, flank pain and hematuria.   Musculoskeletal:  Negative for back pain.   Skin:  Negative for pallor and wound.   Neurological:  Positive for headaches (5/10 compared to 6/10 yesterday). Negative for dizziness, speech difficulty, weakness, light-headedness and numbness.   Psychiatric/Behavioral:  Negative for agitation and confusion. The patient is not nervous/anxious.    Objective:     Vital Signs (Most Recent):  Temp: 98.4 °F (36.9 °C) (07/28/22 1136)  Pulse: 80 (07/28/22 1200)  Resp: 18 (07/28/22 1136)  BP: 107/62 (07/28/22 1350)  SpO2: 97 % (07/28/22 1136) Vital Signs (24h Range):  Temp:  [96.2 °F (35.7 °C)-98.5 °F (36.9 °C)] 98.4 °F (36.9 °C)  Pulse:  [68-83] 80  Resp:  [15-20] 18  SpO2:  [96 %-98 %] 97 %  BP: (107-206)/() 107/62     Weight: 76.9 kg (169 lb 8.5 oz)  Body mass index is 26.55 kg/m².    Intake/Output Summary (Last 24 hours) at 7/28/2022 1411  Last data filed at 7/28/2022 0541  Gross per 24 hour   Intake 240 ml   Output 200 ml   Net 40 ml      Physical Exam  Vitals and nursing note reviewed.   Constitutional:       General: He is not in acute distress.     Appearance: Normal appearance. He is not ill-appearing.   HENT:      Head: Normocephalic and atraumatic.      Mouth/Throat:      Mouth: Mucous membranes are dry.   Eyes:      Extraocular Movements: Extraocular  movements intact.      Pupils: Pupils are equal, round, and reactive to light.   Cardiovascular:      Rate and Rhythm: Normal rate and regular rhythm.      Pulses: Normal pulses.      Heart sounds: No murmur heard.  Pulmonary:      Effort: Pulmonary effort is normal. No respiratory distress.   Abdominal:      General: Bowel sounds are normal.      Palpations: Abdomen is soft.      Tenderness: There is no abdominal tenderness.   Musculoskeletal:         General: No swelling.      Cervical back: Normal range of motion and neck supple.      Right lower leg: No edema.      Left lower leg: No edema.   Skin:     General: Skin is warm and dry.      Capillary Refill: Capillary refill takes less than 2 seconds.   Neurological:      Mental Status: He is alert and oriented to person, place, and time.   Psychiatric:         Mood and Affect: Mood normal.         Behavior: Behavior normal. Behavior is cooperative.       Significant Labs: All pertinent labs within the past 24 hours have been reviewed.  BMP:   Recent Labs   Lab 07/28/22  0745   *      K 3.9      CO2 22*   BUN 25*   CREATININE 0.8   CALCIUM 8.8     CBC:   Recent Labs   Lab 07/28/22  0745   WBC 10.94   HGB 13.3*   HCT 37.7*   *     CMP:   Recent Labs   Lab 07/27/22  0307 07/28/22  0745   * 137   K 4.8 3.9    104   CO2 24 22*   * 114*   BUN 21 25*   CREATININE 1.1 0.8   CALCIUM 9.1 8.8   PROT 6.6 6.4   ALBUMIN 3.0* 3.2*   BILITOT 1.2* 0.9   ALKPHOS 74 71   AST 98* 75*   ALT 88* 72*   ANIONGAP 10 11   EGFRNONAA >60 >60       Significant Imaging: I have reviewed all pertinent imaging results/findings within the past 24 hours.

## 2022-07-28 NOTE — PLAN OF CARE
0825  Message sent to the schedulers requesting a hospfu appt with Dr. Abilio Mtz ([facility]). Awaiting response.     1045  CM was informed by  Brissa of a hospfu appt scheduled for the patient with DREW Montoya ([facility]) on 8/4/2022 at 0830. Information added to the patient's discharge paperwork.    1615  DC order order. Patient in agreement with plan to discharge home today, denied the need for assistance with transportation at time of discharge, & verbalized understanding regarding the hospital follow up appointment.    Message sent to nurse Kumar & xavi Perez informing that the patient is cleared to discharge.       Will continue to follow.

## 2022-07-28 NOTE — PLAN OF CARE
Problem: Adult Inpatient Plan of Care  Goal: Plan of Care Review  Outcome: Ongoing, Progressing  Goal: Patient-Specific Goal (Individualized)  Outcome: Ongoing, Progressing  Goal: Absence of Hospital-Acquired Illness or Injury  Outcome: Ongoing, Progressing  Goal: Optimal Comfort and Wellbeing  Outcome: Ongoing, Progressing  Goal: Readiness for Transition of Care  Outcome: Ongoing, Progressing     Problem: Fall Injury Risk  Goal: Absence of Fall and Fall-Related Injury  Outcome: Ongoing, Progressing     Problem: Pain Acute  Goal: Acceptable Pain Control and Functional Ability  Outcome: Ongoing, Progressing     Problem: Hypertension Comorbidity  Goal: Blood Pressure in Desired Range  Outcome: Ongoing, Progressing     Problem: Social, Occupational or Functional Impairment (Excessive Substance Use)  Goal: Enhanced Social, Occupational or Functional Skills (Excessive Substance Use)  Outcome: Ongoing, Progressing

## 2022-07-29 NOTE — PLAN OF CARE
[facility] - Telemetry  Discharge Final Note    Primary Care Provider: Abilio Mtz MD    Expected Discharge Date: 7/28/2022    Final Discharge Note (most recent)       Final Note - 07/29/22 0733          Final Note    Assessment Type Final Discharge Note (P)      Anticipated Discharge Disposition Home or Self Care (P)      Hospital Resources/Appts/Education Provided Appointments scheduled and added to AVS (P)                      Important Message from Medicare             Contact Info       José Manuel Montoya PA-C   Specialty: Family Medicine    [address]  [address]  [place] [address]   Phone: [phone]       Next Steps: Follow up on 8/4/2022    Instructions: at 8:30 AM; hospital follow up appointment

## 2022-08-04 ENCOUNTER — OFFICE VISIT (OUTPATIENT)
Dept: FAMILY MEDICINE | Facility: HOSPITAL | Age: 62
End: 2022-08-04
Attending: FAMILY MEDICINE
Payer: MEDICAID

## 2022-08-04 VITALS
DIASTOLIC BLOOD PRESSURE: 74 MMHG | BODY MASS INDEX: 26.99 KG/M2 | SYSTOLIC BLOOD PRESSURE: 131 MMHG | HEART RATE: 83 BPM | HEIGHT: 67 IN | WEIGHT: 171.94 LBS

## 2022-08-04 DIAGNOSIS — R94.39 ABNORMAL STRESS ECG WITH TREADMILL: ICD-10-CM

## 2022-08-04 DIAGNOSIS — B18.2 CHRONIC HEPATITIS C WITHOUT HEPATIC COMA: ICD-10-CM

## 2022-08-04 DIAGNOSIS — I10 PRIMARY HYPERTENSION: Primary | ICD-10-CM

## 2022-08-04 DIAGNOSIS — E78.5 HYPERLIPIDEMIA, UNSPECIFIED HYPERLIPIDEMIA TYPE: ICD-10-CM

## 2022-08-04 DIAGNOSIS — R74.8 ABNORMAL LIVER ENZYMES: ICD-10-CM

## 2022-08-04 PROCEDURE — 99214 OFFICE O/P EST MOD 30 MIN: CPT | Performed by: PHYSICIAN ASSISTANT

## 2022-08-04 RX ORDER — AMLODIPINE BESYLATE 10 MG/1
10 TABLET ORAL DAILY
COMMUNITY
Start: 2022-07-28 | End: 2023-03-08 | Stop reason: ALTCHOICE

## 2022-08-04 RX ORDER — CETIRIZINE HYDROCHLORIDE 10 MG/1
TABLET ORAL
COMMUNITY
Start: 2022-05-16 | End: 2022-08-04

## 2022-08-04 NOTE — PROGRESS NOTES
Subjective:       Patient ID: Eitan Cody is a 62 y.o. male.    Chief Complaint: Hospital Follow Up    Eitan Cody is a 61 y/o male with HTN, HLD, and alcohol use here today for hospital follow up. He was recently admitted after presenting to the ED with 10/10 headache and home BP 200s/100s. Admitted for intractable headache. Initial /77, but became hypertensive overnight with BP 200s/100s. MRI brain negative for acute abnormality, noting small remote lacunar infarction in left thalamus with micro-hemorrhage. BP improved with addition of hydralazine. HA persisted during admission, but improved throughout his stay.      Today  is accompanied by his long term partner, Natalya. He has been doing well since his discharge. He is still having occasional headache, but rates at 2/10 vs 6-10/10 that he was experiencing in the hospital. He is taking all of his medication as prescribed, but only taking his hydralazine BID. BP is well controlled today. Has not been checking at home. Denies any CP, SOB, vision changes, swelling. Of note, HepC Ab positive and confirmed with RNA.     Review of Systems   Neurological: Positive for headaches (improved since admission).   All other systems reviewed and are negative.    Current Outpatient Medications   Medication Instructions    amLODIPine (NORVASC) 10 MG tablet No dose, route, or frequency recorded.    aspirin 81 mg, Oral, Daily    atorvastatin (LIPITOR) 20 mg, Oral, Nightly    butalbital-acetaminophen-caffeine -40 mg (FIORICET, ESGIC) -40 mg per tablet 1 tablet, Oral, Every 4 hours PRN    carvediloL (COREG) 12.5 mg, Oral, 2 times daily with meals    cetirizine (ZYRTEC) 10 mg, Oral, Daily    FLUoxetine 20 MG capsule fluoxetine 20 mg capsule  Take 1 capsule every day by oral route.    hydrALAZINE (APRESOLINE) 25 mg, Oral, Every 8 hours    hydroCHLOROthiazide (HYDRODIURIL) 25 MG tablet hydrochlorothiazide 25 mg tablet  Take 1 tablet every day by oral  "route.    ibuprofen (ADVIL,MOTRIN) 200 mg, Oral, Every 6 hours PRN    lifitegrast (XIIDRA) 5 % Dpet 1 drop, Both Eyes, 2 times daily    losartan (COZAAR) 50 mg, Oral, Daily    meclizine (ANTIVERT) 25 mg tablet meclizine 25 mg tablet   Take 1 tablet 3 times a day by oral route as needed for dizziness.    pulse oximeter (PULSE OXIMETER) device Apply Externally, 2 times daily, Use twice daily at 8 AM and 3 PM and record the value in MyChart as directed.     Objective:     Vitals:    08/04/22 0827   BP: 131/74   Pulse: 83   Weight: 78 kg (171 lb 15.3 oz)   Height: 5' 7" (1.702 m)     Physical Exam  Vitals and nursing note reviewed.   Constitutional:       General: He is not in acute distress.     Appearance: He is well-developed. He is not diaphoretic.   HENT:      Head: Normocephalic and atraumatic.   Eyes:      Conjunctiva/sclera: Conjunctivae normal.   Neck:      Trachea: No tracheal deviation.   Cardiovascular:      Rate and Rhythm: Normal rate and regular rhythm.      Heart sounds: Normal heart sounds. No murmur heard.  Pulmonary:      Effort: Pulmonary effort is normal. No respiratory distress.      Breath sounds: Normal breath sounds. No wheezing.   Chest:      Chest wall: No tenderness.   Abdominal:      General: Bowel sounds are normal. There is no distension.      Palpations: Abdomen is soft.      Tenderness: There is no abdominal tenderness. There is no guarding.   Musculoskeletal:         General: No tenderness or deformity. Normal range of motion.   Skin:     General: Skin is warm and dry.   Neurological:      Mental Status: He is alert and oriented to person, place, and time.      Coordination: Coordination normal.   Psychiatric:         Behavior: Behavior normal.         Thought Content: Thought content normal.     MRI Brain  Impression:     1. No acute intracranial abnormality.  2. Small remote lacunar infarction in the left thalamus with microhemorrhage.    Assessment:       1. Primary hypertension "    2. Hyperlipidemia, unspecified hyperlipidemia type    3. Abnormal stress ECG with treadmill    4. Abnormal liver enzymes    5. Chronic hepatitis C without hepatic coma        Plan:       Primary hypertension   -BP controlled today. Will continue current medications. Will bring all meds to follow up. Would consider d/c hydralazine and increasing losartan. Monitor at home and bring readings to follow up.    Hyperlipidemia, unspecified hyperlipidemia type   -Controlled. Continue statin.     Abnormal stress ECG with treadmill   -02/2022 positive for ischemia. [facility] with normal coronaries.     Abnormal liver enzymes   -Improving during admission.     Chronic hepatitis C without hepatic coma   -FIB4 3.75, APRI 1.281. Both scores indicate strong likelihood of advances fibrosis/cirrhosis. Will get US with elastography.     Future Appointments   Date Time Provider Department Center   8/31/2022  1:40 PM Joanna Torres OD [facility]   9/9/2022  9:20 AM Abilio Mtz MD [facility]

## 2022-08-05 ENCOUNTER — TELEPHONE (OUTPATIENT)
Dept: FAMILY MEDICINE | Facility: HOSPITAL | Age: 62
End: 2022-08-05
Payer: MEDICAID

## 2022-08-10 ENCOUNTER — PATIENT MESSAGE (OUTPATIENT)
Dept: FAMILY MEDICINE | Facility: HOSPITAL | Age: 62
End: 2022-08-10
Payer: MEDICAID

## 2022-08-10 ENCOUNTER — HOSPITAL ENCOUNTER (OUTPATIENT)
Dept: RADIOLOGY | Facility: HOSPITAL | Age: 62
Discharge: HOME | End: 2022-08-10
Attending: PHYSICIAN ASSISTANT
Payer: MEDICAID

## 2022-08-10 ENCOUNTER — TELEPHONE (OUTPATIENT)
Dept: FAMILY MEDICINE | Facility: HOSPITAL | Age: 62
End: 2022-08-10
Payer: MEDICAID

## 2022-08-10 DIAGNOSIS — B18.2 CHRONIC HEPATITIS C WITHOUT HEPATIC COMA: ICD-10-CM

## 2022-08-10 DIAGNOSIS — K76.9 LIVER LESION: Primary | ICD-10-CM

## 2022-08-10 DIAGNOSIS — K76.9 LIVER LESION: ICD-10-CM

## 2022-08-10 PROCEDURE — 76700 US EXAM ABDOM COMPLETE: CPT | Mod: TC

## 2022-08-10 PROCEDURE — 76981 US ELASTOGRAPHY PARENCHYMA (ORGAN): ICD-10-PCS | Mod: 26,,, | Performed by: STUDENT IN AN ORGANIZED HEALTH CARE EDUCATION/TRAINING PROGRAM

## 2022-08-10 PROCEDURE — 76981 USE PARENCHYMA: CPT | Mod: 59,TC

## 2022-08-10 PROCEDURE — 76981 USE PARENCHYMA: CPT | Mod: 26,,, | Performed by: STUDENT IN AN ORGANIZED HEALTH CARE EDUCATION/TRAINING PROGRAM

## 2022-08-10 PROCEDURE — 76700 US EXAM ABDOM COMPLETE: CPT | Mod: 26,59,, | Performed by: STUDENT IN AN ORGANIZED HEALTH CARE EDUCATION/TRAINING PROGRAM

## 2022-08-10 PROCEDURE — 76700 US ABDOMEN COMPLETE: ICD-10-PCS | Mod: 26,59,, | Performed by: STUDENT IN AN ORGANIZED HEALTH CARE EDUCATION/TRAINING PROGRAM

## 2022-08-16 ENCOUNTER — HOSPITAL ENCOUNTER (OUTPATIENT)
Dept: RADIOLOGY | Facility: HOSPITAL | Age: 62
Discharge: HOME | End: 2022-08-16
Attending: PHYSICIAN ASSISTANT
Payer: MEDICAID

## 2022-08-16 DIAGNOSIS — K76.9 LIVER LESION: ICD-10-CM

## 2022-08-16 PROCEDURE — 74178 CT ABD&PLV WO CNTR FLWD CNTR: CPT | Mod: TC

## 2022-08-16 PROCEDURE — A9698 NON-RAD CONTRAST MATERIALNOC: HCPCS | Performed by: PHYSICIAN ASSISTANT

## 2022-08-16 PROCEDURE — 25500020 PHARM REV CODE 255: Performed by: PHYSICIAN ASSISTANT

## 2022-08-16 RX ADMIN — IOHEXOL 75 ML: 350 INJECTION, SOLUTION INTRAVENOUS at 10:08

## 2022-08-16 RX ADMIN — IOHEXOL 1000 ML: 9 SOLUTION ORAL at 09:08

## 2022-08-20 ENCOUNTER — TELEPHONE (OUTPATIENT)
Dept: FAMILY MEDICINE | Facility: HOSPITAL | Age: 62
End: 2022-08-20
Payer: MEDICAID

## 2022-08-20 DIAGNOSIS — K76.9 LIVER DISEASE, UNSPECIFIED: ICD-10-CM

## 2022-08-20 DIAGNOSIS — K76.9 LIVER LESION: ICD-10-CM

## 2022-08-20 DIAGNOSIS — B18.2 CHRONIC HEPATITIS C WITHOUT HEPATIC COMA: Primary | ICD-10-CM

## 2022-08-20 NOTE — TELEPHONE ENCOUNTER
Contacted patient with imaging results. Informed him that there were three lesions seen on the CT but nature of them can't be fully described without MRI. MRI to be ordered  ----- Message from Ivanna Rivera MA sent at 8/19/2022  8:56 AM CDT -----  Contact: Patient [id number]    ----- Message -----  From: Cici William MA  Sent: 8/18/2022   3:43 PM CDT  To: [facility] Staff    Requesting call with results of mri done this week.  Thanks.

## 2022-08-31 ENCOUNTER — OFFICE VISIT (OUTPATIENT)
Dept: OPTOMETRY | Facility: CLINIC | Age: 62
End: 2022-08-31
Payer: MEDICAID

## 2022-08-31 DIAGNOSIS — H02.889 MGD (MEIBOMIAN GLAND DYSFUNCTION): ICD-10-CM

## 2022-08-31 DIAGNOSIS — H16.223 KERATOCONJUNCTIVITIS SICCA NOT SPECIFIED AS SJOGREN'S, BILATERAL: Primary | ICD-10-CM

## 2022-08-31 PROCEDURE — 1159F PR MEDICATION LIST DOCUMENTED IN MEDICAL RECORD: ICD-10-PCS | Mod: CPTII,,, | Performed by: OPTOMETRIST

## 2022-08-31 PROCEDURE — 92012 INTRM OPH EXAM EST PATIENT: CPT | Mod: S$PBB,,, | Performed by: OPTOMETRIST

## 2022-08-31 PROCEDURE — 1159F MED LIST DOCD IN RCRD: CPT | Mod: CPTII,,, | Performed by: OPTOMETRIST

## 2022-08-31 PROCEDURE — 3044F HG A1C LEVEL LT 7.0%: CPT | Mod: CPTII,,, | Performed by: OPTOMETRIST

## 2022-08-31 PROCEDURE — 99999 PR PBB SHADOW E&M-EST. PATIENT-LVL II: CPT | Mod: PBBFAC,,, | Performed by: OPTOMETRIST

## 2022-08-31 PROCEDURE — 99212 OFFICE O/P EST SF 10 MIN: CPT | Mod: PBBFAC,PN | Performed by: OPTOMETRIST

## 2022-08-31 PROCEDURE — 3044F PR MOST RECENT HEMOGLOBIN A1C LEVEL <7.0%: ICD-10-PCS | Mod: CPTII,,, | Performed by: OPTOMETRIST

## 2022-08-31 PROCEDURE — 92012 PR EYE EXAM, EST PATIENT,INTERMED: ICD-10-PCS | Mod: S$PBB,,, | Performed by: OPTOMETRIST

## 2022-08-31 PROCEDURE — 99999 PR PBB SHADOW E&M-EST. PATIENT-LVL II: ICD-10-PCS | Mod: PBBFAC,,, | Performed by: OPTOMETRIST

## 2022-08-31 PROCEDURE — 4010F PR ACE/ARB THEARPY RXD/TAKEN: ICD-10-PCS | Mod: CPTII,,, | Performed by: OPTOMETRIST

## 2022-08-31 PROCEDURE — 4010F ACE/ARB THERAPY RXD/TAKEN: CPT | Mod: CPTII,,, | Performed by: OPTOMETRIST

## 2022-08-31 NOTE — PROGRESS NOTES
HPI    Pt is here today for a 2 month dry eye f/u.  He states his eyes are feeling much better. He has been using the Xiidra   BID, Systane BID, and the nightime ointment.   He does report forgetting to do his gtts sometimes.     Eye Meds:  Xiidra BID OU  Systane BID OU  Tear mary qhs OU  Last edited by Joanna Torres, OD on 8/31/2022  4:12 PM.            Assessment /Plan     For exam results, see Encounter Report.    Keratoconjunctivitis sicca not specified as Sjogren's, bilateral    MGD (meibomian gland dysfunction)    Educated pt on findings. Improvement since last visit. Patient to continue Xiidra 1 gtt OU BID, Systane 1 gtt OU prn, and Mary/gel QHS. Stressed importance of using eye gtts as directed to prevent dry eye flare-ups. Discussed if symptoms worsen to RTC or send portal message. May need steroid pulse. Okay to monitor with yearly exam, unless patient symptomatic sooner.       RTC for next annual (06/2023) or sooner if needed.

## 2022-09-08 ENCOUNTER — HOSPITAL ENCOUNTER (OUTPATIENT)
Dept: RADIOLOGY | Facility: HOSPITAL | Age: 62
Discharge: HOME | End: 2022-09-08
Attending: STUDENT IN AN ORGANIZED HEALTH CARE EDUCATION/TRAINING PROGRAM
Payer: MEDICAID

## 2022-09-08 DIAGNOSIS — K76.9 LIVER DISEASE, UNSPECIFIED: ICD-10-CM

## 2022-09-08 PROCEDURE — 74183 MRI ABDOMEN W WO CONTRAST: ICD-10-PCS | Mod: 26,,, | Performed by: STUDENT IN AN ORGANIZED HEALTH CARE EDUCATION/TRAINING PROGRAM

## 2022-09-08 PROCEDURE — 25500020 PHARM REV CODE 255: Performed by: STUDENT IN AN ORGANIZED HEALTH CARE EDUCATION/TRAINING PROGRAM

## 2022-09-08 PROCEDURE — A9585 GADOBUTROL INJECTION: HCPCS | Performed by: STUDENT IN AN ORGANIZED HEALTH CARE EDUCATION/TRAINING PROGRAM

## 2022-09-08 PROCEDURE — 74183 MRI ABD W/O CNTR FLWD CNTR: CPT | Mod: TC

## 2022-09-08 PROCEDURE — 74183 MRI ABD W/O CNTR FLWD CNTR: CPT | Mod: 26,,, | Performed by: STUDENT IN AN ORGANIZED HEALTH CARE EDUCATION/TRAINING PROGRAM

## 2022-09-08 RX ORDER — GADOBUTROL 604.72 MG/ML
10 INJECTION INTRAVENOUS
Status: COMPLETED | OUTPATIENT
Start: 2022-09-08 | End: 2022-09-08

## 2022-09-08 RX ADMIN — GADOBUTROL 10 ML: 604.72 INJECTION INTRAVENOUS at 08:09

## 2022-09-09 ENCOUNTER — OFFICE VISIT (OUTPATIENT)
Dept: FAMILY MEDICINE | Facility: HOSPITAL | Age: 62
End: 2022-09-09
Payer: MEDICAID

## 2022-09-09 VITALS
HEIGHT: 67 IN | SYSTOLIC BLOOD PRESSURE: 111 MMHG | BODY MASS INDEX: 27.06 KG/M2 | DIASTOLIC BLOOD PRESSURE: 70 MMHG | WEIGHT: 172.38 LBS | HEART RATE: 80 BPM

## 2022-09-09 DIAGNOSIS — I10 PRIMARY HYPERTENSION: ICD-10-CM

## 2022-09-09 DIAGNOSIS — G47.33 OSA (OBSTRUCTIVE SLEEP APNEA): ICD-10-CM

## 2022-09-09 DIAGNOSIS — N40.1 BENIGN PROSTATIC HYPERPLASIA WITH URINARY HESITANCY: ICD-10-CM

## 2022-09-09 DIAGNOSIS — R39.11 BENIGN PROSTATIC HYPERPLASIA WITH URINARY HESITANCY: ICD-10-CM

## 2022-09-09 DIAGNOSIS — B18.2 CHRONIC HEPATITIS C WITHOUT HEPATIC COMA: Primary | ICD-10-CM

## 2022-09-09 PROCEDURE — 99214 OFFICE O/P EST MOD 30 MIN: CPT | Performed by: STUDENT IN AN ORGANIZED HEALTH CARE EDUCATION/TRAINING PROGRAM

## 2022-09-09 RX ORDER — VELPATASVIR AND SOFOSBUVIR 100; 400 MG/1; MG/1
1 TABLET, FILM COATED ORAL DAILY
Qty: 30 TABLET | Refills: 2 | Status: SHIPPED | OUTPATIENT
Start: 2022-09-09 | End: 2022-12-12

## 2022-09-09 NOTE — PROGRESS NOTES
I assume primary medical responsibility for this patient. I have reviewed the history, physical, and assessement & treatment plan with the resident and agree that the care is reasonable and necessary. This service has been performed by a resident without the presence of a teaching physician under the primary care exception. If necessary, an addendum of additional findings or evaluation beyond the resident documentation will be noted below.     Start Epclusa for tx of Hep C. Advised to hold statin for the duration of tx.     Tiffani Andrews MD

## 2022-09-09 NOTE — PROGRESS NOTES
[facility]  History & Physical    SUBJECTIVE:     Chief Complaint:   Chief Complaint   Patient presents with    Hypertension       History of Present Illness:  62 y.o. male who  has a past medical history of Hypertension. presents to clinic today for follow up related to hypertension, hepatitis c and liver lesion. Patient reports that he is often fatigued and wakes frequently from sleep. Patient states that he often has the urge to urinate but feels as if he can not completely empty his bladder. Patient denies chest pain, shortness of breath, nausea, and fever.    Allergies:  Review of patient's allergies indicates:  No Known Allergies    Home Medications:  Current Outpatient Medications on File Prior to Visit   Medication Sig    amLODIPine (NORVASC) 10 MG tablet     aspirin 81 MG Chew Take 1 tablet (81 mg total) by mouth once daily.    atorvastatin (LIPITOR) 20 MG tablet Take 1 tablet (20 mg total) by mouth every evening.    carvediloL (COREG) 12.5 MG tablet Take 1 tablet (12.5 mg total) by mouth 2 (two) times daily with meals.    cetirizine (ZYRTEC) 10 MG tablet Take 1 tablet (10 mg total) by mouth once daily.    FLUoxetine 20 MG capsule fluoxetine 20 mg capsule  Take 1 capsule every day by oral route.    hydrALAZINE (APRESOLINE) 25 MG tablet Take 1 tablet (25 mg total) by mouth every 8 (eight) hours.    hydroCHLOROthiazide (HYDRODIURIL) 25 MG tablet hydrochlorothiazide 25 mg tablet  Take 1 tablet every day by oral route.    ibuprofen (ADVIL,MOTRIN) 200 MG tablet Take 200 mg by mouth every 6 (six) hours as needed for Pain.    lifitegrast (XIIDRA) 5 % Dpet Place 1 drop into both eyes 2 (two) times daily.    losartan (COZAAR) 50 MG tablet Take 1 tablet (50 mg total) by mouth once daily.    meclizine (ANTIVERT) 25 mg tablet meclizine 25 mg tablet   Take 1 tablet 3 times a day by oral route as needed for dizziness.    pulse oximeter (PULSE OXIMETER) device by Apply Externally route 2 (two) times a day. Use  twice daily at 8 AM and 3 PM and record the value in MyChart as directed.     Current Facility-Administered Medications on File Prior to Visit   Medication    [COMPLETED] gadobutroL (GADAVIST) injection 10 mL       Past Medical History:   Diagnosis Date    Hypertension      Past Surgical History:   Procedure Laterality Date    COLONOSCOPY N/A 6/30/2022    Procedure: COLONOSCOPY;  Surgeon: VIRIDIANA Swanson MD;  Location: [facility] ENDO;  Service: Endoscopy;  Laterality: N/A;    LEFT HEART CATHETERIZATION Left 2/16/2022    Procedure: Left heart cath;  Surgeon: Thierno Kidd III, MD;  Location: [facility] CATH LAB;  Service: Cardiology;  Laterality: Left;     No family history on file.  Social History     Tobacco Use    Smoking status: Never    Smokeless tobacco: Never   Substance Use Topics    Alcohol use: Yes     Alcohol/week: 6.0 standard drinks     Types: 6 Cans of beer per week     Comment: daily    Drug use: No        Review of Systems   Constitutional:  Positive for malaise/fatigue. Negative for fever and weight loss.   HENT:  Negative for hearing loss and sore throat.    Eyes:  Negative for blurred vision.   Respiratory:  Negative for cough, shortness of breath and wheezing.    Cardiovascular:  Negative for chest pain and leg swelling.   Gastrointestinal:  Negative for heartburn, nausea and vomiting.   Genitourinary:  Positive for frequency and urgency. Negative for dysuria.   Musculoskeletal:  Negative for back pain, joint pain and myalgias.   Neurological:  Negative for dizziness, weakness and headaches.   Psychiatric/Behavioral: Negative.        OBJECTIVE:     Vital Signs:  Pulse: 80 (09/09/22 0909)  BP: 111/70 (09/09/22 0909)    Physical Exam  HENT:      Head: Normocephalic.      Right Ear: External ear normal.      Left Ear: External ear normal.      Nose: Nose normal.      Mouth/Throat:      Mouth: Mucous membranes are moist.   Eyes:      Extraocular Movements: Extraocular movements intact.      Pupils:  Pupils are equal, round, and reactive to light.   Cardiovascular:      Rate and Rhythm: Normal rate.      Pulses: Normal pulses.   Pulmonary:      Effort: Pulmonary effort is normal.   Abdominal:      Palpations: Abdomen is soft.   Musculoskeletal:         General: Normal range of motion.      Cervical back: Normal range of motion.   Skin:     General: Skin is warm.      Capillary Refill: Capillary refill takes less than 2 seconds.   Neurological:      General: No focal deficit present.      Mental Status: He is alert and oriented to person, place, and time.   Psychiatric:         Mood and Affect: Mood normal.       Laboratory:  Hemoglobin A1C   Date Value Ref Range Status   01/14/2022 5.3 4.0 - 5.6 % Final     Comment:     ADA Screening Guidelines:  5.7-6.4%  Consistent with prediabetes  >or=6.5%  Consistent with diabetes    High levels of fetal hemoglobin interfere with the HbA1C  assay. Heterozygous hemoglobin variants (HbS, HgC, etc)do  not significantly interfere with this assay.   However, presence of multiple variants may affect accuracy.         A/P:  Eitan was seen today for hypertension. Patient BP is well controlled in office today, but record of home BP measurements shows a pattern of AM hypertension. Patient is compliant with all medications Patient is expressing symptoms of BPH. Will screen for prostate malignancy at this visit. Will consider starting flomax at next visit if other conditions are more well controlled.    Diagnoses and all orders for this visit:    Chronic hepatitis C without hepatic coma  -     sofosbuvir-velpatasvir (EPCLUSA) 400-100 mg Tab; Take 1 tablet by mouth once daily at 6am.    Primary hypertension  -     Ambulatory referral/consult to Sleep Disorders; Future  -     Recommend taking current hydralazine dose closer to sleep, and earlier in the AM  EMRCEDES (obstructive sleep apnea)  -     Ambulatory referral/consult to Sleep Disorders; Future    Benign prostatic hyperplasia with  urinary hesitancy  -     PSA, Screening; Future        Follow up in about 3 months (around 12/9/2022).      Abilio Mtz MD  [employer] [facility] PGY-2  09/09/2022

## 2022-09-16 ENCOUNTER — SPECIALTY PHARMACY (OUTPATIENT)
Dept: PHARMACY | Facility: CLINIC | Age: 62
End: 2022-09-16
Payer: MEDICAID

## 2022-09-16 NOTE — TELEPHONE ENCOUNTER
New Rx received for Epclusa x 12 weeks. No PA required. Test claim: $0 copay. Pt has [place] Medicaid. No BI/FA needed.

## 2022-09-20 ENCOUNTER — SPECIALTY PHARMACY (OUTPATIENT)
Dept: PHARMACY | Facility: CLINIC | Age: 62
End: 2022-09-20
Payer: MEDICAID

## 2022-09-20 DIAGNOSIS — B18.2 CHRONIC HEPATITIS C WITHOUT HEPATIC COMA: Primary | ICD-10-CM

## 2022-09-20 NOTE — TELEPHONE ENCOUNTER
Specialty Pharmacy - Initial Clinical Assessment - AG Epclusa fill 1 of 3    Specialty Medication Orders Linked to Encounter      Flowsheet Row Most Recent Value   Medication #1 sofosbuvir-velpatasvir (EPCLUSA) 400-100 mg Tab (Order#[id number], Rx#[id number])          Patient Diagnosis   B18.2 - Chronic hepatitis C without hepatic coma    Subjective    Etian Cody is a 62 y.o. male, who is followed by the specialty pharmacy service for management and education.    Recent Encounters       Date Type Provider Description    09/20/2022 Specialty Pharmacy Ashlyn Frias, Karlene Initial Clinical Assessment    09/16/2022 Specialty Pharmacy Ashlyn Frias PharmD Referral Authorization          Clinical call attempts since last clinical assessment   9/20/2022  8:26 PM - Specialty Pharmacy - Clinical Assessment by Ashlyn Frias PharmD     Current Outpatient Medications   Medication Sig    amLODIPine (NORVASC) 10 MG tablet     aspirin 81 MG Chew Take 1 tablet (81 mg total) by mouth once daily.    atorvastatin (LIPITOR) 20 MG tablet Take 1 tablet (20 mg total) by mouth every evening.    carvediloL (COREG) 12.5 MG tablet Take 1 tablet (12.5 mg total) by mouth 2 (two) times daily with meals.    cetirizine (ZYRTEC) 10 MG tablet Take 1 tablet (10 mg total) by mouth once daily.    FLUoxetine 20 MG capsule fluoxetine 20 mg capsule  Take 1 capsule every day by oral route.    hydrALAZINE (APRESOLINE) 25 MG tablet Take 1 tablet (25 mg total) by mouth every 8 (eight) hours.    hydroCHLOROthiazide (HYDRODIURIL) 25 MG tablet hydrochlorothiazide 25 mg tablet  Take 1 tablet every day by oral route.    ibuprofen (ADVIL,MOTRIN) 200 MG tablet Take 200 mg by mouth every 6 (six) hours as needed for Pain.    lifitegrast (XIIDRA) 5 % Dpet Place 1 drop into both eyes 2 (two) times daily.    losartan (COZAAR) 50 MG tablet Take 1 tablet (50 mg total) by mouth once daily.    meclizine (ANTIVERT) 25 mg tablet meclizine 25 mg tablet   Take 1  tablet 3 times a day by oral route as needed for dizziness.    sofosbuvir-velpatasvir (EPCLUSA) 400-100 mg Tab Take 1 tablet by mouth once daily at 6am.    pulse oximeter (PULSE OXIMETER) device by Apply Externally route 2 (two) times a day. Use twice daily at 8 AM and 3 PM and record the value in MyChart as directed.   Last reviewed on 9/9/2022 11:27 AM by Abilio Mtz MD    Review of patient's allergies indicates:  No Known AllergiesLast reviewed on  9/9/2022 11:27 AM by Abilio Mtz    Drug Interactions    Drug interactions evaluated: yes  Clinically relevant drug interactions identified: yes   Interactions list: Epclusa/Atorvastatin: Sofosbuvir may increase the serum concentration of Atorvastatin. Monitor for increased atorvastatin adverse effects (eg, myopathy, rhabdomyolysis) if atorvastatin and sofosbuvir are combined.     Drug management plan: Epclusa/Atorvastatin: Per patient's PCP, pt is to HOLD Atorvastatin while on Epclusa. Pt denies any history of cardiovascular events and aware to resume Atorvastatin once 12 weeks of Epclusa have been completed.    Provided the patient with educational material regarding drug interactions: not applicable           Assessment Questions - Documented Responses      Flowsheet Row Most Recent Value   Assessment    Medication Reconciliation completed for patient Yes   During the past 4 weeks, has patient missed any activities due to condition or medication? No   During the past 4 weeks, did patient have any of the following urgent care visits? None   Goals of Therapy Status Discussed (new start)   Status of the patients ability to self-administer: Is Able   All education points have been covered with patient? Yes, supplemental printed education provided   Welcome packet contents reviewed and discussed with patient? Yes   Assesment completed? Yes   Plan Therapy being initiated   Do you need to open a clinical intervention (i-vent)? No   Do you want to schedule first  "shipment? Yes   Medication #1 Assessment Info    Patient status New medication, New to [facility]   Is this medication appropriate for the patient? Yes   Is this medication effective? Not yet started          Refill Questions - Documented Responses      Flowsheet Row Most Recent Value   Patient Availability and HIPAA Verification    Does patient want to proceed with activity? Yes   HIPAA/medical authority confirmed? Yes   Relationship to patient of person spoken to? Self   Refill Screening Questions    When does the patient need to receive the medication? 09/22/22   Refill Delivery Questions    How will the patient receive the medication? Delivery Ana   When does the patient need to receive the medication? 09/22/22   Shipping Address Home   Address in [facility] confirmed and updated if neccessary? Yes   Expected Copay ($) 0   Is the patient able to afford the medication copay? Yes   Payment Method zero copay   Days supply of Refill 28   Supplies needed? No supplies needed   Refill activity completed? Yes   Refill activity plan Refill scheduled   Shipment/Pickup Date: 09/21/22            Objective    He has a past medical history of Hypertension.    Tried/failed medications: None    BP Readings from Last 4 Encounters:   09/09/22 111/70   08/04/22 131/74   07/28/22 135/71   06/30/22 (!) 184/95     Ht Readings from Last 4 Encounters:   09/09/22 5' 7" (1.702 m)   08/04/22 5' 7" (1.702 m)   07/26/22 5' 7" (1.702 m)   06/30/22 5' 7" (1.702 m)     Wt Readings from Last 4 Encounters:   09/09/22 78.2 kg (172 lb 6.4 oz)   08/04/22 78 kg (171 lb 15.3 oz)   07/26/22 76.9 kg (169 lb 8.5 oz)   06/30/22 75.1 kg (165 lb 10.8 oz)     No results found for: HCVGENOTYPE  Recent Labs   Lab Result Units 07/28/22  0745 07/27/22  0307 07/26/22  1156   Creatinine mg/dL 0.8 1.1 0.9   ALT U/L 72 H 88 H 105 H   AST U/L 75 H 98 H 131 H     The goals of Hepatitis C Virus (HCV) infection treatment include:  Reducing all-cause mortality and " liver-related health adverse consequences, including cirrhosis, end-stage liver disease, and hepatocellular carcinoma  Achieving virologic cure as evidenced by a sustained virologic response  Improving or maintaining quality of life  Maintaining optimal therapy adherence  Minimizing and managing side effects  Preventing the transmission of HCV      Goals of Therapy Status: Discussed (new start)    Epclusa reviewed for appropriateness. Rx checked, dose appropriate (1 tablet by mouth once daily x 12 weeks).   Treatment Experience: None  Weeks: 12  Genotype: Unknown  HCV RNA Viral load: 2,260,000 IU/mL (4/18/2022)  Fibrosis - FIB4 3.75, APRI 1.281; Cirrhotic morphology noted on MRI  Child Nicholas: Class A, well compensated  HBV: HBsAg (-), HBcAb (-); no previous exposure (4/28/2022)  HIV-Negative (1/14/2022)    LFTs-2x ULN  GFR: >60 mL/min   DDIs-Atorvastatin (per PCP, pt is to HOLD while on Epclusa)  Epclusa therapy appropriate for treatment naïve genotype unknown patient per AASLD guidelines.    Assessment/Plan  Patient plans to start therapy on 09/22/22      Indication, dosage, appropriateness, effectiveness, safety and convenience of his specialty medication(s) were reviewed today.     Patient Education   Patient received education on the following:   Expectations and possible outcomes of therapy  Proper use, timely administration, and missed dose management  Duration of therapy  Side effects, including prevention, minimization, and management  Contraindications and safety precautions  New or changed medications, including prescribe and over the counter medications and supplements  Reviews recommended vaccinations, as appropriate  Storage, safe handling, and disposal    -Per patient's PCP, pt is to HOLD Atorvastatin while on Epclusa. Pt denies any history of cardiovascular events and aware to resume Atorvastatin once 12 weeks of Epclusa have been completed.   -Patient does NOT frequently experience heartburn and is  aware to avoid all acid reducing medications while on treatment.     Tasks added this encounter   10/13/2022 - Refill Call (Auto Added)  9/29/2022 - 7 Day Post Start Touchbase   Tasks due within next 3 months   No tasks due.     Ashlyn Frias, PharmD  [facility]  [address]  [facility] [address]  Phone: [phone]  Fax: [phone]

## 2022-09-28 DIAGNOSIS — F41.9 ANXIETY: ICD-10-CM

## 2022-09-28 DIAGNOSIS — I10 PRIMARY HYPERTENSION: ICD-10-CM

## 2022-09-28 RX ORDER — FLUOXETINE HYDROCHLORIDE 20 MG/1
20 CAPSULE ORAL DAILY
Qty: 30 CAPSULE | Refills: 2 | Status: SHIPPED | OUTPATIENT
Start: 2022-09-28 | End: 2022-12-19 | Stop reason: SDUPTHER

## 2022-09-28 RX ORDER — HYDROCHLOROTHIAZIDE 25 MG/1
25 TABLET ORAL DAILY
Qty: 30 TABLET | Refills: 2 | Status: SHIPPED | OUTPATIENT
Start: 2022-09-28 | End: 2022-12-19 | Stop reason: SDUPTHER

## 2022-09-29 ENCOUNTER — SPECIALTY PHARMACY (OUTPATIENT)
Dept: PHARMACY | Facility: CLINIC | Age: 62
End: 2022-09-29
Payer: MEDICAID

## 2022-09-29 NOTE — TELEPHONE ENCOUNTER
Patient returned touchbase call. Reports taking Epclusa every night between 9:30-10:00 pm. No missed doses, aware of importance to take daily at the same time. No changes in medications. No side effects reported at this time. Patient aware to reach out to [facility] in the meantime with any questions or concerns.

## 2022-10-05 ENCOUNTER — HOSPITAL ENCOUNTER (EMERGENCY)
Facility: HOSPITAL | Age: 62
Discharge: HOME | End: 2022-10-05
Attending: EMERGENCY MEDICINE
Payer: MEDICAID

## 2022-10-05 VITALS
BODY MASS INDEX: 26.68 KG/M2 | OXYGEN SATURATION: 98 % | HEIGHT: 67 IN | WEIGHT: 170 LBS | RESPIRATION RATE: 18 BRPM | TEMPERATURE: 99 F | HEART RATE: 74 BPM | SYSTOLIC BLOOD PRESSURE: 182 MMHG | DIASTOLIC BLOOD PRESSURE: 99 MMHG

## 2022-10-05 DIAGNOSIS — R09.81 NASAL CONGESTION: Primary | ICD-10-CM

## 2022-10-05 DIAGNOSIS — J31.0 RHINITIS, UNSPECIFIED TYPE: ICD-10-CM

## 2022-10-05 PROCEDURE — 99284 EMERGENCY DEPT VISIT MOD MDM: CPT

## 2022-10-05 PROCEDURE — 25000003 PHARM REV CODE 250: Performed by: EMERGENCY MEDICINE

## 2022-10-05 PROCEDURE — 25000242 PHARM REV CODE 250 ALT 637 W/ HCPCS: Performed by: EMERGENCY MEDICINE

## 2022-10-05 RX ORDER — ACETAMINOPHEN 500 MG
1000 TABLET ORAL
Status: COMPLETED | OUTPATIENT
Start: 2022-10-05 | End: 2022-10-05

## 2022-10-05 RX ORDER — FLUTICASONE PROPIONATE 50 MCG
1 SPRAY, SUSPENSION (ML) NASAL 2 TIMES DAILY PRN
Qty: 9.9 ML | Refills: 0 | Status: SHIPPED | OUTPATIENT
Start: 2022-10-05 | End: 2022-11-05

## 2022-10-05 RX ORDER — FLUTICASONE PROPIONATE 50 MCG
2 SPRAY, SUSPENSION (ML) NASAL ONCE
Status: COMPLETED | OUTPATIENT
Start: 2022-10-05 | End: 2022-10-05

## 2022-10-05 RX ORDER — CETIRIZINE HYDROCHLORIDE 10 MG/1
10 TABLET ORAL DAILY
Qty: 7 TABLET | Refills: 0 | Status: SHIPPED | OUTPATIENT
Start: 2022-10-05 | End: 2022-12-21 | Stop reason: SDUPTHER

## 2022-10-05 RX ADMIN — FLUTICASONE PROPIONATE 100 MCG: 50 SPRAY, METERED NASAL at 09:10

## 2022-10-05 RX ADMIN — ACETAMINOPHEN 1000 MG: 500 TABLET ORAL at 09:10

## 2022-10-05 NOTE — ED PROVIDER NOTES
Encounter Date: 10/5/2022       History     Chief Complaint   Patient presents with    Sinusitis     Dx with sinus infection on fri given rx for antibiotics,( completed)  continues to complain of nasal congestion, denies fever, n/v + MANLEY reported      62 y.o. M, with history of HTN, presents to the ED for running nose. Patient reports that he has this running nose about 1 week now. He was seen at an  last Friday, diagnosed with sinusitis. He finished his 5 days course of Z-pack, but the running nose continue to get worse, causing difficult sleeping at night due to congestion. Patient denies fever, ear pain, cough, or shortness of breath. Patient states that he has this problem every year.       Review of patient's allergies indicates:  No Known Allergies  Past Medical History:   Diagnosis Date    Hypertension      Past Surgical History:   Procedure Laterality Date    COLONOSCOPY N/A 6/30/2022    Procedure: COLONOSCOPY;  Surgeon: VIRIDIANA Swanson MD;  Location: [facility] ENDO;  Service: Endoscopy;  Laterality: N/A;    LEFT HEART CATHETERIZATION Left 2/16/2022    Procedure: Left heart cath;  Surgeon: Thierno Kidd III, MD;  Location: [facility] CATH LAB;  Service: Cardiology;  Laterality: Left;     No family history on file.  Social History     Tobacco Use    Smoking status: Never    Smokeless tobacco: Never   Substance Use Topics    Alcohol use: Yes     Alcohol/week: 6.0 standard drinks     Types: 6 Cans of beer per week     Comment: daily    Drug use: No     Review of Systems   Constitutional:  Negative for chills and fever.   HENT:  Positive for congestion and rhinorrhea. Negative for sore throat.    Eyes:  Negative for pain and visual disturbance.   Respiratory:  Negative for shortness of breath.    Cardiovascular:  Negative for chest pain and leg swelling.   Gastrointestinal:  Negative for abdominal pain, nausea and vomiting.   Genitourinary:  Negative for dysuria and flank pain.   Musculoskeletal:  Negative for  back pain and neck pain.   Skin:  Negative for rash.   Neurological:  Positive for headaches. Negative for weakness.   Psychiatric/Behavioral:  Negative for behavioral problems, confusion and decreased concentration.      Physical Exam     Initial Vitals [10/05/22 0753]   BP Pulse Resp Temp SpO2   (!) 182/99 74 18 98.7 °F (37.1 °C) 98 %      MAP       --         Physical Exam    Nursing note and vitals reviewed.  Constitutional: He appears well-developed. No distress.   HENT:   Head: Normocephalic and atraumatic.   Nose: Nose normal.   No maxillary sinus or frontal sinuses tenderness to palpation.    Eyes: Conjunctivae and EOM are normal. Pupils are equal, round, and reactive to light.   Neck: No JVD present.   Normal range of motion.  Cardiovascular:  Normal rate, regular rhythm and normal heart sounds.           Pulmonary/Chest: Breath sounds normal. No respiratory distress.   Abdominal: Abdomen is soft. He exhibits no distension. There is no abdominal tenderness.   Musculoskeletal:         General: No tenderness or edema. Normal range of motion.      Cervical back: Normal range of motion.     Neurological: He is alert and oriented to person, place, and time. He has normal strength. No cranial nerve deficit.   Skin: Skin is warm and dry. Capillary refill takes less than 2 seconds.       ED Course   Procedures  Labs Reviewed - No data to display       Imaging Results    None          Medications   acetaminophen tablet 1,000 mg (1,000 mg Oral Given 10/5/22 0904)   fluticasone propionate 50 mcg/actuation nasal spray 100 mcg (100 mcg Each Nostril Given 10/5/22 0912)     Medical Decision Making:   History:   Old Medical Records: I decided to obtain old medical records.  Initial Assessment:   62-year-old male with history of hypertension, presents to the ED for congestion.  Patient is well-appearing, afebrile, stable, no respiratory distress.  Patient finished 5 days of Z-Jonatan without improvement.  Differential  Diagnosis:   Allergic rhinitis, sinusitis, viral UR doubt cavernous thrombosis, nasal trauma  ED Management:  The runny nose is treated with Flonase in the ED. stable to be discharged home, prescribed and Zyrtec.  Referred to ENT for recurrent rhinitis versus sinusitis.  Recommended to follow-up with his primary care provider within 1 week.  Provided discharge instructions and return to ED precaution.  No other questions.           Attending Attestation:   Physician Attestation Statement for Resident:  As the supervising MD   Physician Attestation Statement: I have personally seen and examined this patient.   I agree with the above history.  -:   As the supervising MD I agree with the above PE.     As the supervising MD I agree with the above treatment, course, plan, and disposition.     I have reviewed the following: old records at this facility.                           Clinical Impression:   Final diagnoses:  [R09.81] Nasal congestion (Primary)  [J31.0] Rhinitis, unspecified type      ED Disposition Condition    Discharge Stable          ED Prescriptions       Medication Sig Dispense Start Date End Date Auth. Provider    fluticasone propionate (FLONASE) 50 mcg/actuation nasal spray 1 spray (50 mcg total) by Each Nostril route 2 (two) times daily as needed for Rhinitis or Allergies. 9.9 mL 10/5/2022 11/5/2022 Maged Chen MD    cetirizine (ZYRTEC) 10 MG tablet Take 1 tablet (10 mg total) by mouth once daily. for 7 doses 7 tablet 10/5/2022 10/12/2022 Maged Chen MD          Follow-up Information       Follow up With Specialties Details Why Contact Info    Abilio Mtz MD Family Medicine   [address], [facility] [address]  [facility] [id number] [phone]      [facility] ENT Otolaryngology   [address]  [facility] [address]  [phone]    [facility] - Emergency Dept Emergency Medicine  As needed [address]  [facility] [id number] [phone]             Maged Chen MD  Resident  10/05/22  1320       Gerson Ivey MD  10/06/22 5624

## 2022-10-05 NOTE — ED NOTES
Pt ambulated into room 20 from waiting room. Pt c/o sinus pressure and headache. Pt given antibiotics at urgent care last week. Pt reports compliance but continues with symptoms. Pt arrived with his girlfriend who also checked in with different complaint. Plan of care reviewed, call bell within reach.

## 2022-10-05 NOTE — DISCHARGE INSTRUCTIONS
You're  prescribed Flonase and Zyrtec patient nasal congestion.  Please follow-up with your primary care provider within 1 week.  You are referred to ENT to follow-up with your sinusitis, please make appointment with them.  Return to ED if he develops fever more than 100.4, difficulty breathing, chest pain, or other concerns.

## 2022-10-06 ENCOUNTER — PATIENT OUTREACH (OUTPATIENT)
Dept: EMERGENCY MEDICINE | Facility: HOSPITAL | Age: 62
End: 2022-10-06
Payer: MEDICAID

## 2022-10-13 ENCOUNTER — SPECIALTY PHARMACY (OUTPATIENT)
Dept: PHARMACY | Facility: CLINIC | Age: 62
End: 2022-10-13
Payer: MEDICAID

## 2022-10-13 NOTE — TELEPHONE ENCOUNTER
Patient returned refill call for Epclusa. Claim rejected for refill too soon until 10/14. Patient agreeable to follow up on 10/14.

## 2022-10-14 NOTE — TELEPHONE ENCOUNTER
Specialty Pharmacy - Refill Coordination (AG EPCLUSA REFILL 2 of 3)    Specialty Medication Orders Linked to Encounter      Flowsheet Row Most Recent Value   Medication #1 sofosbuvir-velpatasvir (EPCLUSA) 400-100 mg Tab (Order#[id number], Rx#[id number])          AG Epclusa refill (2 of 3) confirmed and reassessment complete.     Patient has 7 doses of Epclusa remaining and takes it around the evening time daily. Pt reports they are not having any side effects at this time. No missed doses, no new allergies or health conditions reported at this time. Allergies reviewed and medication reconciliation complete (reviewed and documented in [facility]). Patient confirms he continues to hold Lipitor. Disease education reviewed (including transmission and prevention). Patient counseled on importance of maintaining adherence and keeping lab appointments which were scheduled. All questions answered and addressed to patients satisfaction. Advised to call [facility] and provider if any issues arise.     Refill Questions - Documented Responses      Flowsheet Row Most Recent Value   Patient Availability and HIPAA Verification    Does patient want to proceed with activity? Yes   HIPAA/medical authority confirmed? Yes   Relationship to patient of person spoken to? Self   Refill Screening Questions    Changes to allergies? No   Changes to medications? Yes  [Flonase and Zyrtec - no DDIs.]   New conditions since last clinic visit? No   Unplanned office visit, urgent care, ED, or hospital admission in the last 4 weeks? No   How does patient/caregiver feel medication is working? Too soon to tell   Financial problems or insurance changes? No   How many doses of your specialty medications were missed in the last 4 weeks? 0   Would patient like to speak to a pharmacist? No   When does the patient need to receive the medication? 10/20/22   Refill Delivery Questions    How will the patient receive the medication? MEDRx    When does the patient need to receive the medication? 10/20/22   Shipping Address Home   Address in [facility] confirmed and updated if neccessary? Yes   Expected Copay ($) 0   Is the patient able to afford the medication copay? Yes   Payment Method zero copay   Days supply of Refill 28   Supplies needed? No supplies needed   Refill activity completed? Yes   Refill activity plan Refill scheduled   Shipment/Pickup Date: 10/17/22            Current Outpatient Medications   Medication Sig    amLODIPine (NORVASC) 10 MG tablet     aspirin 81 MG Chew Take 1 tablet (81 mg total) by mouth once daily.    atorvastatin (LIPITOR) 20 MG tablet Take 1 tablet (20 mg total) by mouth every evening.    azithromycin (Z-CHIDI) 250 MG tablet Take 2 tablets (500 mg total) by mouth once daily for day 1; then 1 tablet daily for 4 days.    carvediloL (COREG) 12.5 MG tablet Take 1 tablet (12.5 mg total) by mouth 2 (two) times daily with meals.    cetirizine (ZYRTEC) 10 MG tablet Take 1 tablet (10 mg total) by mouth once daily. for 7 doses    FLUoxetine 20 MG capsule Take 1 capsule (20 mg total) by mouth once daily.    fluticasone propionate (FLONASE) 50 mcg/actuation nasal spray 1 spray (50 mcg total) by Each Nostril route 2 (two) times daily as needed for Rhinitis or Allergies.    hydrALAZINE (APRESOLINE) 25 MG tablet Take 1 tablet (25 mg total) by mouth every 8 (eight) hours.    hydroCHLOROthiazide (HYDRODIURIL) 25 MG tablet Take 1 tablet (25 mg total) by mouth once daily.    ibuprofen (ADVIL,MOTRIN) 200 MG tablet Take 200 mg by mouth every 6 (six) hours as needed for Pain.    lifitegrast (XIIDRA) 5 % Dpet Place 1 drop into both eyes 2 (two) times daily.    losartan (COZAAR) 50 MG tablet Take 1 tablet (50 mg total) by mouth once daily.    meclizine (ANTIVERT) 25 mg tablet meclizine 25 mg tablet   Take 1 tablet 3 times a day by oral route as needed for dizziness.    pulse oximeter (PULSE OXIMETER) device by Apply Externally route 2  (two) times a day. Use twice daily at 8 AM and 3 PM and record the value in MyChart as directed.    sofosbuvir-velpatasvir (EPCLUSA) 400-100 mg Tab Take 1 tablet by mouth once daily.   Last reviewed on 9/9/2022 11:27 AM by Abilio Mtz MD    Review of patient's allergies indicates:  No Known Allergies Last reviewed on  10/5/2022 7:54 AM by Ginger Gunderson      Tasks added this encounter   11/10/2022 - Refill Call (Auto Added)   Tasks due within next 3 months   No tasks due.     Joanna Perez, PharmD  [facility]  [address] [facility] [address]  Phone: [phone]  Fax: [phone]

## 2022-11-09 ENCOUNTER — SPECIALTY PHARMACY (OUTPATIENT)
Dept: PHARMACY | Facility: CLINIC | Age: 62
End: 2022-11-09
Payer: MEDICAID

## 2022-11-09 NOTE — TELEPHONE ENCOUNTER
Specialty Pharmacy - Refill Coordination  Epclusa Refill 3 of 3  Specialty Medication Orders Linked to Encounter      Flowsheet Row Most Recent Value   Medication #1 sofosbuvir-velpatasvir (EPCLUSA) 400-100 mg Tab (Order#[id number], Rx#[id number])        Epclusa refill (3 of 3) confirmed and reassessment complete.     Patient has 7 doses of epclusa remaining and takes it around 8 PM daily. Pt reports they are not having any side effects at this time. Continues to hold atorvastatin.    No missed doses, no new medications, no new allergies or health conditions reported at this time. Allergies reviewed and medication reconciliation complete (reviewed and documented in [facility] and [facility]). Disease education reviewed (including transmission and prevention).     Patient counseled on importance of maintaining adherence and keeping lab appointments which were scheduled. All questions answered and addressed to patients satisfaction. Advised to call [facility] and provider if any issues arise.      Refill Questions - Documented Responses      Flowsheet Row Most Recent Value   Patient Availability and HIPAA Verification    Does patient want to proceed with activity? Yes   HIPAA/medical authority confirmed? Yes   Relationship to patient of person spoken to? Self   Refill Screening Questions    Changes to allergies? No   Changes to medications? No   New conditions since last clinic visit? No   Unplanned office visit, urgent care, ED, or hospital admission in the last 4 weeks? No   How does patient/caregiver feel medication is working? Good   Financial problems or insurance changes? No   How many doses of your specialty medications were missed in the last 4 weeks? 0   Would patient like to speak to a pharmacist? No   When does the patient need to receive the medication? 11/16/22   Refill Delivery Questions    How will the patient receive the medication? MEDRx   When does the patient need to receive the medication?  11/16/22   Shipping Address Home   Address in [facility] confirmed and updated if neccessary? Yes   Expected Copay ($) 0   Is the patient able to afford the medication copay? Yes   Payment Method zero copay   Days supply of Refill 28   Supplies needed? No supplies needed   Refill activity completed? Yes   Refill activity plan Refill scheduled   Shipment/Pickup Date: 11/10/22            Current Outpatient Medications   Medication Sig    amLODIPine (NORVASC) 10 MG tablet     aspirin 81 MG Chew Take 1 tablet (81 mg total) by mouth once daily.    atorvastatin (LIPITOR) 20 MG tablet Take 1 tablet (20 mg total) by mouth every evening.    azithromycin (Z-CHIDI) 250 MG tablet Take 2 tablets (500 mg total) by mouth once daily for day 1; then 1 tablet daily for 4 days.    carvediloL (COREG) 12.5 MG tablet Take 1 tablet (12.5 mg total) by mouth 2 (two) times daily with meals.    cetirizine (ZYRTEC) 10 MG tablet Take 1 tablet (10 mg total) by mouth once daily. for 7 doses    FLUoxetine 20 MG capsule Take 1 capsule (20 mg total) by mouth once daily.    hydrALAZINE (APRESOLINE) 25 MG tablet Take 1 tablet (25 mg total) by mouth every 8 (eight) hours.    hydroCHLOROthiazide (HYDRODIURIL) 25 MG tablet Take 1 tablet (25 mg total) by mouth once daily.    ibuprofen (ADVIL,MOTRIN) 200 MG tablet Take 200 mg by mouth every 6 (six) hours as needed for Pain.    lifitegrast (XIIDRA) 5 % Dpet Place 1 drop into both eyes 2 (two) times daily.    losartan (COZAAR) 50 MG tablet Take 1 tablet (50 mg total) by mouth once daily.    meclizine (ANTIVERT) 25 mg tablet meclizine 25 mg tablet   Take 1 tablet 3 times a day by oral route as needed for dizziness.    pulse oximeter (PULSE OXIMETER) device by Apply Externally route 2 (two) times a day. Use twice daily at 8 AM and 3 PM and [facility] as directed.    sofosbuvir-velpatasvir (EPCLUSA) 400-100 mg Tab Take 1 tablet by mouth once daily.   Last reviewed on 9/9/2022 11:27 AM by  Abilio Mtz MD    Review of patient's allergies indicates:  No Known Allergies Last reviewed on  10/5/2022 7:54 AM by Ginger Gunderson      Tasks added this encounter   12/7/2022 - Refill Call (Auto Added)   Tasks due within next 3 months   No tasks due.     Nubia [facility] - Specialty Pharm[facility]  [address] [place] [id number]  Phone: [phone]  Fax: [phone]

## 2022-12-16 DIAGNOSIS — F41.9 ANXIETY: ICD-10-CM

## 2022-12-16 DIAGNOSIS — I10 PRIMARY HYPERTENSION: ICD-10-CM

## 2022-12-16 RX ORDER — FLUOXETINE HYDROCHLORIDE 20 MG/1
20 CAPSULE ORAL DAILY
Qty: 30 CAPSULE | Refills: 2 | Status: CANCELLED | OUTPATIENT
Start: 2022-12-16

## 2022-12-16 RX ORDER — HYDROCHLOROTHIAZIDE 25 MG/1
25 TABLET ORAL DAILY
Qty: 30 TABLET | Refills: 2 | Status: CANCELLED | OUTPATIENT
Start: 2022-12-16

## 2022-12-19 DIAGNOSIS — I10 PRIMARY HYPERTENSION: ICD-10-CM

## 2022-12-19 DIAGNOSIS — F41.9 ANXIETY: ICD-10-CM

## 2022-12-19 RX ORDER — FLUOXETINE HYDROCHLORIDE 20 MG/1
20 CAPSULE ORAL DAILY
Qty: 30 CAPSULE | Refills: 2 | Status: SHIPPED | OUTPATIENT
Start: 2022-12-19 | End: 2023-04-04 | Stop reason: SDUPTHER

## 2022-12-19 RX ORDER — HYDROCHLOROTHIAZIDE 25 MG/1
25 TABLET ORAL DAILY
Qty: 30 TABLET | Refills: 2 | Status: SHIPPED | OUTPATIENT
Start: 2022-12-19 | End: 2022-12-19 | Stop reason: SDUPTHER

## 2022-12-19 RX ORDER — HYDROCHLOROTHIAZIDE 25 MG/1
25 TABLET ORAL DAILY
Qty: 30 TABLET | Refills: 2 | Status: SHIPPED | OUTPATIENT
Start: 2022-12-19 | End: 2023-02-26 | Stop reason: SDUPTHER

## 2022-12-19 RX ORDER — FLUOXETINE HYDROCHLORIDE 20 MG/1
20 CAPSULE ORAL DAILY
Qty: 30 CAPSULE | Refills: 2 | Status: SHIPPED | OUTPATIENT
Start: 2022-12-19 | End: 2022-12-19 | Stop reason: SDUPTHER

## 2022-12-21 DIAGNOSIS — J30.89 NON-SEASONAL ALLERGIC RHINITIS, UNSPECIFIED TRIGGER: Primary | ICD-10-CM

## 2022-12-21 DIAGNOSIS — J30.89 NON-SEASONAL ALLERGIC RHINITIS, UNSPECIFIED TRIGGER: ICD-10-CM

## 2022-12-21 RX ORDER — CETIRIZINE HYDROCHLORIDE 10 MG/1
10 TABLET ORAL DAILY
Qty: 7 TABLET | Refills: 0 | Status: SHIPPED | OUTPATIENT
Start: 2022-12-21 | End: 2022-12-21 | Stop reason: SDUPTHER

## 2022-12-22 RX ORDER — CETIRIZINE HYDROCHLORIDE 10 MG/1
10 TABLET ORAL DAILY
Qty: 7 TABLET | Refills: 0 | Status: SHIPPED | OUTPATIENT
Start: 2022-12-22 | End: 2023-01-31 | Stop reason: SDUPTHER

## 2023-01-24 ENCOUNTER — OFFICE VISIT (OUTPATIENT)
Dept: OTOLARYNGOLOGY | Facility: CLINIC | Age: 63
End: 2023-01-24
Payer: MEDICAID

## 2023-01-24 VITALS
DIASTOLIC BLOOD PRESSURE: 79 MMHG | TEMPERATURE: 98 F | WEIGHT: 185.63 LBS | BODY MASS INDEX: 29.07 KG/M2 | HEART RATE: 67 BPM | SYSTOLIC BLOOD PRESSURE: 134 MMHG

## 2023-01-24 DIAGNOSIS — J31.0 RHINITIS, UNSPECIFIED TYPE: ICD-10-CM

## 2023-01-24 DIAGNOSIS — J34.2 NASAL SEPTAL DEVIATION: ICD-10-CM

## 2023-01-24 DIAGNOSIS — H91.90 HEARING LOSS, UNSPECIFIED HEARING LOSS TYPE, UNSPECIFIED LATERALITY: ICD-10-CM

## 2023-01-24 DIAGNOSIS — J32.9 CHRONIC SINUSITIS, UNSPECIFIED LOCATION: Primary | ICD-10-CM

## 2023-01-24 DIAGNOSIS — J34.3 HYPERTROPHY OF BOTH INFERIOR NASAL TURBINATES: ICD-10-CM

## 2023-01-24 DIAGNOSIS — G43.809 OTHER MIGRAINE WITHOUT STATUS MIGRAINOSUS, NOT INTRACTABLE: ICD-10-CM

## 2023-01-24 PROCEDURE — 3008F BODY MASS INDEX DOCD: CPT | Mod: CPTII,,, | Performed by: STUDENT IN AN ORGANIZED HEALTH CARE EDUCATION/TRAINING PROGRAM

## 2023-01-24 PROCEDURE — 99203 PR OFFICE/OUTPT VISIT, NEW, LEVL III, 30-44 MIN: ICD-10-PCS | Mod: 25,S$PBB,, | Performed by: STUDENT IN AN ORGANIZED HEALTH CARE EDUCATION/TRAINING PROGRAM

## 2023-01-24 PROCEDURE — 1160F PR REVIEW ALL MEDS BY PRESCRIBER/CLIN PHARMACIST DOCUMENTED: ICD-10-PCS | Mod: CPTII,,, | Performed by: STUDENT IN AN ORGANIZED HEALTH CARE EDUCATION/TRAINING PROGRAM

## 2023-01-24 PROCEDURE — 1160F RVW MEDS BY RX/DR IN RCRD: CPT | Mod: CPTII,,, | Performed by: STUDENT IN AN ORGANIZED HEALTH CARE EDUCATION/TRAINING PROGRAM

## 2023-01-24 PROCEDURE — 31231 NASAL ENDOSCOPY DX: CPT | Mod: PBBFAC | Performed by: STUDENT IN AN ORGANIZED HEALTH CARE EDUCATION/TRAINING PROGRAM

## 2023-01-24 PROCEDURE — 87075 CULTR BACTERIA EXCEPT BLOOD: CPT | Performed by: STUDENT IN AN ORGANIZED HEALTH CARE EDUCATION/TRAINING PROGRAM

## 2023-01-24 PROCEDURE — 3075F SYST BP GE 130 - 139MM HG: CPT | Mod: CPTII,,, | Performed by: STUDENT IN AN ORGANIZED HEALTH CARE EDUCATION/TRAINING PROGRAM

## 2023-01-24 PROCEDURE — 99999 PR PBB SHADOW E&M-EST. PATIENT-LVL V: CPT | Mod: PBBFAC,,, | Performed by: STUDENT IN AN ORGANIZED HEALTH CARE EDUCATION/TRAINING PROGRAM

## 2023-01-24 PROCEDURE — 99215 OFFICE O/P EST HI 40 MIN: CPT | Mod: PBBFAC,25 | Performed by: STUDENT IN AN ORGANIZED HEALTH CARE EDUCATION/TRAINING PROGRAM

## 2023-01-24 PROCEDURE — 31231 NASAL ENDOSCOPY DX: CPT | Mod: S$PBB,,, | Performed by: STUDENT IN AN ORGANIZED HEALTH CARE EDUCATION/TRAINING PROGRAM

## 2023-01-24 PROCEDURE — 3078F DIAST BP <80 MM HG: CPT | Mod: CPTII,,, | Performed by: STUDENT IN AN ORGANIZED HEALTH CARE EDUCATION/TRAINING PROGRAM

## 2023-01-24 PROCEDURE — 1159F MED LIST DOCD IN RCRD: CPT | Mod: CPTII,,, | Performed by: STUDENT IN AN ORGANIZED HEALTH CARE EDUCATION/TRAINING PROGRAM

## 2023-01-24 PROCEDURE — 3078F PR MOST RECENT DIASTOLIC BLOOD PRESSURE < 80 MM HG: ICD-10-PCS | Mod: CPTII,,, | Performed by: STUDENT IN AN ORGANIZED HEALTH CARE EDUCATION/TRAINING PROGRAM

## 2023-01-24 PROCEDURE — 31231 PR NASAL ENDOSCOPY, DX: ICD-10-PCS | Mod: S$PBB,,, | Performed by: STUDENT IN AN ORGANIZED HEALTH CARE EDUCATION/TRAINING PROGRAM

## 2023-01-24 PROCEDURE — 3075F PR MOST RECENT SYSTOLIC BLOOD PRESS GE 130-139MM HG: ICD-10-PCS | Mod: CPTII,,, | Performed by: STUDENT IN AN ORGANIZED HEALTH CARE EDUCATION/TRAINING PROGRAM

## 2023-01-24 PROCEDURE — 99999 PR PBB SHADOW E&M-EST. PATIENT-LVL V: ICD-10-PCS | Mod: PBBFAC,,, | Performed by: STUDENT IN AN ORGANIZED HEALTH CARE EDUCATION/TRAINING PROGRAM

## 2023-01-24 PROCEDURE — 1159F PR MEDICATION LIST DOCUMENTED IN MEDICAL RECORD: ICD-10-PCS | Mod: CPTII,,, | Performed by: STUDENT IN AN ORGANIZED HEALTH CARE EDUCATION/TRAINING PROGRAM

## 2023-01-24 PROCEDURE — 3008F PR BODY MASS INDEX (BMI) DOCUMENTED: ICD-10-PCS | Mod: CPTII,,, | Performed by: STUDENT IN AN ORGANIZED HEALTH CARE EDUCATION/TRAINING PROGRAM

## 2023-01-24 PROCEDURE — 99203 OFFICE O/P NEW LOW 30 MIN: CPT | Mod: 25,S$PBB,, | Performed by: STUDENT IN AN ORGANIZED HEALTH CARE EDUCATION/TRAINING PROGRAM

## 2023-01-24 RX ORDER — DOXYCYCLINE HYCLATE 100 MG
100 TABLET ORAL 2 TIMES DAILY
Qty: 20 TABLET | Refills: 0 | Status: SHIPPED | OUTPATIENT
Start: 2023-01-24 | End: 2023-02-03

## 2023-01-24 NOTE — PROGRESS NOTES
Otolaryngology - Head and Neck Surgery New Patient Visit    1/24/2023    Referring Provider: Maged Chen MD    Chief Complaint   Patient presents with    Sinus Problem     Sinus problems for a couple of years     History of Present Illness, Otolaryngology Specialty-Specific Exam, and Assessment and Plan:     Eitan Cody is a 62 y.o. male who presents for evaluation of facial pressure, which has been present for over 5 years. He complains of almost daily head aches that cause him significant facial pressure. Head aches will last for about 3 hours at a time and occur most days in the week. Head aches are relieved by sitting down in a quiet room. He denies visual changes, epistaxis, anosmia, or purulent nasal drainage. He has been treated with flonase, claritin in the past. He has never had allergy testing. He denies previous skullbase surgery. He snores but denies any pauses. The assessment of quality and severity of symptoms as measured by the SNOT-22 score is 13 and the STOP-BANG score is 3.     He reports L>R hearing loss. No tinnitus.     He had a  CTH  done on 7/26/22 which I reviewed along with the associated radiology report.    On exam today, the ears are normal. The oral cavity is clear. The neck is clear. The nasopharynx, hypopharynx, and larynx are normal. Nasal endoscopy reveals midline septum. Hypertrophic inferior turbinates bilaterally. No nasal masses or nasal polyposis. No purulent drainage in the middle meatus. Nasopharynx clear without lesions. Eustachian tubes WNL.  .     Impression today is chronic sinusitis. I have recommended that he start on nasal irrigations and Flonase. A course of doxycycline has been prescribed. Cultures of the sinuses were obtained today, if the antibiotics need to be changed bases based on the sensitivities I will adjust them as needed.     I believe he had migraines in addition to my findings today that are likely the cause of his daily symptoms. Referral to neurology  placed.     Audiogram for hearing eval.     Thank you for allowing us to participate in the care of your patient. We will continue to keep you informed of his progress.    Sincerely yours,    Merritt Camargo MD      Objective     Physical Examination  Vitals -  weight is 84.2 kg (185 lb 10 oz). His temperature is 98.1 °F (36.7 °C). His blood pressure is 134/79 and his pulse is 67.   Constitutional - General appearance: Normal. Ability to communicate: Normal.  Head & Face - Overall appearance, scars, masses: Normal. Palpation &/or percussion of face: Normal. Salivary glands: Normal. Facial strength: Normal  ENMT - Otoscopic exam: Normal. Assessment of hearing: Normal. External inspection: Normal. Nasal mucosa, septum, turbinates: Abnormal see exam details. Lips, teeth, gums: Normal. Oropharynx: Normal. Pharyngeal walls/pyriform sinus: Normal. Larynx: Normal. Nasopharynx: Normal  Neck - Neck: Normal. Thyroid: Normal  Lymphatic - Palpation of lymph nodes: Normal  Eyes - Ocular mobility: Normal  Respiratory - Inspection of Chest: Normal  Cardiovascular - Peripheral vascular system: Normal  Neurological/Psychiatric - Orientation: Normal    Review of Systems  A complete review of systems was obtained 01/24/2023 and reviewed.  The review of systems is negative for symptoms except as described above.    /79   Pulse 67   Temp 98.1 °F (36.7 °C)   Wt 84.2 kg (185 lb 10 oz)   BMI 29.07 kg/m²      Nasal Endoscopy:  1/24/2023    The use of diagnostic nasal endoscopy was considered medically necessary for the evaluation and visualization of the nasal anatomy for symptoms suggestive of nasal or sinus origin. Physical examination (including a nasal speculum evaluation) did not provide sufficient clinical information to establish a diagnosis, or symptoms did not improve or worsened following treatment.     The nasal cavity was decongested with topical 1% phenylephrine and anesthetized with 4% lidocaine.  A rigid 0-degree  endoscope was introduced into the nasal cavity.    The patient was seated in the examination chair. After discussion of risks and benefits, a nasal endoscope was inserted into the nose the endoscope was passed along the left nasal floor to the nasopharynx. It was then passed between the middle and superior meatus, nasal turbinates, nasal septum, nasopharynx and sphenoethmoid region. The nasal endoscope was withdrawn and there was no complications. An identical procedure was performed on the right side. I was present for the entire procedure.The patient tolerated the above procedure well. The findings of this procedure can be found in the dictated note from 1/24/2023 visit.      Data Reviewed    WBC (K/uL)   Date Value   07/28/2022 10.94     Eosinophil % (%)   Date Value   07/28/2022 0.6     Eos # (K/uL)   Date Value   07/28/2022 0.1     Platelets (K/uL)   Date Value   07/28/2022 146 (L)     Glucose (mg/dL)   Date Value   07/28/2022 114 (H)     No results found for: IGE    I independently reviewed the images of the CT sinuses dated 7/26/22. Pertinent findings include no significant opacification of the paranasal sinuses. Conchal pneumatization of the sphenoid sinuses. Midline nasal septum. Incomplete view of turbinates.

## 2023-01-25 ENCOUNTER — CLINICAL SUPPORT (OUTPATIENT)
Dept: OTOLARYNGOLOGY | Facility: CLINIC | Age: 63
End: 2023-01-25
Payer: MEDICAID

## 2023-01-25 DIAGNOSIS — J32.9 CHRONIC SINUSITIS, UNSPECIFIED LOCATION: ICD-10-CM

## 2023-01-25 DIAGNOSIS — H93.13 TINNITUS, BILATERAL: ICD-10-CM

## 2023-01-25 DIAGNOSIS — H91.90 HEARING LOSS, UNSPECIFIED HEARING LOSS TYPE, UNSPECIFIED LATERALITY: ICD-10-CM

## 2023-01-25 DIAGNOSIS — H90.6 MIXED CONDUCTIVE AND SENSORINEURAL HEARING LOSS OF BOTH EARS: Primary | ICD-10-CM

## 2023-01-25 PROCEDURE — 99999 PR PBB SHADOW E&M-EST. PATIENT-LVL I: ICD-10-PCS | Mod: PBBFAC,,,

## 2023-01-25 PROCEDURE — 92567 TYMPANOMETRY: CPT | Mod: PBBFAC,PO

## 2023-01-25 PROCEDURE — 99211 OFF/OP EST MAY X REQ PHY/QHP: CPT | Mod: PBBFAC,PO

## 2023-01-25 PROCEDURE — 92557 COMPREHENSIVE HEARING TEST: CPT | Mod: PBBFAC,PO

## 2023-01-25 PROCEDURE — 99999 PR PBB SHADOW E&M-EST. PATIENT-LVL I: CPT | Mod: PBBFAC,,,

## 2023-01-25 NOTE — Clinical Note
Good afternoon Dr. Camargo,   Here are the results of Mr. Cody's hearing evaluation. Please let me know if you need any further information or have any questions. Thanks!  Jarrell Marshall

## 2023-01-25 NOTE — PROGRESS NOTES
Eitan Cody, a 62 y.o. male, was seen today in the clinic for an audiologic evaluation.  The patient reported bilateral hearing loss that is worse in his left ear.  Mr. Cody reported intermittent tinnitus in both ears and a history of occupational noise exposure.  Mr. Cody also reported an occasional sporadic off-balance dizziness.  There were no reports of aural fullness or otalgia.      Tympanometry revealed Type As in the right ear and Type A in the left ear.  Audiogram results revealed a mild sloping to severe rising back to moderately-severe mixed hearing loss from 750-8000 Hz with a sensorineural component at 2000 Hz in the right ear and a mild sloping to severe mixed hearing loss from 250-8000 Hz with a sensorineural component at 2000 Hz in the left ear.  Speech reception thresholds were noted at 40 dB in the right ear and 60 dB in the left ear.  Speech discrimination scores were 88% in the right ear and 72% in the left ear.    Recommendations:  Otologic evaluation due to mixed hearing loss  Hearing protection when in noise  Hearing aid consultation after medical clearance   Annual audiogram

## 2023-01-26 ENCOUNTER — TELEPHONE (OUTPATIENT)
Dept: OTOLARYNGOLOGY | Facility: CLINIC | Age: 63
End: 2023-01-26
Payer: MEDICAID

## 2023-01-30 LAB — BACTERIA SPEC ANAEROBE CULT: NORMAL

## 2023-01-31 DIAGNOSIS — J30.89 NON-SEASONAL ALLERGIC RHINITIS, UNSPECIFIED TRIGGER: ICD-10-CM

## 2023-01-31 RX ORDER — CETIRIZINE HYDROCHLORIDE 10 MG/1
10 TABLET ORAL DAILY
Qty: 7 TABLET | Refills: 0 | Status: SHIPPED | OUTPATIENT
Start: 2023-01-31 | End: 2023-04-06 | Stop reason: SDUPTHER

## 2023-02-21 RX ORDER — NAPROXEN SODIUM 220 MG/1
81 TABLET, FILM COATED ORAL DAILY
Qty: 30 TABLET | Refills: 1 | Status: CANCELLED | OUTPATIENT
Start: 2023-02-21 | End: 2024-02-21

## 2023-02-22 RX ORDER — NAPROXEN SODIUM 220 MG/1
81 TABLET, FILM COATED ORAL DAILY
Qty: 30 TABLET | Refills: 1 | Status: CANCELLED | OUTPATIENT
Start: 2023-02-21 | End: 2024-02-21

## 2023-02-22 RX ORDER — CARVEDILOL 12.5 MG/1
12.5 TABLET ORAL 2 TIMES DAILY WITH MEALS
Qty: 180 TABLET | Refills: 3 | Status: SHIPPED | OUTPATIENT
Start: 2023-02-22 | End: 2023-04-06 | Stop reason: HOSPADM

## 2023-03-02 ENCOUNTER — HOSPITAL ENCOUNTER (EMERGENCY)
Facility: HOSPITAL | Age: 63
Discharge: HOME | End: 2023-03-02
Attending: EMERGENCY MEDICINE
Payer: MEDICAID

## 2023-03-02 VITALS
DIASTOLIC BLOOD PRESSURE: 66 MMHG | HEART RATE: 75 BPM | RESPIRATION RATE: 19 BRPM | WEIGHT: 180 LBS | SYSTOLIC BLOOD PRESSURE: 132 MMHG | OXYGEN SATURATION: 95 % | BODY MASS INDEX: 28.25 KG/M2 | HEIGHT: 67 IN | TEMPERATURE: 98 F

## 2023-03-02 DIAGNOSIS — I10 HYPERTENSION: ICD-10-CM

## 2023-03-02 DIAGNOSIS — R51.9 NONINTRACTABLE HEADACHE, UNSPECIFIED CHRONICITY PATTERN, UNSPECIFIED HEADACHE TYPE: Primary | ICD-10-CM

## 2023-03-02 LAB
ALBUMIN SERPL BCP-MCNC: 3.8 G/DL (ref 3.5–5.2)
ALP SERPL-CCNC: 78 U/L (ref 55–135)
ALT SERPL W/O P-5'-P-CCNC: 33 U/L (ref 10–44)
ANION GAP SERPL CALC-SCNC: 10 MMOL/L (ref 8–16)
AST SERPL-CCNC: 45 U/L (ref 10–40)
BASOPHILS # BLD AUTO: 0.05 K/UL (ref 0–0.2)
BASOPHILS NFR BLD: 1.1 % (ref 0–1.9)
BILIRUB SERPL-MCNC: 1.3 MG/DL (ref 0.1–1)
BILIRUB UR QL STRIP: NEGATIVE
BUN SERPL-MCNC: 15 MG/DL (ref 8–23)
CALCIUM SERPL-MCNC: 9.3 MG/DL (ref 8.7–10.5)
CHLORIDE SERPL-SCNC: 104 MMOL/L (ref 95–110)
CLARITY UR: CLEAR
CO2 SERPL-SCNC: 23 MMOL/L (ref 23–29)
COLOR UR: YELLOW
CREAT SERPL-MCNC: 0.9 MG/DL (ref 0.5–1.4)
DIFFERENTIAL METHOD: ABNORMAL
EOSINOPHIL # BLD AUTO: 0.1 K/UL (ref 0–0.5)
EOSINOPHIL NFR BLD: 2.3 % (ref 0–8)
ERYTHROCYTE [DISTWIDTH] IN BLOOD BY AUTOMATED COUNT: 13.1 % (ref 11.5–14.5)
EST. GFR  (NO RACE VARIABLE): >60 ML/MIN/1.73 M^2
ETHANOL SERPL-MCNC: <10 MG/DL
GLUCOSE SERPL-MCNC: 132 MG/DL (ref 70–110)
GLUCOSE UR QL STRIP: ABNORMAL
HCT VFR BLD AUTO: 37.1 % (ref 40–54)
HGB BLD-MCNC: 13.1 G/DL (ref 14–18)
HGB UR QL STRIP: NEGATIVE
IMM GRANULOCYTES # BLD AUTO: 0.01 K/UL (ref 0–0.04)
IMM GRANULOCYTES NFR BLD AUTO: 0.2 % (ref 0–0.5)
INFLUENZA A, MOLECULAR: NEGATIVE
INFLUENZA B, MOLECULAR: NEGATIVE
KETONES UR QL STRIP: NEGATIVE
LEUKOCYTE ESTERASE UR QL STRIP: NEGATIVE
LIPASE SERPL-CCNC: 54 U/L (ref 4–60)
LYMPHOCYTES # BLD AUTO: 1.5 K/UL (ref 1–4.8)
LYMPHOCYTES NFR BLD: 31.8 % (ref 18–48)
MCH RBC QN AUTO: 32.7 PG (ref 27–31)
MCHC RBC AUTO-ENTMCNC: 35.3 G/DL (ref 32–36)
MCV RBC AUTO: 93 FL (ref 82–98)
MONOCYTES # BLD AUTO: 0.6 K/UL (ref 0.3–1)
MONOCYTES NFR BLD: 13.3 % (ref 4–15)
NEUTROPHILS # BLD AUTO: 2.4 K/UL (ref 1.8–7.7)
NEUTROPHILS NFR BLD: 51.3 % (ref 38–73)
NITRITE UR QL STRIP: NEGATIVE
NRBC BLD-RTO: 0 /100 WBC
PH UR STRIP: 7 [PH] (ref 5–8)
PLATELET # BLD AUTO: 97 K/UL (ref 150–450)
PMV BLD AUTO: 12.3 FL (ref 9.2–12.9)
POTASSIUM SERPL-SCNC: 4.3 MMOL/L (ref 3.5–5.1)
PROT SERPL-MCNC: 6.9 G/DL (ref 6–8.4)
PROT UR QL STRIP: NEGATIVE
RBC # BLD AUTO: 4.01 M/UL (ref 4.6–6.2)
SARS-COV-2 RDRP RESP QL NAA+PROBE: NEGATIVE
SODIUM SERPL-SCNC: 137 MMOL/L (ref 136–145)
SP GR UR STRIP: 1.01 (ref 1–1.03)
SPECIMEN SOURCE: NORMAL
TROPONIN I SERPL DL<=0.01 NG/ML-MCNC: 0.03 NG/ML (ref 0–0.03)
URN SPEC COLLECT METH UR: ABNORMAL
UROBILINOGEN UR STRIP-ACNC: NEGATIVE EU/DL
WBC # BLD AUTO: 4.72 K/UL (ref 3.9–12.7)

## 2023-03-02 PROCEDURE — 87502 INFLUENZA DNA AMP PROBE: CPT | Performed by: EMERGENCY MEDICINE

## 2023-03-02 PROCEDURE — 99284 EMERGENCY DEPT VISIT MOD MDM: CPT | Mod: 25

## 2023-03-02 PROCEDURE — 63600175 PHARM REV CODE 636 W HCPCS: Performed by: EMERGENCY MEDICINE

## 2023-03-02 PROCEDURE — 96375 TX/PRO/DX INJ NEW DRUG ADDON: CPT

## 2023-03-02 PROCEDURE — 96361 HYDRATE IV INFUSION ADD-ON: CPT

## 2023-03-02 PROCEDURE — 93005 ELECTROCARDIOGRAM TRACING: CPT

## 2023-03-02 PROCEDURE — 84484 ASSAY OF TROPONIN QUANT: CPT | Performed by: EMERGENCY MEDICINE

## 2023-03-02 PROCEDURE — 80053 COMPREHEN METABOLIC PANEL: CPT | Performed by: EMERGENCY MEDICINE

## 2023-03-02 PROCEDURE — 81003 URINALYSIS AUTO W/O SCOPE: CPT | Mod: 59 | Performed by: EMERGENCY MEDICINE

## 2023-03-02 PROCEDURE — 83690 ASSAY OF LIPASE: CPT | Performed by: EMERGENCY MEDICINE

## 2023-03-02 PROCEDURE — 85025 COMPLETE CBC W/AUTO DIFF WBC: CPT | Performed by: EMERGENCY MEDICINE

## 2023-03-02 PROCEDURE — U0002 COVID-19 LAB TEST NON-CDC: HCPCS | Performed by: EMERGENCY MEDICINE

## 2023-03-02 PROCEDURE — 82077 ASSAY SPEC XCP UR&BREATH IA: CPT | Performed by: EMERGENCY MEDICINE

## 2023-03-02 PROCEDURE — 93010 EKG 12-LEAD: ICD-10-PCS | Mod: ,,, | Performed by: INTERNAL MEDICINE

## 2023-03-02 PROCEDURE — 93010 ELECTROCARDIOGRAM REPORT: CPT | Mod: ,,, | Performed by: INTERNAL MEDICINE

## 2023-03-02 PROCEDURE — 96374 THER/PROPH/DIAG INJ IV PUSH: CPT

## 2023-03-02 PROCEDURE — 25000003 PHARM REV CODE 250: Performed by: EMERGENCY MEDICINE

## 2023-03-02 RX ORDER — DIPHENHYDRAMINE HYDROCHLORIDE 50 MG/ML
12.5 INJECTION INTRAMUSCULAR; INTRAVENOUS
Status: COMPLETED | OUTPATIENT
Start: 2023-03-02 | End: 2023-03-02

## 2023-03-02 RX ORDER — BUTALBITAL, ACETAMINOPHEN AND CAFFEINE 50; 325; 40 MG/1; MG/1; MG/1
1 TABLET ORAL
Status: COMPLETED | OUTPATIENT
Start: 2023-03-02 | End: 2023-03-02

## 2023-03-02 RX ORDER — LOSARTAN POTASSIUM 50 MG/1
50 TABLET ORAL ONCE
Status: COMPLETED | OUTPATIENT
Start: 2023-03-02 | End: 2023-03-02

## 2023-03-02 RX ORDER — BUTALBITAL, ACETAMINOPHEN AND CAFFEINE 50; 325; 40 MG/1; MG/1; MG/1
1 TABLET ORAL EVERY 6 HOURS PRN
Qty: 12 TABLET | Refills: 0 | Status: SHIPPED | OUTPATIENT
Start: 2023-03-02 | End: 2023-04-05

## 2023-03-02 RX ORDER — METOCLOPRAMIDE HYDROCHLORIDE 5 MG/ML
5 INJECTION INTRAMUSCULAR; INTRAVENOUS
Status: COMPLETED | OUTPATIENT
Start: 2023-03-02 | End: 2023-03-02

## 2023-03-02 RX ORDER — DROPERIDOL 2.5 MG/ML
1.25 INJECTION, SOLUTION INTRAMUSCULAR; INTRAVENOUS ONCE
Status: COMPLETED | OUTPATIENT
Start: 2023-03-02 | End: 2023-03-02

## 2023-03-02 RX ORDER — HYDRALAZINE HYDROCHLORIDE 25 MG/1
25 TABLET, FILM COATED ORAL ONCE
Status: COMPLETED | OUTPATIENT
Start: 2023-03-02 | End: 2023-03-02

## 2023-03-02 RX ADMIN — LOSARTAN POTASSIUM 50 MG: 50 TABLET, FILM COATED ORAL at 10:03

## 2023-03-02 RX ADMIN — BUTALBITAL, ACETAMINOPHEN, AND CAFFEINE 1 TABLET: 50; 325; 40 TABLET ORAL at 02:03

## 2023-03-02 RX ADMIN — DROPERIDOL 1.25 MG: 2.5 INJECTION, SOLUTION INTRAMUSCULAR; INTRAVENOUS at 12:03

## 2023-03-02 RX ADMIN — SODIUM CHLORIDE 1000 ML: 0.9 INJECTION, SOLUTION INTRAVENOUS at 10:03

## 2023-03-02 RX ADMIN — DIPHENHYDRAMINE HYDROCHLORIDE 12.5 MG: 50 INJECTION INTRAMUSCULAR; INTRAVENOUS at 10:03

## 2023-03-02 RX ADMIN — HYDRALAZINE HYDROCHLORIDE 25 MG: 25 TABLET, FILM COATED ORAL at 10:03

## 2023-03-02 RX ADMIN — METOCLOPRAMIDE 5 MG: 5 INJECTION, SOLUTION INTRAMUSCULAR; INTRAVENOUS at 10:03

## 2023-03-02 NOTE — PHARMACY MED REC
"  Admission Medication History     The home medication history was taken by Juli Rosas CPhT.    Medication history obtained from, Patient Fiance Verified    You may go to "Admission" then "Reconcile Home Medications" tabs to review and/or act upon these items.     The home medication list has been updated by the Pharmacy department.   Please read ALL comments highlighted in yellow.   Please address this information as you see fit.    Feel free to contact us if you have any questions or require assistance.      The medications listed below were removed from the home medication list.  Please reorder if appropriate:  Patient reports no longer taking the following medication(s):  Azithromycin 250 mg        Juli Rosas CPhT.  Ext [phone]             .        "

## 2023-03-02 NOTE — ED NOTES
"Pt presents to ED for "massive HA," fatigue, and chills that began 4d ago; took advil w/o relief. Also states compliance with BP meds except this am. Family at bs, call light in reach and instructed how to use.    MD aware pt went to bathroom, unable to obtain UA at this time.  "

## 2023-03-02 NOTE — DISCHARGE INSTRUCTIONS
Your labs are reassuring. Your headache improved with medications. You do not need to stay in the hospital. Please follow up with your primary doctor and continue to take your medications at home.   Abilio Mtz MD  [address] [facility] [address]  [place] [address] [phone]    Schedule an appointment as soon as possible for a visit in 3 days

## 2023-03-02 NOTE — ED NOTES
Provided urinal and warm blanket. Still has HA however not as prominent, states usually takes awhile for HA to resolve.

## 2023-03-02 NOTE — ED PROVIDER NOTES
Emergency Department Encounter  Provider Note  Encounter Date: 3/2/2023    Patient Name: Eitan Cody  MRN: [id number]    History of Present Illness   HPI  History of Present Illness:    Chief Complaint:   Chief Complaint   Patient presents with    Headache     Headache, chills and fatigue x weeks. States he is normally complaint with BP meds but did not take them this am.        62m with hx htn, headache, pw headache, chills, fatigue x a few weeks. Did not take his BP meds today. Denies sick contacts, chest pain, n/v, abd pain, urinary sxs. Denies neck pain.     The following PMH/PSH/SocHx/FamHx has been reviewed by myself:    Past Medical History:   Diagnosis Date    Hypertension      Past Surgical History:   Procedure Laterality Date    COLONOSCOPY N/A 6/30/2022    Procedure: COLONOSCOPY;  Surgeon: VIRIDIANA Swanson MD;  Location: [facility] ENDO;  Service: Endoscopy;  Laterality: N/A;    LEFT HEART CATHETERIZATION Left 2/16/2022    Procedure: Left heart cath;  Surgeon: Thierno Kidd III, MD;  Location: [facility] CATH LAB;  Service: Cardiology;  Laterality: Left;     Social History     Tobacco Use    Smoking status: Never    Smokeless tobacco: Never   Substance Use Topics    Alcohol use: Yes     Alcohol/week: 6.0 standard drinks     Types: 6 Cans of beer per week     Comment: daily    Drug use: No     No family history on file.    Allergies reviewed:  Review of patient's allergies indicates:   Allergen Reactions    Poison ivy extract Itching       Medications reviewed:  Medication List with Changes/Refills   New Medications    BUTALBITAL-ACETAMINOPHEN-CAFFEINE -40 MG (FIORICET, ESGIC) -40 MG PER TABLET    Take 1 tablet by mouth every 6 (six) hours as needed for Headaches.   Current Medications    AMLODIPINE (NORVASC) 10 MG TABLET    Take 10 mg by mouth once daily.    ASPIRIN 81 MG CHEW    Take 1 tablet (81 mg total) by mouth once daily.    ATORVASTATIN (LIPITOR) 20 MG TABLET    Take 1 tablet (20 mg total) by  mouth every evening.    CARVEDILOL (COREG) 12.5 MG TABLET    Take 1 tablet (12.5 mg total) by mouth 2 (two) times daily with meals.    CETIRIZINE (ZYRTEC) 10 MG TABLET    Take 1 tablet (10 mg total) by mouth once daily. for 7 doses    FLUOXETINE 20 MG CAPSULE    Take 1 capsule (20 mg total) by mouth once daily.    HYDRALAZINE (APRESOLINE) 25 MG TABLET    Take 1 tablet (25 mg total) by mouth every 8 (eight) hours.    HYDROCHLOROTHIAZIDE (HYDRODIURIL) 25 MG TABLET    Take 1 tablet (25 mg total) by mouth once daily.    IBUPROFEN (ADVIL,MOTRIN) 200 MG TABLET    Take 200 mg by mouth every 6 (six) hours as needed for Pain.    LIFITEGRAST (XIIDRA) 5 % DPET    Place 1 drop into both eyes 2 (two) times daily.    LOSARTAN (COZAAR) 50 MG TABLET    Take 1 tablet (50 mg total) by mouth once daily.    MECLIZINE (ANTIVERT) 25 MG TABLET    Take 25 mg by mouth 3 (three) times daily as needed for Dizziness.    PULSE OXIMETER (PULSE OXIMETER) DEVICE    by Apply Externally route 2 (two) times a day. Use twice daily at 8 AM and 3 PM and [facility] as directed.   Discontinued Medications    AZITHROMYCIN (Z-CHIDI) 250 MG TABLET    Take 2 tablets (500 mg total) by mouth once daily for day 1; then 1 tablet daily for 4 days.       ROS  Review of Systems:    Constitutional:  Negative for fever.   HENT:  Negative for sore throat.    Eyes:  Negative for redness.   Respiratory:  Negative for shortness of breath.    Cardiovascular:  Negative for chest pain.   Gastrointestinal:  Negative for nausea.   Genitourinary:  Negative for dysuria.   Musculoskeletal:  Negative for back pain.   Skin:  Negative for rash.   Neurological:  Negative for weakness.   Hematological:  Does not bruise/bleed easily.   Psychiatric/Behavioral:  The patient is not nervous/anxious.      Physical Exam   Physical Exam    Initial Vitals [03/02/23 0835]   BP Pulse Resp Temp SpO2   (!) 207/92 72 19 98.3 °F (36.8 °C) 96 %      MAP       --           Triage vital  signs reviewed.    Constitutional: Well-nourished, well-developed. Not in acute distress.  HENT: Normocephalic, atraumatic. Moist mucous membranes.  Eyes: No conjunctival injection. PERRL.  Neck:  No meningismus.  Resp: Normal respiratory effort, breathing unlabored.  Cardio: Regular rate and rhythm.  GI: Abdomen non-distended.   MSK: Extremities without any deformities noted. No lower extremity edema.  Skin: Warm and dry. No rashes or lesions noted.  Neuro: Awake and alert. Moves all extremities. No focal deficits.    ED Course   Procedures    Medical Decision Making    History Acquisition     The history is provided by the patient.   Assessment requiring an independent historian and why historian was needed: family member at bedside supplementing hx    Review of prior external/non ED notes: discharge summary from 7/2021 for headache, hypertension, dc with fioricet.     Differential Diagnoses   Based on available information and initial assessment, the working differential diagnoses considered during this evaluation include but are not limited to Ischemic stroke, hemorrhagic stroke, subarachnoid hemorrhage/ruptured aneurysm, intracranial lesion/mass, meningitis/encephalitis, epidural hematoma, subdural hematoma, pseudotumor cerebri, venous sinus thrombosis, CO poisoning, hypertensive encephalopathy, MI/ACS, head trauma/contusion, concussion, sinus headache, dehydration, anxiety, medication non-compliance, primary headache (tension/cluster/migraine).  .    EKG   EKG ordered and independently reviewed by me:   Normal sinus rhythm, rate 65, no STEMI, normal intervals, normal axis    Labs   Lab tests ordered and independently reviewed by me:    Labs Reviewed   CBC W/ AUTO DIFFERENTIAL - Abnormal; Notable for the following components:       Result Value    RBC 4.01 (*)     Hemoglobin 13.1 (*)     Hematocrit 37.1 (*)     MCH 32.7 (*)     Platelets 97 (*)     All other components within normal limits   COMPREHENSIVE  METABOLIC PANEL - Abnormal; Notable for the following components:    Glucose 132 (*)     Total Bilirubin 1.3 (*)     AST 45 (*)     All other components within normal limits   URINALYSIS, REFLEX TO URINE CULTURE - Abnormal; Notable for the following components:    Glucose, UA Trace (*)     All other components within normal limits    Narrative:     Specimen Source->Urine   INFLUENZA A & B BY MOLECULAR   LIPASE   TROPONIN I   ALCOHOL,MEDICAL (ETHANOL)   SARS-COV-2 RNA AMPLIFICATION, QUAL         Imaging   Imaging ordered and independently reviewed by me:   Imaging Results    None          Not indicated     Additional Consideration   Eitan Cody  presents to the emergency Department today with headache, hypertension. No evidence end organ damage on exam. Nonfocal exam, low concern for stroke. No AMS. No meningismus concerning for meningitis or infection. Pt slept in the ER after medications. Was admitted in July for similar sxs, dc with fioricet. Will have him f/u with PCP.     Additional testing considered but not indicated during the course of this workup: further imaging and labwork, not indicated  Procedures done in the ED or plan for the OR: No  Social determinants of care considered during development of treatment plan include: decreased medical literacy  Co-morbidities/chronic illness/exacerbation of chronic illness considered which impacted clinical decision making: hypertension  Discussion of management or test interpretation with external provider: No  DNR discussion: No    The patient's list of active medications and allergies as documented per RN staff has been reviewed.  Medications given in the ED and/or prescribed:   Medications   butalbital-acetaminophen-caffeine -40 mg per tablet 1 tablet (has no administration in time range)   metoclopramide HCl injection 5 mg (5 mg Intravenous Given 3/2/23 1005)   sodium chloride 0.9% bolus 1,000 mL 1,000 mL (0 mLs Intravenous Stopped 3/2/23 1110)    diphenhydrAMINE injection 12.5 mg (12.5 mg Intravenous Given 3/2/23 1006)   losartan tablet 50 mg (50 mg Oral Given 3/2/23 1008)   hydrALAZINE tablet 25 mg (25 mg Oral Given 3/2/23 1008)   droperidoL injection 1.25 mg (1.25 mg Intravenous Given 3/2/23 1207)             ED Course as of 03/02/23 1419   Thu Mar 02, 2023   1140 Patient does not feel better after initial medications.  Blood pressure did come down.  Will try different medications for his headache. [CS]   1141 Influenza A & B by Molecular [CS]   1141 Troponin I [CS]   1141 Ethanol [CS]   1141 CBC auto differential(!) [CS]   1141 Lipase [CS]   1141 COVID-19 Rapid Screening [CS]   1141 Comprehensive metabolic panel(!) [CS]   1418 Explained results to patient and wife.  Still complaining of headache although improved.  Fioricet has helped the patient in the past, will give dose here, and discharge.  No red flags on exam.  Patient has been having worsening headaches and will seek care with Neurology.  Will discharge with small prescription for Fioricet. [CS]      ED Course User Index  [CS] Kristyn Heath MD       Explanation of disposition:     Clinical Impression:     1. Nonintractable headache, unspecified chronicity pattern, unspecified headache type    2. Hypertension        All results from the workup were reviewed with the patient/family in detail. I discussed with the patient and/or the family/caregiver that today's evaluation in the ED does not suggest any emergent or life-threatening medical conditions that would require hospitalization or immediate intervention beyond what was provided in the ED. I believe the patient is safe for discharge.  However, a reassuring evaluation in the ED does not preclude the presence or development of a serious or life-threatening condition. As such, strict return precautions were discussed with the patient expressing understanding of instructions, and all questions answered. The patient/family communicates  understanding of all this information and all remaining questions and concerns were addressed at this time.    The patient/family has been provided with verbal and printed direction regarding our final diagnosis(es) as well as instructions regarding use of OTC and/or Rx medications intended to manage the patient's aforementioned conditions including:  ED Prescriptions       Medication Sig Dispense Start Date End Date Auth. Provider    butalbital-acetaminophen-caffeine -40 mg (FIORICET, ESGIC) -40 mg per tablet Take 1 tablet by mouth every 6 (six) hours as needed for Headaches. 12 tablet 3/2/2023 -- Kritsyn Heath MD          The patient's condition did not warrant review of the  and prescription of controlled substances.      ED Disposition Condition    Discharge Stable               Kristyn Heath MD  03/02/23 3548

## 2023-03-03 ENCOUNTER — PATIENT OUTREACH (OUTPATIENT)
Dept: EMERGENCY MEDICINE | Facility: HOSPITAL | Age: 63
End: 2023-03-03
Payer: MEDICAID

## 2023-03-08 ENCOUNTER — LAB VISIT (OUTPATIENT)
Dept: LAB | Facility: HOSPITAL | Age: 63
End: 2023-03-08
Payer: MEDICAID

## 2023-03-08 ENCOUNTER — OFFICE VISIT (OUTPATIENT)
Dept: FAMILY MEDICINE | Facility: HOSPITAL | Age: 63
End: 2023-03-08
Payer: MEDICAID

## 2023-03-08 VITALS
HEIGHT: 67 IN | BODY MASS INDEX: 29.9 KG/M2 | WEIGHT: 190.5 LBS | HEART RATE: 80 BPM | DIASTOLIC BLOOD PRESSURE: 81 MMHG | SYSTOLIC BLOOD PRESSURE: 157 MMHG

## 2023-03-08 DIAGNOSIS — B18.2 CHRONIC HEPATITIS C WITHOUT HEPATIC COMA: ICD-10-CM

## 2023-03-08 DIAGNOSIS — I10 PRIMARY HYPERTENSION: Primary | ICD-10-CM

## 2023-03-08 PROCEDURE — 36415 COLL VENOUS BLD VENIPUNCTURE: CPT | Performed by: STUDENT IN AN ORGANIZED HEALTH CARE EDUCATION/TRAINING PROGRAM

## 2023-03-08 PROCEDURE — 87522 HEPATITIS C REVRS TRNSCRPJ: CPT | Performed by: STUDENT IN AN ORGANIZED HEALTH CARE EDUCATION/TRAINING PROGRAM

## 2023-03-08 PROCEDURE — 99214 OFFICE O/P EST MOD 30 MIN: CPT | Performed by: STUDENT IN AN ORGANIZED HEALTH CARE EDUCATION/TRAINING PROGRAM

## 2023-03-08 RX ORDER — AMLODIPINE, VALSARTAN AND HYDROCHLOROTHIAZIDE 10; 160; 25 MG/1; MG/1; MG/1
1 TABLET ORAL DAILY
Qty: 90 EACH | Refills: 5 | Status: SHIPPED | OUTPATIENT
Start: 2023-03-08 | End: 2023-04-18 | Stop reason: ALTCHOICE

## 2023-03-08 NOTE — PROGRESS NOTES
Subjective:       Patient ID: Eitan Cody is a 62 y.o. male.    Chief Complaint: Follow-up (HTN)    HPI 61 yo male here with wife    #HTN  ED visit 3/2 for /92 and headache, given Fioricet   Reports he is adherent to antihypertensive medications  Amlodipine 10, carvedilol 12.5, hydralazine 25, hydrochlorothiazide 25, losartan 50  Home BP checks in 170s   Today 157/81, repeat manual 148/80  Currently reports headache, same as usual headache he has been having over the past months   Denies f/c, n/v, cp, sob, weakness, dizziness, vision changes.     #Headahces  Chronic 1-3 months, likely related to uncontrolled hypertension  Appointment with neurology     #h/o Hep c, treated  Requesting test of cure    Past Medical History:   Diagnosis Date    Hypertension      Review of Systems     10 point review of systems was conducted and only the pertinent positives and pertinent negatives are noted above in the HPI section.    Objective:      Vitals:    03/08/23 1517   BP: (!) 157/81   Pulse: 80   Manual repeat /80    Physical Exam  Vitals reviewed.   Constitutional:       General: He is not in acute distress.     Appearance: Normal appearance. He is not ill-appearing.   Eyes:      Extraocular Movements: Extraocular movements intact.      Conjunctiva/sclera: Conjunctivae normal.      Pupils: Pupils are equal, round, and reactive to light.   Cardiovascular:      Rate and Rhythm: Normal rate and regular rhythm.      Pulses: Normal pulses.      Heart sounds: Normal heart sounds. No murmur heard.  Pulmonary:      Effort: Pulmonary effort is normal. No respiratory distress.      Breath sounds: Normal breath sounds.   Abdominal:      General: There is no distension.   Neurological:      General: No focal deficit present.      Mental Status: He is alert and oriented to person, place, and time.   Psychiatric:         Mood and Affect: Mood normal.         Behavior: Behavior normal.       Assessment:       1. Primary  hypertension    2. Chronic hepatitis C without hepatic coma        Plan:       Primary hypertension   Unstable  -     amLODIPine-valsartan-hcthiazid (EXFORGE HCT) -25 mg Tab; Take 1 each by mouth once daily.  Dispense: 90 each; Refill: 5    Chronic hepatitis C without hepatic coma  -     HEPATITIS C RNA, QUANTITATIVE, PCR; Future; Expected date: 03/08/2023      Follow up in about 2 weeks (around 3/22/2023), or if symptoms worsen or fail to improve, for follow up BP with PCP.        Sussy Mayer MD, [facility] PGY-3  03/08/2023

## 2023-03-09 LAB
HCV RNA SERPL QL NAA+PROBE: NOT DETECTED
HCV RNA SPEC NAA+PROBE-ACNC: NOT DETECTED IU/ML

## 2023-03-14 ENCOUNTER — TELEPHONE (OUTPATIENT)
Dept: PHARMACY | Facility: CLINIC | Age: 63
End: 2023-03-14
Payer: MEDICAID

## 2023-03-24 ENCOUNTER — OFFICE VISIT (OUTPATIENT)
Dept: OTOLARYNGOLOGY | Facility: CLINIC | Age: 63
End: 2023-03-24
Payer: MEDICAID

## 2023-03-24 VITALS
HEIGHT: 67 IN | DIASTOLIC BLOOD PRESSURE: 70 MMHG | BODY MASS INDEX: 29.55 KG/M2 | HEART RATE: 75 BPM | WEIGHT: 188.25 LBS | SYSTOLIC BLOOD PRESSURE: 112 MMHG

## 2023-03-24 DIAGNOSIS — G43.809 OTHER MIGRAINE WITHOUT STATUS MIGRAINOSUS, NOT INTRACTABLE: ICD-10-CM

## 2023-03-24 DIAGNOSIS — J34.89 OTHER SPECIFIED DISORDERS OF NOSE AND NASAL SINUSES: Primary | ICD-10-CM

## 2023-03-24 DIAGNOSIS — J34.2 NASAL SEPTAL DEVIATION: ICD-10-CM

## 2023-03-24 DIAGNOSIS — J34.3 HYPERTROPHY OF BOTH INFERIOR NASAL TURBINATES: ICD-10-CM

## 2023-03-24 DIAGNOSIS — H91.90 HEARING LOSS, UNSPECIFIED HEARING LOSS TYPE, UNSPECIFIED LATERALITY: Primary | ICD-10-CM

## 2023-03-24 PROCEDURE — 31231 PR NASAL ENDOSCOPY, DX: ICD-10-PCS | Mod: S$PBB,,, | Performed by: STUDENT IN AN ORGANIZED HEALTH CARE EDUCATION/TRAINING PROGRAM

## 2023-03-24 PROCEDURE — 99213 PR OFFICE/OUTPT VISIT, EST, LEVL III, 20-29 MIN: ICD-10-PCS | Mod: 25,S$PBB,, | Performed by: STUDENT IN AN ORGANIZED HEALTH CARE EDUCATION/TRAINING PROGRAM

## 2023-03-24 PROCEDURE — 3078F DIAST BP <80 MM HG: CPT | Mod: CPTII,,, | Performed by: STUDENT IN AN ORGANIZED HEALTH CARE EDUCATION/TRAINING PROGRAM

## 2023-03-24 PROCEDURE — 31231 NASAL ENDOSCOPY DX: CPT | Mod: S$PBB,,, | Performed by: STUDENT IN AN ORGANIZED HEALTH CARE EDUCATION/TRAINING PROGRAM

## 2023-03-24 PROCEDURE — 99213 OFFICE O/P EST LOW 20 MIN: CPT | Mod: 25,S$PBB,, | Performed by: STUDENT IN AN ORGANIZED HEALTH CARE EDUCATION/TRAINING PROGRAM

## 2023-03-24 PROCEDURE — 3008F BODY MASS INDEX DOCD: CPT | Mod: CPTII,,, | Performed by: STUDENT IN AN ORGANIZED HEALTH CARE EDUCATION/TRAINING PROGRAM

## 2023-03-24 PROCEDURE — 1159F PR MEDICATION LIST DOCUMENTED IN MEDICAL RECORD: ICD-10-PCS | Mod: CPTII,,, | Performed by: STUDENT IN AN ORGANIZED HEALTH CARE EDUCATION/TRAINING PROGRAM

## 2023-03-24 PROCEDURE — 99213 PR OFFICE/OUTPT VISIT, EST, LEVL III, 20-29 MIN: ICD-10-PCS | Mod: S$PBB,,, | Performed by: OTOLARYNGOLOGY

## 2023-03-24 PROCEDURE — 99999 PR PBB SHADOW E&M-EST. PATIENT-LVL II: ICD-10-PCS | Mod: PBBFAC,,, | Performed by: OTOLARYNGOLOGY

## 2023-03-24 PROCEDURE — 3074F PR MOST RECENT SYSTOLIC BLOOD PRESSURE < 130 MM HG: ICD-10-PCS | Mod: CPTII,,, | Performed by: STUDENT IN AN ORGANIZED HEALTH CARE EDUCATION/TRAINING PROGRAM

## 2023-03-24 PROCEDURE — 87186 SC STD MICRODIL/AGAR DIL: CPT | Performed by: STUDENT IN AN ORGANIZED HEALTH CARE EDUCATION/TRAINING PROGRAM

## 2023-03-24 PROCEDURE — 1160F RVW MEDS BY RX/DR IN RCRD: CPT | Mod: CPTII,,, | Performed by: STUDENT IN AN ORGANIZED HEALTH CARE EDUCATION/TRAINING PROGRAM

## 2023-03-24 PROCEDURE — 3078F PR MOST RECENT DIASTOLIC BLOOD PRESSURE < 80 MM HG: ICD-10-PCS | Mod: CPTII,,, | Performed by: STUDENT IN AN ORGANIZED HEALTH CARE EDUCATION/TRAINING PROGRAM

## 2023-03-24 PROCEDURE — 99999 PR PBB SHADOW E&M-EST. PATIENT-LVL IV: ICD-10-PCS | Mod: PBBFAC,,, | Performed by: STUDENT IN AN ORGANIZED HEALTH CARE EDUCATION/TRAINING PROGRAM

## 2023-03-24 PROCEDURE — 3008F PR BODY MASS INDEX (BMI) DOCUMENTED: ICD-10-PCS | Mod: CPTII,,, | Performed by: STUDENT IN AN ORGANIZED HEALTH CARE EDUCATION/TRAINING PROGRAM

## 2023-03-24 PROCEDURE — 87077 CULTURE AEROBIC IDENTIFY: CPT | Performed by: STUDENT IN AN ORGANIZED HEALTH CARE EDUCATION/TRAINING PROGRAM

## 2023-03-24 PROCEDURE — 99213 OFFICE O/P EST LOW 20 MIN: CPT | Mod: S$PBB,,, | Performed by: OTOLARYNGOLOGY

## 2023-03-24 PROCEDURE — 1159F MED LIST DOCD IN RCRD: CPT | Mod: CPTII,,, | Performed by: STUDENT IN AN ORGANIZED HEALTH CARE EDUCATION/TRAINING PROGRAM

## 2023-03-24 PROCEDURE — 87075 CULTR BACTERIA EXCEPT BLOOD: CPT | Performed by: STUDENT IN AN ORGANIZED HEALTH CARE EDUCATION/TRAINING PROGRAM

## 2023-03-24 PROCEDURE — 31231 NASAL ENDOSCOPY DX: CPT | Mod: PBBFAC | Performed by: STUDENT IN AN ORGANIZED HEALTH CARE EDUCATION/TRAINING PROGRAM

## 2023-03-24 PROCEDURE — 87070 CULTURE OTHR SPECIMN AEROBIC: CPT | Performed by: STUDENT IN AN ORGANIZED HEALTH CARE EDUCATION/TRAINING PROGRAM

## 2023-03-24 PROCEDURE — 99214 OFFICE O/P EST MOD 30 MIN: CPT | Mod: PBBFAC,27 | Performed by: STUDENT IN AN ORGANIZED HEALTH CARE EDUCATION/TRAINING PROGRAM

## 2023-03-24 PROCEDURE — 99212 OFFICE O/P EST SF 10 MIN: CPT | Mod: PBBFAC | Performed by: OTOLARYNGOLOGY

## 2023-03-24 PROCEDURE — 99999 PR PBB SHADOW E&M-EST. PATIENT-LVL II: CPT | Mod: PBBFAC,,, | Performed by: OTOLARYNGOLOGY

## 2023-03-24 PROCEDURE — 99999 PR PBB SHADOW E&M-EST. PATIENT-LVL IV: CPT | Mod: PBBFAC,,, | Performed by: STUDENT IN AN ORGANIZED HEALTH CARE EDUCATION/TRAINING PROGRAM

## 2023-03-24 PROCEDURE — 1160F PR REVIEW ALL MEDS BY PRESCRIBER/CLIN PHARMACIST DOCUMENTED: ICD-10-PCS | Mod: CPTII,,, | Performed by: STUDENT IN AN ORGANIZED HEALTH CARE EDUCATION/TRAINING PROGRAM

## 2023-03-24 PROCEDURE — 3074F SYST BP LT 130 MM HG: CPT | Mod: CPTII,,, | Performed by: STUDENT IN AN ORGANIZED HEALTH CARE EDUCATION/TRAINING PROGRAM

## 2023-03-24 RX ORDER — SULFAMETHOXAZOLE AND TRIMETHOPRIM 800; 160 MG/1; MG/1
1 TABLET ORAL 2 TIMES DAILY
Qty: 20 TABLET | Refills: 0 | Status: SHIPPED | OUTPATIENT
Start: 2023-03-24 | End: 2023-04-06 | Stop reason: HOSPADM

## 2023-03-24 NOTE — PROGRESS NOTES
"    Subjective:      Eitan is a 62 y.o. male who comes for follow-up of  head aches .  His last visit with me was on 1/24/2023.  Still having significant head aches. Has not yet been seen or scheduled to be evaluated by neurology. Denies any purulent nasal drainage. Was seen by otology for his mixed hearing loss.     His current sinus regime consists of: Nasal saline & Flonase.    The patient's medications, allergies, past medical, surgical, social and family histories were reviewed and updated as appropriate.    A detailed review of systems was obtained with pertinent positives as per the above HPI, and otherwise negative.        Objective:     /70   Pulse 75   Ht 5' 7" (1.702 m)   Wt 85.4 kg (188 lb 4.4 oz)   BMI 29.49 kg/m²        Constitutional:   Vital signs are normal. He appears well-developed and well-nourished.     Head:  Normocephalic and atraumatic.     Ears:  Hearing normal to normal and whispered voice; external ear normal without scars, lesions, or masses; ear canal, tympanic membrane, and middle ear normal..   Right Ear: No swelling. Tympanic membrane is not perforated and not bulging. No middle ear effusion.   Left Ear: No swelling. Tympanic membrane is not perforated and not bulging.  No middle ear effusion.     Nose:  Nose normal including turbinates, nasal mucosa, sinuses and nasal septum. No epistaxis.     Mouth/Throat  Oropharynx clear and moist without lesions or asymmetry and normal uvula midline. Normal dentition. No tonsillar abscesses. Tonsillar exudate.      Neck:  Neck normal without thyromegaly masses, asymmetry, normal tracheal structure, crepitus, and tenderness, thyroid normal, trachea normal, phonation normal, full range of motion with neck supple and no adenopathy. No stridor present.        Head (right side): No submental adenopathy present.        Head (left side): No submental adenopathy present.     He has no cervical adenopathy.     Pulmonary/Chest:   No stridor. "     Procedure    Nasal endoscopy performed.  See procedure note.    Nasal Endoscopy:  3/24/2023    The use of diagnostic nasal endoscopy was considered medically necessary for the evaluation and visualization of the nasal anatomy for symptoms suggestive of nasal or sinus origin. Physical examination (including a nasal speculum evaluation) did not provide sufficient clinical information to establish a diagnosis, or symptoms did not improve or worsened following treatment.     The nasal cavity was decongested with topical 1% phenylephrine and anesthetized with 4% lidocaine.  A rigid 0-degree endoscope was introduced into the nasal cavity.    The patient was seated in the examination chair. After discussion of risks and benefits, a nasal endoscope was inserted into the nose the endoscope was passed along the left nasal floor to the nasopharynx. It was then passed between the middle and superior meatus, nasal turbinates, nasal septum, nasopharynx and sphenoethmoid region. The nasal endoscope was withdrawn and there was no complications. An identical procedure was performed on the right side. I was present for the entire procedure.The patient tolerated the above procedure well. The findings of this procedure can be found in the dictated note from 3/24/2023 visit.                            Data Reviewed    WBC (K/uL)   Date Value   03/02/2023 4.72     Eosinophil % (%)   Date Value   03/02/2023 2.3     Eos # (K/uL)   Date Value   03/02/2023 0.1     Platelets (K/uL)   Date Value   03/02/2023 97 (L)     Glucose (mg/dL)   Date Value   03/02/2023 132 (H)     No results found for: IGE    No sinus imaging available.      Assessment:     1. Other specified disorders of nose and nasal sinuses    2. Other migraine without status migrainosus, not intractable    3. Nasal septal deviation    4. Hypertrophy of both inferior nasal turbinates         Plan:     - Despite his scope, still think that his headaches are not related to his  sinuses  - Will obtain CT to rule out any sinus opacification  - Bactrim  - Cultures of the sinuses were obtained today, if the antibiotics need to be changed bases based on the sensitivities I will adjust them as needed.    - Will message neurology staff to expedite his evaluation for his head aches.    Merritt Camargo MD

## 2023-03-24 NOTE — PROGRESS NOTES
Subjective:       Patient ID: Eitan Cody is a 62 y.o. male.    Chief Complaint: hearing loss    HPI  62 year old male with history of intractable headaches and hearing loss. 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 He reports his left-sided hearing loss is worse than his right. He denies other symptomatology including otalgia, otorrhea, vertigo and history or ear surgery. Of note, he is following with Dr. Camargo for chronic sinusitis and a referral has been placed to Neurology for intractable headaches.     Review of Systems   Constitutional:  Negative for activity change and appetite change.   HENT:  Negative for dental problem and drooling.    Eyes:  Negative for discharge and itching.   Respiratory:  Negative for apnea and chest tightness.    Cardiovascular:  Negative for chest pain and claudication.   Gastrointestinal:  Negative for abdominal distention and abdominal pain.   Endocrine: Negative for cold intolerance and heat intolerance.   Genitourinary:  Negative for bladder incontinence and difficulty urinating.   Musculoskeletal:  Negative for arthralgias and back pain.   Integumentary:  Negative for breast mass and breast discharge.   Allergic/Immunologic: Negative for environmental allergies and food allergies.   Neurological:  Negative for dizziness, coordination difficulties and coordination difficulties.   Hematological:  Negative for adenopathy. Does not bruise/bleed easily.   Psychiatric/Behavioral:  Negative for agitation and behavioral problems.    Breast: Negative for mass      Objective:      Physical Exam  Constitutional:       Appearance: Normal appearance.   HENT:      Head: Normocephalic and atraumatic.      Right Ear: Tympanic membrane and ear canal normal.      Left Ear: Tympanic membrane and ear canal normal.   Neurological:      Mental Status: He is alert.           Independently reviewed audiogram, which demonstrates conductive  loss on left  Assessment:       Problem List Items Addressed This Visit    None  Visit Diagnoses       Hearing loss, unspecified hearing loss type, unspecified laterality    -  Primary            Plan:       - Repeat Audio in 6 months with reflexes.     -not a surgical candidate for stapedectomy

## 2023-03-27 ENCOUNTER — HOSPITAL ENCOUNTER (OUTPATIENT)
Dept: RADIOLOGY | Facility: HOSPITAL | Age: 63
Discharge: HOME | End: 2023-03-27
Attending: STUDENT IN AN ORGANIZED HEALTH CARE EDUCATION/TRAINING PROGRAM
Payer: MEDICAID

## 2023-03-27 DIAGNOSIS — J34.89 OTHER SPECIFIED DISORDERS OF NOSE AND NASAL SINUSES: ICD-10-CM

## 2023-03-27 PROCEDURE — 70486 CT MAXILLOFACIAL W/O DYE: CPT | Mod: 26,,, | Performed by: RADIOLOGY

## 2023-03-27 PROCEDURE — 70486 CT MEDTRONIC SINUSES WITHOUT: ICD-10-PCS | Mod: 26,,, | Performed by: RADIOLOGY

## 2023-03-27 PROCEDURE — 70486 CT MAXILLOFACIAL W/O DYE: CPT | Mod: TC

## 2023-03-29 LAB — BACTERIA SPEC AEROBE CULT: ABNORMAL

## 2023-03-31 LAB — BACTERIA SPEC ANAEROBE CULT: NORMAL

## 2023-04-05 ENCOUNTER — OFFICE VISIT (OUTPATIENT)
Dept: FAMILY MEDICINE | Facility: HOSPITAL | Age: 63
End: 2023-04-05
Payer: MEDICAID

## 2023-04-05 ENCOUNTER — HOSPITAL ENCOUNTER (OUTPATIENT)
Facility: HOSPITAL | Age: 63
Discharge: HOME | End: 2023-04-06
Attending: EMERGENCY MEDICINE | Admitting: FAMILY MEDICINE
Payer: MEDICAID

## 2023-04-05 VITALS
HEIGHT: 67 IN | BODY MASS INDEX: 28.61 KG/M2 | HEART RATE: 65 BPM | SYSTOLIC BLOOD PRESSURE: 86 MMHG | DIASTOLIC BLOOD PRESSURE: 53 MMHG | WEIGHT: 182.31 LBS

## 2023-04-05 DIAGNOSIS — I95.9 HYPOTENSION, UNSPECIFIED HYPOTENSION TYPE: Primary | ICD-10-CM

## 2023-04-05 DIAGNOSIS — M54.9 BACK PAIN, UNSPECIFIED BACK LOCATION, UNSPECIFIED BACK PAIN LATERALITY, UNSPECIFIED CHRONICITY: ICD-10-CM

## 2023-04-05 DIAGNOSIS — R53.1 WEAKNESS: ICD-10-CM

## 2023-04-05 DIAGNOSIS — G44.89 OTHER HEADACHE SYNDROME: ICD-10-CM

## 2023-04-05 DIAGNOSIS — N17.9 AKI (ACUTE KIDNEY INJURY): Primary | ICD-10-CM

## 2023-04-05 DIAGNOSIS — R51.9 ACUTE INTRACTABLE HEADACHE, UNSPECIFIED HEADACHE TYPE: ICD-10-CM

## 2023-04-05 DIAGNOSIS — M54.2 NECK PAIN: ICD-10-CM

## 2023-04-05 PROBLEM — R63.4 UNINTENTIONAL WEIGHT LOSS: Status: ACTIVE | Noted: 2023-04-05

## 2023-04-05 PROBLEM — K74.60 CIRRHOSIS OF LIVER WITHOUT ASCITES: Status: ACTIVE | Noted: 2023-04-05

## 2023-04-05 PROBLEM — R42 WEAKNESS WITH DIZZINESS: Status: ACTIVE | Noted: 2023-04-05

## 2023-04-05 LAB
ALBUMIN SERPL BCP-MCNC: 4 G/DL (ref 3.5–5.2)
ALP SERPL-CCNC: 75 U/L (ref 55–135)
ALT SERPL W/O P-5'-P-CCNC: 50 U/L (ref 10–44)
AMPHET+METHAMPHET UR QL: NEGATIVE
ANION GAP SERPL CALC-SCNC: 12 MMOL/L (ref 8–16)
AST SERPL-CCNC: 52 U/L (ref 10–40)
BACTERIA #/AREA URNS HPF: ABNORMAL /HPF
BARBITURATES UR QL SCN>200 NG/ML: NEGATIVE
BASOPHILS # BLD AUTO: 0.08 K/UL (ref 0–0.2)
BASOPHILS NFR BLD: 1.4 % (ref 0–1.9)
BENZODIAZ UR QL SCN>200 NG/ML: NEGATIVE
BILIRUB SERPL-MCNC: 0.4 MG/DL (ref 0.1–1)
BILIRUB UR QL STRIP: NEGATIVE
BUN SERPL-MCNC: 26 MG/DL (ref 8–23)
BZE UR QL SCN: NEGATIVE
CALCIUM SERPL-MCNC: 9 MG/DL (ref 8.7–10.5)
CANNABINOIDS UR QL SCN: NEGATIVE
CHLORIDE SERPL-SCNC: 107 MMOL/L (ref 95–110)
CLARITY UR: ABNORMAL
CO2 SERPL-SCNC: 18 MMOL/L (ref 23–29)
COLOR UR: YELLOW
CREAT SERPL-MCNC: 2.2 MG/DL (ref 0.5–1.4)
CREAT UR-MCNC: 352.1 MG/DL (ref 23–375)
CREAT UR-MCNC: 352.1 MG/DL (ref 23–375)
CRP SERPL-MCNC: 0.2 MG/L (ref 0–8.2)
DIFFERENTIAL METHOD: ABNORMAL
EOSINOPHIL # BLD AUTO: 0.3 K/UL (ref 0–0.5)
EOSINOPHIL NFR BLD: 5.2 % (ref 0–8)
ERYTHROCYTE [DISTWIDTH] IN BLOOD BY AUTOMATED COUNT: 13 % (ref 11.5–14.5)
EST. GFR  (NO RACE VARIABLE): 33 ML/MIN/1.73 M^2
GLUCOSE SERPL-MCNC: 133 MG/DL (ref 70–110)
GLUCOSE UR QL STRIP: NEGATIVE
HCT VFR BLD AUTO: 38.1 % (ref 40–54)
HGB BLD-MCNC: 13.5 G/DL (ref 14–18)
HGB UR QL STRIP: NEGATIVE
HYALINE CASTS #/AREA URNS LPF: 5 /LPF
IMM GRANULOCYTES # BLD AUTO: 0.01 K/UL (ref 0–0.04)
IMM GRANULOCYTES NFR BLD AUTO: 0.2 % (ref 0–0.5)
INFLUENZA A, MOLECULAR: NEGATIVE
INFLUENZA B, MOLECULAR: NEGATIVE
KETONES UR QL STRIP: NEGATIVE
LACTATE SERPL-SCNC: 1.5 MMOL/L (ref 0.5–2.2)
LEUKOCYTE ESTERASE UR QL STRIP: NEGATIVE
LYMPHOCYTES # BLD AUTO: 2.1 K/UL (ref 1–4.8)
LYMPHOCYTES NFR BLD: 37.2 % (ref 18–48)
MCH RBC QN AUTO: 33.3 PG (ref 27–31)
MCHC RBC AUTO-ENTMCNC: 35.4 G/DL (ref 32–36)
MCV RBC AUTO: 94 FL (ref 82–98)
METHADONE UR QL SCN>300 NG/ML: NEGATIVE
MICROSCOPIC COMMENT: ABNORMAL
MONOCYTES # BLD AUTO: 1.2 K/UL (ref 0.3–1)
MONOCYTES NFR BLD: 20.7 % (ref 4–15)
NEUTROPHILS # BLD AUTO: 2 K/UL (ref 1.8–7.7)
NEUTROPHILS NFR BLD: 35.3 % (ref 38–73)
NITRITE UR QL STRIP: NEGATIVE
NRBC BLD-RTO: 0 /100 WBC
OPIATES UR QL SCN: NEGATIVE
PCP UR QL SCN>25 NG/ML: NEGATIVE
PH UR STRIP: 6 [PH] (ref 5–8)
PLATELET # BLD AUTO: 105 K/UL (ref 150–450)
PMV BLD AUTO: 12.8 FL (ref 9.2–12.9)
POCT GLUCOSE: 91 MG/DL (ref 70–110)
POTASSIUM SERPL-SCNC: 4.6 MMOL/L (ref 3.5–5.1)
PROCALCITONIN SERPL IA-MCNC: 0.06 NG/ML
PROT SERPL-MCNC: 7.5 G/DL (ref 6–8.4)
PROT UR QL STRIP: ABNORMAL
PROT UR-MCNC: 35 MG/DL (ref 0–15)
PROT/CREAT UR: 0.1 MG/G{CREAT} (ref 0–0.2)
RBC # BLD AUTO: 4.06 M/UL (ref 4.6–6.2)
RBC #/AREA URNS HPF: 0 /HPF (ref 0–4)
SARS-COV-2 RDRP RESP QL NAA+PROBE: NEGATIVE
SODIUM SERPL-SCNC: 137 MMOL/L (ref 136–145)
SODIUM UR-SCNC: 27 MMOL/L (ref 20–250)
SP GR UR STRIP: 1.02 (ref 1–1.03)
SPECIMEN SOURCE: NORMAL
SQUAMOUS #/AREA URNS HPF: 0 /HPF
TOXICOLOGY INFORMATION: NORMAL
TROPONIN I SERPL DL<=0.01 NG/ML-MCNC: 0.02 NG/ML (ref 0–0.03)
TSH SERPL DL<=0.005 MIU/L-ACNC: 3.56 UIU/ML (ref 0.4–4)
URN SPEC COLLECT METH UR: ABNORMAL
UROBILINOGEN UR STRIP-ACNC: ABNORMAL EU/DL
WBC # BLD AUTO: 5.76 K/UL (ref 3.9–12.7)
WBC #/AREA URNS HPF: 11 /HPF (ref 0–5)

## 2023-04-05 PROCEDURE — G0378 HOSPITAL OBSERVATION PER HR: HCPCS

## 2023-04-05 PROCEDURE — 87502 INFLUENZA DNA AMP PROBE: CPT

## 2023-04-05 PROCEDURE — 96361 HYDRATE IV INFUSION ADD-ON: CPT

## 2023-04-05 PROCEDURE — 80053 COMPREHEN METABOLIC PANEL: CPT | Performed by: EMERGENCY MEDICINE

## 2023-04-05 PROCEDURE — 87086 URINE CULTURE/COLONY COUNT: CPT | Performed by: EMERGENCY MEDICINE

## 2023-04-05 PROCEDURE — 82962 GLUCOSE BLOOD TEST: CPT

## 2023-04-05 PROCEDURE — 85025 COMPLETE CBC W/AUTO DIFF WBC: CPT | Performed by: EMERGENCY MEDICINE

## 2023-04-05 PROCEDURE — 84166 PROTEIN E-PHORESIS/URINE/CSF: CPT | Performed by: EMERGENCY MEDICINE

## 2023-04-05 PROCEDURE — 96374 THER/PROPH/DIAG INJ IV PUSH: CPT

## 2023-04-05 PROCEDURE — 84156 ASSAY OF PROTEIN URINE: CPT | Performed by: EMERGENCY MEDICINE

## 2023-04-05 PROCEDURE — 84443 ASSAY THYROID STIM HORMONE: CPT | Performed by: EMERGENCY MEDICINE

## 2023-04-05 PROCEDURE — 85610 PROTHROMBIN TIME: CPT | Performed by: STUDENT IN AN ORGANIZED HEALTH CARE EDUCATION/TRAINING PROGRAM

## 2023-04-05 PROCEDURE — 84145 PROCALCITONIN (PCT): CPT | Performed by: EMERGENCY MEDICINE

## 2023-04-05 PROCEDURE — 80307 DRUG TEST PRSMV CHEM ANLYZR: CPT | Performed by: EMERGENCY MEDICINE

## 2023-04-05 PROCEDURE — 84166 PROTEIN E-PHORESIS/URINE/CSF: CPT | Mod: 26,,, | Performed by: PATHOLOGY

## 2023-04-05 PROCEDURE — U0002 COVID-19 LAB TEST NON-CDC: HCPCS | Performed by: EMERGENCY MEDICINE

## 2023-04-05 PROCEDURE — 84300 ASSAY OF URINE SODIUM: CPT | Performed by: EMERGENCY MEDICINE

## 2023-04-05 PROCEDURE — 63600175 PHARM REV CODE 636 W HCPCS: Performed by: STUDENT IN AN ORGANIZED HEALTH CARE EDUCATION/TRAINING PROGRAM

## 2023-04-05 PROCEDURE — 84166 PATHOLOGIST INTERPRETATION UPE: ICD-10-PCS | Mod: 26,,, | Performed by: PATHOLOGY

## 2023-04-05 PROCEDURE — 25000003 PHARM REV CODE 250: Performed by: EMERGENCY MEDICINE

## 2023-04-05 PROCEDURE — 93005 ELECTROCARDIOGRAM TRACING: CPT

## 2023-04-05 PROCEDURE — 85730 THROMBOPLASTIN TIME PARTIAL: CPT | Performed by: STUDENT IN AN ORGANIZED HEALTH CARE EDUCATION/TRAINING PROGRAM

## 2023-04-05 PROCEDURE — 99285 EMERGENCY DEPT VISIT HI MDM: CPT | Mod: 25,27

## 2023-04-05 PROCEDURE — 96372 THER/PROPH/DIAG INJ SC/IM: CPT | Mod: 59 | Performed by: STUDENT IN AN ORGANIZED HEALTH CARE EDUCATION/TRAINING PROGRAM

## 2023-04-05 PROCEDURE — 84165 PROTEIN E-PHORESIS SERUM: CPT | Mod: 26,,, | Performed by: PATHOLOGY

## 2023-04-05 PROCEDURE — 81000 URINALYSIS NONAUTO W/SCOPE: CPT | Mod: 59 | Performed by: EMERGENCY MEDICINE

## 2023-04-05 PROCEDURE — 83605 ASSAY OF LACTIC ACID: CPT | Performed by: EMERGENCY MEDICINE

## 2023-04-05 PROCEDURE — 99213 OFFICE O/P EST LOW 20 MIN: CPT | Mod: 25

## 2023-04-05 PROCEDURE — 96375 TX/PRO/DX INJ NEW DRUG ADDON: CPT

## 2023-04-05 PROCEDURE — 84165 PROTEIN E-PHORESIS SERUM: CPT | Performed by: STUDENT IN AN ORGANIZED HEALTH CARE EDUCATION/TRAINING PROGRAM

## 2023-04-05 PROCEDURE — 87502 INFLUENZA DNA AMP PROBE: CPT | Performed by: EMERGENCY MEDICINE

## 2023-04-05 PROCEDURE — 86140 C-REACTIVE PROTEIN: CPT | Performed by: EMERGENCY MEDICINE

## 2023-04-05 PROCEDURE — 63600175 PHARM REV CODE 636 W HCPCS: Performed by: EMERGENCY MEDICINE

## 2023-04-05 PROCEDURE — 84484 ASSAY OF TROPONIN QUANT: CPT | Performed by: EMERGENCY MEDICINE

## 2023-04-05 PROCEDURE — 93010 ELECTROCARDIOGRAM REPORT: CPT | Mod: ,,, | Performed by: INTERNAL MEDICINE

## 2023-04-05 PROCEDURE — 93010 EKG 12-LEAD: ICD-10-PCS | Mod: ,,, | Performed by: INTERNAL MEDICINE

## 2023-04-05 PROCEDURE — 84165 PATHOLOGIST INTERPRETATION SPE: ICD-10-PCS | Mod: 26,,, | Performed by: PATHOLOGY

## 2023-04-05 PROCEDURE — 87040 BLOOD CULTURE FOR BACTERIA: CPT | Performed by: EMERGENCY MEDICINE

## 2023-04-05 RX ORDER — SODIUM CHLORIDE, SODIUM LACTATE, POTASSIUM CHLORIDE, CALCIUM CHLORIDE 600; 310; 30; 20 MG/100ML; MG/100ML; MG/100ML; MG/100ML
INJECTION, SOLUTION INTRAVENOUS CONTINUOUS
Status: ACTIVE | OUTPATIENT
Start: 2023-04-05 | End: 2023-04-06

## 2023-04-05 RX ORDER — ACETAMINOPHEN 325 MG/1
650 TABLET ORAL EVERY 4 HOURS PRN
Status: DISCONTINUED | OUTPATIENT
Start: 2023-04-05 | End: 2023-04-07 | Stop reason: HOSPADM

## 2023-04-05 RX ORDER — ONDANSETRON 8 MG/1
8 TABLET, ORALLY DISINTEGRATING ORAL EVERY 8 HOURS PRN
Status: DISCONTINUED | OUTPATIENT
Start: 2023-04-05 | End: 2023-04-07 | Stop reason: HOSPADM

## 2023-04-05 RX ORDER — PROCHLORPERAZINE EDISYLATE 5 MG/ML
10 INJECTION INTRAMUSCULAR; INTRAVENOUS
Status: COMPLETED | OUTPATIENT
Start: 2023-04-05 | End: 2023-04-05

## 2023-04-05 RX ORDER — DEXTROSE 40 %
30 GEL (GRAM) ORAL
Status: DISCONTINUED | OUTPATIENT
Start: 2023-04-05 | End: 2023-04-07 | Stop reason: HOSPADM

## 2023-04-05 RX ORDER — ACETAMINOPHEN 325 MG/1
650 TABLET ORAL EVERY 8 HOURS PRN
Status: DISCONTINUED | OUTPATIENT
Start: 2023-04-05 | End: 2023-04-07 | Stop reason: HOSPADM

## 2023-04-05 RX ORDER — FLUOXETINE HYDROCHLORIDE 20 MG/1
20 CAPSULE ORAL DAILY
Status: DISCONTINUED | OUTPATIENT
Start: 2023-04-06 | End: 2023-04-07 | Stop reason: HOSPADM

## 2023-04-05 RX ORDER — NALOXONE HCL 0.4 MG/ML
0.02 VIAL (ML) INJECTION
Status: DISCONTINUED | OUTPATIENT
Start: 2023-04-05 | End: 2023-04-07 | Stop reason: HOSPADM

## 2023-04-05 RX ORDER — GLUCAGON 1 MG
1 KIT INJECTION
Status: DISCONTINUED | OUTPATIENT
Start: 2023-04-05 | End: 2023-04-07 | Stop reason: HOSPADM

## 2023-04-05 RX ORDER — ENOXAPARIN SODIUM 100 MG/ML
40 INJECTION SUBCUTANEOUS EVERY 24 HOURS
Status: DISCONTINUED | OUTPATIENT
Start: 2023-04-05 | End: 2023-04-07 | Stop reason: HOSPADM

## 2023-04-05 RX ORDER — SODIUM CHLORIDE 0.9 % (FLUSH) 0.9 %
5 SYRINGE (ML) INJECTION
Status: DISCONTINUED | OUTPATIENT
Start: 2023-04-05 | End: 2023-04-07 | Stop reason: HOSPADM

## 2023-04-05 RX ORDER — KETOROLAC TROMETHAMINE 30 MG/ML
15 INJECTION, SOLUTION INTRAMUSCULAR; INTRAVENOUS
Status: COMPLETED | OUTPATIENT
Start: 2023-04-05 | End: 2023-04-05

## 2023-04-05 RX ORDER — INSULIN ASPART 100 [IU]/ML
1-10 INJECTION, SOLUTION INTRAVENOUS; SUBCUTANEOUS
Status: DISCONTINUED | OUTPATIENT
Start: 2023-04-05 | End: 2023-04-07 | Stop reason: HOSPADM

## 2023-04-05 RX ADMIN — PROCHLORPERAZINE EDISYLATE 10 MG: 5 INJECTION INTRAMUSCULAR; INTRAVENOUS at 06:04

## 2023-04-05 RX ADMIN — KETOROLAC TROMETHAMINE 15 MG: 30 INJECTION, SOLUTION INTRAMUSCULAR; INTRAVENOUS at 06:04

## 2023-04-05 RX ADMIN — ENOXAPARIN SODIUM 40 MG: 40 INJECTION SUBCUTANEOUS at 10:04

## 2023-04-05 RX ADMIN — SODIUM CHLORIDE 1000 ML: 0.9 INJECTION, SOLUTION INTRAVENOUS at 06:04

## 2023-04-05 RX ADMIN — SODIUM CHLORIDE, SODIUM LACTATE, POTASSIUM CHLORIDE, AND CALCIUM CHLORIDE: .6; .31; .03; .02 INJECTION, SOLUTION INTRAVENOUS at 10:04

## 2023-04-05 NOTE — PROGRESS NOTES
"Progress Note  [facility]    Subjective:      Eitan Cody is a 63 y.o. male here for 2 weeks of constant headache.     #Headaches  Reports pain "all over" his head. Saw ENT last month and had CT ordered. Possible sinus component v migraines. Has yet to be evaluated by neurology. He is endorsing light sensitivity today. No nausea, vomiting, or focal deficits. Has been taking ibuprofen daily despite hx of cirrhosis. Patient unaware of cirrhosis diagnosis when I addressed it today.      #Hypotension  -86/53 > 87/55. Reports SBP approximately 130 normally at home. Takes several BP medications and reports compliance. Hx of HOCM and cirrhosis. Denies chest pain, palpitations at this time.       Active Problem List with Overview Notes    Diagnosis Date Noted    Chronic hepatitis C without hepatic coma 08/04/2022    Hyperlipidemia 07/26/2022    Abnormal liver enzymes 07/26/2022    Alcohol dependence 07/26/2022    Postural dizziness with presyncope 03/25/2022    Abnormal stress ECG with treadmill 02/11/2022    Mild HOCM (hypertrophic obstructive cardiomyopathy) 02/11/2022    Primary hypertension 01/31/2022    COVID     Acute intractable headache       Review of Systems   Constitutional:  Positive for malaise/fatigue. Negative for chills and fever.   Respiratory:  Negative for shortness of breath.    Cardiovascular:  Negative for chest pain and palpitations.   Musculoskeletal:  Positive for neck pain.   Neurological:  Positive for dizziness and headaches. Negative for focal weakness and weakness.       Objective:   BP (!) 86/53   Pulse 65   Ht 5' 7" (1.702 m)   Wt 82.7 kg (182 lb 5.1 oz)   BMI 28.56 kg/m²      Physical Exam  Vitals and nursing note reviewed.   Constitutional:       Appearance: Normal appearance.   HENT:      Head: Normocephalic and atraumatic.   Eyes:      Comments: bloodshot   Cardiovascular:      Rate and Rhythm: Normal rate and regular rhythm.      Pulses: Normal pulses.      Heart sounds: " Normal heart sounds.   Pulmonary:      Effort: Pulmonary effort is normal.      Breath sounds: Normal breath sounds.   Neurological:      Mental Status: He is alert and oriented to person, place, and time.   Psychiatric:         Mood and Affect: Mood normal.         Behavior: Behavior normal.        Assessment:   63 y.o. with headache and hypotension. Given glass of water and cookies and rechecked BP 30 minutes later. > 84/51. Denies recent alcohol or drug use. Given hx of HOCM and cirrhosis, will send to ED to be evaluated. ED staff made aware. Made patient aware of importance of ENT, neurology follow up as well as to closely follow up in our clinic for cirrhosis monitoring.        1. Hypotension, unspecified hypotension type    2. Acute intractable headache, unspecified headache type         Plan:   Eitan was seen today for headache and shoulder pain.    Diagnoses and all orders for this visit:    Hypotension, unspecified hypotension type    Acute intractable headache, unspecified headache type        No follow-ups on file.    Sunday Holland DO  [facility], PGY-1  5:20 PM, 04/05/2023    *This note was dictated using the M*Modal Fluency Direct word recognition program. There are word rescognition mistakes that are occasionally missed on review. \    The following information is provided to all patients.  This information is to help you find resources for any of the problems found today that may be affecting your health:                Living healthy guide: www.[facility].[place].gov       Understanding Diabetes: www.diabetes.org       Eating healthy: www.cdc.gov/healthyweight      CDC home safety checklist: www.cdc.gov/steadi/patient.html      Agency on Aging: www.goea.[place].gov       Alcoholics anonymous (AA): www.aa.org      Physical Activity: www.[place]derick.nih.gov/cj9mgvf       Tobacco use: www.quitwithusla.org

## 2023-04-05 NOTE — Clinical Note
Diagnosis: Weakness [[id number]]   Future Attending Provider: AIRAM PARRA [[id number]]   Admitting Provider:: SAMIR ALDANA III [[id number]]

## 2023-04-06 VITALS
TEMPERATURE: 98 F | WEIGHT: 182 LBS | HEART RATE: 76 BPM | BODY MASS INDEX: 28.51 KG/M2 | RESPIRATION RATE: 17 BRPM | OXYGEN SATURATION: 95 % | DIASTOLIC BLOOD PRESSURE: 74 MMHG | SYSTOLIC BLOOD PRESSURE: 142 MMHG

## 2023-04-06 LAB
ALBUMIN SERPL BCP-MCNC: 3.4 G/DL (ref 3.5–5.2)
ALP SERPL-CCNC: 66 U/L (ref 55–135)
ALT SERPL W/O P-5'-P-CCNC: 42 U/L (ref 10–44)
ANION GAP SERPL CALC-SCNC: 7 MMOL/L (ref 8–16)
ANION GAP SERPL CALC-SCNC: 7 MMOL/L (ref 8–16)
APTT PPP: 29.8 SEC (ref 21–32)
AST SERPL-CCNC: 43 U/L (ref 10–40)
BASOPHILS # BLD AUTO: 0.05 K/UL (ref 0–0.2)
BASOPHILS NFR BLD: 0.8 % (ref 0–1.9)
BILIRUB SERPL-MCNC: 0.5 MG/DL (ref 0.1–1)
BUN SERPL-MCNC: 20 MG/DL (ref 8–23)
BUN SERPL-MCNC: 23 MG/DL (ref 8–23)
CALCIUM SERPL-MCNC: 8.4 MG/DL (ref 8.7–10.5)
CALCIUM SERPL-MCNC: 8.9 MG/DL (ref 8.7–10.5)
CHLORIDE SERPL-SCNC: 108 MMOL/L (ref 95–110)
CHLORIDE SERPL-SCNC: 110 MMOL/L (ref 95–110)
CO2 SERPL-SCNC: 19 MMOL/L (ref 23–29)
CO2 SERPL-SCNC: 22 MMOL/L (ref 23–29)
CREAT SERPL-MCNC: 1.5 MG/DL (ref 0.5–1.4)
CREAT SERPL-MCNC: 1.6 MG/DL (ref 0.5–1.4)
DIFFERENTIAL METHOD: ABNORMAL
EOSINOPHIL # BLD AUTO: 0.3 K/UL (ref 0–0.5)
EOSINOPHIL NFR BLD: 4.6 % (ref 0–8)
ERYTHROCYTE [DISTWIDTH] IN BLOOD BY AUTOMATED COUNT: 13.1 % (ref 11.5–14.5)
EST. GFR  (NO RACE VARIABLE): 48 ML/MIN/1.73 M^2
EST. GFR  (NO RACE VARIABLE): 52 ML/MIN/1.73 M^2
ESTIMATED AVG GLUCOSE: 108 MG/DL (ref 68–131)
GLUCOSE SERPL-MCNC: 103 MG/DL (ref 70–110)
GLUCOSE SERPL-MCNC: 118 MG/DL (ref 70–110)
HBA1C MFR BLD: 5.4 % (ref 4–5.6)
HCT VFR BLD AUTO: 34.9 % (ref 40–54)
HGB BLD-MCNC: 12.3 G/DL (ref 14–18)
IMM GRANULOCYTES # BLD AUTO: 0.01 K/UL (ref 0–0.04)
IMM GRANULOCYTES NFR BLD AUTO: 0.2 % (ref 0–0.5)
INR PPP: 1.2 (ref 0.8–1.2)
LYMPHOCYTES # BLD AUTO: 2.3 K/UL (ref 1–4.8)
LYMPHOCYTES NFR BLD: 39.2 % (ref 18–48)
MAGNESIUM SERPL-MCNC: 1.8 MG/DL (ref 1.6–2.6)
MCH RBC QN AUTO: 33.2 PG (ref 27–31)
MCHC RBC AUTO-ENTMCNC: 35.2 G/DL (ref 32–36)
MCV RBC AUTO: 94 FL (ref 82–98)
MONOCYTES # BLD AUTO: 0.9 K/UL (ref 0.3–1)
MONOCYTES NFR BLD: 15.6 % (ref 4–15)
NEUTROPHILS # BLD AUTO: 2.3 K/UL (ref 1.8–7.7)
NEUTROPHILS NFR BLD: 39.6 % (ref 38–73)
NRBC BLD-RTO: 0 /100 WBC
PATHOLOGIST INTERPRETATION UPE: NORMAL
PHOSPHATE SERPL-MCNC: 3.2 MG/DL (ref 2.7–4.5)
PLATELET # BLD AUTO: 82 K/UL (ref 150–450)
PMV BLD AUTO: 12.7 FL (ref 9.2–12.9)
POCT GLUCOSE: 96 MG/DL (ref 70–110)
POTASSIUM SERPL-SCNC: 4.3 MMOL/L (ref 3.5–5.1)
POTASSIUM SERPL-SCNC: 4.5 MMOL/L (ref 3.5–5.1)
PROT PATTERN UR ELPH-IMP: NORMAL
PROT SERPL-MCNC: 6.3 G/DL (ref 6–8.4)
PROTHROMBIN TIME: 12.2 SEC (ref 9–12.5)
RBC # BLD AUTO: 3.71 M/UL (ref 4.6–6.2)
SODIUM SERPL-SCNC: 136 MMOL/L (ref 136–145)
SODIUM SERPL-SCNC: 137 MMOL/L (ref 136–145)
WBC # BLD AUTO: 5.9 K/UL (ref 3.9–12.7)

## 2023-04-06 PROCEDURE — G0378 HOSPITAL OBSERVATION PER HR: HCPCS

## 2023-04-06 PROCEDURE — 84100 ASSAY OF PHOSPHORUS: CPT | Performed by: STUDENT IN AN ORGANIZED HEALTH CARE EDUCATION/TRAINING PROGRAM

## 2023-04-06 PROCEDURE — 83036 HEMOGLOBIN GLYCOSYLATED A1C: CPT | Performed by: STUDENT IN AN ORGANIZED HEALTH CARE EDUCATION/TRAINING PROGRAM

## 2023-04-06 PROCEDURE — 82962 GLUCOSE BLOOD TEST: CPT

## 2023-04-06 PROCEDURE — 25000003 PHARM REV CODE 250: Performed by: STUDENT IN AN ORGANIZED HEALTH CARE EDUCATION/TRAINING PROGRAM

## 2023-04-06 PROCEDURE — 63600175 PHARM REV CODE 636 W HCPCS: Performed by: STUDENT IN AN ORGANIZED HEALTH CARE EDUCATION/TRAINING PROGRAM

## 2023-04-06 PROCEDURE — 83735 ASSAY OF MAGNESIUM: CPT | Performed by: STUDENT IN AN ORGANIZED HEALTH CARE EDUCATION/TRAINING PROGRAM

## 2023-04-06 PROCEDURE — 80048 BASIC METABOLIC PNL TOTAL CA: CPT | Mod: XB | Performed by: STUDENT IN AN ORGANIZED HEALTH CARE EDUCATION/TRAINING PROGRAM

## 2023-04-06 PROCEDURE — 96361 HYDRATE IV INFUSION ADD-ON: CPT

## 2023-04-06 PROCEDURE — 85025 COMPLETE CBC W/AUTO DIFF WBC: CPT | Performed by: STUDENT IN AN ORGANIZED HEALTH CARE EDUCATION/TRAINING PROGRAM

## 2023-04-06 PROCEDURE — 80053 COMPREHEN METABOLIC PANEL: CPT | Performed by: STUDENT IN AN ORGANIZED HEALTH CARE EDUCATION/TRAINING PROGRAM

## 2023-04-06 RX ORDER — CETIRIZINE HYDROCHLORIDE 10 MG/1
10 TABLET ORAL DAILY
COMMUNITY

## 2023-04-06 RX ORDER — SUMATRIPTAN SUCCINATE 25 MG/1
50 TABLET ORAL ONCE
Status: COMPLETED | OUTPATIENT
Start: 2023-04-06 | End: 2023-04-06

## 2023-04-06 RX ORDER — SUMATRIPTAN 50 MG/1
50 TABLET, FILM COATED ORAL
Qty: 20 TABLET | Refills: 0 | Status: SHIPPED | OUTPATIENT
Start: 2023-04-06 | End: 2023-07-21

## 2023-04-06 RX ORDER — NAPROXEN SODIUM 220 MG/1
81 TABLET, FILM COATED ORAL DAILY
COMMUNITY

## 2023-04-06 RX ORDER — ATORVASTATIN CALCIUM 20 MG/1
20 TABLET, FILM COATED ORAL NIGHTLY
COMMUNITY
End: 2023-07-21 | Stop reason: SDUPTHER

## 2023-04-06 RX ORDER — TOPIRAMATE 50 MG/1
50 TABLET, FILM COATED ORAL 2 TIMES DAILY
Qty: 60 TABLET | Refills: 0 | Status: SHIPPED | OUTPATIENT
Start: 2023-04-06 | End: 2023-05-01 | Stop reason: SDUPTHER

## 2023-04-06 RX ADMIN — SODIUM CHLORIDE, SODIUM LACTATE, POTASSIUM CHLORIDE, AND CALCIUM CHLORIDE: .6; .31; .03; .02 INJECTION, SOLUTION INTRAVENOUS at 06:04

## 2023-04-06 RX ADMIN — FLUOXETINE HYDROCHLORIDE 20 MG: 20 CAPSULE ORAL at 08:04

## 2023-04-06 RX ADMIN — SODIUM CHLORIDE, SODIUM LACTATE, POTASSIUM CHLORIDE, AND CALCIUM CHLORIDE 500 ML: .6; .31; .03; .02 INJECTION, SOLUTION INTRAVENOUS at 11:04

## 2023-04-06 RX ADMIN — SUMATRIPTAN SUCCINATE 50 MG: 25 TABLET ORAL at 08:04

## 2023-04-06 NOTE — ASSESSMENT & PLAN NOTE
Hx of HCV (treated) and alcohol use. MRI liver from 9/2022 showing cirrhosis and multiple rim-enhancing lesions (largest 1.4 cm and 1.1 cm) not meeting diagnostic criteria for HCC but recommending continued surveillance without suggested timeline. Sequelae of portal hypertensin. Colonoscopy from last year negative for malignancy.    - Consider follow up MRI abdomen if malignancy suspected  - Consider possible hepatorenal syndrome if no other identifiable cause of ELAYNE and does not resolve with restored BP

## 2023-04-06 NOTE — PROGRESS NOTES
"[facility]  [facility]  Progress Note    Patient Name: Eitan Cody  MRN: [id number]  Patient Class: OP- Observation   Admission Date: 4/5/2023  Length of Stay: 0 days  Attending Physician: Rolan Salazar DO  Primary Care Provider: Abilio Mtz MD        Subjective:     Principal Problem:ELAYNE (acute kidney injury)        HPI:  64 yo M with PMHx alcohol and HCV cirrhosis, HTN, chronic headaches, HLD, MDD. Patient referred to ED from  clinic after being found to have low BP on evaluation of 2 weeks of generalized weakness, decreased PO and worsening of chronic headache. Patient says headache has been "all over" and has been taking advil twice a day for it, though doesn't know what dose. Patient with several office and ED presentations for similar headache complaint, recently worked up by ENT with unremarkable CT sinuses. Nonetheless patient started on 2 week course of bactrim for sinusitis with last dose the day after admit. Patient had MRI brain last year which was negative for infarct, bleed or tumor. Patient also reports having very little appetite for past 2 weeks, losing "8 lbs." During this time also feeling very weak, dizzy when he walks around though denies falls or LOC. Weakness has kept patient home from work for past 2 weeks. Patient also reporting pain up and down the center of his back, which he says he's had for several months but feels is getting worse, denies sciatica, urine/bowel incontinence, saddle anesthesia. Denies fever, chills, N/V/D, urinary changes, SOB/CP.     In ED, patient normotensive, GCS 15, AAOx3, normal O2 sat on room air, afebrile. Labs significant just for 1+ protein on UA and Creatinine of 2.2 (up from 0.9) last month. WBC normal and lactate negative. CRP and procal negative. Troponin negative - EKG with sinus bradycardia (58 bpm), age-indeterminate septal infarct. CT head negative. Patient got 1L NS and maintained normal blood pressures in ED. Patient " admitted to [facility] for management of ELAYNE in setting of generalized weakness.      Overview/Hospital Course:  Patient admitted to       [facility] History: NAEON, pts headache persists, elayne resolved with fluids     Review of Systems   Constitutional:  Positive for activity change, appetite change and unexpected weight change. Negative for chills.   HENT:  Negative for congestion, ear pain, facial swelling, hearing loss and rhinorrhea.    Eyes:  Negative for discharge and redness.   Respiratory:  Negative for cough and shortness of breath.    Cardiovascular:  Negative for chest pain and leg swelling.   Gastrointestinal:  Negative for abdominal distention and abdominal pain.   Musculoskeletal: Negative.    Skin: Negative.    Neurological:  Negative for facial asymmetry, speech difficulty, weakness and headaches.   Objective:     Vital Signs (Most Recent):  Temp: 98.5 °F (36.9 °C) (04/06/23 0748)  Pulse: 73 (04/06/23 0748)  Resp: 17 (04/05/23 2002)  BP: 125/75 (04/06/23 0748)  SpO2: 96 % (04/06/23 0748) Vital Signs (24h Range):  Temp:  [97.6 °F (36.4 °C)-98.5 °F (36.9 °C)] 98.5 °F (36.9 °C)  Pulse:  [60-73] 73  Resp:  [16-18] 17  SpO2:  [95 %-97 %] 96 %  BP: ()/(53-75) 125/75     Weight: 82.6 kg (182 lb)  Body mass index is 28.51 kg/m².    Intake/Output Summary (Last 24 hours) at 4/6/2023 0845  Last data filed at 4/5/2023 2021  Gross per 24 hour   Intake 999.93 ml   Output --   Net 999.93 ml      Physical Exam  Vitals and nursing note reviewed.   Constitutional:       Appearance: Normal appearance.   HENT:      Head: Normocephalic and atraumatic.   Eyes:      Pupils: Pupils are equal, round, and reactive to light.   Cardiovascular:      Rate and Rhythm: Normal rate and regular rhythm.      Heart sounds: Normal heart sounds.   Pulmonary:      Breath sounds: Normal breath sounds.   Abdominal:      General: Abdomen is flat.      Palpations: Abdomen is soft.   Musculoskeletal:         General: No swelling  or tenderness.      Cervical back: Normal range of motion.   Skin:     General: Skin is warm.   Neurological:      General: No focal deficit present.      Mental Status: He is alert.   Psychiatric:         Mood and Affect: Mood normal.       Significant Labs: All pertinent labs within the past 24 hours have been reviewed.  CBC:   Recent Labs   Lab 04/05/23  1831 04/06/23  0406   WBC 5.76 5.90   HGB 13.5* 12.3*   HCT 38.1* 34.9*   * 82*     CMP:   Recent Labs   Lab 04/05/23  1831 04/06/23  0406    136   K 4.6 4.3    110   CO2 18* 19*   * 103   BUN 26* 23   CREATININE 2.2* 1.6*   CALCIUM 9.0 8.4*   PROT 7.5 6.3   ALBUMIN 4.0 3.4*   BILITOT 0.4 0.5   ALKPHOS 75 66   AST 52* 43*   ALT 50* 42   ANIONGAP 12 7*       Significant Imaging: I have reviewed all pertinent imaging results/findings within the past 24 hours.      Assessment/Plan:      * ELAYNE (acute kidney injury)  No hx of CKD, Cr 0.9 1 month ago, on arrival 2.2 Multiple possible underlying causes/differential dxs including: daily NSAID use, recent bactrim use, decreased intake, hypotension hepatorenal, myeloma? FeNa of 0.1% suggestive of pre-renal.    - Hold NSAIDS and rest of nephrotoxic meds  - Hold Bactrim  - Avoid hypotension  - F/u SPEP/UPEP  - Continue maintenance fluids for now - improved with fluids  - F/u BMP    Hypotension  Possibly due to poor intake. 86/53 in clinic prior to arrival. Normotensive on arrival to ED, stayed normal after 1L bolus. Sepsis workup unremarkable (Bcx pending).     - Continue maintenance fluids for now  - F/u orthostatic vitals      Cirrhosis of liver without ascites  Hx of HCV (treated) and alcohol use. MRI liver from 9/2022 showing cirrhosis and multiple rim-enhancing lesions (largest 1.4 cm and 1.1 cm) not meeting diagnostic criteria for HCC but recommending continued surveillance without suggested timeline. Sequelae of portal hypertensin. Colonoscopy from last year negative for malignancy.    -  Consider follow up MRI abdomen if malignancy suspected  - Consider possible hepatorenal syndrome if no other identifiable cause of ELAYNE and does not resolve with restored BP          Back pain  Patient with pains from neck down to lower back for past 5-6 months. Denies accident or trauma. No point tenderness on examination. Xray thoracolumbar and cervical spine unrevealing. Denies radiation down legs, saddle anesthesia, urinary/bowel incontinence.    - Non-NSAID pain control PRN    Weakness with dizziness  For past 2 weeks or so, without falls or syncope. Associated with decreased appetite and 6 lbs weight loss. Likely associated with hypotension.      - Novasource renal with meals  - Avoid hypotension  - Fall precautions    Hyperlipidemia  - Continue home lipitor      Primary hypertension  - Home meds (HCTZ, losartan, amlodipine, coreg) held in light of ELAYNE and hypotension      Chronic intractable headache  Several months long issue, with multiple office visits and ED presentations. Patient takes advil twice a day, doesn't know what dose. Has tried fioricet which does not help, not currently taking. Recently seen by ENT who did not suspect sinus issue. CT head negative.    - Has been referred to but not yet seen neurology  - Possible functional/overuse headache   - trial sumatriptan   - Consider initiating controller medication if rest of work up negative  - Avoid NSAIDS  - Ensure adequate hydration        VTE Risk Mitigation (From admission, onward)         Ordered     enoxaparin injection 40 mg  Daily         04/05/23 2031     IP VTE HIGH RISK PATIENT  Once         04/05/23 2031     Place sequential compression device  Until discontinued         04/05/23 2031                Discharge Planning   BABATUNDE:      Code Status: Full Code   Is the patient medically ready for discharge?:     Reason for patient still in hospital (select all that apply): Laboratory test           Bobby Adams MD  Department of Hospital Medicine    [facility] - Emergency Dept

## 2023-04-06 NOTE — ASSESSMENT & PLAN NOTE
Possibly due to poor intake. 86/53 in clinic prior to arrival. Normotensive on arrival to ED, stayed normal after 1L bolus. Sepsis workup unremarkable (Bcx pending).     - Continue maintenance fluids for now  - F/u orthostatic vitals     #1:

## 2023-04-06 NOTE — ASSESSMENT & PLAN NOTE
For past 2 weeks or so, without falls or syncope. Associated with decreased appetite and 6 lbs weight loss. Likely associated with hypotension.      - Novasource renal with meals  - Avoid hypotension  - Fall precautions

## 2023-04-06 NOTE — HPI
"64 yo M with PMHx alcohol and HCV cirrhosis, HTN, chronic headaches, HLD, MDD. Patient referred to ED from  clinic after being found to have low BP on evaluation of 2 weeks of generalized weakness, decreased PO and worsening of chronic headache. Patient says headache has been "all over" and has been taking advil twice a day for it, though doesn't know what dose. Patient with several office and ED presentations for similar headache complaint, recently worked up by ENT with unremarkable CT sinuses. Nonetheless patient started on 2 week course of bactrim for sinusitis with last dose the day after admit. Patient had MRI brain last year which was negative for infarct, bleed or tumor. Patient also reports having very little appetite for past 2 weeks, losing "8 lbs." During this time also feeling very weak, dizzy when he walks around though denies falls or LOC. Weakness has kept patient home from work for past 2 weeks. Patient also reporting pain up and down the center of his back, which he says he's had for several months but feels is getting worse, denies sciatica, urine/bowel incontinence, saddle anesthesia. Denies fever, chills, N/V/D, urinary changes, SOB/CP.     In ED, patient normotensive, GCS 15, AAOx3, normal O2 sat on room air, afebrile. Labs significant just for 1+ protein on UA and Creatinine of 2.2 (up from 0.9) last month. WBC normal and lactate negative. CRP and procal negative. Troponin negative - EKG with sinus bradycardia (58 bpm), age-indeterminate septal infarct. CT head negative. Patient got 1L NS and maintained normal blood pressures in ED. Patient admitted to [facility] for management of ELAYNE in setting of generalized weakness.  "

## 2023-04-06 NOTE — PHARMACY MED REC
"  [facility]           Pharmacy  Admission Medication History     The home medication history was taken by Angelia Darnell.      Medication history obtained from Medications listed below were obtained from: Patient/family    Based on information gathered for medication list, you may go to "Admission" then "Reconcile Home Medications" tabs to review and/or act upon those items.     The home medication list has been updated by the Pharmacy department.   Please read ALL comments highlighted in yellow.   Please address this information as you see fit.    Feel free to contact us if you have any questions or require assistance.        No current facility-administered medications on file prior to encounter.     Current Outpatient Medications on File Prior to Encounter   Medication Sig Dispense Refill    amLODIPine-valsartan-hcthiazid (EXFORGE HCT) -25 mg Tab Take 1 each by mouth once daily. 90 each 5    aspirin 81 MG Chew Take 81 mg by mouth once daily.      atorvastatin (LIPITOR) 20 MG tablet Take 20 mg by mouth every evening.      cetirizine (ZYRTEC) 10 MG tablet Take 10 mg by mouth once daily.      FLUoxetine 20 MG capsule Take 1 capsule (20 mg total) by mouth once daily. 30 capsule 2    lifitegrast (XIIDRA) 5 % Dpet Place 1 drop into both eyes 2 (two) times daily. 60 each 11    meclizine (ANTIVERT) 25 mg tablet Take 25 mg by mouth 3 (three) times daily as needed for Dizziness.         Please address this information as you see fit.  Feel free to contact us if you have any questions or require assistance.    Angelia Darnell  [phone]                .        "

## 2023-04-06 NOTE — ED PROVIDER NOTES
Encounter Date: 4/5/2023       History     Chief Complaint   Patient presents with    Headache     Pt went to see his pcp for a headache for the last 2 weeks. The pain radiates down his back to his hips. Pt also reports difficulty holding his head up. Pt had low blood pressure in the office and was sent to the ER for further eval and treatment.      HPI  This is a 63 y.o. male who has a past medical history of Hypertension.     The patient presents to the Emergency Department with concern low blood pressure, sent from [facility] for pressures in the 80s systolic.  Patient had been seen in the clinic for a headache for the past 2 weeks, associated with pain in his neck and back radiating down towards his hips.  Patient also reports generalized malaise and decreased p.o. intake, feeling weak all over.  No visual disturbance, however he reports some photophobia.  Denies any fever chills, nausea or vomiting, diarrhea, burning with urination, cough or shortness of breath.    Independent historian: Wife, who states that this is not how the patient only [place].  He is usually very active and has a physical job but he has been unable to work for the past week due to his symptoms.      Review of patient's allergies indicates:   Allergen Reactions    Poison ivy extract Itching     Past Medical History:   Diagnosis Date    Hypertension      Past Surgical History:   Procedure Laterality Date    COLONOSCOPY N/A 6/30/2022    Procedure: COLONOSCOPY;  Surgeon: VIRIDIANA Swanson MD;  Location: [facility] ENDO;  Service: Endoscopy;  Laterality: N/A;    LEFT HEART CATHETERIZATION Left 2/16/2022    Procedure: Left heart cath;  Surgeon: Thierno Kidd III, MD;  Location: [facility] CATH LAB;  Service: Cardiology;  Laterality: Left;     No family history on file.  Social History     Tobacco Use    Smoking status: Never    Smokeless tobacco: Never   Substance Use Topics    Alcohol use: Yes     Alcohol/week: 6.0 standard drinks      Types: 6 Cans of beer per week     Comment: daily    Drug use: No     Review of Systems   Constitutional:  Positive for activity change, appetite change and fatigue. Negative for fever.   HENT:  Negative for sore throat.    Eyes:  Positive for photophobia. Negative for visual disturbance.   Respiratory:  Negative for cough and shortness of breath.    Cardiovascular:  Negative for chest pain.   Gastrointestinal:  Negative for abdominal pain, diarrhea, nausea and vomiting.   Endocrine: Negative for polydipsia and polyuria.   Genitourinary:  Negative for difficulty urinating and dysuria.   Musculoskeletal:  Positive for arthralgias, back pain, myalgias and neck pain.   Skin:  Negative for rash.   Neurological:  Positive for light-headedness and headaches. Negative for weakness and numbness.   Psychiatric/Behavioral:  Positive for decreased concentration and sleep disturbance (hypersomnolence).      Physical Exam     Initial Vitals [04/05/23 1709]   BP Pulse Resp Temp SpO2   (!) 120/58 61 18 97.6 °F (36.4 °C) 97 %      MAP       --         Physical Exam    Nursing note and vitals reviewed.  Constitutional: He appears well-developed and well-nourished. He appears distressed.   HENT:   Head: Normocephalic and atraumatic.   Dry oropharynx   Eyes: Conjunctivae and EOM are normal. Pupils are equal, round, and reactive to light.   No nystagmus   Neck: Neck supple.   No nuchal rigidity, however patient does have pain with range of motion in all directions.    Negative Brudzinski's sign   Normal range of motion.  Cardiovascular:  Normal rate, regular rhythm, normal heart sounds and intact distal pulses.           Pulmonary/Chest: Breath sounds normal. No respiratory distress. He has no wheezes. He has no rhonchi. He has no rales.   Abdominal: Abdomen is soft. Bowel sounds are normal. He exhibits no distension. There is no abdominal tenderness.   Musculoskeletal:         General: No tenderness or edema. Normal range of motion.       Cervical back: Normal range of motion and neck supple.     Lymphadenopathy:     He has no cervical adenopathy.   Neurological: He is alert and oriented to person, place, and time. He has normal strength. No cranial nerve deficit or sensory deficit.   Normal coordination, finger-to-nose   Skin: Skin is warm and dry. Capillary refill takes less than 2 seconds. No rash noted. No erythema.   Psychiatric:   Patient has slowed, withdrawn       ED Course   Procedures  Labs Reviewed   CBC W/ AUTO DIFFERENTIAL - Abnormal; Notable for the following components:       Result Value    RBC 4.06 (*)     Hemoglobin 13.5 (*)     Hematocrit 38.1 (*)     MCH 33.3 (*)     Platelets 105 (*)     Mono # 1.2 (*)     Gran % 35.3 (*)     Mono % 20.7 (*)     All other components within normal limits   COMPREHENSIVE METABOLIC PANEL - Abnormal; Notable for the following components:    CO2 18 (*)     Glucose 133 (*)     BUN 26 (*)     Creatinine 2.2 (*)     AST 52 (*)     ALT 50 (*)     eGFR 33 (*)     All other components within normal limits   URINALYSIS, REFLEX TO URINE CULTURE - Abnormal; Notable for the following components:    Appearance, UA Hazy (*)     Protein, UA 1+ (*)     Urobilinogen, UA 2.0-3.0 (*)     All other components within normal limits    Narrative:     Specimen Source->Urine   URINALYSIS MICROSCOPIC - Abnormal; Notable for the following components:    WBC, UA 11 (*)     Hyaline Casts, UA 5 (*)     All other components within normal limits    Narrative:     Specimen Source->Urine   PROTEIN / CREATININE RATIO, URINE - Abnormal; Notable for the following components:    Protein, Urine Random 35 (*)     All other components within normal limits    Narrative:     Specimen Source->Urine   CBC W/ AUTO DIFFERENTIAL - Abnormal; Notable for the following components:    RBC 3.71 (*)     Hemoglobin 12.3 (*)     Hematocrit 34.9 (*)     MCH 33.2 (*)     Platelets 82 (*)     Mono % 15.6 (*)     All other components within normal  limits   COMPREHENSIVE METABOLIC PANEL - Abnormal; Notable for the following components:    CO2 19 (*)     Creatinine 1.6 (*)     Calcium 8.4 (*)     Albumin 3.4 (*)     AST 43 (*)     Anion Gap 7 (*)     eGFR 48 (*)     All other components within normal limits   BASIC METABOLIC PANEL - Abnormal; Notable for the following components:    CO2 22 (*)     Glucose 118 (*)     Creatinine 1.5 (*)     Anion Gap 7 (*)     eGFR 52 (*)     All other components within normal limits   INFLUENZA A & B BY MOLECULAR   CULTURE, BLOOD   CULTURE, BLOOD   CULTURE, URINE   DRUG SCREEN PANEL, URINE EMERGENCY    Narrative:     Specimen Source->Urine   LACTIC ACID, PLASMA   TROPONIN I   TSH   C-REACTIVE PROTEIN   PROCALCITONIN   SARS-COV-2 RNA AMPLIFICATION, QUAL   MICROALBUMIN / CREATININE RATIO URINE   PROTEIN / CREATININE RATIO, URINE   PROTEIN ELECTROPHORESIS, SERUM    Narrative:     Diagnosis:->N17.9 ELAYNE (acute kidney injury)   PROTEIN ELECTROPHORESIS, TIMED URINE   SODIUM, URINE, RANDOM   CREATININE, URINE, RANDOM   PROTEIN ELECTROPHORESIS, RANDOM URINE   APTT   PROTIME-INR   PROTEIN ELECTROPHORESIS, TIMED URINE    Narrative:     Specimen Source->Urine   SODIUM, URINE, RANDOM    Narrative:     Specimen Source->Urine   PHOSPHORUS   MAGNESIUM   HEMOGLOBIN A1C   POCT GLUCOSE, HAND-HELD DEVICE   POCT GLUCOSE, HAND-HELD DEVICE   POCT GLUCOSE, HAND-HELD DEVICE   POCT GLUCOSE, HAND-HELD DEVICE   POCT GLUCOSE   POCT GLUCOSE     EKG Readings: (Independently Interpreted)   Initial Reading: No STEMI. Rhythm: Sinus Bradycardia. Heart Rate: 58. Ectopy: No Ectopy. Conduction: Normal. ST Segments: Normal ST Segments. T Waves: Normal. Clinical Impression: Sinus Bradycardia   ECG Results              EKG 12-lead (In process)  Result time 04/06/23 07:16:17      In process by Interface, Lab In [facility] (04/06/23 07:16:17)                   Narrative:    Test Reason : R53.1,    Vent. Rate : 058 BPM     Atrial Rate : 058 BPM     P-R Int : 182 ms           QRS Dur : 094 ms      QT Int : 430 ms       P-R-T Axes : 036 031 005 degrees     QTc Int : 422 ms    Sinus bradycardia  Septal infarct ,age undetermined  Abnormal ECG  When compared with ECG of 02-MAR-2023 08:51,  T wave amplitude has decreased in Lateral leads    Referred By: MARIA DOLORES   SELF           Confirmed By:                                   Imaging Results              X-Ray Cervical Spine 2 or 3 Views (Final result)  Result time 04/05/23 22:15:50      Final result by Alex Marshall DO (04/05/23 22:15:50)                   Impression:      No acute osseous abnormality of the cervical spine.    Multilevel degenerative changes.      Electronically signed by: Alex Marshall  Date:    04/05/2023  Time:    22:15               Narrative:    EXAMINATION:  XR CERVICAL SPINE 2 OR 3 VIEWS    CLINICAL HISTORY:  ELAYNE, back pain, unintended weight loss;    TECHNIQUE:  AP, lateral and open mouth views of the cervical spine were performed.  Swimmer's view also performed.    COMPARISON:  None.    FINDINGS:  The cervical spine is visualized from the craniocervical junction to the inferior endplate of C5 on the lateral view.  C6 and C7 are not well visualized.  Swimmer's view also somewhat obscures C6 and C7.  There is no acute fracture or subluxation of the cervical spine.  The vertebral body heights are preserved. Alignment is normal. There is disc height loss at C5-C6. There are multilevel degenerative changes.  The remaining visualized osseous structures are intact. Prevertebral soft tissues are within normal limits.                                       X-ray Thoracolumbar Spine AP Lateral (Final result)  Result time 04/05/23 22:12:27      Final result by Alex Marshall DO (04/05/23 22:12:27)                   Impression:      No acute osseous abnormality of the thoracolumbar spine.      Electronically signed by: Alex Marshall  Date:    04/05/2023  Time:    22:12               Narrative:    EXAMINATION:  XR  THORACOLUMBAR SPINE AP LATERAL    CLINICAL HISTORY:  ELAYNE, back pain, unintended weight loss;    TECHNIQUE:  AP and lateral views of the thoracolumbar spine were performed.    COMPARISON:  None    FINDINGS:  There is no evidence of an acute fracture or subluxation of the thoracolumbar spine.  There is mild levoconvex scoliosis centered at approximately L2.  There are multilevel degenerative changes.  There are flowing anterior osteophytes in the thoracic spine which can be seen with diffuse idiopathic skeletal hyperostosis.  Remaining visualized osseous structures are intact.  There are vascular calcifications.                                       CT Head Without Contrast (Final result)  Result time 04/05/23 21:24:44      Final result by Alex Marshall DO (04/05/23 21:24:44)                   Impression:      No acute intracranial abnormality.      Electronically signed by: Alex Marshall  Date:    04/05/2023  Time:    21:24               Narrative:    EXAMINATION:  CT HEAD WITHOUT CONTRAST    CLINICAL HISTORY:  Headache, new or worsening (Age >= 50y);    TECHNIQUE:  Low dose axial CT images obtained throughout the head without intravenous contrast. Sagittal and coronal reconstructions were performed.    COMPARISON:  MRI brain 07/26/2022.    FINDINGS:  Ventricles and sulci are normal in size for age without evidence of hydrocephalus. No extra-axial blood or fluid collections.  The brain parenchyma is normal. No parenchymal mass, hemorrhage, edema or major vascular distribution infarct.    No calvarial fracture.  There is a small subgaleal lipoma.  The scalp is otherwise unremarkable.  Bilateral paranasal sinuses and mastoid air cells are clear.                                       Medications   sodium chloride 0.9% flush 5 mL (has no administration in time range)   acetaminophen tablet 650 mg (has no administration in time range)   acetaminophen tablet 650 mg (has no administration in time range)   naloxone 0.4  mg/mL injection 0.02 mg (has no administration in time range)   insulin aspart U-100 pen 1-10 Units (has no administration in time range)   glucagon (human recombinant) injection 1 mg (has no administration in time range)   dextrose 10% bolus 125 mL 125 mL (has no administration in time range)   dextrose 10% bolus 250 mL 250 mL (has no administration in time range)   dextrose 40 % gel 30,000 mg (has no administration in time range)   enoxaparin injection 40 mg (40 mg Subcutaneous Given 4/5/23 2209)   ondansetron disintegrating tablet 8 mg (has no administration in time range)   FLUoxetine capsule 20 mg (20 mg Oral Given 4/6/23 0831)   lactated ringers infusion ( Intravenous New Bag 4/6/23 0624)   sodium chloride 0.9% bolus 1,000 mL 1,000 mL ( Intravenous Stopped 4/5/23 1948)   prochlorperazine injection Soln 10 mg (10 mg Intravenous Given 4/5/23 1845)   ketorolac injection 15 mg (15 mg Intravenous Given 4/5/23 1846)   sumatriptan tablet 50 mg (50 mg Oral Given 4/6/23 0831)   lactated ringers bolus 500 mL (0 mLs Intravenous Stopped 4/6/23 1309)     Medical Decision Making:   History:   I obtained history from: someone other than patient.       <> Summary of History: Spouse, bedside.  See HPI  Old Medical Records: I decided to obtain old medical records.  Old Records Summarized: records from previous admission(s) and records from clinic visits.       <> Summary of Records: Patient admitted last year for acute intractable headache.  Patient seen in the ED a few different times for headaches and sinus congestion      Initial Assessment:   This is an emergent evaluation of a 63 y.o.male patient with presentation of generalized weakness, malaise, decreased p.o. intake setting of a headache x2 weeks, global, associated with photophobia and decreased activity.  Patient denies any fever chills, GI  complaints, shortness of breath or cough.  Vital signs show patient is normotensive jaw and normal heart rate here.  EKG  showed mild sinus bradycardia but that has resolved.     Initial differentials include but are not limited to:  Dehydration, electrolyte abnormality, metabolic disturbance, acute kidney injury, migraine headache, intracranial mass, less likely intracranial bleed, other considerations include meningitis or encephalitis, although patient has had similar headaches in the past.  Patient does not appear to be septic.  Considering patient's back pain, less likely abscess or hematoma as has no risk factors    Plan:  CBC, CMP, lactate, CRP for acute inflammatory/infectious process, procalcitonin for similar, EKG, flu, COVID, blood cultures.  Will treat with IV fluids, IV Toradol and IV compazine for acute headaches             ED Course as of 04/06/23 1721   Wed Apr 05, 2023 1859 WBC: 5.76 [NP]   1859 Hemoglobin(!): 13.5 [NP]   1859 Hematocrit(!): 38.1 [NP]   1859 Platelets(!): 105 [NP]   1859 Leukocytes, UA: Negative [NP]   1859 UROBILINOGEN UA(!): 2.0-3.0 [NP]   1859 NITRITE UA: Negative [NP]   1859 Occult Blood UA: Negative [NP]   1859 Bilirubin (UA): Negative [NP]   1859 Ketones, UA: Negative [NP]   1859 Glucose, UA: Negative [NP]   1859 Protein, UA(!): 1+ [NP]   2002 Troponin I: 0.020 [NP]   2002 TSH: 3.565 [NP]   2002 Lactate, Mckay: 1.5 [NP]   2002 Procalcitonin: 0.06 [NP]   2010 Influenza A, Molecular: Negative [NP]   2010 Influenza B, Molecular: Negative [NP]   2010 SARS-CoV-2 RNA, Amplification, Qual: Negative [NP]      ED Course User Index  [NP] Ganesh Galo MD          Labs reviewed as above.  Creatinine elevated, > double than previous, consistent with acute kidney injury.  Otherwise labs were unremarkable.  Will order head CT for headache for 2 weeks.  Case discussed with another physician, [employer] resident for family practice, accepted the patient to their service.  They will follow head CT and manage ELAYNE, continue to eval/treat headache syndrome - most consistent with a migraine at this time.      Clinical  Impression:   Final diagnoses:  [R53.1] Weakness  [G44.89] Other headache syndrome  [M54.9] Back pain, unspecified back location, unspecified back pain laterality, unspecified chronicity  [M54.2] Neck pain  [N17.9] ELAYNE (acute kidney injury) (Primary)        ED Disposition Condition    Discharge           ED Prescriptions       Medication Sig Dispense Start Date End Date Auth. Provider    sumatriptan (IMITREX) 50 MG tablet Take 1 tablet (50 mg total) by mouth every 2 (two) hours as needed for Migraine (no more than 2 doses a day). 20 tablet 4/6/2023 5/6/2023 Bobby Adams MD    topiramate (TOPAMAX) 50 MG tablet Take 1 tablet (50 mg total) by mouth 2 (two) times daily. 60 tablet 4/6/2023 5/6/2023 Bobby Adams MD          Follow-up Information    None          Ganesh Galo MD  04/06/23 7901

## 2023-04-06 NOTE — ASSESSMENT & PLAN NOTE
Several months long issue, with multiple office visits and ED presentations. Patient takes advil twice a day, doesn't know what dose. Has tried fioricet which does not help, not currently taking. Recently seen by ENT who did not suspect sinus issue. CT head negative.    - Has been referred to but not yet seen neurology  - Possible functional/overuse headache   - trial sumatriptan   - Consider initiating controller medication if rest of work up negative  - Avoid NSAIDS  - Ensure adequate hydration

## 2023-04-06 NOTE — DISCHARGE SUMMARY
"[facility] [place] [facility]  Discharge Summary      Patient Name: Eitan Cody  MRN: [id number]  ALEX: [id number]  Patient Class: OP- Observation  Admission Date: 4/5/2023  Hospital Length of Stay: 0 days  Discharge Date and Time: No discharge date for patient encounter.  Attending Physician: Rolan Salazar DO   Discharging Provider: Bobby Adams MD  Primary Care Provider: Abilio Mtz MD    Primary Care Team: Networked reference to record PCT     HPI:   62 yo M with PMHx alcohol and HCV cirrhosis, HTN, chronic headaches, HLD, MDD. Patient referred to ED from  clinic after being found to have low BP on evaluation of 2 weeks of generalized weakness, decreased PO and worsening of chronic headache. Patient says headache has been "all over" and has been taking advil twice a day for it, though doesn't know what dose. Patient with several office and ED presentations for similar headache complaint, recently worked up by ENT with unremarkable CT sinuses. Nonetheless patient started on 2 week course of bactrim for sinusitis with last dose the day after admit. Patient had MRI brain last year which was negative for infarct, bleed or tumor. Patient also reports having very little appetite for past 2 weeks, losing "8 lbs." During this time also feeling very weak, dizzy when he walks around though denies falls or LOC. Weakness has kept patient home from work for past 2 weeks. Patient also reporting pain up and down the center of his back, which he says he's had for several months but feels is getting worse, denies sciatica, urine/bowel incontinence, saddle anesthesia. Denies fever, chills, N/V/D, urinary changes, SOB/CP.     In ED, patient normotensive, GCS 15, AAOx3, normal O2 sat on room air, afebrile. Labs significant just for 1+ protein on UA and Creatinine of 2.2 (up from 0.9) last month. WBC normal and lactate negative. CRP and procal negative. Troponin negative - EKG with sinus bradycardia (58 bpm), " age-indeterminate septal infarct. CT head negative. Patient got 1L NS and maintained normal blood pressures in ED. [facility] for management of ELAYNE in setting of generalized weakness.      * No surgery found *      Hospital Course:   [facility] for evaluation of hypotension in the setting of decreased PO intake, weakness, and back pain for the past several weeks. Creatinine was 2.2 on admission with a FeNa of 0.1%. Workup including SPEP and UPEP was unremarkable. Patient received 1L normal saline and 1500 mL LR with improvement of creatinine to 1.5. Anti-hypertensive medications were held with stable blood pressure since admission.  Back pain was evaluated with cervical and thoracolumbar x-ray, which were unremarkable. Patient also has a chronic headache, with negative CT head in the ED. Headache improved with Sumatriptan 50 mg. Plan to discharge patient with Sumatriptin 50 mg prn and topamax 50 mg daily and follow up with neurology outpatient. Continue Exforge, discontinue hydrochlorothiazide and coreg. Closely monitor blood pressure at home and follow up with PCP for repeat BMP. All other questions were answered and pt was discharged home.       Goals of Care Treatment Preferences:  Code Status: Full Code      Consults:     No new Assessment & Plan notes have been filed under this hospital service since the last note was generated.  Service: Hospital Medicine    Final Active Diagnoses:    Diagnosis Date Noted POA    PRINCIPAL PROBLEM:  ELAYNE (acute kidney injury) [N17.9] 04/05/2023 Yes    Weakness with dizziness [R53.1, R42] 04/05/2023 Yes    Back pain [M54.9] 04/05/2023 Yes    Cirrhosis of liver without ascites [K74.60] 04/05/2023 Yes    Hypotension [I95.9] 04/05/2023 No    Hyperlipidemia [E78.5] 07/26/2022 Yes    Primary hypertension [I10] 01/31/2022 Yes    Chronic intractable headache [R51.9, G89.29]  Yes      Problems Resolved During this  Admission:       Discharged Condition: stable    Disposition: Home or Self Care    Follow Up:    Patient Instructions:   No discharge procedures on file.    Significant Diagnostic Studies: N/A    Pending Diagnostic Studies:     None         Medications:  Reconciled Home Medications:      Medication List      START taking these medications    sumatriptan 50 MG tablet  Commonly known as: IMITREX  Take 1 tablet (50 mg total) by mouth every 2 (two) hours as needed for Migraine (no more than 2 doses a day).     topiramate 50 MG tablet  Commonly known as: TOPAMAX  Take 1 tablet (50 mg total) by mouth 2 (two) times daily.        CONTINUE taking these medications    amLODIPine-valsartan-hcthiazid -25 mg Tab  Commonly known as: EXFORGE HCT  Take 1 each by mouth once daily.     aspirin 81 MG Chew  Take 81 mg by mouth once daily.     atorvastatin 20 MG tablet  Commonly known as: LIPITOR  Take 20 mg by mouth every evening.     cetirizine 10 MG tablet  Commonly known as: ZYRTEC  Take 10 mg by mouth once daily.     FLUoxetine 20 MG capsule  Take 1 capsule (20 mg total) by mouth once daily.     lifitegrast 5 % Dpet  Commonly known as: XIIDRA  Place 1 drop into both eyes 2 (two) times daily.     meclizine 25 mg tablet  Commonly known as: ANTIVERT  Take 25 mg by mouth 3 (three) times daily as needed for Dizziness.        STOP taking these medications    carvediloL 12.5 MG tablet  Commonly known as: COREG     hydrALAZINE 25 MG tablet  Commonly known as: APRESOLINE     pulse oximeter device  Commonly known as: pulse oximeter     sulfamethoxazole-trimethoprim 800-160mg 800-160 mg Tab  Commonly known as: BACTRIM DS            Indwelling Lines/Drains at time of discharge:   Lines/Drains/Airways     None                 Time spent on the discharge of patient: 40 minutes         Bobby Adams MD  Department of Hospital Medicine  [facility] - Emergency Dept

## 2023-04-06 NOTE — SUBJECTIVE & OBJECTIVE
Past Medical History:   Diagnosis Date    Hypertension        Past Surgical History:   Procedure Laterality Date    COLONOSCOPY N/A 6/30/2022    Procedure: COLONOSCOPY;  Surgeon: VIRIDIANA Swanson MD;  Location: [facility] ENDO;  Service: Endoscopy;  Laterality: N/A;    LEFT HEART CATHETERIZATION Left 2/16/2022    Procedure: Left heart cath;  Surgeon: Thierno Kidd III, MD;  Location: [facility] CATH LAB;  Service: Cardiology;  Laterality: Left;       Review of patient's allergies indicates:   Allergen Reactions    Poison ivy extract Itching       No current facility-administered medications on file prior to encounter.     Current Outpatient Medications on File Prior to Encounter   Medication Sig    amLODIPine-valsartan-hcthiazid (EXFORGE HCT) -25 mg Tab Take 1 each by mouth once daily.    aspirin 81 MG Chew Take 1 tablet (81 mg total) by mouth once daily.    atorvastatin (LIPITOR) 20 MG tablet Take 1 tablet (20 mg total) by mouth every evening.    carvediloL (COREG) 12.5 MG tablet Take 1 tablet (12.5 mg total) by mouth 2 (two) times daily with meals.    cetirizine (ZYRTEC) 10 MG tablet Take 1 tablet (10 mg total) by mouth once daily. for 7 doses    FLUoxetine 20 MG capsule Take 1 capsule (20 mg total) by mouth once daily.    hydrALAZINE (APRESOLINE) 25 MG tablet Take 1 tablet (25 mg total) by mouth every 8 (eight) hours.    lifitegrast (XIIDRA) 5 % Dpet Place 1 drop into both eyes 2 (two) times daily.    meclizine (ANTIVERT) 25 mg tablet Take 25 mg by mouth 3 (three) times daily as needed for Dizziness.    pulse oximeter (PULSE OXIMETER) device by Apply Externally route 2 (two) times a day. Use twice daily at 8 AM and 3 PM and record the value in [facility] as directed. (Patient not taking: Reported on 4/5/2023)    sulfamethoxazole-trimethoprim 800-160mg (BACTRIM DS) 800-160 mg Tab Take 1 tablet by mouth 2 (two) times daily. for 10 days    [DISCONTINUED] butalbital-acetaminophen-caffeine -40 mg (FIORICET,  ESGIC) -40 mg per tablet Take 1 tablet by mouth every 6 (six) hours as needed for Headaches.    [DISCONTINUED] ibuprofen (ADVIL,MOTRIN) 200 MG tablet Take 200 mg by mouth every 6 (six) hours as needed for Pain.     Family History    None       Tobacco Use    Smoking status: Never    Smokeless tobacco: Never   Substance and Sexual Activity    Alcohol use: Yes     Alcohol/week: 6.0 standard drinks     Types: 6 Cans of beer per week     Comment: daily    Drug use: No    Sexual activity: Not on file     Review of Systems   Constitutional:  Positive for activity change, appetite change, fatigue and unexpected weight change. Negative for chills and fever.   HENT:  Negative for congestion, rhinorrhea and sore throat.    Eyes:  Negative for visual disturbance.   Respiratory:  Negative for cough and shortness of breath.    Cardiovascular:  Negative for chest pain and leg swelling.   Gastrointestinal:  Negative for abdominal distention, abdominal pain, diarrhea, nausea and vomiting.   Genitourinary:  Negative for decreased urine volume, dysuria, flank pain and frequency.   Musculoskeletal:  Positive for back pain and neck pain. Negative for arthralgias and myalgias.   Skin:  Negative for rash.   Neurological:  Positive for dizziness and headaches. Negative for syncope, weakness and numbness.   Psychiatric/Behavioral:  Negative for dysphoric mood. The patient is not nervous/anxious.    Objective:     Vital Signs (Most Recent):  Temp: 97.6 °F (36.4 °C) (04/05/23 1709)  Pulse: 60 (04/05/23 2002)  Resp: 17 (04/05/23 2002)  BP: 107/63 (04/05/23 2002)  SpO2: 95 % (04/05/23 2002) Vital Signs (24h Range):  Temp:  [97.6 °F (36.4 °C)] 97.6 °F (36.4 °C)  Pulse:  [60-65] 60  Resp:  [16-18] 17  SpO2:  [95 %-97 %] 95 %  BP: ()/(53-65) 107/63     Weight: 82.6 kg (182 lb)  Body mass index is 28.51 kg/m².    Physical Exam  Constitutional:       Appearance: Normal appearance.   HENT:      Mouth/Throat:      Mouth: Mucous membranes  are moist.      Pharynx: Oropharynx is clear.   Cardiovascular:      Rate and Rhythm: Normal rate and regular rhythm.      Pulses: Normal pulses.      Heart sounds: Normal heart sounds.   Pulmonary:      Effort: Pulmonary effort is normal.      Breath sounds: Normal breath sounds.   Abdominal:      General: Abdomen is flat. Bowel sounds are normal. There is no distension.      Palpations: Abdomen is soft.      Tenderness: There is no abdominal tenderness.   Musculoskeletal:      Cervical back: Normal range of motion. No rigidity.      Right lower leg: No edema.      Left lower leg: No edema.   Skin:     General: Skin is warm and dry.      Capillary Refill: Capillary refill takes less than 2 seconds.      Coloration: Skin is not jaundiced.   Neurological:      General: No focal deficit present.      Mental Status: He is alert and oriented to person, place, and time. Mental status is at baseline.   Psychiatric:         Mood and Affect: Mood normal.         Behavior: Behavior normal.           Significant Labs: CBC:   Recent Labs   Lab 04/05/23  1831   WBC 5.76   HGB 13.5*   HCT 38.1*   *     CMP:   Recent Labs   Lab 04/05/23  1831      K 4.6      CO2 18*   *   BUN 26*   CREATININE 2.2*   CALCIUM 9.0   PROT 7.5   ALBUMIN 4.0   BILITOT 0.4   ALKPHOS 75   AST 52*   ALT 50*   ANIONGAP 12     Lactic Acid:   Recent Labs   Lab 04/05/23  1831   LACTATE 1.5     Troponin:   Recent Labs   Lab 04/05/23  1831   TROPONINI 0.020     Urine Studies:   Recent Labs   Lab 04/05/23  1818   COLORU Yellow   APPEARANCEUA Hazy*   PHUR 6.0   SPECGRAV 1.025   PROTEINUA 1+*   GLUCUA Negative   KETONESU Negative   BILIRUBINUA Negative   OCCULTUA Negative   NITRITE Negative   UROBILINOGEN 2.0-3.0*   LEUKOCYTESUR Negative   RBCUA 0   WBCUA 11*   BACTERIA Rare   SQUAMEPITHEL 0   HYALINECASTS 5*       Significant Imaging: I have reviewed all pertinent imaging results/findings within the past 24 hours.

## 2023-04-06 NOTE — SUBJECTIVE & OBJECTIVE
Interval History: NAEON, pts headache persists, west resolved with fluids     Review of Systems   Constitutional:  Positive for activity change, appetite change and unexpected weight change. Negative for chills.   HENT:  Negative for congestion, ear pain, facial swelling, hearing loss and rhinorrhea.    Eyes:  Negative for discharge and redness.   Respiratory:  Negative for cough and shortness of breath.    Cardiovascular:  Negative for chest pain and leg swelling.   Gastrointestinal:  Negative for abdominal distention and abdominal pain.   Musculoskeletal: Negative.    Skin: Negative.    Neurological:  Negative for facial asymmetry, speech difficulty, weakness and headaches.   Objective:     Vital Signs (Most Recent):  Temp: 98.5 °F (36.9 °C) (04/06/23 0748)  Pulse: 73 (04/06/23 0748)  Resp: 17 (04/05/23 2002)  BP: 125/75 (04/06/23 0748)  SpO2: 96 % (04/06/23 0748) Vital Signs (24h Range):  Temp:  [97.6 °F (36.4 °C)-98.5 °F (36.9 °C)] 98.5 °F (36.9 °C)  Pulse:  [60-73] 73  Resp:  [16-18] 17  SpO2:  [95 %-97 %] 96 %  BP: ()/(53-75) 125/75     Weight: 82.6 kg (182 lb)  Body mass index is 28.51 kg/m².    Intake/Output Summary (Last 24 hours) at 4/6/2023 0845  Last data filed at 4/5/2023 2021  Gross per 24 hour   Intake 999.93 ml   Output --   Net 999.93 ml      Physical Exam  Vitals and nursing note reviewed.   Constitutional:       Appearance: Normal appearance.   HENT:      Head: Normocephalic and atraumatic.   Eyes:      Pupils: Pupils are equal, round, and reactive to light.   Cardiovascular:      Rate and Rhythm: Normal rate and regular rhythm.      Heart sounds: Normal heart sounds.   Pulmonary:      Breath sounds: Normal breath sounds.   Abdominal:      General: Abdomen is flat.      Palpations: Abdomen is soft.   Musculoskeletal:         General: No swelling or tenderness.      Cervical back: Normal range of motion.   Skin:     General: Skin is warm.   Neurological:      General: No focal deficit present.       Mental Status: He is alert.   Psychiatric:         Mood and Affect: Mood normal.       Significant Labs: All pertinent labs within the past 24 hours have been reviewed.  CBC:   Recent Labs   Lab 04/05/23  1831 04/06/23  0406   WBC 5.76 5.90   HGB 13.5* 12.3*   HCT 38.1* 34.9*   * 82*     CMP:   Recent Labs   Lab 04/05/23 1831 04/06/23  0406    136   K 4.6 4.3    110   CO2 18* 19*   * 103   BUN 26* 23   CREATININE 2.2* 1.6*   CALCIUM 9.0 8.4*   PROT 7.5 6.3   ALBUMIN 4.0 3.4*   BILITOT 0.4 0.5   ALKPHOS 75 66   AST 52* 43*   ALT 50* 42   ANIONGAP 12 7*       Significant Imaging: I have reviewed all pertinent imaging results/findings within the past 24 hours.

## 2023-04-06 NOTE — PLAN OF CARE
SW requested  neuro follow up appointment for patient with [facility]. Will continue to follow.       04/06/23 1126   Post-Acute Status   Post-Acute Authorization Other   Other Status Awaiting f/u Appts   Hospital Resources/Appts/Education Provided Appointments scheduled by Navigator/Coordinator     Dilcia Galo LMSW  ED Social Work  [phone]

## 2023-04-06 NOTE — HOSPITAL COURSE
[facility] evaluation of hypotension in the setting of decreased PO intake, weakness, and back pain for the past several weeks. Creatinine was 2.2 on admission with a FeNa of 0.1%. Workup including SPEP and UPEP was unremarkable. Patient received 1L normal saline and 1500 mL LR with improvement of creatinine to 1.5. Anti-hypertensive medications were held with stable blood pressure since admission.  Back pain was evaluated with cervical and thoracolumbar x-ray, which were unremarkable. Patient also has a chronic headache, with negative CT head in the ED. Headache improved with Sumatriptan 50 mg. Plan to discharge patient with Sumatriptin 50 mg prn and topamax 50 mg daily and follow up with neurology outpatient. Continue Exforge, discontinue hydrochlorothiazide and coreg. Closely monitor blood pressure at home and follow up with PCP for repeat BMP. All other questions were answered and pt was discharged home.

## 2023-04-06 NOTE — ASSESSMENT & PLAN NOTE
Several months long issue, with multiple office visits and ED presentations. Patient takes advil twice a day, doesn't know what dose. Has tried fioricet which does not help, not currently taking. Recently seen by ENT who did not suspect sinus issue. CT head negative.    - Has been referred to but not yet seen neurology  - Possible functional/overuse headache   - Consider initiating controller medication if rest of work up negative  - Avoid NSAIDS  - Ensure adequate hydration

## 2023-04-06 NOTE — ASSESSMENT & PLAN NOTE
No hx of CKD, Cr 0.9 1 month ago, on arrival 2.2 Multiple possible underlying causes/differential dxs including: daily NSAID use, decreased intake, hypotension hepatorenal, myeloma? FeNa of 0.1% suggestive of pre-renal.    - Hold NSAIDS and rest of nephrotoxic meds  - Avoid hypotension  - F/u SPEP/UPEP  - Continue maintenance fluids for now  - F/u BMP  - Consider renal US

## 2023-04-06 NOTE — PLAN OF CARE
Problem: Adult Inpatient Plan of Care  Goal: Plan of Care Review  Outcome: Met  Goal: Patient-Specific Goal (Individualized)  Outcome: Met  Goal: Absence of Hospital-Acquired Illness or Injury  Outcome: Met  Goal: Optimal Comfort and Wellbeing  Outcome: Met  Goal: Readiness for Transition of Care  Outcome: Met     Wife is at bedside.

## 2023-04-06 NOTE — ASSESSMENT & PLAN NOTE
Patient with pains from neck down to lower back for past 5-6 months. Denies accident or trauma. No point tenderness on examination. Xray thoracolumbar and cervical spine unrevealing. Denies radiation down legs, saddle anesthesia, urinary/bowel incontinence.    - Non-NSAID pain control PRN

## 2023-04-06 NOTE — H&P
"[facility] Medicine  History & Physical    Patient Name: Eitan Cody  MRN: [id number]  Patient Class: OP- Observation  Admission Date: 4/5/2023  Attending Physician: Rolan Salazar DO   Primary Care Provider: Abilio Mtz MD         Patient information was obtained from patient and ER records.     Subjective:     Principal Problem:ELAYNE (acute kidney injury)    Chief Complaint:   Chief Complaint   Patient presents with    Headache     Pt went to see his pcp for a headache for the last 2 weeks. The pain radiates down his back to his hips. Pt also reports difficulty holding his head up. Pt had low blood pressure in the office and was sent to the ER for further eval and treatment.         HPI: 64 yo M with PMHx alcohol and HCV cirrhosis, HTN, chronic headaches, HLD, MDD. Patient referred to ED from FM clinic after being found to have low BP on evaluation of 2 weeks of generalized weakness, decreased PO and worsening of chronic headache. Patient says headache has been "all over" and has been taking advil twice a day for it, though doesn't know what dose. Patient with several office and ED presentations for similar headache complaint, recently worked up by ENT with unremarkable CT sinuses. Nonetheless patient started on 2 week course of bactrim for sinusitis with last dose the day after admit. Patient had MRI brain last year which was negative for infarct, bleed or tumor. Patient also reports having very little appetite for past 2 weeks, losing "8 lbs." During this time also feeling very weak, dizzy when he walks around though denies falls or LOC. Weakness has kept patient home from work for past 2 weeks. Patient also reporting pain up and down the center of his back, which he says he's had for several months but feels is getting worse, denies sciatica, urine/bowel incontinence, saddle anesthesia. Denies fever, chills, N/V/D, urinary changes, SOB/CP.     In ED, patient normotensive, GCS 15, AAOx3, " normal O2 sat on room air, afebrile. Labs significant just for 1+ protein on UA and Creatinine of 2.2 (up from 0.9) last month. WBC normal and lactate negative. CRP and procal negative. Troponin negative - EKG with sinus bradycardia (58 bpm), age-indeterminate septal infarct. CT head negative. Patient got 1L NS and maintained normal blood pressures in ED. Patient admitted to [facility] for management of ELAYNE in setting of generalized weakness.      Past Medical History:   Diagnosis Date    Hypertension        Past Surgical History:   Procedure Laterality Date    COLONOSCOPY N/A 6/30/2022    Procedure: COLONOSCOPY;  Surgeon: VIRIDIANA Swanson MD;  Location: [facility] ENDO;  Service: Endoscopy;  Laterality: N/A;    LEFT HEART CATHETERIZATION Left 2/16/2022    Procedure: Left heart cath;  Surgeon: Thierno Kidd III, MD;  Location: [facility] CATH LAB;  Service: Cardiology;  Laterality: Left;       Review of patient's allergies indicates:   Allergen Reactions    Poison ivy extract Itching       No current facility-administered medications on file prior to encounter.     Current Outpatient Medications on File Prior to Encounter   Medication Sig    amLODIPine-valsartan-hcthiazid (EXFORGE HCT) -25 mg Tab Take 1 each by mouth once daily.    aspirin 81 MG Chew Take 1 tablet (81 mg total) by mouth once daily.    atorvastatin (LIPITOR) 20 MG tablet Take 1 tablet (20 mg total) by mouth every evening.    carvediloL (COREG) 12.5 MG tablet Take 1 tablet (12.5 mg total) by mouth 2 (two) times daily with meals.    cetirizine (ZYRTEC) 10 MG tablet Take 1 tablet (10 mg total) by mouth once daily. for 7 doses    FLUoxetine 20 MG capsule Take 1 capsule (20 mg total) by mouth once daily.    hydrALAZINE (APRESOLINE) 25 MG tablet Take 1 tablet (25 mg total) by mouth every 8 (eight) hours.    lifitegrast (XIIDRA) 5 % Dpet Place 1 drop into both eyes 2 (two) times daily.    meclizine (ANTIVERT) 25 mg tablet Take 25 mg by mouth 3  (three) times daily as needed for Dizziness.    pulse oximeter (PULSE OXIMETER) device by Apply Externally route 2 (two) times a day. Use twice daily at 8 AM and 3 PM and record the value in MyChart as directed. (Patient not taking: Reported on 4/5/2023)    sulfamethoxazole-trimethoprim 800-160mg (BACTRIM DS) 800-160 mg Tab Take 1 tablet by mouth 2 (two) times daily. for 10 days    [DISCONTINUED] butalbital-acetaminophen-caffeine -40 mg (FIORICET, ESGIC) -40 mg per tablet Take 1 tablet by mouth every 6 (six) hours as needed for Headaches.    [DISCONTINUED] ibuprofen (ADVIL,MOTRIN) 200 MG tablet Take 200 mg by mouth every 6 (six) hours as needed for Pain.     Family History    None       Tobacco Use    Smoking status: Never    Smokeless tobacco: Never   Substance and Sexual Activity    Alcohol use: Yes     Alcohol/week: 6.0 standard drinks     Types: 6 Cans of beer per week     Comment: daily    Drug use: No    Sexual activity: Not on file     Review of Systems   Constitutional:  Positive for activity change, appetite change, fatigue and unexpected weight change. Negative for chills and fever.   HENT:  Negative for congestion, rhinorrhea and sore throat.    Eyes:  Negative for visual disturbance.   Respiratory:  Negative for cough and shortness of breath.    Cardiovascular:  Negative for chest pain and leg swelling.   Gastrointestinal:  Negative for abdominal distention, abdominal pain, diarrhea, nausea and vomiting.   Genitourinary:  Negative for decreased urine volume, dysuria, flank pain and frequency.   Musculoskeletal:  Positive for back pain and neck pain. Negative for arthralgias and myalgias.   Skin:  Negative for rash.   Neurological:  Positive for dizziness and headaches. Negative for syncope, weakness and numbness.   Psychiatric/Behavioral:  Negative for dysphoric mood. The patient is not nervous/anxious.    Objective:     Vital Signs (Most Recent):  Temp: 97.6 °F (36.4 °C) (04/05/23  1709)  Pulse: 60 (04/05/23 2002)  Resp: 17 (04/05/23 2002)  BP: 107/63 (04/05/23 2002)  SpO2: 95 % (04/05/23 2002) Vital Signs (24h Range):  Temp:  [97.6 °F (36.4 °C)] 97.6 °F (36.4 °C)  Pulse:  [60-65] 60  Resp:  [16-18] 17  SpO2:  [95 %-97 %] 95 %  BP: ()/(53-65) 107/63     Weight: 82.6 kg (182 lb)  Body mass index is 28.51 kg/m².    Physical Exam  Constitutional:       Appearance: Normal appearance.   HENT:      Mouth/Throat:      Mouth: Mucous membranes are moist.      Pharynx: Oropharynx is clear.   Cardiovascular:      Rate and Rhythm: Normal rate and regular rhythm.      Pulses: Normal pulses.      Heart sounds: Normal heart sounds.   Pulmonary:      Effort: Pulmonary effort is normal.      Breath sounds: Normal breath sounds.   Abdominal:      General: Abdomen is flat. Bowel sounds are normal. There is no distension.      Palpations: Abdomen is soft.      Tenderness: There is no abdominal tenderness.   Musculoskeletal:      Cervical back: Normal range of motion. No rigidity.      Right lower leg: No edema.      Left lower leg: No edema.   Skin:     General: Skin is warm and dry.      Capillary Refill: Capillary refill takes less than 2 seconds.      Coloration: Skin is not jaundiced.   Neurological:      General: No focal deficit present.      Mental Status: He is alert and oriented to person, place, and time. Mental status is at baseline.   Psychiatric:         Mood and Affect: Mood normal.         Behavior: Behavior normal.           Significant Labs: CBC:   Recent Labs   Lab 04/05/23  1831   WBC 5.76   HGB 13.5*   HCT 38.1*   *     CMP:   Recent Labs   Lab 04/05/23  1831      K 4.6      CO2 18*   *   BUN 26*   CREATININE 2.2*   CALCIUM 9.0   PROT 7.5   ALBUMIN 4.0   BILITOT 0.4   ALKPHOS 75   AST 52*   ALT 50*   ANIONGAP 12     Lactic Acid:   Recent Labs   Lab 04/05/23  1831   LACTATE 1.5     Troponin:   Recent Labs   Lab 04/05/23  1831   TROPONINI 0.020     Urine Studies:    Recent Labs   Lab 04/05/23  1818   COLORU Yellow   APPEARANCEUA Hazy*   PHUR 6.0   SPECGRAV 1.025   PROTEINUA 1+*   GLUCUA Negative   KETONESU Negative   BILIRUBINUA Negative   OCCULTUA Negative   NITRITE Negative   UROBILINOGEN 2.0-3.0*   LEUKOCYTESUR Negative   RBCUA 0   WBCUA 11*   BACTERIA Rare   SQUAMEPITHEL 0   HYALINECASTS 5*       Significant Imaging: I have reviewed all pertinent imaging results/findings within the past 24 hours.  Assessment/Plan:     * ELAYNE (acute kidney injury)  No hx of CKD, Cr 0.9 1 month ago, on arrival 2.2 Multiple possible underlying causes/differential dxs including: daily NSAID use, recent bactrim use, decreased intake, hypotension hepatorenal, myeloma? FeNa of 0.1% suggestive of pre-renal.    - Hold NSAIDS and rest of nephrotoxic meds  - Hold Bactrim  - Avoid hypotension  - F/u SPEP/UPEP  - Continue maintenance fluids for now  - F/u BMP  - Consider renal US      Hypotension  Possibly due to poor intake. 86/53 in clinic prior to arrival. Normotensive on arrival to ED, stayed normal after 1L bolus. Sepsis workup unremarkable (Bcx pending).     - Continue maintenance fluids for now  - F/u orthostatic vitals      Weakness with dizziness  For past 2 weeks or so, without falls or syncope. Associated with decreased appetite and 6 lbs weight loss. Likely associated with hypotension.      - Novasource renal with meals  - Avoid hypotension  - Fall precautions    Chronic intractable headache  Several months long issue, with multiple office visits and ED presentations. Patient takes advil twice a day, doesn't know what dose. Has tried fioricet which does not help, not currently taking. Recently seen by ENT who did not suspect sinus issue. CT head negative.    - Has been referred to but not yet seen neurology  - Possible functional/overuse headache   - Consider initiating controller medication if rest of work up negative  - Avoid NSAIDS  - Ensure adequate hydration      Back pain  Patient  with pains from neck down to lower back for past 5-6 months. Denies accident or trauma. No point tenderness on examination. Xray thoracolumbar and cervical spine unrevealing. Denies radiation down legs, saddle anesthesia, urinary/bowel incontinence.    - Non-NSAID pain control PRN    Cirrhosis of liver without ascites  Hx of HCV (treated) and alcohol use. MRI liver from 9/2022 showing cirrhosis and multiple rim-enhancing lesions (largest 1.4 cm and 1.1 cm) not meeting diagnostic criteria for HCC but recommending continued surveillance without suggested timeline. Sequelae of portal hypertensin. Colonoscopy from last year negative for malignancy.    - Consider follow up MRI abdomen if malignancy suspected  - Consider possible hepatorenal syndrome if no other identifiable cause of ELAYNE and does not resolve with restored BP          Primary hypertension  - Home meds (HCTZ, losartan, amlodipine, coreg) held in light of ELAYNE and hypotension      Hyperlipidemia  - Continue home lipitor        VTE Risk Mitigation (From admission, onward)           Ordered     enoxaparin injection 40 mg  Daily         04/05/23 2031     IP VTE HIGH RISK PATIENT  Once         04/05/23 2031     Place sequential compression device  Until discontinued         04/05/23 2031                         Mika Comer MD  Department of Hospital Medicine  [place] - Emergency Dept

## 2023-04-06 NOTE — ASSESSMENT & PLAN NOTE
No hx of CKD, Cr 0.9 1 month ago, on arrival 2.2 Multiple possible underlying causes/differential dxs including: daily NSAID use, recent bactrim use, decreased intake, hypotension hepatorenal, myeloma? FeNa of 0.1% suggestive of pre-renal.    - Hold NSAIDS and rest of nephrotoxic meds  - Hold Bactrim  - Avoid hypotension  - F/u SPEP/UPEP  - Continue maintenance fluids for now - improved with fluids  - F/u BMP

## 2023-04-07 LAB — BACTERIA UR CULT: NORMAL

## 2023-04-10 LAB
ALBUMIN SERPL ELPH-MCNC: 3.95 G/DL (ref 3.35–5.55)
ALPHA1 GLOB SERPL ELPH-MCNC: 0.26 G/DL (ref 0.17–0.41)
ALPHA2 GLOB SERPL ELPH-MCNC: 0.83 G/DL (ref 0.43–0.99)
B-GLOBULIN SERPL ELPH-MCNC: 0.74 G/DL (ref 0.5–1.1)
GAMMA GLOB SERPL ELPH-MCNC: 1.12 G/DL (ref 0.67–1.58)
PROT SERPL-MCNC: 6.9 G/DL (ref 6–8.4)

## 2023-04-11 LAB
BACTERIA BLD CULT: NORMAL
BACTERIA BLD CULT: NORMAL
PATHOLOGIST INTERPRETATION SPE: NORMAL

## 2023-04-18 ENCOUNTER — OFFICE VISIT (OUTPATIENT)
Dept: FAMILY MEDICINE | Facility: HOSPITAL | Age: 63
End: 2023-04-18
Payer: MEDICAID

## 2023-04-18 VITALS
SYSTOLIC BLOOD PRESSURE: 99 MMHG | HEART RATE: 74 BPM | HEIGHT: 67 IN | WEIGHT: 183.88 LBS | DIASTOLIC BLOOD PRESSURE: 64 MMHG | BODY MASS INDEX: 28.86 KG/M2

## 2023-04-18 DIAGNOSIS — N17.9 AKI (ACUTE KIDNEY INJURY): ICD-10-CM

## 2023-04-18 DIAGNOSIS — I10 PRIMARY HYPERTENSION: Primary | ICD-10-CM

## 2023-04-18 DIAGNOSIS — I10 PRIMARY HYPERTENSION: ICD-10-CM

## 2023-04-18 DIAGNOSIS — G44.221 CHRONIC TENSION-TYPE HEADACHE, INTRACTABLE: ICD-10-CM

## 2023-04-18 DIAGNOSIS — I95.9 HYPOTENSION, UNSPECIFIED HYPOTENSION TYPE: Primary | ICD-10-CM

## 2023-04-18 PROCEDURE — 99214 OFFICE O/P EST MOD 30 MIN: CPT | Performed by: STUDENT IN AN ORGANIZED HEALTH CARE EDUCATION/TRAINING PROGRAM

## 2023-04-18 RX ORDER — AMLODIPINE AND VALSARTAN 10; 160 MG/1; MG/1
1 TABLET ORAL DAILY
Qty: 30 TABLET | Refills: 11 | Status: SHIPPED | OUTPATIENT
Start: 2023-04-18 | End: 2024-05-01

## 2023-04-18 NOTE — PROGRESS NOTES
I assume primary medical responsibility for this patient. I have reviewed the history, physical, and assessment & treatment plan with the resident and agree that the care is reasonable and necessary. This service has been performed by a resident without the presence of a teaching physician under the primary care exception. If necessary, an addendum of additional findings or evaluation beyond the resident documentation will be noted below.        Rolan Salazar Jr., DO    [facility]

## 2023-04-18 NOTE — PROGRESS NOTES
"Subjective:      Patient ID: Eitan Cody is a 63 y.o. male.    Chief Complaint: Follow-up    HPI 64 yo male presents for follow up    #Hospital follow up  Discharged 4/6 after overnight stay s/p intractable headache and hypotension with lightheadedness   "Patient w chronic headache, with negative CT head in the ED. Headache improved with Sumatriptan 50 mg. Plan to discharge patient with Sumatriptin 50 mg prn and topamax 50 mg daily and follow up with neurology outpatient. Continue Exforge, discontinue hydrochlorothiazide and coreg. Closely monitor blood pressure at home and follow up with PCP for repeat BMP"  Has not been checking blood pressure at home  Reports eating well and adequate hydration   Reports still having headaches, but improved on Propranolol. Has not needed the Imitrex yet.     Past Medical History:   Diagnosis Date    Hypertension      Review of Systems     10 point review of systems was conducted and only the pertinent positives and pertinent negatives are noted above in the HPI section.    Objective:     Vitals:    04/18/23 1359   BP: 99/64   Pulse: 74     Physical Exam  Vitals reviewed.   Constitutional:       General: He is not in acute distress.     Appearance: Normal appearance. He is not ill-appearing.   Eyes:      Conjunctiva/sclera: Conjunctivae normal.      Pupils: Pupils are equal, round, and reactive to light.   Cardiovascular:      Rate and Rhythm: Normal rate and regular rhythm.      Pulses: Normal pulses.      Heart sounds: Normal heart sounds.   Pulmonary:      Effort: Pulmonary effort is normal.      Breath sounds: Normal breath sounds.   Skin:     General: Skin is warm.   Neurological:      General: No focal deficit present.      Mental Status: He is alert and oriented to person, place, and time.   Psychiatric:         Mood and Affect: Mood normal.         Behavior: Behavior normal.     Assessment:      1. Hypotension, unspecified hypotension type    2. Primary hypertension    3. " Chronic tension-type headache, intractable    4. ELAYNE (acute kidney injury)      Plan:     Primary Hypertension  Hypotension, unspecified hypotension type   Discontinue Exforge HCT, start Exforge (amlodipine 10/valsartan 160) as likely polypharmacy involvement   Check blood pressures at home in the morning, keep a log     Chronic tension-type headache, intractable  -     Ambulatory referral/consult to Neurology; Future; Expected date: 04/25/2023    ELAYNE (acute kidney injury)  -     Basic Metabolic Panel; Future; Expected date: 04/18/2023      Follow up in 2 weeks (on 5/2/2023), or if symptoms worsen or fail to improve, for follow up 2-4 weeks for blood pressure check.    Sussy Mayer MD, [facility] PGY-3  04/18/2023

## 2023-05-01 ENCOUNTER — PATIENT MESSAGE (OUTPATIENT)
Dept: FAMILY MEDICINE | Facility: HOSPITAL | Age: 63
End: 2023-05-01
Payer: MEDICAID

## 2023-05-01 RX ORDER — TOPIRAMATE 50 MG/1
50 TABLET, FILM COATED ORAL 2 TIMES DAILY
Qty: 60 TABLET | Refills: 0 | Status: SHIPPED | OUTPATIENT
Start: 2023-05-01 | End: 2023-10-20 | Stop reason: SDUPTHER

## 2023-05-14 NOTE — PROGRESS NOTES
I assume primary medical responsibility for this patient. I have reviewed the history, physical, and assessement & treatment plan with the resident and agree that the care is reasonable and necessary. This service has been performed by a resident without the presence of a teaching physician under the primary care exception. If necessary, an addendum of additional findings or evaluation beyond the resident documentation will be noted below.     Tiffani Andrews MD   no

## 2023-05-18 ENCOUNTER — TELEPHONE (OUTPATIENT)
Dept: FAMILY MEDICINE | Facility: HOSPITAL | Age: 63
End: 2023-05-18
Payer: MEDICAID

## 2023-05-18 ENCOUNTER — PATIENT MESSAGE (OUTPATIENT)
Dept: FAMILY MEDICINE | Facility: HOSPITAL | Age: 63
End: 2023-05-18
Payer: MEDICAID

## 2023-06-11 RX ORDER — TOPIRAMATE 50 MG/1
50 TABLET, FILM COATED ORAL 2 TIMES DAILY
Qty: 60 TABLET | Refills: 0 | Status: CANCELLED | OUTPATIENT
Start: 2023-06-11 | End: 2023-07-11

## 2023-06-15 RX ORDER — TOPIRAMATE 50 MG/1
50 TABLET, FILM COATED ORAL 2 TIMES DAILY
Qty: 60 TABLET | Refills: 0 | Status: CANCELLED | OUTPATIENT
Start: 2023-06-11 | End: 2023-07-11

## 2023-06-21 ENCOUNTER — OFFICE VISIT (OUTPATIENT)
Dept: SLEEP MEDICINE | Facility: CLINIC | Age: 63
End: 2023-06-21
Payer: MEDICAID

## 2023-06-21 VITALS
HEART RATE: 109 BPM | HEIGHT: 67 IN | BODY MASS INDEX: 27.78 KG/M2 | RESPIRATION RATE: 18 BRPM | DIASTOLIC BLOOD PRESSURE: 89 MMHG | SYSTOLIC BLOOD PRESSURE: 142 MMHG | WEIGHT: 177 LBS | OXYGEN SATURATION: 95 %

## 2023-06-21 DIAGNOSIS — G47.30 SLEEP APNEA, UNSPECIFIED TYPE: Primary | ICD-10-CM

## 2023-06-21 DIAGNOSIS — I10 PRIMARY HYPERTENSION: ICD-10-CM

## 2023-06-21 DIAGNOSIS — G47.33 OSA (OBSTRUCTIVE SLEEP APNEA): ICD-10-CM

## 2023-06-21 PROCEDURE — 3008F PR BODY MASS INDEX (BMI) DOCUMENTED: ICD-10-PCS | Mod: CPTII,,, | Performed by: PSYCHIATRY & NEUROLOGY

## 2023-06-21 PROCEDURE — 3044F PR MOST RECENT HEMOGLOBIN A1C LEVEL <7.0%: ICD-10-PCS | Mod: CPTII,,, | Performed by: PSYCHIATRY & NEUROLOGY

## 2023-06-21 PROCEDURE — 3079F DIAST BP 80-89 MM HG: CPT | Mod: CPTII,,, | Performed by: PSYCHIATRY & NEUROLOGY

## 2023-06-21 PROCEDURE — 1160F PR REVIEW ALL MEDS BY PRESCRIBER/CLIN PHARMACIST DOCUMENTED: ICD-10-PCS | Mod: CPTII,,, | Performed by: PSYCHIATRY & NEUROLOGY

## 2023-06-21 PROCEDURE — 1159F PR MEDICATION LIST DOCUMENTED IN MEDICAL RECORD: ICD-10-PCS | Mod: CPTII,,, | Performed by: PSYCHIATRY & NEUROLOGY

## 2023-06-21 PROCEDURE — 3077F PR MOST RECENT SYSTOLIC BLOOD PRESSURE >= 140 MM HG: ICD-10-PCS | Mod: CPTII,,, | Performed by: PSYCHIATRY & NEUROLOGY

## 2023-06-21 PROCEDURE — 3079F PR MOST RECENT DIASTOLIC BLOOD PRESSURE 80-89 MM HG: ICD-10-PCS | Mod: CPTII,,, | Performed by: PSYCHIATRY & NEUROLOGY

## 2023-06-21 PROCEDURE — 99204 PR OFFICE/OUTPT VISIT, NEW, LEVL IV, 45-59 MIN: ICD-10-PCS | Mod: S$PBB,,, | Performed by: PSYCHIATRY & NEUROLOGY

## 2023-06-21 PROCEDURE — 99213 OFFICE O/P EST LOW 20 MIN: CPT | Mod: PBBFAC | Performed by: PSYCHIATRY & NEUROLOGY

## 2023-06-21 PROCEDURE — 3008F BODY MASS INDEX DOCD: CPT | Mod: CPTII,,, | Performed by: PSYCHIATRY & NEUROLOGY

## 2023-06-21 PROCEDURE — 99999 PR PBB SHADOW E&M-EST. PATIENT-LVL III: CPT | Mod: PBBFAC,,, | Performed by: PSYCHIATRY & NEUROLOGY

## 2023-06-21 PROCEDURE — 99999 PR PBB SHADOW E&M-EST. PATIENT-LVL III: ICD-10-PCS | Mod: PBBFAC,,, | Performed by: PSYCHIATRY & NEUROLOGY

## 2023-06-21 PROCEDURE — 99204 OFFICE O/P NEW MOD 45 MIN: CPT | Mod: S$PBB,,, | Performed by: PSYCHIATRY & NEUROLOGY

## 2023-06-21 PROCEDURE — 1160F RVW MEDS BY RX/DR IN RCRD: CPT | Mod: CPTII,,, | Performed by: PSYCHIATRY & NEUROLOGY

## 2023-06-21 PROCEDURE — 4010F ACE/ARB THERAPY RXD/TAKEN: CPT | Mod: CPTII,,, | Performed by: PSYCHIATRY & NEUROLOGY

## 2023-06-21 PROCEDURE — 1159F MED LIST DOCD IN RCRD: CPT | Mod: CPTII,,, | Performed by: PSYCHIATRY & NEUROLOGY

## 2023-06-21 PROCEDURE — 3044F HG A1C LEVEL LT 7.0%: CPT | Mod: CPTII,,, | Performed by: PSYCHIATRY & NEUROLOGY

## 2023-06-21 PROCEDURE — 4010F PR ACE/ARB THEARPY RXD/TAKEN: ICD-10-PCS | Mod: CPTII,,, | Performed by: PSYCHIATRY & NEUROLOGY

## 2023-06-21 PROCEDURE — 3077F SYST BP >= 140 MM HG: CPT | Mod: CPTII,,, | Performed by: PSYCHIATRY & NEUROLOGY

## 2023-06-21 NOTE — PROGRESS NOTES
Eitan Cody is a 63 y.o. male is here to be evaluated for a sleep disorder; referred by Abilio Mtz MD.    The patient reports snoring, gasping for air, witnessed apneas, interrupted sleep, excessive daytime sleepiness, and excessive daytime fatigue since many years.      The patient feels rested upon awakening. Takes naps. The patient  denies morning headaches and denies  dry mouth on awakening.   Eitan Cody denies  nasal congestion.      The patient  denied difficulty  with falling or staying asleep.    Eitan Cody  denies symptoms concerning for parasomnia except for occasional somniloquy.  The patient  denies auxiliary symptoms of narcolepsy including sleep onset paralysis, hypnagogic hallucinations, sleep attacks and cataplexy.    Eitan Cody denied symptoms concerning for RLS; nocturnal leg movements have not been noticed.   The patient does not experience sleep related leg cramps.         Medications pertinent to sleep  disorders taken currently: no  Previous  medications taken  for sleep disorders:  no    Sleep studies  no        Occupation:full time  Bed partner:  his girlfriend - patient of mine  Exercise routine: active at work  Caffeine:  no beverages per day  Alcohol: alcohol  Smoking:  EPWORTH SLEEPINESS SCALE TOTAL SCORE  6/21/2023   Total score 14         EPWORTH SLEEPINESS SCALE 6/21/2023   Sitting and reading 3   Watching TV 3   Sitting, inactive in a public place (e.g. a theatre or a meeting) 1   As a passenger in a car for an hour without a break 0   Lying down to rest in the afternoon when circumstances permit 3   Sitting and talking to someone 1   Sitting quietly after a lunch without alcohol 3   In a car, while stopped for a few minutes in traffic 0   Total score 14       EPWORTH SLEEPINESS SCALE 6/21/2023   Sitting and reading 3   Watching TV 3   Sitting, inactive in a public place (e.g. a theatre or a meeting) 1   As a passenger in a car for an hour without a break 0   Lying down  to rest in the afternoon when circumstances permit 3   Sitting and talking to someone 1   Sitting quietly after a lunch without alcohol 3   In a car, while stopped for a few minutes in traffic 0   Total score 14     Sleep Clinic New Patient 6/21/2023   What time do you go to bed on a week day? (Give a range) 10:30 pm- 11:00 pm   What time do you go to bed on a day off? (Give a range) 10:30-11:00pm   How long does it take you to fall asleep? (Give a range) 10-15 minutes   On average, how many times per night do you wake up? 3 times   How long does it take you to fall back into sleep? (Give a range) 5-10 minutes   What time do you wake up to start your day on a week day? (Give a range) 6:30 am-7 am   What time do you wake up to start your day on a day off? (Give a range) 8 am-8:30 am   What time do you get out of bed? (Give a range) 6:30 am   On average, how many hours do you sleep? 7-8 hours   On average, how many naps do you take per day? 1   Rate your sleep quality from 0 to 5 (0-poor, 5-great). 5   Have you experienced:  Weight loss?   How much weight have you lost or gained (in lbs.) in the last year? 20 lbs   On average, how many times per night do you go to the bathroom?  3 times   Have you ever had a sleep study/CPAP machine/surgery for sleep apnea? No   Have you ever had a CPAP machine for sleep apnea? No   Have you ever had surgery for sleep apnea? No       Sleep Clinic ROS  6/21/2023   Difficulty breathing through the nose?  No   Sore throat or dry mouth in the morning? No   Irregular or very fast heart beat?  No   Shortness of breath?  No   Acid reflux? No   Body aches and pains?  Yes   Morning headaches? No   Dizziness? No   Mood changes?  Yes   Do you exercise?  Yes   Do you feel like moving your legs a lot?  No       DME:         PAST MEDICAL HISTORY:    Active Ambulatory Problems     Diagnosis Date Noted    COVID     Chronic intractable headache     Primary hypertension 01/31/2022    Abnormal stress  ECG with treadmill 02/11/2022    Mild HOCM (hypertrophic obstructive cardiomyopathy) 02/11/2022    Postural dizziness with presyncope 03/25/2022    Hyperlipidemia 07/26/2022    Abnormal liver enzymes 07/26/2022    Alcohol dependence 07/26/2022    Chronic hepatitis C without hepatic coma 08/04/2022    Weakness with dizziness 04/05/2023    Unintentional weight loss 04/05/2023    Back pain 04/05/2023    ELAYNE (acute kidney injury) 04/05/2023    Cirrhosis of liver without ascites 04/05/2023    Hypotension 04/05/2023     Resolved Ambulatory Problems     Diagnosis Date Noted    Hypertensive emergency 01/14/2022    Chest pain, unspecified 01/14/2022    Demand ischemia 01/31/2022     Past Medical History:   Diagnosis Date    Hypertension                 PAST SURGICAL HISTORY:    Past Surgical History:   Procedure Laterality Date    COLONOSCOPY N/A 6/30/2022    Procedure: COLONOSCOPY;  Surgeon: VIRIDIANA Swanson MD;  Location: [facility] ENDO;  Service: Endoscopy;  Laterality: N/A;    LEFT HEART CATHETERIZATION Left 2/16/2022    Procedure: Left heart cath;  Surgeon: Thierno Kidd III, MD;  Location: [facility] CATH LAB;  Service: Cardiology;  Laterality: Left;         FAMILY HISTORY:                No family history on file.    SOCIAL HISTORY:          Tobacco:   Social History     Tobacco Use   Smoking Status Never   Smokeless Tobacco Never       alcohol use:    Social History     Substance and Sexual Activity   Alcohol Use Yes    Alcohol/week: 6.0 standard drinks    Types: 6 Cans of beer per week    Comment: daily                   ALLERGIES:    Review of patient's allergies indicates:   Allergen Reactions    Poison ivy extract Itching       CURRENT MEDICATIONS:    Current Outpatient Medications   Medication Sig Dispense Refill    amlodipine-valsartan (EXFORGE)  mg per tablet Take 1 tablet by mouth once daily. 30 tablet 11    aspirin 81 MG Chew Take 81 mg by mouth once daily.      atorvastatin (LIPITOR) 20 MG tablet Take  "20 mg by mouth every evening.      cetirizine (ZYRTEC) 10 MG tablet Take 10 mg by mouth once daily.      FLUoxetine 20 MG capsule Take 1 capsule (20 mg total) by mouth once daily. 30 capsule 2    lifitegrast (XIIDRA) 5 % Dpet Place 1 drop into both eyes 2 (two) times daily. 60 each 11    meclizine (ANTIVERT) 25 mg tablet Take 25 mg by mouth 3 (three) times daily as needed for Dizziness.      sumatriptan (IMITREX) 50 MG tablet Take 1 tablet (50 mg total) by mouth every 2 (two) hours as needed for Migraine (no more than 2 doses a day). 20 tablet 0    topiramate (TOPAMAX) 50 MG tablet Take 1 tablet (50 mg total) by mouth 2 (two) times daily. 60 tablet 0     No current facility-administered medications for this visit.                      PHYSICAL EXAM:  BP (!) 142/89 (BP Location: Right arm, Patient Position: Sitting, BP Method: Medium (Automatic))   Pulse 109   Resp 18   Ht 5' 7" (1.702 m)   Wt 80.3 kg (177 lb)   SpO2 95%   BMI 27.72 kg/m²   GENERAL: Normal development, well groomed.  HEENT:   HEENT:  Conjunctivae are non-erythematous; Pupils equal, round, and reactive to light; Nose is symmetrical; Nasal mucosa is pink and moist; Septum is midline; Inferior turbinates are normal; Nasal airflow is normal; Posterior pharynx is pink; Modified Mallampati:III-IV; Posterior palate is low; Tonsils not visualized; Uvula is reddened;Tongue is indurated along the molar surface; Dentition is fair; No TMJ tenderness; Jaw opening and protrusion without click and without discomfort.  NECK: Supple. Neck circumference is 16 inches. No thyromegaly. No palpable nodes.     SKIN: On face and neck: No abrasions, no rashes, no lesions.  No subcutaneous nodules are palpable.  RESPIRATORY: Chest is clear to auscultation.  Normal chest expansion and non-labored breathing at rest.  CARDIOVASCULAR: Normal S1, S2.  No murmurs, gallops or rubs. No carotid bruits bilaterally.  No edema. No clubbing. No cyanosis.    NEURO: Oriented to time, " place and person. Normal attention span and concentration. Gait normal.    PSYCH: Affect is full. Mood is normal  MUSCULOSKELETAL: Moves 4 extremities. Gait normal.           ASSESSMENT:    1. Sleep Apnea NEC. The patient symptomatically has  snoring, gasping for air, choking , witnessed apneas, and interrupted sleep  with exam findings of crowded airway and elevated BMI. The patient has medical co-morbidities of hypertension  which can be worsened by MERCEDES. This warrants further investigation for possible obstructive sleep apnea.          PLAN:    Diagnostic: Home Sleep Study. The nature of this procedure and its indication was discussed with the patient. We will notify the patient about sleep study resuts via My Chart.  May do HST in [place] with his girlfriend who I met as a patient last week (and also ordered HST for her).     During our discussion today, we talked about the etiology of  MERCEDES as well as the potential ramifications of untreated sleep apnea, which could include daytime sleepiness, hypertension, heart disease and/or stroke.  We discussed potential treatment options, which could include weight loss, body positioning, continuous positive airway pressure (CPAP), or referral for surgical consideration. Meanwhile, he  is urged to avoid supine sleep, weight gain and alcoholic beverages since all of these can worsen MERCEDES.     The patient was given open opportunity to ask questions and/or express concerns about treatment plan. Two point patient identifier confirmed.       Precautions: The patient was advised to abstain from driving should he feel sleepy or drowsy.    Follow up: MD after the sleep study has been completed.     ESS ([place] Sleepiness Scale) and other sleep medicine related questionnaires were reviewed with the patient.      46-minute visit. >50% spent counseling patient and coordination of care.  The patient was  cautioned against drowsy driving.

## 2023-06-21 NOTE — PATIENT INSTRUCTIONS
SLEEP LAB (Tam Szymanski) will contact you to schedulethe sleep study. Their number is [phone] (ext 2). Please call them if you do not hear from them in 2 weeks from now.  The [facility] is located on 7th floor of the [facility] (for home and in lab studies); [facility] is located in [facility] ( in lab studies only).    SLEEP CLINIC (my assistant) will call you when the sleep study results are ready or I will message you through the portal with the results as we have discussed - if you have not heard from us by 2 weeks from the date of the study, or you can use [facility] to contact me.    Our clinic phone number is [phone] (ext 1)       You are advised to abstain from driving should you feel sleepy or drowsy.

## 2023-06-22 ENCOUNTER — TELEPHONE (OUTPATIENT)
Dept: SLEEP MEDICINE | Facility: OTHER | Age: 63
End: 2023-06-22
Payer: MEDICAID

## 2023-06-23 DIAGNOSIS — F41.9 ANXIETY: ICD-10-CM

## 2023-06-23 RX ORDER — FLUOXETINE HYDROCHLORIDE 20 MG/1
20 CAPSULE ORAL DAILY
Qty: 30 CAPSULE | Refills: 2 | Status: SHIPPED | OUTPATIENT
Start: 2023-06-23 | End: 2023-10-17 | Stop reason: SDUPTHER

## 2023-07-10 PROBLEM — N17.9 AKI (ACUTE KIDNEY INJURY): Status: RESOLVED | Noted: 2023-04-05 | Resolved: 2023-07-10

## 2023-07-12 ENCOUNTER — TELEPHONE (OUTPATIENT)
Dept: SLEEP MEDICINE | Facility: OTHER | Age: 63
End: 2023-07-12
Payer: MEDICAID

## 2023-07-17 ENCOUNTER — HOSPITAL ENCOUNTER (OUTPATIENT)
Dept: SLEEP MEDICINE | Facility: HOSPITAL | Age: 63
Discharge: HOME | End: 2023-07-17
Attending: PSYCHIATRY & NEUROLOGY
Payer: MEDICAID

## 2023-07-17 DIAGNOSIS — G47.30 SLEEP APNEA, UNSPECIFIED TYPE: ICD-10-CM

## 2023-07-17 PROCEDURE — 95800 SLP STDY UNATTENDED: CPT

## 2023-07-17 NOTE — PROGRESS NOTES
Per physician orders, patient was given home sleep testing device and instructed on how to apply the device before going to bed tonight. I sized the device and showed the patient using a mirror how the device fits and what it should look like so they can use a mirror when putting it on themselves at home. We reviewed the instruction booklet and the number to call if they have any questions at night. Patient understood and was instructed to return the device the next day.

## 2023-07-19 PROBLEM — G47.30 SLEEP APNEA: Status: ACTIVE | Noted: 2023-07-19

## 2023-07-19 PROCEDURE — 95806 SLEEP STUDY UNATT&RESP EFFT: CPT | Mod: 26,,, | Performed by: INTERNAL MEDICINE

## 2023-07-19 PROCEDURE — 95806 PR SLEEP STUDY, UNATTENDED, SIMUL RECORD HR/O2 SAT/RESP FLOW/RESP EFFT: ICD-10-PCS | Mod: 26,,, | Performed by: INTERNAL MEDICINE

## 2023-07-21 ENCOUNTER — PATIENT MESSAGE (OUTPATIENT)
Dept: SLEEP MEDICINE | Facility: CLINIC | Age: 63
End: 2023-07-21
Payer: MEDICAID

## 2023-07-21 ENCOUNTER — OFFICE VISIT (OUTPATIENT)
Dept: CARDIOLOGY | Facility: CLINIC | Age: 63
End: 2023-07-21
Payer: MEDICAID

## 2023-07-21 VITALS
OXYGEN SATURATION: 98 % | DIASTOLIC BLOOD PRESSURE: 102 MMHG | WEIGHT: 184.19 LBS | SYSTOLIC BLOOD PRESSURE: 195 MMHG | HEART RATE: 88 BPM | HEIGHT: 67 IN | BODY MASS INDEX: 28.91 KG/M2

## 2023-07-21 DIAGNOSIS — R94.39 ABNORMAL STRESS ECG WITH TREADMILL: ICD-10-CM

## 2023-07-21 DIAGNOSIS — R42 POSTURAL DIZZINESS WITH PRESYNCOPE: ICD-10-CM

## 2023-07-21 DIAGNOSIS — I42.1 MILD HOCM (HYPERTROPHIC OBSTRUCTIVE CARDIOMYOPATHY): ICD-10-CM

## 2023-07-21 DIAGNOSIS — G47.30 SLEEP APNEA, UNSPECIFIED TYPE: ICD-10-CM

## 2023-07-21 DIAGNOSIS — I10 PRIMARY HYPERTENSION: ICD-10-CM

## 2023-07-21 DIAGNOSIS — R55 POSTURAL DIZZINESS WITH PRESYNCOPE: ICD-10-CM

## 2023-07-21 DIAGNOSIS — E78.5 HYPERLIPIDEMIA, UNSPECIFIED HYPERLIPIDEMIA TYPE: ICD-10-CM

## 2023-07-21 PROCEDURE — 3077F SYST BP >= 140 MM HG: CPT | Mod: CPTII,,,

## 2023-07-21 PROCEDURE — 4010F PR ACE/ARB THEARPY RXD/TAKEN: ICD-10-PCS | Mod: CPTII,,,

## 2023-07-21 PROCEDURE — 99999 PR PBB SHADOW E&M-EST. PATIENT-LVL III: CPT | Mod: PBBFAC,,,

## 2023-07-21 PROCEDURE — 99213 OFFICE O/P EST LOW 20 MIN: CPT | Mod: PBBFAC,PN

## 2023-07-21 PROCEDURE — 99214 OFFICE O/P EST MOD 30 MIN: CPT | Mod: S$PBB,,,

## 2023-07-21 PROCEDURE — 3080F PR MOST RECENT DIASTOLIC BLOOD PRESSURE >= 90 MM HG: ICD-10-PCS | Mod: CPTII,,,

## 2023-07-21 PROCEDURE — 3044F PR MOST RECENT HEMOGLOBIN A1C LEVEL <7.0%: ICD-10-PCS | Mod: CPTII,,,

## 2023-07-21 PROCEDURE — 99214 PR OFFICE/OUTPT VISIT, EST, LEVL IV, 30-39 MIN: ICD-10-PCS | Mod: S$PBB,,,

## 2023-07-21 PROCEDURE — 3008F BODY MASS INDEX DOCD: CPT | Mod: CPTII,,,

## 2023-07-21 PROCEDURE — 1160F PR REVIEW ALL MEDS BY PRESCRIBER/CLIN PHARMACIST DOCUMENTED: ICD-10-PCS | Mod: CPTII,,,

## 2023-07-21 PROCEDURE — 1159F MED LIST DOCD IN RCRD: CPT | Mod: CPTII,,,

## 2023-07-21 PROCEDURE — 1159F PR MEDICATION LIST DOCUMENTED IN MEDICAL RECORD: ICD-10-PCS | Mod: CPTII,,,

## 2023-07-21 PROCEDURE — 3044F HG A1C LEVEL LT 7.0%: CPT | Mod: CPTII,,,

## 2023-07-21 PROCEDURE — 3077F PR MOST RECENT SYSTOLIC BLOOD PRESSURE >= 140 MM HG: ICD-10-PCS | Mod: CPTII,,,

## 2023-07-21 PROCEDURE — 3008F PR BODY MASS INDEX (BMI) DOCUMENTED: ICD-10-PCS | Mod: CPTII,,,

## 2023-07-21 PROCEDURE — 4010F ACE/ARB THERAPY RXD/TAKEN: CPT | Mod: CPTII,,,

## 2023-07-21 PROCEDURE — 3080F DIAST BP >= 90 MM HG: CPT | Mod: CPTII,,,

## 2023-07-21 PROCEDURE — 1160F RVW MEDS BY RX/DR IN RCRD: CPT | Mod: CPTII,,,

## 2023-07-21 PROCEDURE — 99999 PR PBB SHADOW E&M-EST. PATIENT-LVL III: ICD-10-PCS | Mod: PBBFAC,,,

## 2023-07-21 RX ORDER — ATORVASTATIN CALCIUM 20 MG/1
20 TABLET, FILM COATED ORAL NIGHTLY
Qty: 90 TABLET | Refills: 3 | Status: SHIPPED | OUTPATIENT
Start: 2023-07-21

## 2023-07-21 NOTE — ASSESSMENT & PLAN NOTE
-Lat LDL 87 1/2022  -patient has been out of medication for several month  -restart atorvastatin 20 mg  -check lipid panel

## 2023-07-21 NOTE — ASSESSMENT & PLAN NOTE
Pt noted to have mid LV obstruction w/ gradient of 23 mmHg.  Pt also notes perkins.    - carvedilol 12.5 mg daily was DC'd by PCP  -repeat cardiac echo to evaluate heart function

## 2023-07-21 NOTE — ASSESSMENT & PLAN NOTE
Improved.  Goal BP < 130/80.  Compliant w/ meds.    -medication regimen changed by PCP - stopped  carvedilol 12.5 mg BID and  losartan to 50 mg daily  -current regimen: amlodipine- valsartan  mg   -in office /100, patient notes he has not taken his medication today   - continue to monitor BP at home and record  - risk factor and lifestyle modifications

## 2023-07-21 NOTE — ASSESSMENT & PLAN NOTE
False positive.  Pt w/ angiogram negative for CAD.   - continue medical therapy for primary prevention of CAD- ASA and statin therapy

## 2023-07-21 NOTE — PROGRESS NOTES
Subjective:    Patient ID:  Eitan Cody is a 63 y.o. male who presents for follow-up of No chief complaint on file.      PCP: Abilio Mtz MD     Follow-up  Pertinent negatives include no abdominal pain, congestion, diaphoresis, fever or nausea.       Pt is a 61 yo M w/ PMH of HTN and ETOH abuse (quit 1/12/2022) who presents for f/u appt.He was last seen by Dr. Kidd on 6/8/22 for f/u and HTN management and was  continued on medical therapy.  He also had an angiogram which was negative for CAD w/ an LVEDP of 11 mmHg. He mentions that he has been doing ok however notes postural presyncope, cp, and perkins at times however this has improved and has decreased in frequency to be somewhat tolerable.  He denies edema, orthopnea, PND, palpitations, LOC, and claudication.  He notes compliance w/ meds and denies side effects. He notes monitoring home BP ranges 140-160s/ 60-70s. He notes medication compliance but has been out of his atorvastatin for several months. He has not been exercising however is active at work as a  and walks often and denies limitation.          Past Medical History:   Diagnosis Date    Hypertension      Past Surgical History:   Procedure Laterality Date    COLONOSCOPY N/A 6/30/2022    Procedure: COLONOSCOPY;  Surgeon: VIRIDIANA Swanson MD;  Location: [facility] END[facility];  Service: Endoscopy;  Laterality: N/A;    LEFT HEART CATHETERIZATION Left 2/16/2022    Procedure: Left heart cath;  Surgeon: Thierno Kidd III, MD;  Location: [facility] CATH LAB;  Service: Cardiology;  Laterality: Left;     Social History     Socioeconomic History    Marital status: Single   Tobacco Use    Smoking status: Never    Smokeless tobacco: Never   Substance and Sexual Activity    Alcohol use: Yes     Alcohol/week: 6.0 standard drinks     Types: 6 Cans of beer per week     Comment: daily    Drug use: No     No family history on file.    Review of patient's allergies indicates:   Allergen Reactions    Poison ivy  "extract Itching       Medication List with Changes/Refills   Current Medications    AMLODIPINE-VALSARTAN (EXFORGE)  MG PER TABLET    Take 1 tablet by mouth once daily.    ASPIRIN 81 MG CHEW    Take 81 mg by mouth once daily.    ATORVASTATIN (LIPITOR) 20 MG TABLET    Take 20 mg by mouth every evening.    CETIRIZINE (ZYRTEC) 10 MG TABLET    Take 10 mg by mouth once daily.    FLUOXETINE 20 MG CAPSULE    Take 1 capsule (20 mg total) by mouth once daily.    MECLIZINE (ANTIVERT) 25 MG TABLET    Take 25 mg by mouth 3 (three) times daily as needed for Dizziness.    SUMATRIPTAN (IMITREX) 50 MG TABLET    Take 1 tablet (50 mg total) by mouth every 2 (two) hours as needed for Migraine (no more than 2 doses a day).    TOPIRAMATE (TOPAMAX) 50 MG TABLET    Take 1 tablet (50 mg total) by mouth 2 (two) times daily.       Review of Systems   Constitutional: Negative for diaphoresis and fever.   HENT:  Negative for congestion and hearing loss.    Eyes:  Negative for blurred vision and pain.   Cardiovascular:  Negative for claudication, leg swelling, palpitations and syncope.   Respiratory:  Negative for sleep disturbances due to breathing.    Hematologic/Lymphatic: Negative for bleeding problem. Does not bruise/bleed easily.   Skin:  Negative for color change and poor wound healing.   Gastrointestinal:  Negative for abdominal pain and nausea.   Genitourinary:  Negative for bladder incontinence and flank pain.   Neurological:  Negative for focal weakness and light-headedness.      Objective:   BP (!) 195/102 (BP Location: Right arm)   Pulse 88   Ht 5' 7" (1.702 m)   Wt 83.6 kg (184 lb 3.2 oz)   SpO2 98%   BMI 28.85 kg/m²    Physical Exam  Constitutional:       Appearance: He is well-developed. He is not diaphoretic.   HENT:      Head: Normocephalic and atraumatic.   Eyes:      General: No scleral icterus.     Pupils: Pupils are equal, round, and reactive to light.   Neck:      Vascular: No JVD.   Cardiovascular:      Rate " and Rhythm: Normal rate and regular rhythm.      Pulses: Intact distal pulses.           Radial pulses are 2+ on the right side and 2+ on the left side.        Dorsalis pedis pulses are 2+ on the right side and 2+ on the left side.        Posterior tibial pulses are 2+ on the right side and 2+ on the left side.      Heart sounds: S1 normal and S2 normal. No murmur heard.    No friction rub. No gallop.   Pulmonary:      Effort: Pulmonary effort is normal. No respiratory distress.      Breath sounds: Normal breath sounds. No wheezing or rales.   Chest:      Chest wall: No tenderness.   Abdominal:      General: Bowel sounds are normal. There is no distension.      Palpations: Abdomen is soft. There is no mass.      Tenderness: There is no abdominal tenderness. There is no rebound.   Musculoskeletal:         General: No tenderness. Normal range of motion.      Cervical back: Normal range of motion and neck supple.   Skin:     General: Skin is warm and dry.      Coloration: Skin is not pale.   Neurological:      Mental Status: He is alert and oriented to person, place, and time.      Coordination: Coordination normal.      Deep Tendon Reflexes: Reflexes normal.   Psychiatric:         Behavior: Behavior normal.         Judgment: Judgment normal.           Cardiac angiogram 22  Summary       The coronary arteries were normal.  The left ventricular end diastolic pressure was normal, LVEDP 11 mmHg.  The estimated blood loss was <50 mL.         EC2022- reviewed.  NSR, NLE ECG.      Echo: 2022- reviewed.    Summary    Concentric remodeling and hyperdynamic systolic function.  The estimated ejection fraction is 75%.  Left ventricular mid-cavity obstruction is present. Mid-cavity gradient 23 mmHg.  Normal left ventricular diastolic function.  Normal right ventricular size with normal right ventricular systolic function.  The sinuses of Valsalva is mildly dilated.  Intermediate central venous pressure (8  mmHg).  The estimated PA systolic pressure is 36 mmHg.    ETT: 2/10/2022- reviewed.    Conclusion         The EKG portion of this study is positive for ischemia.    The patient reported no chest pain during the stress test.    The blood pressure response to stress was normal.    The Duke Treadmill Score was 1.    The exercise capacity was average.    LHC: 2/16/2022- reviewed.    Summary       The coronary arteries were normal.  The left ventricular end diastolic pressure was normal, LVEDP 11 mmHg.  The estimated blood loss was <50 mL.       Assessment:       1. Primary hypertension    2. Mild HOCM (hypertrophic obstructive cardiomyopathy)    3. Hyperlipidemia, unspecified hyperlipidemia type    4. Abnormal stress ECG with treadmill    5. Postural dizziness with presyncope    6. Sleep apnea, unspecified type           Plan:         Primary hypertension  Improved.  Goal BP < 130/80.  Compliant w/ meds.    -medication regimen changed by PCP - stopped  carvedilol 12.5 mg BID and  losartan to 50 mg daily  -current regimen: amlodipine- valsartan  mg   - continue to monitor BP at home and record  - risk factor and lifestyle modifications     Mild HOCM (hypertrophic obstructive cardiomyopathy)  Pt noted to have mid LV obstruction w/ gradient of 23 mmHg.  Pt also notes perkins.    - carvedilol 12.5 mg daily was DC'd by PCP  -repeat cardiac echo to evaluate heart function    Hyperlipidemia  -Lat LDL 87 1/2022  -patient has been out of medication for several month  -restart atorvastatin 20 mg  -check lipid panel     Abnormal stress ECG with treadmill  False positive.  Pt w/ angiogram negative for CAD.   - continue medical therapy for primary prevention of CAD- ASA and statin therapy     Postural dizziness with presyncope  -improved w medication adjustments  -continue current regimen     Sleep apnea  -sleep study completed, results pending       Total duration of face to face visit time 30 minutes.  Total time spent  counseling greater than fifty percent of total visit time.  Counseling included discussion regarding imaging findings, diagnosis, possibilities, treatment options, risks and benefits.  The patient had many questions regarding the options and long-term effects      Ney Hernandez NP  Cardiology

## 2023-07-23 RX ORDER — TOPIRAMATE 50 MG/1
50 TABLET, FILM COATED ORAL 2 TIMES DAILY
Qty: 60 TABLET | Refills: 0 | Status: CANCELLED | OUTPATIENT
Start: 2023-07-23 | End: 2023-08-22

## 2023-07-26 ENCOUNTER — TELEPHONE (OUTPATIENT)
Dept: CARDIOLOGY | Facility: CLINIC | Age: 63
End: 2023-07-26

## 2023-07-26 NOTE — TELEPHONE ENCOUNTER
----- Message from Ney Hernandez NP sent at 7/26/2023  3:17 PM CDT -----  Please inform Mr. Cody that his heart function is within nornal limits on his cardiac echo.    Thank you,  Ney Hernandez NP

## 2023-07-31 ENCOUNTER — PATIENT MESSAGE (OUTPATIENT)
Dept: CARDIOLOGY | Facility: CLINIC | Age: 63
End: 2023-07-31
Payer: MEDICAID

## 2023-08-04 RX ORDER — TOPIRAMATE 50 MG/1
50 TABLET, FILM COATED ORAL 2 TIMES DAILY
Qty: 60 TABLET | Refills: 0 | Status: CANCELLED | OUTPATIENT
Start: 2023-07-27 | End: 2023-08-26

## 2023-08-14 ENCOUNTER — PATIENT MESSAGE (OUTPATIENT)
Dept: SLEEP MEDICINE | Facility: CLINIC | Age: 63
End: 2023-08-14
Payer: MEDICAID

## 2023-08-23 ENCOUNTER — PATIENT MESSAGE (OUTPATIENT)
Dept: SLEEP MEDICINE | Facility: CLINIC | Age: 63
End: 2023-08-23
Payer: MEDICAID

## 2023-08-23 ENCOUNTER — TELEPHONE (OUTPATIENT)
Dept: SLEEP MEDICINE | Facility: CLINIC | Age: 63
End: 2023-08-23
Payer: MEDICAID

## 2023-08-23 NOTE — TELEPHONE ENCOUNTER
Pt came into sleep med regarding his home sleep study gave pt his msg from the dr about the sleep study

## 2023-08-23 NOTE — TELEPHONE ENCOUNTER
Hello,    Could you please inform Eitan Cody that I have sent him a detailed message through patient's portal  (today and on 8/14th) regarding his sleep study if he wants to see the sleep study report,  But the gist of it is that his sleep study was  positive for mild to moderate obstructive sleep apnea with up to 17 breathing interruptions per  hour of sleep.     The leading treatment options for moderate to mild sleep apnea include an auto-CPAP machine, and oral appliances, aka the mouth pieces, that can be fabricated by the dentist (they are quite priceythough)  Does he want us to order the CPAP machine ? If yes, please let me know, otherwise, he can read my detailed explanation on the portal and send me his questions.      Thank you!    JAK

## 2023-08-25 NOTE — TELEPHONE ENCOUNTER
Attempted to reach in regards to message from Dr Medina see below.    LVM    Portal message sent 8/14 by Dr Medina was read y pt on 8/23 at 4:20pm

## 2023-08-28 ENCOUNTER — LAB VISIT (OUTPATIENT)
Dept: LAB | Facility: HOSPITAL | Age: 63
End: 2023-08-28
Payer: MEDICAID

## 2023-08-28 ENCOUNTER — OFFICE VISIT (OUTPATIENT)
Dept: FAMILY MEDICINE | Facility: HOSPITAL | Age: 63
End: 2023-08-28
Payer: MEDICAID

## 2023-08-28 VITALS — HEIGHT: 67 IN | WEIGHT: 187.38 LBS | BODY MASS INDEX: 29.41 KG/M2

## 2023-08-28 DIAGNOSIS — R35.0 URINARY FREQUENCY: ICD-10-CM

## 2023-08-28 DIAGNOSIS — R35.0 URINARY FREQUENCY: Primary | ICD-10-CM

## 2023-08-28 LAB — COMPLEXED PSA SERPL-MCNC: 0.55 NG/ML (ref 0–4)

## 2023-08-28 PROCEDURE — 99213 OFFICE O/P EST LOW 20 MIN: CPT | Performed by: STUDENT IN AN ORGANIZED HEALTH CARE EDUCATION/TRAINING PROGRAM

## 2023-08-28 PROCEDURE — 84153 ASSAY OF PSA TOTAL: CPT | Performed by: STUDENT IN AN ORGANIZED HEALTH CARE EDUCATION/TRAINING PROGRAM

## 2023-08-28 PROCEDURE — 36415 COLL VENOUS BLD VENIPUNCTURE: CPT | Performed by: STUDENT IN AN ORGANIZED HEALTH CARE EDUCATION/TRAINING PROGRAM

## 2023-08-28 RX ORDER — TAMSULOSIN HYDROCHLORIDE 0.4 MG/1
0.4 CAPSULE ORAL DAILY
Qty: 30 CAPSULE | Refills: 2 | Status: SHIPPED | OUTPATIENT
Start: 2023-08-28 | End: 2023-11-23 | Stop reason: SDUPTHER

## 2023-08-28 NOTE — PROGRESS NOTES
[facility]  History & Physical    SUBJECTIVE:     Chief Complaint:   Chief Complaint   Patient presents with    Urinary Frequency       History of Present Illness:  63 y.o. male who  has a past medical history of Hypertension. presents to clinic today for increase urinary frequency. Patient states that he has been suffering from these symptoms for many years. Patient says he often feels as though he needs to urinate shortly after urinating and leaving the restroom. Patient denies and dysuria, pain rectal pain, pain with orgasm or blood in urine or ejaculate.       Allergies:  Review of patient's allergies indicates:   Allergen Reactions    Poison ivy extract Itching       Home Medications:  Current Outpatient Medications on File Prior to Visit   Medication Sig    amlodipine-valsartan (EXFORGE)  mg per tablet Take 1 tablet by mouth once daily.    aspirin 81 MG Chew Take 81 mg by mouth once daily.    atorvastatin (LIPITOR) 20 MG tablet Take 1 tablet (20 mg total) by mouth every evening.    cetirizine (ZYRTEC) 10 MG tablet Take 10 mg by mouth once daily.    FLUoxetine 20 MG capsule Take 1 capsule (20 mg total) by mouth once daily.    meclizine (ANTIVERT) 25 mg tablet Take 25 mg by mouth 3 (three) times daily as needed for Dizziness.    topiramate (TOPAMAX) 50 MG tablet Take 1 tablet (50 mg total) by mouth 2 (two) times daily.     No current facility-administered medications on file prior to visit.       Past Medical History:   Diagnosis Date    Hypertension      Past Surgical History:   Procedure Laterality Date    COLONOSCOPY N/A 6/30/2022    Procedure: COLONOSCOPY;  Surgeon: VIRIDIANA Swanson MD;  Location: [facility] ENDO;  Service: Endoscopy;  Laterality: N/A;    LEFT HEART CATHETERIZATION Left 2/16/2022    Procedure: Left heart cath;  Surgeon: Thierno Kidd III, MD;  Location: [facility] CATH LAB;  Service: Cardiology;  Laterality: Left;     No family history on file.  Social History     Tobacco Use     Smoking status: Never    Smokeless tobacco: Never   Substance Use Topics    Alcohol use: Yes     Alcohol/week: 6.0 standard drinks of alcohol     Types: 6 Cans of beer per week     Comment: daily    Drug use: No        Review of Systems   Constitutional:  Negative for fever and weight loss.   HENT:  Negative for hearing loss and sore throat.    Eyes:  Negative for blurred vision.   Respiratory:  Negative for cough, shortness of breath and wheezing.    Cardiovascular:  Negative for chest pain and leg swelling.   Gastrointestinal:  Negative for heartburn, nausea and vomiting.   Genitourinary:  Positive for frequency and urgency. Negative for dysuria, flank pain and hematuria.   Musculoskeletal:  Negative for back pain, joint pain and myalgias.   Neurological:  Negative for dizziness, weakness and headaches.        OBJECTIVE:     Vital Signs:     Body mass index is 29.35 kg/m².  Physical Exam  Constitutional:       General: He is not in acute distress.     Appearance: Normal appearance. He is not ill-appearing or diaphoretic.   HENT:      Head: Normocephalic and atraumatic.      Right Ear: External ear normal.      Left Ear: External ear normal.      Nose: Nose normal.      Mouth/Throat:      Mouth: Mucous membranes are moist.   Eyes:      Extraocular Movements: Extraocular movements intact.   Cardiovascular:      Rate and Rhythm: Normal rate and regular rhythm.      Pulses: Normal pulses.      Heart sounds: Normal heart sounds.   Pulmonary:      Effort: No respiratory distress.      Breath sounds: No wheezing.   Abdominal:      General: Abdomen is flat.      Tenderness: There is no guarding.   Musculoskeletal:         General: No swelling. Normal range of motion.   Skin:     General: Skin is warm and dry.   Neurological:      Mental Status: He is alert and oriented to person, place, and time.   Psychiatric:         Mood and Affect: Mood normal.         Behavior: Behavior normal.         Laboratory:  Hemoglobin A1C    Date Value Ref Range Status   04/06/2023 5.4 4.0 - 5.6 % Final     Comment:     ADA Screening Guidelines:  5.7-6.4%  Consistent with prediabetes  >or=6.5%  Consistent with diabetes    High levels of fetal hemoglobin interfere with the HbA1C  assay. Heterozygous hemoglobin variants (HbS, HgC, etc)do  not significantly interfere with this assay.   However, presence of multiple variants may affect accuracy.     01/14/2022 5.3 4.0 - 5.6 % Final     Comment:     ADA Screening Guidelines:  5.7-6.4%  Consistent with prediabetes  >or=6.5%  Consistent with diabetes    High levels of fetal hemoglobin interfere with the HbA1C  assay. Heterozygous hemoglobin variants (HbS, HgC, etc)do  not significantly interfere with this assay.   However, presence of multiple variants may affect accuracy.         A/P:  Eitan was seen today for urinary frequency. At this time suspect symptoms are obstructive uropathy from BPH. Will begin flomax at this time. Patient may also benefit from finasteride at future visit. If symptoms improve with flomax, bph diagnosis is likely. If not improvement will increase dose. If PSA elevated or not improvement from medication will refer to urology from more thorough evaluation of prostate pathology.    Diagnoses and all orders for this visit:    Urinary frequency  -     tamsulosin (FLOMAX) 0.4 mg Cap; Take 1 capsule (0.4 mg total) by mouth once daily.  -     PSA, Screening; Future        Follow up in about 4 weeks (around 9/25/2023).      Abilio Mtz MD  [facility] PGY-3  08/29/2023

## 2023-08-31 NOTE — PROGRESS NOTES
I assume primary medical responsibility for this patient. I have reviewed the history, physical, and assessement & treatment plan with the resident and agree that the care is reasonable and necessary. This service has been performed by a resident without the presence of a teaching physician under the primary care exception. If necessary, an addendum of additional findings or evaluation beyond the resident documentation will be noted below    Rossi Segovia MD    [facility]

## 2023-09-17 NOTE — ED NOTES
c/o runny nose, sinus congestion, and salty taste when eating or drinking anything. Symptoms began about 1-2 months ago. Also requests prescription for medication for chronic right shoulder. Pt is hypertensive. Denies history of htn, but medication prescriptions for htn noted in chart from previous visit. Denies chest pain, headache, or SOB.    APPEARANCE: Alert, oriented and in no acute distress.  HEENT: Speaks without hoarseness. C/o mild sore throat and runny nose. Also c/o sinus congestion  CARDIAC: Normal rate and rhythm.  Hypertensive  PERIPHERAL VASCULAR: peripheral pulses present. Normal cap refill. No edema. Warm to touch.    RESPIRATORY:Normal rate and effort. Respirations are equal and unlabored no obvious signs of distress.  GASTRO: soft, nondistended, nontender. Denies nausea, vomiting, or diarrhea.  : voids spontaneously and without difficulty.   MUSC: Full ROM. No obvious deformity. Ambulatory with a steady gait  SKIN: Skin is warm and dry, without discoloration. Mucous membranes moist.  NEURO: Pt is awake, alert, aware of environment. No neurologic deficits noted.     Alert and oriented to person, place and time/Patient baseline mental status/Awake/Symptoms improved

## 2023-09-22 ENCOUNTER — OFFICE VISIT (OUTPATIENT)
Dept: FAMILY MEDICINE | Facility: HOSPITAL | Age: 63
End: 2023-09-22
Payer: MEDICAID

## 2023-09-22 VITALS
DIASTOLIC BLOOD PRESSURE: 76 MMHG | HEART RATE: 95 BPM | HEIGHT: 67 IN | BODY MASS INDEX: 29.24 KG/M2 | WEIGHT: 186.31 LBS | SYSTOLIC BLOOD PRESSURE: 124 MMHG

## 2023-09-22 DIAGNOSIS — M62.838 MUSCLE SPASM: ICD-10-CM

## 2023-09-22 DIAGNOSIS — N40.0 BENIGN PROSTATIC HYPERPLASIA, UNSPECIFIED WHETHER LOWER URINARY TRACT SYMPTOMS PRESENT: ICD-10-CM

## 2023-09-22 DIAGNOSIS — R35.0 URINARY FREQUENCY: ICD-10-CM

## 2023-09-22 DIAGNOSIS — R74.8 ABNORMAL LIVER ENZYMES: Primary | ICD-10-CM

## 2023-09-22 DIAGNOSIS — R79.89 ELEVATED SERUM CREATININE: ICD-10-CM

## 2023-09-22 DIAGNOSIS — N17.9 AKI (ACUTE KIDNEY INJURY): ICD-10-CM

## 2023-09-22 PROCEDURE — 99214 OFFICE O/P EST MOD 30 MIN: CPT | Performed by: STUDENT IN AN ORGANIZED HEALTH CARE EDUCATION/TRAINING PROGRAM

## 2023-09-22 RX ORDER — METHOCARBAMOL 500 MG/1
500 TABLET, FILM COATED ORAL 4 TIMES DAILY
Qty: 40 TABLET | Refills: 0 | Status: SHIPPED | OUTPATIENT
Start: 2023-09-22 | End: 2023-10-02

## 2023-09-22 RX ORDER — FINASTERIDE 5 MG/1
5 TABLET, FILM COATED ORAL DAILY
Qty: 30 TABLET | Refills: 11 | Status: SHIPPED | OUTPATIENT
Start: 2023-09-22 | End: 2024-09-21

## 2023-09-24 NOTE — PROGRESS NOTES
[facility]  History & Physical    SUBJECTIVE:     Chief Complaint:   Chief Complaint   Patient presents with    Follow-up     1MTH       History of Present Illness:  63 y.o. male who  has a past medical history of Hypertension. presents to clinic today for increased urination and shoulder pain. Patient states urinary frequency issue has not improved with flomax. Patient also reports upper back and shoulder. Paint states pain improved with muscle relaxers. Patient denies blood in urine, pain with urination and pain with ejaculation.      Allergies:  Review of patient's allergies indicates:   Allergen Reactions    Poison ivy extract Itching       Home Medications:  Current Outpatient Medications on File Prior to Visit   Medication Sig    amlodipine-valsartan (EXFORGE)  mg per tablet Take 1 tablet by mouth once daily.    aspirin 81 MG Chew Take 81 mg by mouth once daily.    atorvastatin (LIPITOR) 20 MG tablet Take 1 tablet (20 mg total) by mouth every evening.    cetirizine (ZYRTEC) 10 MG tablet Take 10 mg by mouth once daily.    FLUoxetine 20 MG capsule Take 1 capsule (20 mg total) by mouth once daily.    meclizine (ANTIVERT) 25 mg tablet Take 25 mg by mouth 3 (three) times daily as needed for Dizziness.    tamsulosin (FLOMAX) 0.4 mg Cap Take 1 capsule (0.4 mg total) by mouth once daily.    topiramate (TOPAMAX) 50 MG tablet Take 1 tablet (50 mg total) by mouth 2 (two) times daily.     No current facility-administered medications on file prior to visit.       Past Medical History:   Diagnosis Date    Hypertension      Past Surgical History:   Procedure Laterality Date    COLONOSCOPY N/A 6/30/2022    Procedure: COLONOSCOPY;  Surgeon: VIRIDIANA Swanson MD;  Location: [facility] ENDO;  Service: Endoscopy;  Laterality: N/A;    LEFT HEART CATHETERIZATION Left 2/16/2022    Procedure: Left heart cath;  Surgeon: Thierno Kidd III, MD;  Location: [facility] CATH LAB;  Service: Cardiology;  Laterality: Left;     No  family history on file.  Social History     Tobacco Use    Smoking status: Never    Smokeless tobacco: Never   Substance Use Topics    Alcohol use: Yes     Alcohol/week: 6.0 standard drinks of alcohol     Types: 6 Cans of beer per week     Comment: daily    Drug use: No        Review of Systems   Constitutional:  Negative for fever and weight loss.   HENT:  Negative for hearing loss and sore throat.    Eyes:  Negative for blurred vision.   Respiratory:  Negative for cough, shortness of breath and wheezing.    Cardiovascular:  Negative for chest pain and leg swelling.   Gastrointestinal:  Negative for heartburn, nausea and vomiting.   Genitourinary:  Positive for frequency. Negative for dysuria.   Musculoskeletal:  Negative for back pain, joint pain and myalgias.   Neurological:  Negative for dizziness, weakness and headaches.        OBJECTIVE:     Vital Signs:  Pulse: 95 (09/22/23 1059)  BP: 124/76 (09/22/23 1059)  Body mass index is 29.18 kg/m².  Physical Exam  Constitutional:       General: He is not in acute distress.     Appearance: Normal appearance. He is not ill-appearing or diaphoretic.   HENT:      Head: Normocephalic and atraumatic.      Right Ear: External ear normal.      Left Ear: External ear normal.      Nose: Nose normal.      Mouth/Throat:      Mouth: Mucous membranes are moist.   Eyes:      Extraocular Movements: Extraocular movements intact.   Cardiovascular:      Rate and Rhythm: Normal rate and regular rhythm.      Pulses: Normal pulses.      Heart sounds: Normal heart sounds.   Pulmonary:      Effort: No respiratory distress.      Breath sounds: No wheezing.   Abdominal:      General: Abdomen is flat.      Tenderness: There is no guarding.   Musculoskeletal:         General: No swelling. Normal range of motion.   Skin:     General: Skin is warm and dry.   Neurological:      Mental Status: He is alert and oriented to person, place, and time.   Psychiatric:         Mood and Affect: Mood normal.          Behavior: Behavior normal.         Laboratory:  Hemoglobin A1C   Date Value Ref Range Status   04/06/2023 5.4 4.0 - 5.6 % Final     Comment:     ADA Screening Guidelines:  5.7-6.4%  Consistent with prediabetes  >or=6.5%  Consistent with diabetes    High levels of fetal hemoglobin interfere with the HbA1C  assay. Heterozygous hemoglobin variants (HbS, HgC, etc)do  not significantly interfere with this assay.   However, presence of multiple variants may affect accuracy.     01/14/2022 5.3 4.0 - 5.6 % Final     Comment:     ADA Screening Guidelines:  5.7-6.4%  Consistent with prediabetes  >or=6.5%  Consistent with diabetes    High levels of fetal hemoglobin interfere with the HbA1C  assay. Heterozygous hemoglobin variants (HbS, HgC, etc)do  not significantly interfere with this assay.   However, presence of multiple variants may affect accuracy.         A/P:  Eitan was seen today for follow-up related to urinary frequency. Starting patient on finasteride to augment therapy for suspected BPH. Robaxin added for MSK strain of upper back and shoulders.    Diagnoses and all orders for this visit:    Abnormal liver enzymes  -     Comprehensive Metabolic Panel; Future    ELAYNE (acute kidney injury)  -     Microalbumin/creatinine urine ratio; Future    Elevated serum creatinine  -     Comprehensive Metabolic Panel; Future  -     Microalbumin/creatinine urine ratio; Future    Muscle spasm  -     methocarbamoL (ROBAXIN) 500 MG Tab; Take 1 tablet (500 mg total) by mouth 4 (four) times daily. for 10 days    Urinary frequency    Benign prostatic hyperplasia, unspecified whether lower urinary tract symptoms present  -     finasteride (PROSCAR) 5 mg tablet; Take 1 tablet (5 mg total) by mouth once daily.        Follow up in about 4 weeks (around 10/20/2023), or if symptoms worsen or fail to improve.      Abilio Mtz MD  [facility] PGY-3  09/25/2023

## 2023-10-02 NOTE — PROGRESS NOTES
I assume primary medical responsibility for this patient. I have reviewed the history, physical, and assessement & treatment plan with the resident and agree that the care is reasonable and necessary. This service has been performed by a resident without the presence of a teaching physician under the primary care exception. If necessary, an addendum of additional findings or evaluation beyond the resident documentation will be noted below.      Rossi Segovia MD    [facility]

## 2023-10-17 DIAGNOSIS — F41.9 ANXIETY: ICD-10-CM

## 2023-10-22 RX ORDER — FLUOXETINE HYDROCHLORIDE 20 MG/1
20 CAPSULE ORAL DAILY
Qty: 30 CAPSULE | Refills: 2 | Status: SHIPPED | OUTPATIENT
Start: 2023-10-22 | End: 2024-02-25 | Stop reason: SDUPTHER

## 2023-10-22 RX ORDER — TOPIRAMATE 50 MG/1
50 TABLET, FILM COATED ORAL 2 TIMES DAILY
Qty: 60 TABLET | Refills: 0 | Status: SHIPPED | OUTPATIENT
Start: 2023-10-22 | End: 2023-11-23 | Stop reason: SDUPTHER

## 2023-11-23 DIAGNOSIS — R35.0 URINARY FREQUENCY: ICD-10-CM

## 2023-11-27 RX ORDER — TAMSULOSIN HYDROCHLORIDE 0.4 MG/1
0.4 CAPSULE ORAL DAILY
Qty: 30 CAPSULE | Refills: 2 | Status: SHIPPED | OUTPATIENT
Start: 2023-11-27 | End: 2024-04-01 | Stop reason: SDUPTHER

## 2023-11-27 RX ORDER — TOPIRAMATE 50 MG/1
50 TABLET, FILM COATED ORAL 2 TIMES DAILY
Qty: 60 TABLET | Refills: 0 | Status: SHIPPED | OUTPATIENT
Start: 2023-11-27 | End: 2024-01-11 | Stop reason: SDUPTHER

## 2024-01-12 RX ORDER — TOPIRAMATE 50 MG/1
50 TABLET, FILM COATED ORAL 2 TIMES DAILY
Qty: 60 TABLET | Refills: 0 | Status: SHIPPED | OUTPATIENT
Start: 2024-01-12 | End: 2024-02-15

## 2024-02-25 DIAGNOSIS — F41.9 ANXIETY: ICD-10-CM

## 2024-02-27 RX ORDER — FLUOXETINE HYDROCHLORIDE 20 MG/1
20 CAPSULE ORAL DAILY
Qty: 30 CAPSULE | Refills: 2 | Status: SHIPPED | OUTPATIENT
Start: 2024-02-27 | End: 2024-05-19 | Stop reason: SDUPTHER

## 2024-02-27 RX ORDER — TOPIRAMATE 50 MG/1
50 TABLET, FILM COATED ORAL 2 TIMES DAILY
Qty: 60 TABLET | Refills: 0 | Status: SHIPPED | OUTPATIENT
Start: 2024-02-27 | End: 2024-04-01 | Stop reason: SDUPTHER

## 2024-04-01 DIAGNOSIS — R35.0 URINARY FREQUENCY: ICD-10-CM

## 2024-04-01 RX ORDER — TOPIRAMATE 50 MG/1
50 TABLET, FILM COATED ORAL 2 TIMES DAILY
Qty: 60 TABLET | Refills: 0 | Status: SHIPPED | OUTPATIENT
Start: 2024-04-01 | End: 2024-06-03

## 2024-04-01 RX ORDER — TAMSULOSIN HYDROCHLORIDE 0.4 MG/1
0.4 CAPSULE ORAL DAILY
Qty: 30 CAPSULE | Refills: 2 | Status: SHIPPED | OUTPATIENT
Start: 2024-04-01 | End: 2024-06-30

## 2024-05-19 DIAGNOSIS — F41.9 ANXIETY: ICD-10-CM

## 2024-05-20 RX ORDER — FLUOXETINE HYDROCHLORIDE 20 MG/1
20 CAPSULE ORAL DAILY
Qty: 30 CAPSULE | Refills: 2 | Status: SHIPPED | OUTPATIENT
Start: 2024-05-20

## 2024-05-24 DIAGNOSIS — I10 PRIMARY HYPERTENSION: ICD-10-CM

## 2024-05-24 RX ORDER — AMLODIPINE AND VALSARTAN 10; 160 MG/1; MG/1
1 TABLET ORAL DAILY
Qty: 30 TABLET | Refills: 11 | Status: CANCELLED | OUTPATIENT
Start: 2024-05-24 | End: 2025-05-24

## 2024-05-25 ENCOUNTER — HOSPITAL ENCOUNTER (EMERGENCY)
Facility: HOSPITAL | Age: 64
Discharge: HOME | End: 2024-05-25
Attending: EMERGENCY MEDICINE
Payer: MEDICAID

## 2024-05-25 VITALS
OXYGEN SATURATION: 98 % | DIASTOLIC BLOOD PRESSURE: 74 MMHG | RESPIRATION RATE: 17 BRPM | SYSTOLIC BLOOD PRESSURE: 148 MMHG | TEMPERATURE: 98 F | HEART RATE: 65 BPM

## 2024-05-25 DIAGNOSIS — G89.29 CHRONIC NONINTRACTABLE HEADACHE, UNSPECIFIED HEADACHE TYPE: Primary | ICD-10-CM

## 2024-05-25 DIAGNOSIS — R51.9 CHRONIC NONINTRACTABLE HEADACHE, UNSPECIFIED HEADACHE TYPE: Primary | ICD-10-CM

## 2024-05-25 PROCEDURE — 63600175 PHARM REV CODE 636 W HCPCS

## 2024-05-25 PROCEDURE — 25000003 PHARM REV CODE 250

## 2024-05-25 PROCEDURE — 99283 EMERGENCY DEPT VISIT LOW MDM: CPT

## 2024-05-25 RX ORDER — KETOROLAC TROMETHAMINE 10 MG/1
10 TABLET, FILM COATED ORAL
Status: COMPLETED | OUTPATIENT
Start: 2024-05-25 | End: 2024-05-25

## 2024-05-25 RX ORDER — CETIRIZINE HYDROCHLORIDE 10 MG/1
10 TABLET ORAL DAILY
Qty: 30 TABLET | Refills: 0 | Status: SHIPPED | OUTPATIENT
Start: 2024-05-25 | End: 2024-06-24

## 2024-05-25 RX ORDER — DEXAMETHASONE 4 MG/1
4 TABLET ORAL
Status: COMPLETED | OUTPATIENT
Start: 2024-05-25 | End: 2024-05-25

## 2024-05-25 RX ADMIN — DEXAMETHASONE 4 MG: 4 TABLET ORAL at 09:05

## 2024-05-25 RX ADMIN — KETOROLAC TROMETHAMINE 10 MG: 10 TABLET, FILM COATED ORAL at 09:05

## 2024-05-25 NOTE — DISCHARGE INSTRUCTIONS
Thank you for letting me care for you today - it was nice to meet you and I hope you feel better soon. Please return to the ER if your symptoms don't improve or get worse. And be sure to follow up with your primary care provider within the next week and with Neurology for follow up care. [facility] will call you within 48 hours to make an appointment, or you can call [phone] ([phone]) to schedule.     For headaches, you can take ibuprofen every 6-8 hours. Do not take  more than 2400 mg of ibuprofen and 1 day. For a headache that won't go away, you can take over the counter Benadryl.     Our goal at [facility] is to always give you outstanding care and exceptional service. You may receive a survey by mail or email in the next week about your experience in our ED. We would greatly appreciate you completing and returning the survey. Your feedback provides us with a way to recognize our staff who give very good care and it helps us learn how to improve when your experience was below our aspiration of excellence.     All the best,     Ritu العلي, MPH, PA-C  Emergency Department Physician Assistant  [facility], [facility]

## 2024-05-27 DIAGNOSIS — I10 PRIMARY HYPERTENSION: ICD-10-CM

## 2024-05-27 RX ORDER — AMLODIPINE AND VALSARTAN 10; 160 MG/1; MG/1
1 TABLET ORAL DAILY
Qty: 30 TABLET | Refills: 11 | OUTPATIENT
Start: 2024-05-27 | End: 2025-05-27

## 2024-05-27 NOTE — ED PROVIDER NOTES
Encounter Date: 5/25/2024       History     Chief Complaint   Patient presents with    Headache     Patient presents to ED from home with c/o chronic generalized headache that he states he's had for the last 3 years since having Covid. Patient rates headache pain 10/10. Reports having intermittent lightheadedness, denies changes to vision. Denies being seen by a doctor or taking anything for pain.      Patient is a 64 y.o. male who presents to the ED for evaluation of intermittent headaches X 3 years. Says that he had COVID 3 years ago and has been having occasional headaches ever since. Headache are of gradual onset.  Patient does not have a history of headaches prior to 3 years ago.  Reports mild headache today in the Er, states that this is not not the worst headache of life.  Patient has not been in contact with anybody sick with COVID or flu. Patient takes sometimes takes ibuprofen for symptom relief, without  full resolution of the symptoms. Denies fever, chills, focal neurological weakness, altered mental status, chest pain, shortness of breath, vision changes, temporal pain, neck pain/stiffness wheezing, stridor, drooling, NVD, abdominal pain, constipation, urinary problems, joint problems, rashes, or any other complaints at this time.       The history is provided by the patient.     Review of patient's allergies indicates:   Allergen Reactions    Poison ivy extract Itching     Past Medical History:   Diagnosis Date    Hypertension     Liver cirrhosis      Past Surgical History:   Procedure Laterality Date    COLONOSCOPY N/A 6/30/2022    Procedure: COLONOSCOPY;  Surgeon: VIRIDIANA Swanson MD;  Location: [facility] ENDO;  Service: Endoscopy;  Laterality: N/A;    LEFT HEART CATHETERIZATION Left 2/16/2022    Procedure: Left heart cath;  Surgeon: Thierno Kidd III, MD;  Location: [facility] CATH LAB;  Service: Cardiology;  Laterality: Left;     No family history on file.  Social History     Tobacco Use    Smoking  status: Never    Smokeless tobacco: Never   Substance Use Topics    Alcohol use: Yes     Alcohol/week: 6.0 standard drinks of alcohol     Types: 6 Cans of beer per week     Comment: daily    Drug use: No     Review of Systems   Constitutional:  Negative for chills and fever.   HENT:  Negative for sore throat.    Eyes: Negative.    Respiratory:  Negative for shortness of breath.    Cardiovascular:  Negative for chest pain.   Gastrointestinal:  Negative for nausea and vomiting.   Genitourinary:  Negative for dysuria.   Musculoskeletal:  Negative for back pain.   Skin:  Negative for rash.   Neurological:  Positive for headaches. Negative for dizziness, tremors, seizures, syncope, facial asymmetry, speech difficulty, weakness, light-headedness and numbness.   Hematological:  Does not bruise/bleed easily.       Physical Exam     Initial Vitals [05/25/24 0858]   BP Pulse Resp Temp SpO2   (!) 175/91 83 18 98.1 °F (36.7 °C) 96 %      MAP       --         Physical Exam    Constitutional: He appears well-developed and well-nourished.   HENT:   Head: Normocephalic and atraumatic.   Cardiovascular:  Normal rate.           Abdominal: He exhibits no distension. There is no abdominal tenderness. There is no rebound and no guarding.     Neurological: He is alert and oriented to person, place, and time. A cranial nerve deficit is present. No sensory deficit. GCS score is 15. GCS eye subscore is 4. GCS verbal subscore is 5. GCS motor subscore is 6.         ED Course   Procedures  Labs Reviewed - No data to display       Imaging Results    None          Medications   ketorolac tablet 10 mg (10 mg Oral Given 5/25/24 0954)   dexAMETHasone tablet 4 mg (4 mg Oral Given 5/25/24 0954)     Medical Decision Making  Patient is an afebrile, well appearing 64 y.o. male who presents for evaluation intermittent headaches X 3 years. Headaches are of gradual onset. No headache red flags. Neurologic exam without evidence of meningismus, AMS, focal  neurologic findings. VSS and do not suggest sepsis.  Denies chest pain, SOB, or any other complaints at this time. A&Ox4. The patient remained comfortable and stable during their visit in the ED. Details of ED course documented in ED workup.     Differential Diagnosis included but was not limited to:  - SAH: not sudden onset or worst headache of life  - Epidural/subdural hematoma: no head injury  - CVA: normal neuro exam  - Temporal arteritis: no changes in vision, no temporal pain, headache not specifically unilateral  - Glaucoma: age inconsistent, eye exam wnl  - Meningitis: no neck stiffness, no fever    All historical, clinical, radiographic, and laboratory findings reviewed. Pt has benign initial and repeat neuro exam here in ED, no visual changes or ataxia. The pain was made better with toradol and dexamethasone.  (toradol, compazine, benadryl).    There are no concerning features on physical exam to suggest an emergent condition - no further intervention is indicated at this time after having taken into account the patient's history, physical exam findings, and empirical and objective data obtained during the patient's emergency department workup. Therefore, I do not believe there is any underlying intracranial pathology and patient is safe for discharge home with Rx for Zyrtec.  Told patient he can take ibuprofen for headaches, and to avoid Tylenol as he has history of liver disease.  Patient to follow up with PCP in 2-3 days and with neurology. Referral placed    Although the patient has no emergent etiology today this does not preclude the development of an emergent condition so, in addition, I have advised the patient to return to the ED with worsening of their symptoms, change of their symptoms, including, but not limited to: inability to eat/drink, fevers (temperature >100.4), visual change, or with any other concerns. All patient questions answered. Patient given discharge instructions. Patient  discharged from the emergency department in stable condition, in no acute distress.     Risk  OTC drugs.  Prescription drug management.               ED Course as of 05/27/24 1319   Sat May 25, 2024   0936 Patient examined and assessed. Patient answering questions appropriately, speaking in complete sentences, respirations are even and unlabored.  AAO x 4.  [OB]   1036 Reports that his headache has improved.  Will discharge patient with referral to headache specialist. [OB]      ED Course User Index  [OB] Ritu العلي PA-C                           Clinical Impression:  Final diagnoses:  [R51.9, G89.29] Chronic nonintractable headache, unspecified headache type (Primary)          ED Disposition Condition    Discharge Stable          ED Prescriptions       Medication Sig Dispense Start Date End Date Auth. Provider    cetirizine (ZYRTEC) 10 MG tablet Take 1 tablet (10 mg total) by mouth once daily. 30 tablet 5/25/2024 6/24/2024 Ritu العلي PA-C          Follow-up Information       Follow up With Specialties Details Why Contact Info    Abilio Mtz MD Family Medicine In 3 days As needed, If symptoms worsen [address], [place] [address]  [place] [id number]  [phone]               Ritu العلي PA-C  05/27/24 4083

## 2024-06-03 ENCOUNTER — OFFICE VISIT (OUTPATIENT)
Dept: FAMILY MEDICINE | Facility: HOSPITAL | Age: 64
End: 2024-06-03
Payer: MEDICAID

## 2024-06-03 VITALS
HEIGHT: 67 IN | SYSTOLIC BLOOD PRESSURE: 196 MMHG | HEART RATE: 76 BPM | DIASTOLIC BLOOD PRESSURE: 94 MMHG | BODY MASS INDEX: 29.03 KG/M2 | WEIGHT: 184.94 LBS

## 2024-06-03 DIAGNOSIS — I10 PRIMARY HYPERTENSION: ICD-10-CM

## 2024-06-03 DIAGNOSIS — R39.11 BENIGN PROSTATIC HYPERPLASIA WITH URINARY HESITANCY: ICD-10-CM

## 2024-06-03 DIAGNOSIS — R51.9 ACUTE INTRACTABLE HEADACHE, UNSPECIFIED HEADACHE TYPE: ICD-10-CM

## 2024-06-03 DIAGNOSIS — I10 UNCONTROLLED HYPERTENSION: Primary | ICD-10-CM

## 2024-06-03 DIAGNOSIS — N40.1 BENIGN PROSTATIC HYPERPLASIA WITH URINARY HESITANCY: ICD-10-CM

## 2024-06-03 PROCEDURE — 99215 OFFICE O/P EST HI 40 MIN: CPT | Performed by: STUDENT IN AN ORGANIZED HEALTH CARE EDUCATION/TRAINING PROGRAM

## 2024-06-03 RX ORDER — TOPIRAMATE 50 MG/1
50 TABLET, FILM COATED ORAL 2 TIMES DAILY
Qty: 60 TABLET | Refills: 0 | Status: CANCELLED | OUTPATIENT
Start: 2024-06-03 | End: 2024-07-03

## 2024-06-03 RX ORDER — AMLODIPINE AND VALSARTAN 10; 160 MG/1; MG/1
1 TABLET ORAL DAILY
Qty: 30 TABLET | Refills: 11 | Status: CANCELLED | OUTPATIENT
Start: 2024-06-03 | End: 2025-06-03

## 2024-06-03 RX ORDER — AMLODIPINE AND VALSARTAN 10; 320 MG/1; MG/1
1 TABLET ORAL DAILY
Qty: 90 TABLET | Refills: 3 | Status: SHIPPED | OUTPATIENT
Start: 2024-06-03 | End: 2025-06-03

## 2024-06-03 RX ORDER — TOPIRAMATE 100 MG/1
100 TABLET, FILM COATED ORAL 2 TIMES DAILY
Qty: 60 TABLET | Refills: 11 | Status: SHIPPED | OUTPATIENT
Start: 2024-06-03 | End: 2025-06-03

## 2024-06-03 NOTE — PROGRESS NOTES
[facility]  History & Physical    SUBJECTIVE:     Chief Complaint:   Chief Complaint   Patient presents with    Annual Exam    Medication Refill       History of Present Illness:  64 y.o. male who  has a past medical history of Hypertension and Liver cirrhosis. presents to clinic today for ED follow up related to headache, hypertension and BPH. Patient reports recent worsening of chronic headaches. Headaches worsening in frequency, duration, and severity. Patient has been on topamax 50mg BID for 1 year. Patient has been out of HTN medication for 10 days. Home BP monitoring shows uncontrolled HTN. Patient reports continued issues with urinary frequency with associated sensation of incomplete bladder emptying. Patient denies, blood in urine, pain with urination,pain/blood with ejaculation, vision changes, dizziness, weakness and confusion.       Allergies:  Review of patient's allergies indicates:   Allergen Reactions    Poison ivy extract Itching       Home Medications:  Current Outpatient Medications on File Prior to Visit   Medication Sig    aspirin 81 MG Chew Take 81 mg by mouth once daily.    atorvastatin (LIPITOR) 20 MG tablet Take 1 tablet (20 mg total) by mouth every evening.    cetirizine (ZYRTEC) 10 MG tablet Take 10 mg by mouth once daily.    cetirizine (ZYRTEC) 10 MG tablet Take 1 tablet (10 mg total) by mouth once daily.    cetirizine (ZYRTEC) 10 MG tablet Take 1 tablet (10 mg total) by mouth once daily.    finasteride (PROSCAR) 5 mg tablet Take 1 tablet (5 mg total) by mouth once daily.    FLUoxetine 20 MG capsule Take 1 capsule (20 mg total) by mouth once daily.    meclizine (ANTIVERT) 25 mg tablet Take 25 mg by mouth 3 (three) times daily as needed for Dizziness.    promethazine-dextromethorphan (PROMETHAZINE-DM) 6.25-15 mg/5 mL Syrp Take 5 mLs by mouth every 4 to 6 hours as needed for cough.    tamsulosin (FLOMAX) 0.4 mg Cap Take 1 capsule (0.4 mg total) by mouth once daily.     No current  facility-administered medications on file prior to visit.       Past Medical History:   Diagnosis Date    Hypertension     Liver cirrhosis      Past Surgical History:   Procedure Laterality Date    COLONOSCOPY N/A 6/30/2022    Procedure: COLONOSCOPY;  Surgeon: VIRIDIANA Swanson MD;  Location: [facility] ENDO;  Service: Endoscopy;  Laterality: N/A;    LEFT HEART CATHETERIZATION Left 2/16/2022    Procedure: Left heart cath;  Surgeon: Thierno Kidd III, MD;  Location: [facility] CATH LAB;  Service: Cardiology;  Laterality: Left;     No family history on file.  Social History     Tobacco Use    Smoking status: Never    Smokeless tobacco: Never   Substance Use Topics    Alcohol use: Yes     Alcohol/week: 6.0 standard drinks of alcohol     Types: 6 Cans of beer per week     Comment: daily    Drug use: No        Review of Systems   Constitutional:  Negative for fever and weight loss.   HENT:  Negative for hearing loss and sore throat.    Eyes:  Negative for blurred vision.   Respiratory:  Negative for cough, shortness of breath and wheezing.    Cardiovascular:  Negative for chest pain and leg swelling.   Gastrointestinal:  Negative for heartburn, nausea and vomiting.   Genitourinary:  Negative for dysuria.   Musculoskeletal:  Negative for back pain, joint pain and myalgias.   Neurological:  Positive for headaches. Negative for dizziness and weakness.        OBJECTIVE:     Vital Signs:  Pulse: 76 (06/03/24 0942)  BP: (!) 196/94 (06/03/24 0942)  Body mass index is 28.97 kg/m².  Physical Exam  Constitutional:       General: He is not in acute distress.     Appearance: Normal appearance. He is not ill-appearing or diaphoretic.   HENT:      Head: Normocephalic and atraumatic.      Right Ear: External ear normal.      Left Ear: External ear normal.      Nose: Nose normal.      Mouth/Throat:      Mouth: Mucous membranes are moist.   Eyes:      Extraocular Movements: Extraocular movements intact.   Cardiovascular:      Rate and  Rhythm: Normal rate and regular rhythm.      Pulses: Normal pulses.      Heart sounds: Normal heart sounds.   Pulmonary:      Effort: No respiratory distress.      Breath sounds: No wheezing.   Abdominal:      General: Abdomen is flat.      Tenderness: There is no guarding.   Musculoskeletal:         General: No swelling. Normal range of motion.   Skin:     General: Skin is warm and dry.   Neurological:      Mental Status: He is alert and oriented to person, place, and time.   Psychiatric:         Mood and Affect: Mood normal.         Behavior: Behavior normal.         Laboratory:  Hemoglobin A1C   Date Value Ref Range Status   04/06/2023 5.4 4.0 - 5.6 % Final     Comment:     ADA Screening Guidelines:  5.7-6.4%  Consistent with prediabetes  >or=6.5%  Consistent with diabetes    High levels of fetal hemoglobin interfere with the HbA1C  assay. Heterozygous hemoglobin variants (HbS, HgC, etc)do  not significantly interfere with this assay.   However, presence of multiple variants may affect accuracy.     01/14/2022 5.3 4.0 - 5.6 % Final     Comment:     ADA Screening Guidelines:  5.7-6.4%  Consistent with prediabetes  >or=6.5%  Consistent with diabetes    High levels of fetal hemoglobin interfere with the HbA1C  assay. Heterozygous hemoglobin variants (HbS, HgC, etc)do  not significantly interfere with this assay.   However, presence of multiple variants may affect accuracy.         A/P:  Eitan was seen today for annual exam and medication refill.    Diagnoses and all orders for this visit:    Uncontrolled hypertension  -     amlodipine-valsartan (EXFORGE)  mg per tablet; Take 1 tablet by mouth once daily.   - Increasing dose of valsartan at this visit, patient has been without medication, but was uncontrolled while on previous dose   - Add HCTZ at next visit if BP remains uncontrolled  Acute intractable headache, unspecified headache type  -     topiramate (TOPAMAX) 100 MG tablet; Take 1 tablet (100 mg total)  by mouth 2 (two) times daily.  -     Ambulatory referral/consult to Neurology; Future    Benign prostatic hyperplasia with urinary hesitancy  -     Ambulatory referral/consult to Urology; Future   - Urinary symptoms persist despite medication        Follow up in about 2 weeks (around 6/17/2024) for Repeat blood pressure check.      Abilio Mtz MD  [facility] PGY-3  06/04/2024

## 2024-06-05 ENCOUNTER — TELEPHONE (OUTPATIENT)
Dept: OPTOMETRY | Facility: CLINIC | Age: 64
End: 2024-06-05
Payer: MEDICAID

## 2024-06-05 DIAGNOSIS — H16.223 KERATOCONJUNCTIVITIS SICCA NOT SPECIFIED AS SJOGREN'S, BILATERAL: Primary | ICD-10-CM

## 2024-06-05 NOTE — TELEPHONE ENCOUNTER
----- Message from Joanna Torres, CAROLE sent at 6/5/2024  9:33 AM CDT -----  Can you call to see if it is xiidra? Ask if the drop if for his dry eyes. Let me know! Thanks!  ----- Message -----  From: Pham Kidd MA  Sent: 6/3/2024   3:47 PM CDT  To: Joanna Torres OD      ----- Message -----  From: Chris Wallace  Sent: 6/3/2024  12:59 PM CDT  To: Brian Marshall Staff    Consult/Advisory  Eitan   Name Of Caller:      Contact Preference:[id number]    Nature of call: Ptn called regarding a refill the drops he don't remember and I'm not seeing anything listed please call ptn to assist I also didn't see the last time he was seen

## 2024-06-06 NOTE — PROGRESS NOTES
I assume primary medical responsibility for this patient. I have reviewed the history, physical, and assessment & treatment plan with the resident and agree that the care is reasonable and necessary. This service has been performed by a resident without the presence of a teaching physician under the primary care exception. If necessary, an addendum of additional findings or evaluation beyond the resident documentation will be noted below.     Uncontrolled hypertension without signs/symptoms of end-organ damage during today's visit.  Encouraged lifestyle modification along adherence to current antihypertensive medication regimen.  Recommended patient to maintain a BP log and bring it back to next scheduled clinic visit in approximately 2 weeks.  Recommended patient schedule a follow-up visit in approximately 2 weeks for repeat blood pressure check.  If blood pressure remains uncontrolled at that visit, may consider adding/adjusting current antihypertensive medication regimen.     Rolan Salazar Jr., DO    [facility]

## 2024-06-11 ENCOUNTER — OFFICE VISIT (OUTPATIENT)
Dept: FAMILY MEDICINE | Facility: HOSPITAL | Age: 64
End: 2024-06-11
Payer: MEDICAID

## 2024-06-11 VITALS
DIASTOLIC BLOOD PRESSURE: 82 MMHG | SYSTOLIC BLOOD PRESSURE: 131 MMHG | HEIGHT: 67 IN | HEART RATE: 83 BPM | BODY MASS INDEX: 28.52 KG/M2 | WEIGHT: 181.69 LBS

## 2024-06-11 DIAGNOSIS — I10 HYPERTENSION, UNSPECIFIED TYPE: ICD-10-CM

## 2024-06-11 DIAGNOSIS — R51.9 ACUTE INTRACTABLE HEADACHE, UNSPECIFIED HEADACHE TYPE: ICD-10-CM

## 2024-06-11 DIAGNOSIS — M79.671 RIGHT FOOT PAIN: Primary | ICD-10-CM

## 2024-06-11 DIAGNOSIS — K08.89 TOOTH PAIN: ICD-10-CM

## 2024-06-11 PROCEDURE — 99215 OFFICE O/P EST HI 40 MIN: CPT | Performed by: STUDENT IN AN ORGANIZED HEALTH CARE EDUCATION/TRAINING PROGRAM

## 2024-06-11 RX ORDER — PROPRANOLOL HYDROCHLORIDE 10 MG/1
10 TABLET ORAL DAILY
Qty: 30 TABLET | Refills: 11 | Status: SHIPPED | OUTPATIENT
Start: 2024-06-11 | End: 2025-06-11

## 2024-06-11 NOTE — PROGRESS NOTES
[facility]  History & Physical    SUBJECTIVE:     Chief Complaint:   Chief Complaint   Patient presents with    Follow-up     HTN       History of Present Illness:  64 y.o. male who  has a past medical history of Hypertension and Liver cirrhosis. presents to clinic today for HTN, headache, and urinary frequency.  Daily chronic headache has continued for the past 2 months. Pain waxes and wanes based on activity. Pain not responding to topamax. Pain did not improve with blood pressure control. Patient has not been contact for neurology appointment yet.      Allergies:  Review of patient's allergies indicates:   Allergen Reactions    Poison ivy extract Itching       Home Medications:  Current Outpatient Medications on File Prior to Visit   Medication Sig    amlodipine-valsartan (EXFORGE)  mg per tablet Take 1 tablet by mouth once daily.    aspirin 81 MG Chew Take 81 mg by mouth once daily.    atorvastatin (LIPITOR) 20 MG tablet Take 1 tablet (20 mg total) by mouth every evening.    cetirizine (ZYRTEC) 10 MG tablet Take 10 mg by mouth once daily.    cetirizine (ZYRTEC) 10 MG tablet Take 1 tablet (10 mg total) by mouth once daily.    cetirizine (ZYRTEC) 10 MG tablet Take 1 tablet (10 mg total) by mouth once daily.    finasteride (PROSCAR) 5 mg tablet Take 1 tablet (5 mg total) by mouth once daily.    FLUoxetine 20 MG capsule Take 1 capsule (20 mg total) by mouth once daily.    lifitegrast (XIIDRA) 5 % Dpet Place 1 drop into both eyes 2 (two) times daily.    meclizine (ANTIVERT) 25 mg tablet Take 25 mg by mouth 3 (three) times daily as needed for Dizziness.    promethazine-dextromethorphan (PROMETHAZINE-DM) 6.25-15 mg/5 mL Syrp Take 5 mLs by mouth every 4 to 6 hours as needed for cough.    tamsulosin (FLOMAX) 0.4 mg Cap Take 1 capsule (0.4 mg total) by mouth once daily.    topiramate (TOPAMAX) 100 MG tablet Take 1 tablet (100 mg total) by mouth 2 (two) times daily.     No current facility-administered  medications on file prior to visit.       Past Medical History:   Diagnosis Date    Hypertension     Liver cirrhosis      Past Surgical History:   Procedure Laterality Date    COLONOSCOPY N/A 6/30/2022    Procedure: COLONOSCOPY;  Surgeon: VIRIDIANA Swanson MD;  Location: [facility] ENDO;  Service: Endoscopy;  Laterality: N/A;    LEFT HEART CATHETERIZATION Left 2/16/2022    Procedure: Left heart cath;  Surgeon: Thierno Kidd III, MD;  Location: [facility] CATH LAB;  Service: Cardiology;  Laterality: Left;     No family history on file.  Social History     Tobacco Use    Smoking status: Never    Smokeless tobacco: Never   Substance Use Topics    Alcohol use: Yes     Alcohol/week: 6.0 standard drinks of alcohol     Types: 6 Cans of beer per week     Comment: daily    Drug use: No        Review of Systems   Constitutional:  Negative for fever and weight loss.   HENT:  Negative for hearing loss and sore throat.    Eyes:  Negative for blurred vision.   Respiratory:  Negative for cough, shortness of breath and wheezing.    Cardiovascular:  Negative for chest pain and leg swelling.   Gastrointestinal:  Negative for heartburn, nausea and vomiting.   Genitourinary:  Negative for dysuria.   Musculoskeletal:  Negative for back pain, joint pain and myalgias.   Neurological:  Negative for dizziness, weakness and headaches.        OBJECTIVE:     Vital Signs:  Pulse: 83 (06/11/24 1519)  BP: 131/82 (06/11/24 1519)  Body mass index is 28.45 kg/m².  Physical Exam  Constitutional:       General: He is not in acute distress.     Appearance: Normal appearance. He is not ill-appearing or diaphoretic.   HENT:      Head: Normocephalic and atraumatic.      Right Ear: External ear normal.      Left Ear: External ear normal.      Nose: Nose normal.      Mouth/Throat:      Mouth: Mucous membranes are moist.   Eyes:      Extraocular Movements: Extraocular movements intact.   Cardiovascular:      Rate and Rhythm: Normal rate and regular rhythm.       Pulses: Normal pulses.      Heart sounds: Normal heart sounds.   Pulmonary:      Effort: No respiratory distress.      Breath sounds: No wheezing.   Abdominal:      General: Abdomen is flat.      Tenderness: There is no guarding.   Musculoskeletal:         General: No swelling. Normal range of motion.   Skin:     General: Skin is warm and dry.   Neurological:      Mental Status: He is alert and oriented to person, place, and time.   Psychiatric:         Mood and Affect: Mood normal.         Behavior: Behavior normal.         Laboratory:  Hemoglobin A1C   Date Value Ref Range Status   04/06/2023 5.4 4.0 - 5.6 % Final     Comment:     ADA Screening Guidelines:  5.7-6.4%  Consistent with prediabetes  >or=6.5%  Consistent with diabetes    High levels of fetal hemoglobin interfere with the HbA1C  assay. Heterozygous hemoglobin variants (HbS, HgC, etc)do  not significantly interfere with this assay.   However, presence of multiple variants may affect accuracy.     01/14/2022 5.3 4.0 - 5.6 % Final     Comment:     ADA Screening Guidelines:  5.7-6.4%  Consistent with prediabetes  >or=6.5%  Consistent with diabetes    High levels of fetal hemoglobin interfere with the HbA1C  assay. Heterozygous hemoglobin variants (HbS, HgC, etc)do  not significantly interfere with this assay.   However, presence of multiple variants may affect accuracy.         A/P:  Eitan was seen today for follow-up.    Diagnoses and all orders for this visit:    Right foot pain  -     Ambulatory referral/consult to Podiatry; Future   - Acute on chronic right plantar foot pain  Hypertension, unspecified type   - Controlled on current regimen  Acute intractable headache, unspecified headache type  -     propranoloL (INDERAL) 10 MG tablet; Take 1 tablet (10 mg total) by mouth once daily.   - Will trial propanolol for headache relief, patients chart indicates this medication was helpful in the past  Tooth pain  -     Ambulatory referral/consult to Dentistry;  Future         Follow up in about 4 weeks (around 7/9/2024).      Abilio Mtz MD  [facility] PGY-3  06/17/2024

## 2024-06-12 ENCOUNTER — TELEPHONE (OUTPATIENT)
Dept: FAMILY MEDICINE | Facility: HOSPITAL | Age: 64
End: 2024-06-12
Payer: MEDICAID

## 2024-06-24 DIAGNOSIS — R35.0 URINARY FREQUENCY: ICD-10-CM

## 2024-06-25 RX ORDER — TAMSULOSIN HYDROCHLORIDE 0.4 MG/1
0.4 CAPSULE ORAL DAILY
Qty: 30 CAPSULE | Refills: 2 | Status: SHIPPED | OUTPATIENT
Start: 2024-06-25 | End: 2024-09-23

## 2024-07-11 ENCOUNTER — OFFICE VISIT (OUTPATIENT)
Dept: PODIATRY | Facility: CLINIC | Age: 64
End: 2024-07-11
Payer: MEDICAID

## 2024-07-11 ENCOUNTER — OFFICE VISIT (OUTPATIENT)
Dept: OPTOMETRY | Facility: CLINIC | Age: 64
End: 2024-07-11
Payer: MEDICAID

## 2024-07-11 VITALS — WEIGHT: 180.25 LBS | BODY MASS INDEX: 28.23 KG/M2

## 2024-07-11 DIAGNOSIS — B88.0 INFESTATION BY DEMODEX: ICD-10-CM

## 2024-07-11 DIAGNOSIS — H01.004 BLEPHARITIS OF UPPER EYELIDS OF BOTH EYES, UNSPECIFIED TYPE: Primary | ICD-10-CM

## 2024-07-11 DIAGNOSIS — H16.223 KERATOCONJUNCTIVITIS SICCA NOT SPECIFIED AS SJOGREN'S, BILATERAL: ICD-10-CM

## 2024-07-11 DIAGNOSIS — M20.42 ACQUIRED HAMMERTOES OF BOTH FEET: ICD-10-CM

## 2024-07-11 DIAGNOSIS — M79.671 FOOT PAIN, RIGHT: Primary | ICD-10-CM

## 2024-07-11 DIAGNOSIS — M79.672 FOOT PAIN, LEFT: Primary | ICD-10-CM

## 2024-07-11 DIAGNOSIS — M19.079 ARTHRITIS OF FOOT: ICD-10-CM

## 2024-07-11 DIAGNOSIS — M72.2 PLANTAR FASCIITIS, BILATERAL: Primary | ICD-10-CM

## 2024-07-11 DIAGNOSIS — M21.612 BILATERAL BUNIONS: ICD-10-CM

## 2024-07-11 DIAGNOSIS — M21.611 BILATERAL BUNIONS: ICD-10-CM

## 2024-07-11 DIAGNOSIS — H01.001 BLEPHARITIS OF UPPER EYELIDS OF BOTH EYES, UNSPECIFIED TYPE: Primary | ICD-10-CM

## 2024-07-11 DIAGNOSIS — H02.889 MGD (MEIBOMIAN GLAND DYSFUNCTION): ICD-10-CM

## 2024-07-11 DIAGNOSIS — M20.41 ACQUIRED HAMMERTOES OF BOTH FEET: ICD-10-CM

## 2024-07-11 PROCEDURE — 99999 PR PBB SHADOW E&M-EST. PATIENT-LVL III: CPT | Mod: PBBFAC,,, | Performed by: PODIATRIST

## 2024-07-11 PROCEDURE — 99999PBSHW PR PBB SHADOW TECHNICAL ONLY FILED TO HB: Mod: PBBFAC,,,

## 2024-07-11 PROCEDURE — 99212 OFFICE O/P EST SF 10 MIN: CPT | Mod: PBBFAC,27,PN | Performed by: OPTOMETRIST

## 2024-07-11 PROCEDURE — 20550 NJX 1 TENDON SHEATH/LIGAMENT: CPT | Mod: PBBFAC,PN,LT | Performed by: PODIATRIST

## 2024-07-11 PROCEDURE — 99214 OFFICE O/P EST MOD 30 MIN: CPT | Mod: S$PBB,,, | Performed by: OPTOMETRIST

## 2024-07-11 PROCEDURE — 99999 PR PBB SHADOW E&M-EST. PATIENT-LVL II: CPT | Mod: PBBFAC,,, | Performed by: OPTOMETRIST

## 2024-07-11 PROCEDURE — 20550 NJX 1 TENDON SHEATH/LIGAMENT: CPT | Mod: PBBFAC,PN,RT | Performed by: PODIATRIST

## 2024-07-11 PROCEDURE — 4010F ACE/ARB THERAPY RXD/TAKEN: CPT | Mod: CPTII,,, | Performed by: OPTOMETRIST

## 2024-07-11 PROCEDURE — 99213 OFFICE O/P EST LOW 20 MIN: CPT | Mod: PBBFAC,PN | Performed by: PODIATRIST

## 2024-07-11 PROCEDURE — 1159F MED LIST DOCD IN RCRD: CPT | Mod: CPTII,,, | Performed by: OPTOMETRIST

## 2024-07-11 RX ORDER — LOTILANER OPHTHALMIC SOLUTION 2.5 MG/ML
1 SOLUTION/ DROPS OPHTHALMIC 2 TIMES DAILY
Qty: 10 ML | Refills: 0 | Status: SHIPPED | OUTPATIENT
Start: 2024-07-11 | End: 2024-08-22

## 2024-07-11 RX ORDER — DEXAMETHASONE SODIUM PHOSPHATE 4 MG/ML
4 INJECTION, SOLUTION INTRA-ARTICULAR; INTRALESIONAL; INTRAMUSCULAR; INTRAVENOUS; SOFT TISSUE
Status: DISCONTINUED | OUTPATIENT
Start: 2024-07-11 | End: 2024-07-11 | Stop reason: HOSPADM

## 2024-07-11 RX ADMIN — DEXAMETHASONE SODIUM PHOSPHATE 4 MG: 4 INJECTION, SOLUTION INTRAMUSCULAR; INTRAVENOUS at 09:07

## 2024-07-11 RX ADMIN — DEXAMETHASONE SODIUM PHOSPHATE 4 MG: 4 INJECTION, SOLUTION INTRAMUSCULAR; INTRAVENOUS at 08:07

## 2024-07-11 NOTE — PROGRESS NOTES
[facility] - PODIATRY  [address]  [address]  [facility] [address]  Dept: [phone]  Dept Fax: [phone]    Hua Nails Jr., DPM     Assessment:   MDM    Coding  1. Plantar fasciitis, bilateral  Ambulatory referral/consult to Podiatry    Tendon Sheath    Tendon Sheath      2. Arthritis of foot        3. Bilateral bunions        4. Acquired hammertoes of both feet            Plan:     Tendon Sheath: R plantar fascia    Date/Time: 7/11/2024 9:41 AM    Performed by: Hua Nails Jr., DPM  Authorized by: Hua Nails Jr., DPM    Consent Done?:  Yes (Verbal)  Indications:  Pain  Site marked: the procedure site was marked    Timeout: prior to procedure the correct patient, procedure, and site was verified    Prep: patient was prepped and draped in usual sterile fashion      Local anesthesia used?: Yes    Anesthesia:  Local infiltration  Local anesthetic:  Co-phenylcaine spray and bupivacaine 0.5% without epinephrine  Anesthetic total (ml):  2    Location:  Foot  Site:  R plantar fascia  Ultrasonic guidance for needle placement?: No    Needle size:  25 G  Approach:  Medial  Medications:  4 mg dexAMETHasone 4 mg/mL  Patient tolerance:  Patient tolerated the procedure well with no immediate complications  Tendon Sheath: L plantar fascia    Date/Time: 7/11/2024 8:30 AM    Performed by: Hua Nails Jr., DPM  Authorized by: Hua Nails Jr., DPM    Consent Done?:  Yes (Verbal)  Indications:  Pain  Site marked: the procedure site was marked    Timeout: prior to procedure the correct patient, procedure, and site was verified    Prep: patient was prepped and draped in usual sterile fashion      Local anesthesia used?: Yes    Anesthesia:  Local infiltration  Local anesthetic:  Co-phenylcaine spray and bupivacaine 0.5% without epinephrine  Anesthetic total (ml):  2    Location:  Foot  Site:  L plantar fascia  Ultrasonic guidance for needle placement?: No    Needle size:  25  G  Approach:  Medial  Medications:  4 mg dexAMETHasone 4 mg/mL  Patient tolerance:  Patient tolerated the procedure well with no immediate complications      Eitan was seen today for flat foot.    Diagnoses and all orders for this visit:    Plantar fasciitis, bilateral  -     Ambulatory referral/consult to Podiatry  -     Tendon Sheath  -     Tendon Sheath    Arthritis of foot    Bilateral bunions    Acquired hammertoes of both feet        -pt seen, evaluated, and managed  -dx discussed in detail. All questions/concerns addressed  -all tx options discussed. All alternatives, risks, benefits of all txs discussed  -the patient was educated about the diagnosis and discussed reducing caloric intake, increase physical activity  -We discussed conservative care options possible including but not limited to shoe wear and/or padding, bracing/strapping, at home ROM, formal PT, medical therapy, injection therapy  - The utilization of NSAIDs can be considered but their benefit has to be tempered against the risk of GI/ concerns  - A steroid injection can be undertaken.  We did discuss the potential mechanism of action of this shot.  Understanding that multiple injections at the same anatomic site do have deleterious effects on the soft tissue.  Generic risks include: steroid flare (advised to ice if necessary), skin hypo-pgimentation (which can be permanent and unsightly), elevation of blood sugar, subcutaneous atrophy (can be permanent) and infection.   -XR/imaging reviewed by me: agree with read  -labs reviewed by me: ok for vgel  -implemented icing/stretching regimen  -heel lifts dispensed    -rxs dispensed: none  -referrals: none  -WB: wbat      Follow up in about 8 weeks (around 9/5/2024).    Subjective:      Patient ID: Eitan Cody is a 64 y.o. male.    Chief Complaint:   Chief Complaint   Patient presents with    Flat Foot     Bilateral, left worse         CC - foot pain: patient presents to the podiatry clinic  with  complaint of  bilateral foot pain. Onset of the symptoms was several years ago. Precipitating event: unk. Current symptoms include: ability to bear weight, but with some pain, gonzales the heel, swelling and worsening symptoms after a period of inactivity. Aggravating factors: walking and certain shoegear. Symptoms have gradually worsened. Patient has had no prior foot problems. Evaluation to date: none. Treatment to date: avoidance of offending activity. Patients rates pain 7/10 on pain scale.      HPI    Last Podiatry Enc: Visit date not found  Last Enc w/ Me: Visit date not found    Outside reports reviewed: historical medical records.  Family hx: as below  Past Medical History:   Diagnosis Date    Hypertension     Liver cirrhosis      Past Surgical History:   Procedure Laterality Date    COLONOSCOPY N/A 6/30/2022    Procedure: COLONOSCOPY;  Surgeon: VIRIDIANA Swanson MD;  Location: [facility] ENDO;  Service: Endoscopy;  Laterality: N/A;    LEFT HEART CATHETERIZATION Left 2/16/2022    Procedure: Left heart cath;  Surgeon: Thierno Kidd III, MD;  Location: [facility] CATH LAB;  Service: Cardiology;  Laterality: Left;     No family history on file.  Current Outpatient Medications   Medication Sig Dispense Refill    amlodipine-valsartan (EXFORGE)  mg per tablet Take 1 tablet by mouth once daily. 90 tablet 3    aspirin 81 MG Chew Take 81 mg by mouth once daily.      atorvastatin (LIPITOR) 20 MG tablet Take 1 tablet (20 mg total) by mouth every evening. 90 tablet 3    cetirizine (ZYRTEC) 10 MG tablet Take 10 mg by mouth once daily.      cetirizine (ZYRTEC) 10 MG tablet Take 1 tablet (10 mg total) by mouth once daily. 14 tablet 0    finasteride (PROSCAR) 5 mg tablet Take 1 tablet (5 mg total) by mouth once daily. 30 tablet 11    FLUoxetine 20 MG capsule Take 1 capsule (20 mg total) by mouth once daily. 30 capsule 2    lifitegrast (XIIDRA) 5 % Dpet Place 1 drop into both eyes 2 (two) times daily. 60 each 11    meclizine  (ANTIVERT) 25 mg tablet Take 25 mg by mouth 3 (three) times daily as needed for Dizziness.      promethazine-dextromethorphan (PROMETHAZINE-DM) 6.25-15 mg/5 mL Syrp Take 5 mLs by mouth every 4 to 6 hours as needed for cough. 120 mL 0    propranoloL (INDERAL) 10 MG tablet Take 1 tablet (10 mg total) by mouth once daily. 30 tablet 11    tamsulosin (FLOMAX) 0.4 mg Cap Take 1 capsule (0.4 mg total) by mouth once daily. 30 capsule 2    topiramate (TOPAMAX) 100 MG tablet Take 1 tablet (100 mg total) by mouth 2 (two) times daily. 60 tablet 11     No current facility-administered medications for this visit.     Review of patient's allergies indicates:   Allergen Reactions    Poison ivy extract Itching     Social History     Socioeconomic History    Marital status: Single   Tobacco Use    Smoking status: Never    Smokeless tobacco: Never   Substance and Sexual Activity    Alcohol use: Yes     Alcohol/week: 6.0 standard drinks of alcohol     Types: 6 Cans of beer per week     Comment: daily    Drug use: No       ROS    REVIEW OF SYSTEMS: Negative as documented below as well as positive findings in bold.       Constitutional  Respiratory  Gastrointestinal  Skin   - Fever - Cough - Heartburn - Rash   - Chills - Spit blood - Nausea - Itching   - Weight Loss - Shortness of breath - Vomiting - Nail pain   - Malaise/Fatigue - Wheezing - Abdominal Pain  Wound/Ulcer   - Weight Gain   - Blood in Stool  Poor wound healing       - Diarrhea          Cardiovascular  Genitourinary  Neurological  HEENT   - Chest Pain - Dysuria - Burning Sensation of feet - Headache   - Palpitations - Hematuria - Tingling / Paresthesia - Congestion   - Pain at night in legs - Flank Pain - Dizziness - Sore Throat   - Cramping   - Tremor - Blurred Vision   - Leg Swelling   - Sensory Change - Double Vision   - Dizzy when standing   - Speech Change - Eye Redness       - Focal Weakness - Dry Eyes       - Loss of Consciousness          Endocrine  Musculoskeletal   Psychiatric   - Cold intolerance - Muscle Pain - Depression   - Heat intolerance - Neck Pain - Insomnia   - Anemia - Joint Pain - Memory Loss   -  Easy bruising, bleeding - Heel pain - Anxiety      Toe Pain        Leg/Ankle/Foot Pain         Objective:     Wt 81.8 kg (180 lb 3.6 oz)   BMI 28.23 kg/m²   Vitals:    07/11/24 0920   Weight: 81.8 kg (180 lb 3.6 oz)   PainSc: 0-No pain       Physical Exam    General Appearance:   Patient appears well developed, well nourished  Patient appears stated age    Psychiatric:   Patient is oriented to time, place, and person.  Patient has appropriate mood and affect    Neck:  Trachea Midline  No visible masses    Respiratory/Ears:  No distress or labored breathing.  Able to differentiate between normal talking voice and whisper.  Able to follow commands    Eyes:  Visual Acuity intact  Lids and conjunctivae normal. No discoloration noted.    Foot Exam  Physical Exam  Ortho Exam  Ortho/SPM Exam  Foot/Ankle Musculoskeletal Exam    B/l LE exam con't:  V:  DP 2/4, PT 2/4   CRT< 3s to all digits tested   Tibial and popliteal lymph nodes are w/o abnormality   Edema: absent, varicosities: present    N:  Patient displays normal ankle reflexes   SILT in SP/DP/T/Radha/Saph distributions    Ortho: +Motor EHL/FHL/TA/GA   equinus deformity present  There is moderate pain with palpation of b/l medial calcaneal tubercle  Bunion and hammertoe deformity present bilateral  Compartments soft/compressible. No pain on passive stretch of big toe. No calf  Pain.    Derm:  skin intact, skin warm and dry, skin without ulcers or lesions, skin without induration, nails normal, texture turgor well hydrated      Imaging / Labs:      No results found.      Note: This was dictated using a computer transcription program. Although proofread, it may contain computer transcription errors and phonetic errors. Other human proofreading errors may also exist. Corrections may be performed at a later time. Please contact  us for any clarification if needed.    Hua Nails,  KAMLESHM  [facility]

## 2024-07-11 NOTE — PROGRESS NOTES
HPI    CC: Pt presents today for routine eye exam. Pt reports no vision   complaints. Pt also reports itching, tearing, burning and crusting. Pt   states symptoms are constant for the past two weeks. Pt denies eye pain.    VIKKI: not sure    (-) Changes in vision   (-) Pain  (+) Irritation   (+) Itching   (-) Flashes  (-) Floaters  (+) Glasses wearer, when driving mainly  (-) CL wearer  (+) Uses eye gtts, Systane mary QHS, Xiidra QHS, Equate ATs 5-10x a day    Does patient want a refraction today? yes    (-) Eye injury  (-) Eye surgery   (-)POHx  (-)FOHx    (-)DM  Hemoglobin A1C       Date                     Value               Ref Range             Status                04/06/2023               5.4                 4.0 - 5.6 %           Final                  01/14/2022               5.3                 4.0 - 5.6 %           Final                 Last edited by Joanna Torres, OD on 7/11/2024  4:31 PM.            Assessment /Plan     For exam results, see Encounter Report.    Blepharitis of upper eyelids of both eyes, unspecified type  -     lotilaner (XDEMVY) 0.25 % Drop; Place 1 drop into both eyes 2 (two) times a day.  Dispense: 10 mL; Refill: 0    Infestation by Demodex  -     lotilaner (XDEMVY) 0.25 % Drop; Place 1 drop into both eyes 2 (two) times a day.  Dispense: 10 mL; Refill: 0    MGD (meibomian gland dysfunction)    Keratoconjunctivitis sicca not specified as Sjogren's, bilateral      Educated pt on findings. Demodex blepharitis noted OU. Rx xdemvy 1 gtt OU BID for 6 weeks then d/c use. Pt to d/c use of equate ATs and start systane ATs QID + continue systane mary/gel QHS. Pt to d/c xiidra at this time. Will consider vevye at dry eye f/u. Monitor 7-8 weeks or sooner if needed.     Will complete refraction and DFE at f/u.       RTC in 7-8 weeks for f/u or sooner with any worsening.

## 2024-07-11 NOTE — PATIENT INSTRUCTIONS
Heel Pain (Plantar Fasciitis)        Heel pain is most often caused by plantar fasciitis, a condition that is sometimes also called heel spur syndrome when a spur is present. Heel pain may also be due to other causes, such as a stress fracture, tendonitis, arthritis, nerve irritation or, rarely, a cyst.    Because there are several potential causes, it is important to have heel pain properly diagnosed. A foot and ankle surgeon is able to distinguish between all the possibilities and to determine the underlying source of your heel pain.    What Is Plantar Fasciitis?  Heel pain is often caused by plantar fasciitis  Plantar fasciitis is an inflammation of the band of tissue (the plantar fascia) that extends from the heel to the toes. In this condition, the fascia first becomes irritated and then inflamed, resulting in heel pain.    Causes  The most common cause of plantar fasciitis relates to faulty structure of the foot. For example, people who have problems with their arches, either overly flat feet or high-arched feet, are more prone to developing plantar fasciitis.    Wearing nonsupportive footwear on hard, flat surfaces puts abnormal strain on the plantar fascia and can also lead to plantar fasciitis. This is particularly evident when ones job requires long hours on the feet. Obesity and overuse may also contribute to plantar fasciitis.    Symptoms  The symptoms of plantar fasciitis are:    Pain on the bottom of the heel  Pain in the arch of the foot  Pain that is usually worse upon arising  Pain that increases over a period of months  Swelling on the bottom of the heel     People with plantar fasciitis often describe the pain as worse when they get up in the morning or after they have been sitting for long periods of time. After a few minutes of walking, the pain decreases because walking stretches the fascia. For some people, the pain subsides but returns after spending long periods of time on their  feet.    Diagnosis  To arrive at a diagnosis, the foot and ankle surgeon will obtain your medical history and examine your foot. Throughout this process, the surgeon rules out all possible causes for your heel pain other than plantar fasciitis.    In addition, diagnostic imaging studies, such as x-rays or other imaging modalities, may be used to distinguish the different types of heel pain. Sometimes heel spurs are found in patients with plantar fasciitis, but these are rarely a source of pain. When they are present, the condition may be diagnosed as plantar fasciitis/heel spur syndrome.    Nonsurgical Treatment  Treatment of plantar fasciitis begins with first-line strategies, which you can begin at home:    -Stretching exercises. Exercises that stretch out the calf muscles help ease pain and assist with recovery.  -Avoid going barefoot. When you walk without shoes, you put undue strain and stress on your plantar fascia.  -Ice. Putting an ice pack on your heel for 20 minutes several times a day helps reduce inflammation. Place a thin towel between the ice and your heel; do not apply ice directly to the skin.  -Limit activities. Cut down on extended physical activities to give your heel a rest.  -Shoe modifications. Wearing supportive shoes that have good arch support and a slightly raised heel reduces stress on the plantar fascia.  -Medications. Oral nonsteroidal anti-inflammatory drugs (NSAIDs), such as ibuprofen, may be recommended to reduce pain and inflammation.     If you still have pain after several weeks, see your foot and ankle surgeon, who may add one or more of these treatment approaches:    -Padding, taping and strapping. Placing pads in the shoe softens the impact of walking. Taping and strapping help support the foot and reduce strain on the fascia.  -Orthotic devices. Custom orthotic devices that fit into your shoe help correct the underlying structural abnormalities causing the plantar  fasciitis.  -Injection therapy. In some cases, corticosteroid injections are used to help reduce the inflammation and relieve pain.  -Removable walking cast. A removable walking cast may be used to keep your foot immobile for a few weeks to allow it to rest and heal.  -Night splint. Wearing a night splint allows you to maintain an extended stretch of the plantar fascia while sleeping. This may help reduce the morning pain experienced by some patients.  -Physical therapy. Exercises and other physical therapy measures may be used to help provide relief.     When Is Surgery Needed?  Although most patients with plantar fasciitis respond to nonsurgical treatment, a small percentage of patients may require surgery. If, after several months of nonsurgical treatment, you continue to have heel pain, surgery will be considered. Your foot and ankle surgeon will discuss the surgical options with you and determine which approach would be most beneficial for you.    Long-Term Care  No matter what kind of treatment you undergo for plantar fasciitis, the underlying causes that led to this condition may remain. Therefore, you will need to continue with preventive measures. Wearing supportive shoes, stretching and using custom orthotic devices are the mainstay of long-term treatment for plantar fasciitis.            Understanding Heel Pain  Your heel is the back part of your foot. A band of tissue called the plantar fascia connects the heel bone to the bones in the ball of your foot. Nerves run from the heel up the inside of your ankle and into your leg. When you feel pain in the bottom of your heel, the plantar fascia may be inflamed. Overuse, Achilles tightness, or excess body weight can cause the tissue to tear or pull away from the bone. Sometimes the inflamed plantar fascia also irritates a nerve, causing more pain.    What causes heel pain?  Wearing shoes with poor cushioning can irritate the tissue in your heel (plantar  fascia). Being overweight or standing for long periods can also irritate the tissue. Running, walking, tennis, and other sports that put stress on the heels can cause tiny tears in the tissue. If your lower leg muscles are tight, this is more likely to occur. A tight Achilles tendon will also contribute to heel pain.  Symptoms  You may feel pain on the bottom or on the inside edge of your heel. The pain may be sharp when you get out of bed or when you stand up after sitting for a while. You may feel a dull ache in your heel after youve been standing for a long time on a hard surface. Running can also cause a dull ache.  Preventing future problems  To prevent future heel pain, wear shoes with well-cushioned heels. And do exercises prescribed by your healthcare provider to stretch the plantar fascia and the muscles in the lower leg.   Date Last Reviewed: 9/10/2015  © 4371-0792 [employer]. [address] [place] [address]. All rights reserved. This information is not intended as a substitute for professional medical care. Always follow your healthcare professional's instructions.      Treating Plantar Fasciitis  First, your healthcare provider tries to determine the cause of your problem in order to suggest ways to relieve pain. If your pain is due to poor foot mechanics, custom-made shoe inserts (orthoses) may help.    Reduce symptoms  To relieve mild symptoms, try aspirin, ibuprofen, or other medicines as directed. Rubbing ice on the affected area may also help.  To reduce severe pain and swelling, your healthcare provider may prescribe pills or injections or a walking cast in some instances. Physical therapy, such as ultrasound or a daily stretching program, may also be recommended. Surgery is rarely required.  To reduce symptoms caused by poor foot mechanics, your foot may be taped. This supports the arch and temporarily controls movement. Night splints may also help by stretching the  fascia.  Control movement  If taping helps, your healthcare provider may prescribe orthoses. Built from plaster casts of your feet, these inserts control the way your foot moves. As a result, your symptoms should go away.  Reduce overuse  Every time your foot strikes the ground, the plantar fascia is stretched. You can reduce the strain on the plantar fascia and the possibility of overuse by following these suggestions:  Lose any excess weight.  Avoid running on hard or uneven ground.  Use orthoses at all times in your shoes and house slippers.  If surgery is needed  Your healthcare provider may consider surgery if other types of treatment don't control your pain. During surgery, the plantar fascia is partially cut to release tension. As you heal, fibrous tissue fills the space between the heel bone and the plantar fascia.   Date Last Reviewed: 10/14/2015  © 0273-5999 [employer]. [address] [place] [address]. All rights reserved. This information is not intended as a substitute for professional medical care. Always follow your healthcare professional's instructions.      Equinus          What Is Equinus?    Equinus is a condition in which the upward bending motion of the ankle joint is limited. Someone with equinus lacks the flexibility to bring the top of the foot toward the front of the leg. Equinus can occur in one or both feet. When it involves both feet, the limitation of motion is sometimes worse in one foot than in the other.    People with equinus develop ways to compensate for their limited ankle motion, and this often leads to other foot, leg or back problems. The most common methods of compensation are flattening of the arch or picking up the heel early when walking, placing increased pressure on the ball of the foot. Other patients compensate by toe walking, while a smaller number take steps by bending abnormally at the hip or knee.    Causes  There are several possible causes  for the limited range of ankle motion. Often, it is due to tightness in the Achilles tendon or calf muscles (the soleus muscle and/or gastrocnemius muscle). In some patients, this tightness is congenital (present at birth), and sometimes it is an inherited trait. Other patients acquire the tightness from being in a cast, being on crutches or frequently wearing high-heeled shoes. In addition, diabetes can affect the fibers of the Achilles tendon and cause tightness. Sometimes equinus is related to a bone blocking the ankle motion. For example, a fragment of a broken bone following an ankle injury, or bone block, can get in the way and restrict motion. Equinus may also result from one leg being shorter than the other. Less often, equinus is caused by spasms in the calf muscle. These spasms may be signs of an underlying neurologic disorder.      Foot Problems Related to Equinus  Depending on how a patient compensates for the inability to bend properly at the ankle, a variety of foot conditions can develop, including:    Plantar fasciitis (arch/heel pain)  Calf cramping  Tendonitis (inflammation in the Achilles tendon)  Metatarsalgia (pain and/or callusing on the ball of the foot)  Flatfoot  Arthritis of the midfoot (middle area of the foot)  Pressure sores on the ball of the foot or the arch  Bunions and hammertoes  Ankle pain  Shin splints     Diagnosis  Most patients with equinus are unaware they have this condition when they first visit the doctor. Instead, they come to the doctor seeking relief for foot problems associated with equinus.    To diagnose equinus, the foot and ankle surgeon will evaluate the ankle's range of motion when the knee is flexed (bent) as well as extended (straightened). This enables the surgeon to identify whether the tendon or muscle is tight and to assess whether bone is interfering with ankle motion. X-rays may also be ordered. In some cases, the foot and ankle surgeon may refer the  patient for neurologic evaluation.    Nonsurgical Treatment  Treatment includes strategies aimed at relieving the symptoms and conditions associated with equinus. In addition, the patient is treated for the equinus itself through one or more of the following options:    Night splint. The foot may be placed in a splint at night to keep it in a position that helps reduce tightness of the calf muscle.  Heel lifts. Placing heel lifts inside the shoes or wearing shoes with a moderate heel takes stress off the Achilles tendon when walking and may reduce symptoms.  Arch supports or orthotic devices. Custom orthotic devices that fit into the shoe are often prescribed to keep weight distributed properly and to help control muscle/tendon imbalance.  Physical therapy. To help remedy muscle tightness, exercises that stretch the calf muscle(s) are recommended.     When Is Surgery Needed?  In some cases, surgery may be needed to correct the cause of equinus if it is related to a tight tendon or a bone blocking the ankle motion. The foot and ankle surgeon will determine the type of procedure that is best suited to the individual patient.                Ankle Dorsiflexion/Plantarflexion (Flexibility)    Sit on the floor or in bed with your legs straight in front of you.  Point both feet. Then flex both feet.  Do this 10 to 30 times in a row.  Repeat this exercise 2 times a day, or as instructed.  Date Last Reviewed: 5/1/2016 © 2000-2016 [employer]. [address] [place] [address]. All rights reserved. This information is not intended as a substitute for professional medical care. Always follow your healthcare professional's instructions.          Arch retraining    These exercises are for your right foot. Switch sides for your left foot.  Sit in a chair or stand with both feet flat on the floor. Press down with the ball of your right foot, but only on the left side of the foot, just under the big  toe.  Then pull the bottom of your big toe back toward your heel. This should pull up the arch of your foot. Dont flex your toes while doing this. It is a subtle movement of the arch.  Hold for 5 seconds. Relax.  Date Last Reviewed: 3/10/2016  © 4976-0827 [employer]. [address] [place] [address]. All rights reserved. This information is not intended as a substitute for professional medical care. Always follow your healthcare professional's instructions.        Soleus Stretch (Flexibility)    Stand facing a wall from 3 feet away. Take one step toward the wall with your right foot.  Place both palms on the wall. Bend both knees and lean forward. Keep both heels on the floor.  Hold for 30 to 60 seconds. Then relax both legs. Repeat the exercise 2 times.  Switch legs and repeat.  Repeat this exercise 3 times a day, or as instructed.     Tip: Dont bounce while youre stretching.   Date Last Reviewed: 3/10/2016  © 4477-0377 [employer]. [address] [place] [address]. All rights reserved. This information is not intended as a substitute for professional medical care. Always follow your healthcare professional's instructions.          Recommended OTC orthotics:  -powerstep  -superfeet    Recommended shoegear:  -new balance  -ascics  -antoine baker

## 2024-08-23 DIAGNOSIS — E78.5 HYPERLIPIDEMIA, UNSPECIFIED HYPERLIPIDEMIA TYPE: ICD-10-CM

## 2024-08-23 RX ORDER — ATORVASTATIN CALCIUM 20 MG/1
20 TABLET, FILM COATED ORAL NIGHTLY
Qty: 90 TABLET | Refills: 0 | Status: SHIPPED | OUTPATIENT
Start: 2024-08-23

## 2024-08-28 ENCOUNTER — OFFICE VISIT (OUTPATIENT)
Dept: OPTOMETRY | Facility: CLINIC | Age: 64
End: 2024-08-28
Payer: MEDICAID

## 2024-08-28 DIAGNOSIS — H25.13 NUCLEAR SCLEROSIS OF BOTH EYES: ICD-10-CM

## 2024-08-28 DIAGNOSIS — B88.0 INFESTATION BY DEMODEX: ICD-10-CM

## 2024-08-28 DIAGNOSIS — H16.223 KERATOCONJUNCTIVITIS SICCA NOT SPECIFIED AS SJOGREN'S, BILATERAL: ICD-10-CM

## 2024-08-28 DIAGNOSIS — H02.889 MGD (MEIBOMIAN GLAND DYSFUNCTION): ICD-10-CM

## 2024-08-28 DIAGNOSIS — H01.004 BLEPHARITIS OF UPPER EYELIDS OF BOTH EYES, UNSPECIFIED TYPE: Primary | ICD-10-CM

## 2024-08-28 DIAGNOSIS — H01.001 BLEPHARITIS OF UPPER EYELIDS OF BOTH EYES, UNSPECIFIED TYPE: Primary | ICD-10-CM

## 2024-08-28 DIAGNOSIS — H52.7 REFRACTIVE ERROR: ICD-10-CM

## 2024-08-28 PROCEDURE — 92015 DETERMINE REFRACTIVE STATE: CPT | Mod: ,,, | Performed by: OPTOMETRIST

## 2024-08-28 PROCEDURE — 99999 PR PBB SHADOW E&M-EST. PATIENT-LVL III: CPT | Mod: PBBFAC,,, | Performed by: OPTOMETRIST

## 2024-08-28 PROCEDURE — 4010F ACE/ARB THERAPY RXD/TAKEN: CPT | Mod: CPTII,,, | Performed by: OPTOMETRIST

## 2024-08-28 PROCEDURE — 99213 OFFICE O/P EST LOW 20 MIN: CPT | Mod: PBBFAC,PN | Performed by: OPTOMETRIST

## 2024-08-28 PROCEDURE — 1159F MED LIST DOCD IN RCRD: CPT | Mod: CPTII,,, | Performed by: OPTOMETRIST

## 2024-08-28 PROCEDURE — 92014 COMPRE OPH EXAM EST PT 1/>: CPT | Mod: S$PBB,,, | Performed by: OPTOMETRIST

## 2024-08-28 NOTE — PROGRESS NOTES
HPI     Dry Eye     Additional comments: Dry eye/dm chk/MRX           Comments    CC: Pt states his eyes feel much better since his last visit. His va seems   to be doing better as well. He has been using lotilaner for 2-3 weeks. He   had issues with insurance getting the gtt sooner.     VIKKI: 7/11/2024    (-) Changes in vision   (-) Pain  (-) Irritation   (-) Itching   (-) Flashes  (-) Floaters  (+) Glasses wearer, when driving mainly  (-) CL wearer  (+) Uses eye gtts, Systane mary QHS  Systane gtts BID OU  Lotilaner BID OU    Does patient want a refraction today? Yes    (-) Eye injury  (-) Eye surgery   (-)POHx  (-)FOHx    (-)DM                   Last edited by Joanna Torres, OD on 8/28/2024  5:32 PM.            Assessment /Plan     For exam results, see Encounter Report.    Blepharitis of upper eyelids of both eyes, unspecified type    Infestation by Demodex    MGD (meibomian gland dysfunction)    Keratoconjunctivitis sicca not specified as Sjogren's, bilateral    Nuclear sclerosis of both eyes    Refractive error      1-4. Educated pt on findings. Significant improvement in bleph since VIKKI. Pt has been using lotilaner for 2-3 weeks. Stressed importance of continuing gtt for the entire 6 weeks then d/c use. Ocusft lid scrubs recommended Qday. Pt to also continue systane TID + mary/gel QHS. Pt may need xiidra / restasis in the future but for now okay to continue with ATs only after lotilaner is d/c. Monitor.     5. Educated pt on findings. Not visually significant. No need for removal at this time. Monitor yearly.      6. Updated SRx. Moderate change from habitual. Monitor yearly.        RTC in 1 year for annual eye exam or sooner if needed.

## 2024-09-10 ENCOUNTER — OFFICE VISIT (OUTPATIENT)
Dept: PODIATRY | Facility: CLINIC | Age: 64
End: 2024-09-10
Payer: MEDICAID

## 2024-09-10 VITALS — WEIGHT: 180.31 LBS | HEIGHT: 67 IN | RESPIRATION RATE: 18 BRPM | BODY MASS INDEX: 28.3 KG/M2

## 2024-09-10 DIAGNOSIS — M21.861 ACQUIRED POSTERIOR EQUINUS OF BOTH LOWER EXTREMITIES: ICD-10-CM

## 2024-09-10 DIAGNOSIS — M21.862 ACQUIRED POSTERIOR EQUINUS OF BOTH LOWER EXTREMITIES: ICD-10-CM

## 2024-09-10 DIAGNOSIS — M72.2 PLANTAR FASCIITIS, BILATERAL: Primary | ICD-10-CM

## 2024-09-10 PROCEDURE — 3008F BODY MASS INDEX DOCD: CPT | Mod: CPTII,,, | Performed by: PODIATRIST

## 2024-09-10 PROCEDURE — 4010F ACE/ARB THERAPY RXD/TAKEN: CPT | Mod: CPTII,,, | Performed by: PODIATRIST

## 2024-09-10 PROCEDURE — 99999PBSHW PR PBB SHADOW TECHNICAL ONLY FILED TO HB: Mod: PBBFAC,,,

## 2024-09-10 PROCEDURE — 99213 OFFICE O/P EST LOW 20 MIN: CPT | Mod: 25,S$PBB,, | Performed by: PODIATRIST

## 2024-09-10 PROCEDURE — 99999 PR PBB SHADOW E&M-EST. PATIENT-LVL IV: CPT | Mod: PBBFAC,,, | Performed by: PODIATRIST

## 2024-09-10 PROCEDURE — 99214 OFFICE O/P EST MOD 30 MIN: CPT | Mod: PBBFAC,PN,25 | Performed by: PODIATRIST

## 2024-09-10 PROCEDURE — 1159F MED LIST DOCD IN RCRD: CPT | Mod: CPTII,,, | Performed by: PODIATRIST

## 2024-09-10 PROCEDURE — 20550 NJX 1 TENDON SHEATH/LIGAMENT: CPT | Mod: PBBFAC,PN,LT | Performed by: PODIATRIST

## 2024-09-10 RX ORDER — TRIAMCINOLONE ACETONIDE 40 MG/ML
40 INJECTION, SUSPENSION INTRA-ARTICULAR; INTRAMUSCULAR
Status: DISCONTINUED | OUTPATIENT
Start: 2024-09-10 | End: 2024-09-10 | Stop reason: HOSPADM

## 2024-09-10 RX ADMIN — TRIAMCINOLONE ACETONIDE 40 MG: 40 INJECTION, SUSPENSION INTRA-ARTICULAR; INTRAMUSCULAR at 02:09

## 2024-09-10 NOTE — PATIENT INSTRUCTIONS
Heel Pain (Plantar Fasciitis)        Heel pain is most often caused by plantar fasciitis, a condition that is sometimes also called heel spur syndrome when a spur is present. Heel pain may also be due to other causes, such as a stress fracture, tendonitis, arthritis, nerve irritation or, rarely, a cyst.    Because there are several potential causes, it is important to have heel pain properly diagnosed. A foot and ankle surgeon is able to distinguish between all the possibilities and to determine the underlying source of your heel pain.    What Is Plantar Fasciitis?  Heel pain is often caused by plantar fasciitis  Plantar fasciitis is an inflammation of the band of tissue (the plantar fascia) that extends from the heel to the toes. In this condition, the fascia first becomes irritated and then inflamed, resulting in heel pain.    Causes  The most common cause of plantar fasciitis relates to faulty structure of the foot. For example, people who have problems with their arches, either overly flat feet or high-arched feet, are more prone to developing plantar fasciitis.    Wearing nonsupportive footwear on hard, flat surfaces puts abnormal strain on the plantar fascia and can also lead to plantar fasciitis. This is particularly evident when ones job requires long hours on the feet. Obesity and overuse may also contribute to plantar fasciitis.    Symptoms  The symptoms of plantar fasciitis are:    Pain on the bottom of the heel  Pain in the arch of the foot  Pain that is usually worse upon arising  Pain that increases over a period of months  Swelling on the bottom of the heel     People with plantar fasciitis often describe the pain as worse when they get up in the morning or after they have been sitting for long periods of time. After a few minutes of walking, the pain decreases because walking stretches the fascia. For some people, the pain subsides but returns after spending long periods of time on their  feet.    Diagnosis  To arrive at a diagnosis, the foot and ankle surgeon will obtain your medical history and examine your foot. Throughout this process, the surgeon rules out all possible causes for your heel pain other than plantar fasciitis.    In addition, diagnostic imaging studies, such as x-rays or other imaging modalities, may be used to distinguish the different types of heel pain. Sometimes heel spurs are found in patients with plantar fasciitis, but these are rarely a source of pain. When they are present, the condition may be diagnosed as plantar fasciitis/heel spur syndrome.    Nonsurgical Treatment  Treatment of plantar fasciitis begins with first-line strategies, which you can begin at home:    -Stretching exercises. Exercises that stretch out the calf muscles help ease pain and assist with recovery.  -Avoid going barefoot. When you walk without shoes, you put undue strain and stress on your plantar fascia.  -Ice. Putting an ice pack on your heel for 20 minutes several times a day helps reduce inflammation. Place a thin towel between the ice and your heel; do not apply ice directly to the skin.  -Limit activities. Cut down on extended physical activities to give your heel a rest.  -Shoe modifications. Wearing supportive shoes that have good arch support and a slightly raised heel reduces stress on the plantar fascia.  -Medications. Oral nonsteroidal anti-inflammatory drugs (NSAIDs), such as ibuprofen, may be recommended to reduce pain and inflammation.     If you still have pain after several weeks, see your foot and ankle surgeon, who may add one or more of these treatment approaches:    -Padding, taping and strapping. Placing pads in the shoe softens the impact of walking. Taping and strapping help support the foot and reduce strain on the fascia.  -Orthotic devices. Custom orthotic devices that fit into your shoe help correct the underlying structural abnormalities causing the plantar  fasciitis.  -Injection therapy. In some cases, corticosteroid injections are used to help reduce the inflammation and relieve pain.  -Removable walking cast. A removable walking cast may be used to keep your foot immobile for a few weeks to allow it to rest and heal.  -Night splint. Wearing a night splint allows you to maintain an extended stretch of the plantar fascia while sleeping. This may help reduce the morning pain experienced by some patients.  -Physical therapy. Exercises and other physical therapy measures may be used to help provide relief.     When Is Surgery Needed?  Although most patients with plantar fasciitis respond to nonsurgical treatment, a small percentage of patients may require surgery. If, after several months of nonsurgical treatment, you continue to have heel pain, surgery will be considered. Your foot and ankle surgeon will discuss the surgical options with you and determine which approach would be most beneficial for you.    Long-Term Care  No matter what kind of treatment you undergo for plantar fasciitis, the underlying causes that led to this condition may remain. Therefore, you will need to continue with preventive measures. Wearing supportive shoes, stretching and using custom orthotic devices are the mainstay of long-term treatment for plantar fasciitis.            Understanding Heel Pain  Your heel is the back part of your foot. A band of tissue called the plantar fascia connects the heel bone to the bones in the ball of your foot. Nerves run from the heel up the inside of your ankle and into your leg. When you feel pain in the bottom of your heel, the plantar fascia may be inflamed. Overuse, Achilles tightness, or excess body weight can cause the tissue to tear or pull away from the bone. Sometimes the inflamed plantar fascia also irritates a nerve, causing more pain.    What causes heel pain?  Wearing shoes with poor cushioning can irritate the tissue in your heel (plantar  fascia). Being overweight or standing for long periods can also irritate the tissue. Running, walking, tennis, and other sports that put stress on the heels can cause tiny tears in the tissue. If your lower leg muscles are tight, this is more likely to occur. A tight Achilles tendon will also contribute to heel pain.  Symptoms  You may feel pain on the bottom or on the inside edge of your heel. The pain may be sharp when you get out of bed or when you stand up after sitting for a while. You may feel a dull ache in your heel after youve been standing for a long time on a hard surface. Running can also cause a dull ache.  Preventing future problems  To prevent future heel pain, wear shoes with well-cushioned heels. And do exercises prescribed by your healthcare provider to stretch the plantar fascia and the muscles in the lower leg.   Date Last Reviewed: 9/10/2015  © 6085-5313 [employer]. [address] [place] [address]. All rights reserved. This information is not intended as a substitute for professional medical care. Always follow your healthcare professional's instructions.      Treating Plantar Fasciitis  First, your healthcare provider tries to determine the cause of your problem in order to suggest ways to relieve pain. If your pain is due to poor foot mechanics, custom-made shoe inserts (orthoses) may help.    Reduce symptoms  To relieve mild symptoms, try aspirin, ibuprofen, or other medicines as directed. Rubbing ice on the affected area may also help.  To reduce severe pain and swelling, your healthcare provider may prescribe pills or injections or a walking cast in some instances. Physical therapy, such as ultrasound or a daily stretching program, may also be recommended. Surgery is rarely required.  To reduce symptoms caused by poor foot mechanics, your foot may be taped. This supports the arch and temporarily controls movement. Night splints may also help by stretching the  fascia.  Control movement  If taping helps, your healthcare provider may prescribe orthoses. Built from plaster casts of your feet, these inserts control the way your foot moves. As a result, your symptoms should go away.  Reduce overuse  Every time your foot strikes the ground, the plantar fascia is stretched. You can reduce the strain on the plantar fascia and the possibility of overuse by following these suggestions:  Lose any excess weight.  Avoid running on hard or uneven ground.  Use orthoses at all times in your shoes and house slippers.  If surgery is needed  Your healthcare provider may consider surgery if other types of treatment don't control your pain. During surgery, the plantar fascia is partially cut to release tension. As you heal, fibrous tissue fills the space between the heel bone and the plantar fascia.   Date Last Reviewed: 10/14/2015  © 3837-6662 [employer]. [address] [place] [address]. All rights reserved. This information is not intended as a substitute for professional medical care. Always follow your healthcare professional's instructions.      Equinus          What Is Equinus?    Equinus is a condition in which the upward bending motion of the ankle joint is limited. Someone with equinus lacks the flexibility to bring the top of the foot toward the front of the leg. Equinus can occur in one or both feet. When it involves both feet, the limitation of motion is sometimes worse in one foot than in the other.    People with equinus develop ways to compensate for their limited ankle motion, and this often leads to other foot, leg or back problems. The most common methods of compensation are flattening of the arch or picking up the heel early when walking, placing increased pressure on the ball of the foot. Other patients compensate by toe walking, while a smaller number take steps by bending abnormally at the hip or knee.    Causes  There are several possible causes  for the limited range of ankle motion. Often, it is due to tightness in the Achilles tendon or calf muscles (the soleus muscle and/or gastrocnemius muscle). In some patients, this tightness is congenital (present at birth), and sometimes it is an inherited trait. Other patients acquire the tightness from being in a cast, being on crutches or frequently wearing high-heeled shoes. In addition, diabetes can affect the fibers of the Achilles tendon and cause tightness. Sometimes equinus is related to a bone blocking the ankle motion. For example, a fragment of a broken bone following an ankle injury, or bone block, can get in the way and restrict motion. Equinus may also result from one leg being shorter than the other. Less often, equinus is caused by spasms in the calf muscle. These spasms may be signs of an underlying neurologic disorder.      Foot Problems Related to Equinus  Depending on how a patient compensates for the inability to bend properly at the ankle, a variety of foot conditions can develop, including:    Plantar fasciitis (arch/heel pain)  Calf cramping  Tendonitis (inflammation in the Achilles tendon)  Metatarsalgia (pain and/or callusing on the ball of the foot)  Flatfoot  Arthritis of the midfoot (middle area of the foot)  Pressure sores on the ball of the foot or the arch  Bunions and hammertoes  Ankle pain  Shin splints     Diagnosis  Most patients with equinus are unaware they have this condition when they first visit the doctor. Instead, they come to the doctor seeking relief for foot problems associated with equinus.    To diagnose equinus, the foot and ankle surgeon will evaluate the ankle's range of motion when the knee is flexed (bent) as well as extended (straightened). This enables the surgeon to identify whether the tendon or muscle is tight and to assess whether bone is interfering with ankle motion. X-rays may also be ordered. In some cases, the foot and ankle surgeon may refer the  patient for neurologic evaluation.    Nonsurgical Treatment  Treatment includes strategies aimed at relieving the symptoms and conditions associated with equinus. In addition, the patient is treated for the equinus itself through one or more of the following options:    Night splint. The foot may be placed in a splint at night to keep it in a position that helps reduce tightness of the calf muscle.  Heel lifts. Placing heel lifts inside the shoes or wearing shoes with a moderate heel takes stress off the Achilles tendon when walking and may reduce symptoms.  Arch supports or orthotic devices. Custom orthotic devices that fit into the shoe are often prescribed to keep weight distributed properly and to help control muscle/tendon imbalance.  Physical therapy. To help remedy muscle tightness, exercises that stretch the calf muscle(s) are recommended.     When Is Surgery Needed?  In some cases, surgery may be needed to correct the cause of equinus if it is related to a tight tendon or a bone blocking the ankle motion. The foot and ankle surgeon will determine the type of procedure that is best suited to the individual patient.                Ankle Dorsiflexion/Plantarflexion (Flexibility)    Sit on the floor or in bed with your legs straight in front of you.  Point both feet. Then flex both feet.  Do this 10 to 30 times in a row.  Repeat this exercise 2 times a day, or as instructed.  Date Last Reviewed: 5/1/2016 © 2000-2016 [employer]. [address] [place] [address]. All rights reserved. This information is not intended as a substitute for professional medical care. Always follow your healthcare professional's instructions.          Arch retraining    These exercises are for your right foot. Switch sides for your left foot.  Sit in a chair or stand with both feet flat on the floor. Press down with the ball of your right foot, but only on the left side of the foot, just under the big  toe.  Then pull the bottom of your big toe back toward your heel. This should pull up the arch of your foot. Dont flex your toes while doing this. It is a subtle movement of the arch.  Hold for 5 seconds. Relax.  Date Last Reviewed: 3/10/2016  © [id number] [employer]. [address] [place] [address]. All rights reserved. This information is not intended as a substitute for professional medical care. Always follow your healthcare professional's instructions.        Soleus Stretch (Flexibility)    Stand facing a wall from 3 feet away. Take one step toward the wall with your right foot.  Place both palms on the wall. Bend both knees and lean forward. Keep both heels on the floor.  Hold for 30 to 60 seconds. Then relax both legs. Repeat the exercise 2 times.  Switch legs and repeat.  Repeat this exercise 3 times a day, or as instructed.     Tip: Dont bounce while youre stretching.   Date Last Reviewed: 3/10/2016  © 5866-2224 [employer]. [address] [place] [address]. All rights reserved. This information is not intended as a substitute for professional medical care. Always follow your healthcare professional's instructions.          Recommended OTC orthotics:  -powerstep  -superfeet    Recommended shoegear:  -new balance  -ascics  -antoine baker

## 2024-09-10 NOTE — PROGRESS NOTES
[facility] - [facility]  [address]  [address]  [facility] [address]  Dept: [phone]  Dept Fax: [phone]    Hua Nails Jr., DPM     Assessment:   MDM    Coding  1. Plantar fasciitis, bilateral  Ambulatory referral/consult to Physical/Occupational Therapy    Tendon Sheath      2. Acquired posterior equinus of both lower extremities  Ambulatory referral/consult to Physical/Occupational Therapy          Plan:     Tendon Sheath: L plantar fascia    Date/Time: 9/10/2024 2:15 PM    Performed by: Hua Nails Jr., DPM  Authorized by: Hua Nails Jr., DPM    Consent Done?:  Yes (Verbal)  Indications:  Pain  Site marked: the procedure site was marked    Timeout: prior to procedure the correct patient, procedure, and site was verified    Prep: patient was prepped and draped in usual sterile fashion      Local anesthesia used?: Yes    Anesthesia:  Local infiltration  Local anesthetic:  Co-phenylcaine spray and bupivacaine 0.5% without epinephrine  Anesthetic total (ml):  2    Location:  Foot  Site:  L plantar fascia  Ultrasonic guidance for needle placement?: No    Needle size:  25 G  Approach:  Medial  Medications:  40 mg triamcinolone acetonide 40 mg/mL  Patient tolerance:  Patient tolerated the procedure well with no immediate complications      Eitan was seen today for plantar fasciitis.    Diagnoses and all orders for this visit:    Plantar fasciitis, bilateral  -     Ambulatory referral/consult to Physical/Occupational Therapy; Future  -     Tendon Sheath    Acquired posterior equinus of both lower extremities  -     Ambulatory referral/consult to Physical/Occupational Therapy; Future        -pt seen, evaluated, and managed  -dx discussed in detail. All questions/concerns addressed  -all tx options discussed. All alternatives, risks, benefits of all txs discussed  -the patient was educated about the diagnosis and discussed reducing caloric intake, increase physical  activity  -We discussed conservative care options possible including but not limited to shoe wear and/or padding, bracing/strapping, at home ROM, formal PT, medical therapy, injection therapy  - The utilization of NSAIDs can be considered but their benefit has to be tempered against the risk of GI/ concerns  - A steroid injection can be undertaken.  We did discuss the potential mechanism of action of this shot.  Understanding that multiple injections at the same anatomic site do have deleterious effects on the soft tissue.  Generic risks include: steroid flare (advised to ice if necessary), skin hypo-pgimentation (which can be permanent and unsightly), elevation of blood sugar, subcutaneous atrophy (can be permanent) and infection.   -XR/imaging reviewed by me: agree with read  -labs reviewed by me: ok for vgel  -implemented icing/stretching regimen  -heel lifts dispensed    -rxs dispensed: none  -referrals: PT  -WB: wbat      Follow up in about 8 weeks (around 11/5/2024).    Subjective:      Patient ID: Eitan Cody is a 64 y.o. male.    Chief Complaint:   Chief Complaint   Patient presents with    Plantar Fasciitis       CC - foot pain: patient presents to the podiatry clinic  with complaint of  bilateral foot pain. Onset of the symptoms was several years ago. Precipitating event: unk. Current symptoms include: ability to bear weight, but with some pain, gonzales the heel, swelling and worsening symptoms after a period of inactivity. Aggravating factors: walking and certain shoegear. Symptoms have gradually worsened. Patient has had no prior foot problems. Evaluation to date: none. Treatment to date: avoidance of offending activity. Patients rates pain 7/10 on pain scale.      9/10/24:  Hx as above. F/u for b/l PF. L is only one hurting now.       Last Podiatry Enc: Visit date not found  Last Enc w/ Me: Visit date not found    Outside reports reviewed: historical medical records.  Family hx: as below  Past Medical  History:   Diagnosis Date    Hypertension     Liver cirrhosis      Past Surgical History:   Procedure Laterality Date    COLONOSCOPY N/A 6/30/2022    Procedure: COLONOSCOPY;  Surgeon: VIRIDIANA Swanson MD;  Location: [facility] ENDO;  Service: Endoscopy;  Laterality: N/A;    LEFT HEART CATHETERIZATION Left 2/16/2022    Procedure: Left heart cath;  Surgeon: Thierno Kidd III, MD;  Location: [facility] CATH LAB;  Service: Cardiology;  Laterality: Left;     No family history on file.  Current Outpatient Medications   Medication Sig Dispense Refill    amlodipine-valsartan (EXFORGE)  mg per tablet Take 1 tablet by mouth once daily. 90 tablet 3    aspirin 81 MG Chew Take 81 mg by mouth once daily.      atorvastatin (LIPITOR) 20 MG tablet Take 1 tablet (20 mg total) by mouth every evening. 90 tablet 0    cetirizine (ZYRTEC) 10 MG tablet Take 10 mg by mouth once daily.      cetirizine (ZYRTEC) 10 MG tablet Take 1 tablet (10 mg total) by mouth once daily. 14 tablet 0    finasteride (PROSCAR) 5 mg tablet Take 1 tablet (5 mg total) by mouth once daily. 30 tablet 11    FLUoxetine 20 MG capsule Take 1 capsule (20 mg total) by mouth once daily. 30 capsule 2    lifitegrast (XIIDRA) 5 % Dpet Place 1 drop into both eyes 2 (two) times daily. 60 each 11    meclizine (ANTIVERT) 25 mg tablet Take 25 mg by mouth 3 (three) times daily as needed for Dizziness.      promethazine-dextromethorphan (PROMETHAZINE-DM) 6.25-15 mg/5 mL Syrp Take 5 mLs by mouth every 4 to 6 hours as needed for cough. 120 mL 0    propranoloL (INDERAL) 10 MG tablet Take 1 tablet (10 mg total) by mouth once daily. 30 tablet 11    tamsulosin (FLOMAX) 0.4 mg Cap Take 1 capsule (0.4 mg total) by mouth once daily. 30 capsule 2    topiramate (TOPAMAX) 100 MG tablet Take 1 tablet (100 mg total) by mouth 2 (two) times daily. 60 tablet 11     No current facility-administered medications for this visit.     Review of patient's allergies indicates:   Allergen  "Reactions    Poison ivy extract Itching     Social History     Socioeconomic History    Marital status: Single   Tobacco Use    Smoking status: Never    Smokeless tobacco: Never   Substance and Sexual Activity    Alcohol use: Yes     Alcohol/week: 6.0 standard drinks of alcohol     Types: 6 Cans of beer per week     Comment: daily    Drug use: No       ROS    REVIEW OF SYSTEMS: Negative as documented below as well as positive findings in bold.       Constitutional  Respiratory  Gastrointestinal  Skin   - Fever - Cough - Heartburn - Rash   - Chills - Spit blood - Nausea - Itching   - Weight Loss - Shortness of breath - Vomiting - Nail pain   - Malaise/Fatigue - Wheezing - Abdominal Pain  Wound/Ulcer   - Weight Gain   - Blood in Stool  Poor wound healing       - Diarrhea          Cardiovascular  Genitourinary  Neurological  HEENT   - Chest Pain - Dysuria - Burning Sensation of feet - Headache   - Palpitations - Hematuria - Tingling / Paresthesia - Congestion   - Pain at night in legs - Flank Pain - Dizziness - Sore Throat   - Cramping   - Tremor - Blurred Vision   - Leg Swelling   - Sensory Change - Double Vision   - Dizzy when standing   - Speech Change - Eye Redness       - Focal Weakness - Dry Eyes       - Loss of Consciousness          Endocrine  Musculoskeletal  Psychiatric   - Cold intolerance - Muscle Pain - Depression   - Heat intolerance - Neck Pain - Insomnia   - Anemia - Joint Pain - Memory Loss   -  Easy bruising, bleeding - Heel pain - Anxiety      Toe Pain        Leg/Ankle/Foot Pain         Objective:     Resp 18   Ht 5' 7" (1.702 m)   Wt 81.8 kg (180 lb 5.4 oz)   BMI 28.24 kg/m²   Vitals:    09/10/24 1304   Resp: 18   Weight: 81.8 kg (180 lb 5.4 oz)   Height: 5' 7" (1.702 m)   PainSc:   4   PainLoc: Foot       Physical Exam    General Appearance:   Patient appears well developed, well nourished  Patient appears stated age    Psychiatric:   Patient is oriented to time, place, and " person.  Patient has appropriate mood and affect    Neck:  Trachea Midline  No visible masses    Respiratory/Ears:  No distress or labored breathing.  Able to differentiate between normal talking voice and whisper.  Able to follow commands    Eyes:  Visual Acuity intact  Lids and conjunctivae normal. No discoloration noted.    Foot Exam  Physical Exam  Ortho Exam  Ortho/SPM Exam  Foot/Ankle Musculoskeletal Exam    B/l LE exam con't:  V:  DP 2/4, PT 2/4   CRT< 3s to all digits tested   Tibial and popliteal lymph nodes are w/o abnormality   Edema: absent, varicosities: present    N:  Patient displays normal ankle reflexes   SILT in SP/DP/T/Radha/Saph distributions    Ortho: +Motor EHL/FHL/TA/GA   equinus deformity present  There is moderate pain with palpation of b/l medial calcaneal tubercle  Bunion and hammertoe deformity present bilateral  Compartments soft/compressible. No pain on passive stretch of big toe. No calf  Pain.    Derm:  skin intact, skin warm and dry, skin without ulcers or lesions, skin without induration, nails normal, texture turgor well hydrated        Imaging / Labs:      No results found.      Note: This was dictated using a computer transcription program. Although proofread, it may contain computer transcription errors and phonetic errors. Other human proofreading errors may also exist. Corrections may be performed at a later time. Please contact us for any clarification if needed.    Hua Nails DPM  [facility]

## 2024-09-20 ENCOUNTER — TELEPHONE (OUTPATIENT)
Dept: PODIATRY | Facility: CLINIC | Age: 64
End: 2024-09-20
Payer: MEDICAID

## 2024-09-20 NOTE — TELEPHONE ENCOUNTER
Received following message regarding PT for patient:      ----- Message from Haritha Coyle sent at 9/19/2024  5:09 PM CDT -----  Regarding: Therapy  Good afternoon,    The therapy scheduling department received your order to get the attached patient scheduled for an evaluation. We have made several attempts to get him scheduled, but have been unsuccessful.     We wanted you to be aware that we are cancelling his therapy order. It can be easily reinstated, if he returns our calls to schedule the evaluation. If you speak with the patient again about starting therapy, please have him reach out to us at [phone] to get scheduled. He can also call [phone], then hit option 1, then option 2 to reach the [facility] region.    Sincerely,    Haritha Coyle

## 2024-10-03 ENCOUNTER — OFFICE VISIT (OUTPATIENT)
Dept: UROLOGY | Facility: CLINIC | Age: 64
End: 2024-10-03
Payer: MEDICAID

## 2024-10-03 VITALS
DIASTOLIC BLOOD PRESSURE: 86 MMHG | WEIGHT: 167.13 LBS | SYSTOLIC BLOOD PRESSURE: 149 MMHG | HEIGHT: 67 IN | BODY MASS INDEX: 26.23 KG/M2 | HEART RATE: 83 BPM

## 2024-10-03 DIAGNOSIS — N40.1 BENIGN PROSTATIC HYPERPLASIA WITH URINARY HESITANCY: ICD-10-CM

## 2024-10-03 DIAGNOSIS — R35.1 NOCTURIA: Primary | ICD-10-CM

## 2024-10-03 DIAGNOSIS — R39.11 BENIGN PROSTATIC HYPERPLASIA WITH URINARY HESITANCY: ICD-10-CM

## 2024-10-03 PROCEDURE — 3008F BODY MASS INDEX DOCD: CPT | Mod: CPTII,,, | Performed by: UROLOGY

## 2024-10-03 PROCEDURE — 99999 PR PBB SHADOW E&M-EST. PATIENT-LVL III: CPT | Mod: PBBFAC,,, | Performed by: UROLOGY

## 2024-10-03 PROCEDURE — 99204 OFFICE O/P NEW MOD 45 MIN: CPT | Mod: S$PBB,,, | Performed by: UROLOGY

## 2024-10-03 PROCEDURE — 1160F RVW MEDS BY RX/DR IN RCRD: CPT | Mod: CPTII,,, | Performed by: UROLOGY

## 2024-10-03 PROCEDURE — 99213 OFFICE O/P EST LOW 20 MIN: CPT | Mod: PBBFAC,PO | Performed by: UROLOGY

## 2024-10-03 PROCEDURE — 1159F MED LIST DOCD IN RCRD: CPT | Mod: CPTII,,, | Performed by: UROLOGY

## 2024-10-03 PROCEDURE — 3077F SYST BP >= 140 MM HG: CPT | Mod: CPTII,,, | Performed by: UROLOGY

## 2024-10-03 PROCEDURE — 3079F DIAST BP 80-89 MM HG: CPT | Mod: CPTII,,, | Performed by: UROLOGY

## 2024-10-03 PROCEDURE — 4010F ACE/ARB THERAPY RXD/TAKEN: CPT | Mod: CPTII,,, | Performed by: UROLOGY

## 2024-10-03 NOTE — PROGRESS NOTES
[place] - Urology   Clinic Note    SUBJECTIVE:     Chief Complaint   Patient presents with    Benign Prostatic Hypertrophy       Referral from: Abilio Mtz MD.    History of Present Illness:  Eitan Cody is a 64 y.o. male who presents to clinic for nocturia.    Patient here for evaluation of nocturia.  He currently takes tamsulosin and finasteride for BPH.  He reports nocturia and urinary frequency.  He reports urinary frequency at night every 1-2 hours.  He reports nocturia every 1-2 hours 4-5 times per night.  He reports he has sleep apnea and does not use a CPAP.  He also reports that he drinks 7-8 beers per night on average.    Patient endorses no additional complaints at this time.    Past Medical History:   Diagnosis Date    Hypertension     Liver cirrhosis        Past Surgical History:   Procedure Laterality Date    COLONOSCOPY N/A 6/30/2022    Procedure: COLONOSCOPY;  Surgeon: VIRIDIANA Swanson MD;  Location: [facility] ENDO;  Service: Endoscopy;  Laterality: N/A;    LEFT HEART CATHETERIZATION Left 2/16/2022    Procedure: Left heart cath;  Surgeon: Thierno Kidd III, MD;  Location: [facility] CATH LAB;  Service: Cardiology;  Laterality: Left;       No family history on file.    Social History     Tobacco Use    Smoking status: Never    Smokeless tobacco: Never   Substance Use Topics    Alcohol use: Yes     Alcohol/week: 6.0 standard drinks of alcohol     Types: 6 Cans of beer per week     Comment: daily    Drug use: No       Current Outpatient Medications on File Prior to Visit   Medication Sig Dispense Refill    amlodipine-valsartan (EXFORGE)  mg per tablet Take 1 tablet by mouth once daily. 90 tablet 3    aspirin 81 MG Chew Take 81 mg by mouth once daily.      atorvastatin (LIPITOR) 20 MG tablet Take 1 tablet (20 mg total) by mouth every evening. 90 tablet 0    cetirizine (ZYRTEC) 10 MG tablet Take 10 mg by mouth once daily.      cetirizine (ZYRTEC) 10 MG tablet Take 1 tablet (10 mg total) by mouth  "once daily. 14 tablet 0    FLUoxetine 20 MG capsule Take 1 capsule (20 mg total) by mouth once daily. 30 capsule 2    lifitegrast (XIIDRA) 5 % Dpet Place 1 drop into both eyes 2 (two) times daily. 60 each 11    meclizine (ANTIVERT) 25 mg tablet Take 25 mg by mouth 3 (three) times daily as needed for Dizziness.      promethazine-dextromethorphan (PROMETHAZINE-DM) 6.25-15 mg/5 mL Syrp Take 5 mLs by mouth every 4 to 6 hours as needed for cough. 120 mL 0    propranoloL (INDERAL) 10 MG tablet Take 1 tablet (10 mg total) by mouth once daily. 30 tablet 11    topiramate (TOPAMAX) 100 MG tablet Take 1 tablet (100 mg total) by mouth 2 (two) times daily. 60 tablet 11    finasteride (PROSCAR) 5 mg tablet Take 1 tablet (5 mg total) by mouth once daily. 30 tablet 11    tamsulosin (FLOMAX) 0.4 mg Cap Take 1 capsule (0.4 mg total) by mouth once daily. 30 capsule 2     No current facility-administered medications on file prior to visit.       Review of patient's allergies indicates:   Allergen Reactions    Poison ivy extract Itching       Review of Systems:  A review of 10+ systems was conducted with pertinent positive and negative findings documented in HPI with all other systems reviewed and negative.    OBJECTIVE:     Estimated body mass index is 26.17 kg/m² as calculated from the following:    Height as of this encounter: 5' 7" (1.702 m).    Weight as of this encounter: 75.8 kg (167 lb 1.7 oz).    Vital Signs (Most Recent)  Vitals:    10/03/24 0843   BP: (!) 149/86   Pulse: 83       Physical Exam:  GENERAL: patient sitting comfortably  HEENT: normocephalic  NECK: supple, no JVD  PULM: normal chest rise, no increased WOB  HEART: non-diaphoretic  ABDO: soft, nondistended, nontender  BACK: no CVA tenderness bilaterally  SKIN: warm, dry, well perfused  EXT: no bruising or edema  NEURO: grossly normal with no focal deficits  PSYCH: appropriate mood and affect    Genitourinary Exam:  deferred    Lab Results   Component Value Date    " "BUN 12 09/22/2023    CREATININE 0.9 09/22/2023    WBC 5.90 04/06/2023    HGB 12.3 (L) 04/06/2023    HCT 34.9 (L) 04/06/2023    PLT 82 (L) 04/06/2023    AST 52 (H) 09/22/2023    ALT 39 09/22/2023    ALKPHOS 110 09/22/2023    ALBUMIN 4.0 09/22/2023    HGBA1C 5.4 04/06/2023    INR 1.2 04/05/2023        Imaging:  I have personally reviewed all relevant imaging studies.    No results found for this or any previous visit (from the past 2160 hours).  No results found for this or any previous visit (from the past 2160 hours).  X-Ray Foot Complete 3 view Bilateral  Narrative: EXAMINATION:  XR FOOT COMPLETE 3 VIEW BILATERAL    CLINICAL HISTORY:  Pain in right foot    TECHNIQUE:  AP, lateral, and oblique views of both feet were performed.    COMPARISON:  None    FINDINGS:  Bilateral hallux valgus deformities are noted.  There is splaying of the 2nd and 3rd digits bilaterally with the 2nd digit overlapping the 1st digits bilaterally.  Hammertoe deformities of either 2nd digit also noted prominent degenerative change seen at either 1st MTP joint.  Mild-to-moderate degenerative change seen at either 2nd MTP joint and the 3rd MTP joint on the right.  Mild to moderate degenerative changes seen in the region of either midfoot.  Dorsal and plantar calcaneal enthesophytes are noted.  Impression: 1.  As above    Electronically signed by: Reji Hughes DO  Date:    07/11/2024  Time:    09:52       PSA:  Lab Results   Component Value Date    PSA 0.55 08/28/2023    PSA 0.26 09/09/2022       Testosterone:  No results found for: "TOTALTESTOST", "TESTOSTERONE"     ASSESSMENT     1. Nocturia    2. Benign prostatic hyperplasia with urinary hesitancy        PLAN:     Recommended patient stop consuming 7-8 beers per night as this is worsening his nocturia and urinary frequency  Also recommended patient use a CPAP machine for sleep apnea  Nocturia and frequency will not improve even with medication without the above lifestyle changes  Follow up " becca Clark MD  [facility]  [facility]  [facility]    Disclaimer: This note has been generated using voice-recognition software. There may be typographical errors that have been missed during proof-reading.

## 2024-11-11 DIAGNOSIS — N40.0 BENIGN PROSTATIC HYPERPLASIA, UNSPECIFIED WHETHER LOWER URINARY TRACT SYMPTOMS PRESENT: ICD-10-CM

## 2024-11-11 DIAGNOSIS — F41.9 ANXIETY: ICD-10-CM

## 2024-11-11 RX ORDER — FLUOXETINE HYDROCHLORIDE 20 MG/1
20 CAPSULE ORAL DAILY
Qty: 30 CAPSULE | Refills: 2 | Status: CANCELLED | OUTPATIENT
Start: 2024-11-10

## 2024-11-11 RX ORDER — FINASTERIDE 5 MG/1
5 TABLET, FILM COATED ORAL DAILY
Qty: 30 TABLET | Refills: 11 | Status: CANCELLED | OUTPATIENT
Start: 2024-11-10 | End: 2025-11-10

## 2024-11-13 DIAGNOSIS — N40.0 BENIGN PROSTATIC HYPERPLASIA, UNSPECIFIED WHETHER LOWER URINARY TRACT SYMPTOMS PRESENT: ICD-10-CM

## 2024-11-13 DIAGNOSIS — F41.9 ANXIETY: ICD-10-CM

## 2024-11-13 RX ORDER — FLUOXETINE HYDROCHLORIDE 20 MG/1
20 CAPSULE ORAL DAILY
Qty: 30 CAPSULE | Refills: 2 | Status: CANCELLED | OUTPATIENT
Start: 2024-11-10

## 2024-11-13 RX ORDER — FINASTERIDE 5 MG/1
5 TABLET, FILM COATED ORAL DAILY
Qty: 30 TABLET | Refills: 11 | Status: CANCELLED | OUTPATIENT
Start: 2024-11-10 | End: 2025-11-10

## 2024-11-15 DIAGNOSIS — F41.9 ANXIETY: ICD-10-CM

## 2024-11-15 DIAGNOSIS — N40.0 BENIGN PROSTATIC HYPERPLASIA, UNSPECIFIED WHETHER LOWER URINARY TRACT SYMPTOMS PRESENT: ICD-10-CM

## 2024-11-15 RX ORDER — FLUOXETINE HYDROCHLORIDE 20 MG/1
20 CAPSULE ORAL DAILY
Qty: 30 CAPSULE | Refills: 2 | Status: CANCELLED | OUTPATIENT
Start: 2024-11-10

## 2024-11-15 RX ORDER — FINASTERIDE 5 MG/1
5 TABLET, FILM COATED ORAL DAILY
Qty: 30 TABLET | Refills: 11 | Status: CANCELLED | OUTPATIENT
Start: 2024-11-10 | End: 2025-11-10

## 2024-11-19 ENCOUNTER — OFFICE VISIT (OUTPATIENT)
Dept: PODIATRY | Facility: CLINIC | Age: 64
End: 2024-11-19
Payer: MEDICAID

## 2024-11-19 VITALS — WEIGHT: 165.38 LBS | HEIGHT: 67 IN | RESPIRATION RATE: 18 BRPM | BODY MASS INDEX: 25.96 KG/M2

## 2024-11-19 DIAGNOSIS — M21.862 ACQUIRED POSTERIOR EQUINUS OF BOTH LOWER EXTREMITIES: ICD-10-CM

## 2024-11-19 DIAGNOSIS — M21.861 ACQUIRED POSTERIOR EQUINUS OF BOTH LOWER EXTREMITIES: ICD-10-CM

## 2024-11-19 DIAGNOSIS — M72.2 PLANTAR FASCIITIS, BILATERAL: Primary | ICD-10-CM

## 2024-11-19 PROCEDURE — 99213 OFFICE O/P EST LOW 20 MIN: CPT | Mod: S$PBB,,, | Performed by: PODIATRIST

## 2024-11-19 PROCEDURE — 99999 PR PBB SHADOW E&M-EST. PATIENT-LVL III: CPT | Mod: PBBFAC,,, | Performed by: PODIATRIST

## 2024-11-19 PROCEDURE — 99213 OFFICE O/P EST LOW 20 MIN: CPT | Mod: PBBFAC,PN | Performed by: PODIATRIST

## 2024-11-19 PROCEDURE — 4010F ACE/ARB THERAPY RXD/TAKEN: CPT | Mod: CPTII,,, | Performed by: PODIATRIST

## 2024-11-19 PROCEDURE — 3008F BODY MASS INDEX DOCD: CPT | Mod: CPTII,,, | Performed by: PODIATRIST

## 2024-11-19 PROCEDURE — 1159F MED LIST DOCD IN RCRD: CPT | Mod: CPTII,,, | Performed by: PODIATRIST

## 2024-11-19 NOTE — PROGRESS NOTES
[facility] - PODIATRY  [address]  [address]  [facility] [address]  Dept: [phone]  Dept Fax: [phone]    Hua Nails Jr., DPDENVER     Assessment:   MDM    Coding  1. Plantar fasciitis, bilateral        2. Acquired posterior equinus of both lower extremities            Plan:     Serjio Laird was seen today for plantar fasciitis.    Diagnoses and all orders for this visit:    Plantar fasciitis, bilateral    Acquired posterior equinus of both lower extremities        -pt seen, evaluated, and managed  -dx discussed in detail. All questions/concerns addressed  -all tx options discussed. All alternatives, risks, benefits of all txs discussed  -the patient was educated about the diagnosis and discussed reducing caloric intake, increase physical activity  -Pt has failed conservative care options possible including but not limited to shoe wear and/or padding, bracing/strapping, at home ROM, formal PT, medical therapy, injection therapy  -XR/imaging reviewed by me: agree with read  -labs reviewed by me: ok for vgel  -MRI ordered to better characterize    -rxs dispensed: none  -referrals: none  Cont PT  -WB: wbat      Follow up in about 8 weeks (around 1/14/2025).    Subjective:      Patient ID: Eitan Cody is a 64 y.o. male.    Chief Complaint:   Chief Complaint   Patient presents with    Plantar Fasciitis     Left        CC - foot pain: patient presents to the podiatry clinic  with complaint of  bilateral foot pain. Onset of the symptoms was several years ago. Precipitating event: unk. Current symptoms include: ability to bear weight, but with some pain, gonzales the heel, swelling and worsening symptoms after a period of inactivity. Aggravating factors: walking and certain shoegear. Symptoms have gradually worsened. Patient has had no prior foot problems. Evaluation to date: none. Treatment to date: avoidance of offending activity. Patients rates pain 7/10 on pain  scale.      11/19/24:  Hx as above. F/u for b/l PF. L PF resistant to conservative measures.        Last Podiatry Enc: Visit date not found  Last Enc w/ Me: Visit date not found    Outside reports reviewed: historical medical records.  Family hx: as below  Past Medical History:   Diagnosis Date    Hypertension     Liver cirrhosis      Past Surgical History:   Procedure Laterality Date    COLONOSCOPY N/A 6/30/2022    Procedure: COLONOSCOPY;  Surgeon: VIRIDIANA Swanson MD;  Location: [facility] ENDO;  Service: Endoscopy;  Laterality: N/A;    LEFT HEART CATHETERIZATION Left 2/16/2022    Procedure: Left heart cath;  Surgeon: Thierno Kidd III, MD;  Location: [facility] CATH LAB;  Service: Cardiology;  Laterality: Left;     No family history on file.  Current Outpatient Medications   Medication Sig Dispense Refill    amlodipine-valsartan (EXFORGE)  mg per tablet Take 1 tablet by mouth once daily. 90 tablet 3    aspirin 81 MG Chew Take 81 mg by mouth once daily.      atorvastatin (LIPITOR) 20 MG tablet Take 1 tablet (20 mg total) by mouth every evening. 90 tablet 0    cetirizine (ZYRTEC) 10 MG tablet Take 10 mg by mouth once daily.      cetirizine (ZYRTEC) 10 MG tablet Take 1 tablet (10 mg total) by mouth once daily. 14 tablet 0    FLUoxetine 20 MG capsule Take 1 capsule (20 mg total) by mouth once daily. 30 capsule 2    lifitegrast (XIIDRA) 5 % Dpet Place 1 drop into both eyes 2 (two) times daily. 60 each 11    meclizine (ANTIVERT) 25 mg tablet Take 25 mg by mouth 3 (three) times daily as needed for Dizziness.      promethazine-dextromethorphan (PROMETHAZINE-DM) 6.25-15 mg/5 mL Syrp Take 5 mLs by mouth every 4 to 6 hours as needed for cough. 120 mL 0    propranoloL (INDERAL) 10 MG tablet Take 1 tablet (10 mg total) by mouth once daily. 30 tablet 11    tamsulosin (FLOMAX) 0.4 mg Cap Take 1 capsule (0.4 mg total) by mouth once daily. 30 capsule 2    topiramate (TOPAMAX) 100 MG tablet Take 1 tablet (100 mg total) by mouth  "2 (two) times daily. 60 tablet 11    finasteride (PROSCAR) 5 mg tablet Take 1 tablet (5 mg total) by mouth once daily. 30 tablet 11     No current facility-administered medications for this visit.     Review of patient's allergies indicates:   Allergen Reactions    Poison ivy extract Itching     Social History     Socioeconomic History    Marital status: Single   Tobacco Use    Smoking status: Never    Smokeless tobacco: Never   Substance and Sexual Activity    Alcohol use: Yes     Alcohol/week: 6.0 standard drinks of alcohol     Types: 6 Cans of beer per week     Comment: daily    Drug use: No       ROS    REVIEW OF SYSTEMS: Negative as documented below as well as positive findings in bold.       Constitutional  Respiratory  Gastrointestinal  Skin   - Fever - Cough - Heartburn - Rash   - Chills - Spit blood - Nausea - Itching   - Weight Loss - Shortness of breath - Vomiting - Nail pain   - Malaise/Fatigue - Wheezing - Abdominal Pain  Wound/Ulcer   - Weight Gain   - Blood in Stool  Poor wound healing       - Diarrhea          Cardiovascular  Genitourinary  Neurological  HEENT   - Chest Pain - Dysuria - Burning Sensation of feet - Headache   - Palpitations - Hematuria - Tingling / Paresthesia - Congestion   - Pain at night in legs - Flank Pain - Dizziness - Sore Throat   - Cramping   - Tremor - Blurred Vision   - Leg Swelling   - Sensory Change - Double Vision   - Dizzy when standing   - Speech Change - Eye Redness       - Focal Weakness - Dry Eyes       - Loss of Consciousness          Endocrine  Musculoskeletal  Psychiatric   - Cold intolerance - Muscle Pain - Depression   - Heat intolerance - Neck Pain - Insomnia   - Anemia - Joint Pain - Memory Loss   -  Easy bruising, bleeding - Heel pain - Anxiety      Toe Pain        Leg/Ankle/Foot Pain         Objective:     Resp 18   Ht 5' 7" (1.702 m)   Wt 75 kg (165 lb 5.5 oz)   BMI 25.90 kg/m²   Vitals:    11/19/24 1008   Resp: 18   Weight: 75 kg (165 lb 5.5 oz) " "  Height: 5' 7" (1.702 m)   PainSc:   4   PainLoc: Foot       Physical Exam    General Appearance:   Patient appears well developed, well nourished  Patient appears stated age    Psychiatric:   Patient is oriented to time, place, and person.  Patient has appropriate mood and affect    Neck:  Trachea Midline  No visible masses    Respiratory/Ears:  No distress or labored breathing.  Able to differentiate between normal talking voice and whisper.  Able to follow commands    Eyes:  Visual Acuity intact  Lids and conjunctivae normal. No discoloration noted.    Foot Exam  Physical Exam  Ortho Exam  Ortho/SPM Exam  Foot/Ankle Musculoskeletal Exam    B/l LE exam con't:  V:  DP 2/4, PT 2/4   CRT< 3s to all digits tested   Tibial and popliteal lymph nodes are w/o abnormality   Edema: absent, varicosities: present    N:  Patient displays normal ankle reflexes   SILT in SP/DP/T/Radha/Saph distributions    Ortho: +Motor EHL/FHL/TA/GA   equinus deformity present  There is moderate pain with palpation of b/l medial calcaneal tubercle, L>>R  Bunion and hammertoe deformity present bilateral  Compartments soft/compressible. No pain on passive stretch of big toe. No calf  Pain.    Derm:  skin intact, skin warm and dry, skin without ulcers or lesions, skin without induration, nails normal, texture turgor well hydrated        Imaging / Labs:      No results found.      Note: This was dictated using a computer transcription program. Although proofread, it may contain computer transcription errors and phonetic errors. Other human proofreading errors may also exist. Corrections may be performed at a later time. Please contact us for any clarification if needed.    Hua Nails DPM  [facility]      "

## 2024-11-19 NOTE — PATIENT INSTRUCTIONS
Heel Pain (Plantar Fasciitis)        Heel pain is most often caused by plantar fasciitis, a condition that is sometimes also called heel spur syndrome when a spur is present. Heel pain may also be due to other causes, such as a stress fracture, tendonitis, arthritis, nerve irritation or, rarely, a cyst.    Because there are several potential causes, it is important to have heel pain properly diagnosed. A foot and ankle surgeon is able to distinguish between all the possibilities and to determine the underlying source of your heel pain.    What Is Plantar Fasciitis?  Heel pain is often caused by plantar fasciitis  Plantar fasciitis is an inflammation of the band of tissue (the plantar fascia) that extends from the heel to the toes. In this condition, the fascia first becomes irritated and then inflamed, resulting in heel pain.    Causes  The most common cause of plantar fasciitis relates to faulty structure of the foot. For example, people who have problems with their arches, either overly flat feet or high-arched feet, are more prone to developing plantar fasciitis.    Wearing nonsupportive footwear on hard, flat surfaces puts abnormal strain on the plantar fascia and can also lead to plantar fasciitis. This is particularly evident when ones job requires long hours on the feet. Obesity and overuse may also contribute to plantar fasciitis.    Symptoms  The symptoms of plantar fasciitis are:    Pain on the bottom of the heel  Pain in the arch of the foot  Pain that is usually worse upon arising  Pain that increases over a period of months  Swelling on the bottom of the heel     People with plantar fasciitis often describe the pain as worse when they get up in the morning or after they have been sitting for long periods of time. After a few minutes of walking, the pain decreases because walking stretches the fascia. For some people, the pain subsides but returns after spending long periods of time on their  feet.    Diagnosis  To arrive at a diagnosis, the foot and ankle surgeon will obtain your medical history and examine your foot. Throughout this process, the surgeon rules out all possible causes for your heel pain other than plantar fasciitis.    In addition, diagnostic imaging studies, such as x-rays or other imaging modalities, may be used to distinguish the different types of heel pain. Sometimes heel spurs are found in patients with plantar fasciitis, but these are rarely a source of pain. When they are present, the condition may be diagnosed as plantar fasciitis/heel spur syndrome.    Nonsurgical Treatment  Treatment of plantar fasciitis begins with first-line strategies, which you can begin at home:    -Stretching exercises. Exercises that stretch out the calf muscles help ease pain and assist with recovery.  -Avoid going barefoot. When you walk without shoes, you put undue strain and stress on your plantar fascia.  -Ice. Putting an ice pack on your heel for 20 minutes several times a day helps reduce inflammation. Place a thin towel between the ice and your heel; do not apply ice directly to the skin.  -Limit activities. Cut down on extended physical activities to give your heel a rest.  -Shoe modifications. Wearing supportive shoes that have good arch support and a slightly raised heel reduces stress on the plantar fascia.  -Medications. Oral nonsteroidal anti-inflammatory drugs (NSAIDs), such as ibuprofen, may be recommended to reduce pain and inflammation.     If you still have pain after several weeks, see your foot and ankle surgeon, who may add one or more of these treatment approaches:    -Padding, taping and strapping. Placing pads in the shoe softens the impact of walking. Taping and strapping help support the foot and reduce strain on the fascia.  -Orthotic devices. Custom orthotic devices that fit into your shoe help correct the underlying structural abnormalities causing the plantar  fasciitis.  -Injection therapy. In some cases, corticosteroid injections are used to help reduce the inflammation and relieve pain.  -Removable walking cast. A removable walking cast may be used to keep your foot immobile for a few weeks to allow it to rest and heal.  -Night splint. Wearing a night splint allows you to maintain an extended stretch of the plantar fascia while sleeping. This may help reduce the morning pain experienced by some patients.  -Physical therapy. Exercises and other physical therapy measures may be used to help provide relief.     When Is Surgery Needed?  Although most patients with plantar fasciitis respond to nonsurgical treatment, a small percentage of patients may require surgery. If, after several months of nonsurgical treatment, you continue to have heel pain, surgery will be considered. Your foot and ankle surgeon will discuss the surgical options with you and determine which approach would be most beneficial for you.    Long-Term Care  No matter what kind of treatment you undergo for plantar fasciitis, the underlying causes that led to this condition may remain. Therefore, you will need to continue with preventive measures. Wearing supportive shoes, stretching and using custom orthotic devices are the mainstay of long-term treatment for plantar fasciitis.            Understanding Heel Pain  Your heel is the back part of your foot. A band of tissue called the plantar fascia connects the heel bone to the bones in the ball of your foot. Nerves run from the heel up the inside of your ankle and into your leg. When you feel pain in the bottom of your heel, the plantar fascia may be inflamed. Overuse, Achilles tightness, or excess body weight can cause the tissue to tear or pull away from the bone. Sometimes the inflamed plantar fascia also irritates a nerve, causing more pain.    What causes heel pain?  Wearing shoes with poor cushioning can irritate the tissue in your heel (plantar  fascia). Being overweight or standing for long periods can also irritate the tissue. Running, walking, tennis, and other sports that put stress on the heels can cause tiny tears in the tissue. If your lower leg muscles are tight, this is more likely to occur. A tight Achilles tendon will also contribute to heel pain.  Symptoms  You may feel pain on the bottom or on the inside edge of your heel. The pain may be sharp when you get out of bed or when you stand up after sitting for a while. You may feel a dull ache in your heel after youve been standing for a long time on a hard surface. Running can also cause a dull ache.  Preventing future problems  To prevent future heel pain, wear shoes with well-cushioned heels. And do exercises prescribed by your healthcare provider to stretch the plantar fascia and the muscles in the lower leg.   Date Last Reviewed: 9/10/2015  © 0805-1513 [employer]. [address] [place] [address]. All rights reserved. This information is not intended as a substitute for professional medical care. Always follow your healthcare professional's instructions.      Treating Plantar Fasciitis  First, your healthcare provider tries to determine the cause of your problem in order to suggest ways to relieve pain. If your pain is due to poor foot mechanics, custom-made shoe inserts (orthoses) may help.    Reduce symptoms  To relieve mild symptoms, try aspirin, ibuprofen, or other medicines as directed. Rubbing ice on the affected area may also help.  To reduce severe pain and swelling, your healthcare provider may prescribe pills or injections or a walking cast in some instances. Physical therapy, such as ultrasound or a daily stretching program, may also be recommended. Surgery is rarely required.  To reduce symptoms caused by poor foot mechanics, your foot may be taped. This supports the arch and temporarily controls movement. Night splints may also help by stretching the  fascia.  Control movement  If taping helps, your healthcare provider may prescribe orthoses. Built from plaster casts of your feet, these inserts control the way your foot moves. As a result, your symptoms should go away.  Reduce overuse  Every time your foot strikes the ground, the plantar fascia is stretched. You can reduce the strain on the plantar fascia and the possibility of overuse by following these suggestions:  Lose any excess weight.  Avoid running on hard or uneven ground.  Use orthoses at all times in your shoes and house slippers.  If surgery is needed  Your healthcare provider may consider surgery if other types of treatment don't control your pain. During surgery, the plantar fascia is partially cut to release tension. As you heal, fibrous tissue fills the space between the heel bone and the plantar fascia.   Date Last Reviewed: 10/14/2015  © 3481-4041 [employer]. [address] [place] [address]. All rights reserved. This information is not intended as a substitute for professional medical care. Always follow your healthcare professional's instructions.      Equinus          What Is Equinus?    Equinus is a condition in which the upward bending motion of the ankle joint is limited. Someone with equinus lacks the flexibility to bring the top of the foot toward the front of the leg. Equinus can occur in one or both feet. When it involves both feet, the limitation of motion is sometimes worse in one foot than in the other.    People with equinus develop ways to compensate for their limited ankle motion, and this often leads to other foot, leg or back problems. The most common methods of compensation are flattening of the arch or picking up the heel early when walking, placing increased pressure on the ball of the foot. Other patients compensate by toe walking, while a smaller number take steps by bending abnormally at the hip or knee.    Causes  There are several possible causes  for the limited range of ankle motion. Often, it is due to tightness in the Achilles tendon or calf muscles (the soleus muscle and/or gastrocnemius muscle). In some patients, this tightness is congenital (present at birth), and sometimes it is an inherited trait. Other patients acquire the tightness from being in a cast, being on crutches or frequently wearing high-heeled shoes. In addition, diabetes can affect the fibers of the Achilles tendon and cause tightness. Sometimes equinus is related to a bone blocking the ankle motion. For example, a fragment of a broken bone following an ankle injury, or bone block, can get in the way and restrict motion. Equinus may also result from one leg being shorter than the other. Less often, equinus is caused by spasms in the calf muscle. These spasms may be signs of an underlying neurologic disorder.      Foot Problems Related to Equinus  Depending on how a patient compensates for the inability to bend properly at the ankle, a variety of foot conditions can develop, including:    Plantar fasciitis (arch/heel pain)  Calf cramping  Tendonitis (inflammation in the Achilles tendon)  Metatarsalgia (pain and/or callusing on the ball of the foot)  Flatfoot  Arthritis of the midfoot (middle area of the foot)  Pressure sores on the ball of the foot or the arch  Bunions and hammertoes  Ankle pain  Shin splints     Diagnosis  Most patients with equinus are unaware they have this condition when they first visit the doctor. Instead, they come to the doctor seeking relief for foot problems associated with equinus.    To diagnose equinus, the foot and ankle surgeon will evaluate the ankle's range of motion when the knee is flexed (bent) as well as extended (straightened). This enables the surgeon to identify whether the tendon or muscle is tight and to assess whether bone is interfering with ankle motion. X-rays may also be ordered. In some cases, the foot and ankle surgeon may refer the  patient for neurologic evaluation.    Nonsurgical Treatment  Treatment includes strategies aimed at relieving the symptoms and conditions associated with equinus. In addition, the patient is treated for the equinus itself through one or more of the following options:    Night splint. The foot may be placed in a splint at night to keep it in a position that helps reduce tightness of the calf muscle.  Heel lifts. Placing heel lifts inside the shoes or wearing shoes with a moderate heel takes stress off the Achilles tendon when walking and may reduce symptoms.  Arch supports or orthotic devices. Custom orthotic devices that fit into the shoe are often prescribed to keep weight distributed properly and to help control muscle/tendon imbalance.  Physical therapy. To help remedy muscle tightness, exercises that stretch the calf muscle(s) are recommended.     When Is Surgery Needed?  In some cases, surgery may be needed to correct the cause of equinus if it is related to a tight tendon or a bone blocking the ankle motion. The foot and ankle surgeon will determine the type of procedure that is best suited to the individual patient.                Ankle Dorsiflexion/Plantarflexion (Flexibility)    Sit on the floor or in bed with your legs straight in front of you.  Point both feet. Then flex both feet.  Do this 10 to 30 times in a row.  Repeat this exercise 2 times a day, or as instructed.  Date Last Reviewed: 5/1/2016 © 2000-2016 [employer]. [address] [place] [address]. All rights reserved. This information is not intended as a substitute for professional medical care. Always follow your healthcare professional's instructions.          Arch retraining    These exercises are for your right foot. Switch sides for your left foot.  Sit in a chair or stand with both feet flat on the floor. Press down with the ball of your right foot, but only on the left side of the foot, just under the big  toe.  Then pull the bottom of your big toe back toward your heel. This should pull up the arch of your foot. Dont flex your toes while doing this. It is a subtle movement of the arch.  Hold for 5 seconds. Relax.  Date Last Reviewed: 3/10/2016  © 0084-6539 [employer]. [address] [place] [address]. All rights reserved. This information is not intended as a substitute for professional medical care. Always follow your healthcare professional's instructions.        Soleus Stretch (Flexibility)    Stand facing a wall from 3 feet away. Take one step toward the wall with your right foot.  Place both palms on the wall. Bend both knees and lean forward. Keep both heels on the floor.  Hold for 30 to 60 seconds. Then relax both legs. Repeat the exercise 2 times.  Switch legs and repeat.  Repeat this exercise 3 times a day, or as instructed.     Tip: Dont bounce while youre stretching.   Date Last Reviewed: 3/10/2016  © 3689-3131 [employer]. [address] [place] [address]. All rights reserved. This information is not intended as a substitute for professional medical care. Always follow your healthcare professional's instructions.          Recommended OTC orthotics:  -powerstep  -superfeet    Recommended shoegear:  -new balance  -ascics  -antoine baker

## 2024-11-25 ENCOUNTER — OFFICE VISIT (OUTPATIENT)
Dept: CARDIOLOGY | Facility: CLINIC | Age: 64
End: 2024-11-25
Payer: MEDICAID

## 2024-11-25 VITALS
WEIGHT: 170.88 LBS | BODY MASS INDEX: 26.82 KG/M2 | DIASTOLIC BLOOD PRESSURE: 78 MMHG | SYSTOLIC BLOOD PRESSURE: 114 MMHG | OXYGEN SATURATION: 98 % | HEIGHT: 67 IN | HEART RATE: 107 BPM

## 2024-11-25 DIAGNOSIS — I10 UNCONTROLLED HYPERTENSION: Primary | ICD-10-CM

## 2024-11-25 DIAGNOSIS — E78.5 HYPERLIPIDEMIA, UNSPECIFIED HYPERLIPIDEMIA TYPE: ICD-10-CM

## 2024-11-25 DIAGNOSIS — R07.89 OTHER CHEST PAIN: ICD-10-CM

## 2024-11-25 DIAGNOSIS — I42.1 MILD HOCM (HYPERTROPHIC OBSTRUCTIVE CARDIOMYOPATHY): ICD-10-CM

## 2024-11-25 DIAGNOSIS — G47.30 SLEEP APNEA, UNSPECIFIED TYPE: ICD-10-CM

## 2024-11-25 DIAGNOSIS — R94.39 ABNORMAL STRESS ECG WITH TREADMILL: ICD-10-CM

## 2024-11-25 DIAGNOSIS — G89.29 CHRONIC INTRACTABLE HEADACHE, UNSPECIFIED HEADACHE TYPE: ICD-10-CM

## 2024-11-25 DIAGNOSIS — R55 POSTURAL DIZZINESS WITH PRESYNCOPE: ICD-10-CM

## 2024-11-25 DIAGNOSIS — R39.11 BENIGN PROSTATIC HYPERPLASIA WITH URINARY HESITANCY: ICD-10-CM

## 2024-11-25 DIAGNOSIS — N40.1 BENIGN PROSTATIC HYPERPLASIA WITH URINARY HESITANCY: ICD-10-CM

## 2024-11-25 DIAGNOSIS — E78.2 MIXED HYPERLIPIDEMIA: ICD-10-CM

## 2024-11-25 DIAGNOSIS — R51.9 CHRONIC INTRACTABLE HEADACHE, UNSPECIFIED HEADACHE TYPE: ICD-10-CM

## 2024-11-25 DIAGNOSIS — R42 POSTURAL DIZZINESS WITH PRESYNCOPE: ICD-10-CM

## 2024-11-25 PROCEDURE — 99999 PR PBB SHADOW E&M-EST. PATIENT-LVL III: CPT | Mod: PBBFAC,,,

## 2024-11-25 PROCEDURE — 99213 OFFICE O/P EST LOW 20 MIN: CPT | Mod: PBBFAC,PN

## 2024-11-25 PROCEDURE — 3008F BODY MASS INDEX DOCD: CPT | Mod: CPTII,,,

## 2024-11-25 PROCEDURE — 99214 OFFICE O/P EST MOD 30 MIN: CPT | Mod: S$PBB,,,

## 2024-11-25 PROCEDURE — 1159F MED LIST DOCD IN RCRD: CPT | Mod: CPTII,,,

## 2024-11-25 PROCEDURE — 3074F SYST BP LT 130 MM HG: CPT | Mod: CPTII,,,

## 2024-11-25 PROCEDURE — 4010F ACE/ARB THERAPY RXD/TAKEN: CPT | Mod: CPTII,,,

## 2024-11-25 PROCEDURE — 3078F DIAST BP <80 MM HG: CPT | Mod: CPTII,,,

## 2024-11-25 PROCEDURE — 1160F RVW MEDS BY RX/DR IN RCRD: CPT | Mod: CPTII,,,

## 2024-11-25 RX ORDER — ATORVASTATIN CALCIUM 20 MG/1
20 TABLET, FILM COATED ORAL NIGHTLY
Qty: 90 TABLET | Refills: 3 | Status: SHIPPED | OUTPATIENT
Start: 2024-11-25

## 2024-11-25 RX ORDER — ATOGEPANT 60 MG/1
1 TABLET ORAL DAILY
COMMUNITY
Start: 2024-11-11 | End: 2025-11-11

## 2024-11-25 RX ORDER — LISINOPRIL AND HYDROCHLOROTHIAZIDE 12.5; 2 MG/1; MG/1
TABLET ORAL DAILY
COMMUNITY
End: 2024-11-25

## 2024-11-25 NOTE — ASSESSMENT & PLAN NOTE
Cardiac echo 1/14/2022:   Concentric remodeling and hyperdynamic systolic function.  The estimated ejection fraction is 75%.  Left ventricular mid-cavity obstruction is present. Mid-cavity gradient 23 mmHg.  - carvedilol 12.5 mg daily was DC'd by PCP  Cardiac echo 7/26/2023  Summary  The left ventricle is normal in size with concentric remodeling and normal systolic function.  The estimated ejection fraction is 70%.  Indeterminate left ventricular diastolic function.  Normal right ventricular size with normal right ventricular systolic function.  Normal central venous pressure (3 mmHg).

## 2024-11-25 NOTE — PROGRESS NOTES
Subjective:    Patient ID:  Eitan Cody is a 64 y.o. male who presents for follow-up of No chief complaint on file.      PCP: Abilio Mtz MD     Follow-up  Associated symptoms include chest pain. Pertinent negatives include no abdominal pain, congestion, diaphoresis, fever or nausea.         Pt is a 63 yo M w/ PMH of HTN and ETOH abuse (quit 1/12/2022) who presents for f/u appt. He was last seen on 7/21/2023 for f/u appt and HTN management  and was continued on medical therapy and scheduled for 6 month follow up.   He had an angiogram  2/16/2022 which was negative for CAD w/ an LVEDP of 11 mmHg. He reported postural presyncope, cp, and perkins at times however this has improved and has decreased in frequency to be somewhat tolerable. Repeat cardiac echo w  LVEF 70%.    He has followed w PCP and medication increased to amlodipine- valsartan  mg.     He reports intermittent left sided chest pain describes as someone pressing on his heart. He denies SOB,edema, orthopnea, PND, palpitations, LOC, and claudication. He notes compliance w/ meds and denies side effects. He notes monitoring home BP ranges 130-140s/ 60-70s. He notes medication compliance without side effects.  He has not been exercising however is active at work as a  and walks often and denies limitation.          NM stress test to evaluate for ischemia- patient with prior abnormal exercise stress test, also HOCM    Past Medical History:   Diagnosis Date    Hypertension     Liver cirrhosis      Past Surgical History:   Procedure Laterality Date    COLONOSCOPY N/A 6/30/2022    Procedure: COLONOSCOPY;  Surgeon: VIRIDIANA Swanson MD;  Location: [facility] ENDO;  Service: Endoscopy;  Laterality: N/A;    LEFT HEART CATHETERIZATION Left 2/16/2022    Procedure: Left heart cath;  Surgeon: Thierno Kidd III, MD;  Location: [facility] CATH LAB;  Service: Cardiology;  Laterality: Left;     Social History     Socioeconomic History    Marital status:  Single   Tobacco Use    Smoking status: Never    Smokeless tobacco: Never   Substance and Sexual Activity    Alcohol use: Yes     Alcohol/week: 6.0 standard drinks of alcohol     Types: 6 Cans of beer per week     Comment: daily    Drug use: No     No family history on file.    Review of patient's allergies indicates:   Allergen Reactions    Poison ivy extract Itching       Medication List with Changes/Refills   Current Medications    AMLODIPINE-VALSARTAN (EXFORGE)  MG PER TABLET    Take 1 tablet by mouth once daily.    ASPIRIN 81 MG CHEW    Take 81 mg by mouth once daily.    CETIRIZINE (ZYRTEC) 10 MG TABLET    Take 10 mg by mouth once daily.    CETIRIZINE (ZYRTEC) 10 MG TABLET    Take 1 tablet (10 mg total) by mouth once daily.    FINASTERIDE (PROSCAR) 5 MG TABLET    Take 1 tablet (5 mg total) by mouth once daily.    FLUOXETINE 20 MG CAPSULE    Take 1 capsule (20 mg total) by mouth once daily.    LIFITEGRAST (XIIDRA) 5 % DPET    Place 1 drop into both eyes 2 (two) times daily.    MECLIZINE (ANTIVERT) 25 MG TABLET    Take 25 mg by mouth 3 (three) times daily as needed for Dizziness.    PROMETHAZINE-DEXTROMETHORPHAN (PROMETHAZINE-DM) 6.25-15 MG/5 ML SYRP    Take 5 mLs by mouth every 4 to 6 hours as needed for cough.    PROPRANOLOL (INDERAL) 10 MG TABLET    Take 1 tablet (10 mg total) by mouth once daily.    QULIPTA 60 MG TAB    Take 1 tablet by mouth once daily.    TAMSULOSIN (FLOMAX) 0.4 MG CAP    Take 1 capsule (0.4 mg total) by mouth once daily.    TOPIRAMATE (TOPAMAX) 100 MG TABLET    Take 1 tablet (100 mg total) by mouth 2 (two) times daily.   Changed and/or Refilled Medications    Modified Medication Previous Medication    ATORVASTATIN (LIPITOR) 20 MG TABLET atorvastatin (LIPITOR) 20 MG tablet       Take 1 tablet (20 mg total) by mouth every evening.    Take 1 tablet (20 mg total) by mouth every evening.   Discontinued Medications    LISINOPRIL-HYDROCHLOROTHIAZIDE (PRINZIDE,ZESTORETIC) 20-12.5 MG PER  "TABLET    once daily.       Review of Systems   Constitutional: Negative for diaphoresis and fever.   HENT:  Negative for congestion and hearing loss.    Eyes:  Negative for blurred vision and pain.   Cardiovascular:  Positive for chest pain. Negative for claudication, leg swelling, palpitations and syncope.   Respiratory:  Negative for sleep disturbances due to breathing.    Hematologic/Lymphatic: Negative for bleeding problem. Does not bruise/bleed easily.   Skin:  Negative for color change and poor wound healing.   Gastrointestinal:  Negative for abdominal pain and nausea.   Genitourinary:  Negative for bladder incontinence and flank pain.   Neurological:  Negative for focal weakness and light-headedness.        Objective:   /78 (BP Location: Left arm, Patient Position: Sitting)   Pulse 107   Ht 5' 7" (1.702 m)   Wt 77.5 kg (170 lb 13.7 oz)   SpO2 98%   BMI 26.76 kg/m²    Physical Exam  Constitutional:       Appearance: He is well-developed. He is not diaphoretic.   HENT:      Head: Normocephalic and atraumatic.   Eyes:      General: No scleral icterus.     Pupils: Pupils are equal, round, and reactive to light.   Neck:      Vascular: No JVD.   Cardiovascular:      Rate and Rhythm: Normal rate and regular rhythm.      Pulses: Intact distal pulses.           Radial pulses are 2+ on the right side and 2+ on the left side.        Dorsalis pedis pulses are 2+ on the right side and 2+ on the left side.        Posterior tibial pulses are 2+ on the right side and 2+ on the left side.      Heart sounds: S1 normal and S2 normal. No murmur heard.     No friction rub. No gallop.   Pulmonary:      Effort: Pulmonary effort is normal. No respiratory distress.      Breath sounds: Normal breath sounds. No wheezing or rales.   Chest:      Chest wall: No tenderness.   Abdominal:      General: Bowel sounds are normal. There is no distension.      Palpations: Abdomen is soft. There is no mass.      Tenderness: There is no " abdominal tenderness. There is no rebound.   Musculoskeletal:         General: No tenderness. Normal range of motion.      Cervical back: Normal range of motion and neck supple.   Skin:     General: Skin is warm and dry.      Coloration: Skin is not pale.   Neurological:      Mental Status: He is alert and oriented to person, place, and time.      Coordination: Coordination normal.      Deep Tendon Reflexes: Reflexes normal.   Psychiatric:         Behavior: Behavior normal.         Judgment: Judgment normal.           Cardiac echo 2023  Summary  The left ventricle is normal in size with concentric remodeling and normal systolic function.  The estimated ejection fraction is 70%.  Indeterminate left ventricular diastolic function.  Normal right ventricular size with normal right ventricular systolic function.  Normal central venous pressure (3 mmHg).      Cardiac angiogram 22  Summary  The coronary arteries were normal.  The left ventricular end diastolic pressure was normal, LVEDP 11 mmHg.  The estimated blood loss was <50 mL.    EC2022- reviewed.  NSR, NLE ECG.      Echo: 2022- reviewed.    Summary    Concentric remodeling and hyperdynamic systolic function.  The estimated ejection fraction is 75%.  Left ventricular mid-cavity obstruction is present. Mid-cavity gradient 23 mmHg.  Normal left ventricular diastolic function.  Normal right ventricular size with normal right ventricular systolic function.  The sinuses of Valsalva is mildly dilated.  Intermediate central venous pressure (8 mmHg).  The estimated PA systolic pressure is 36 mmHg.    ETT: 2/10/2022- reviewed.    Conclusion         The EKG portion of this study is positive for ischemia.    The patient reported no chest pain during the stress test.    The blood pressure response to stress was normal.    The Duke Treadmill Score was 1.    The exercise capacity was average.    [facility]: 2022- reviewed.    Summary       The coronary  arteries were normal.  The left ventricular end diastolic pressure was normal, LVEDP 11 mmHg.  The estimated blood loss was <50 mL.       Assessment:       1. Uncontrolled hypertension    2. Mild HOCM (hypertrophic obstructive cardiomyopathy)    3. Mixed hyperlipidemia    4. Abnormal stress ECG with treadmill    5. Other chest pain    6. Chronic intractable headache, unspecified headache type    7. Benign prostatic hyperplasia with urinary hesitancy    8. Postural dizziness with presyncope    9. Sleep apnea, unspecified type    10. Hyperlipidemia, unspecified hyperlipidemia type             Plan:         Uncontrolled hypertension   Goal BP < 130/80.  Compliant w/ meds.    -controlled   -in office 114/78  -current regimen: amlodipine- valsartan  mg   - continue to monitor BP at home and record  - risk factor and lifestyle modifications     Mild HOCM (hypertrophic obstructive cardiomyopathy)  Cardiac echo 1/14/2022:   Concentric remodeling and hyperdynamic systolic function.  The estimated ejection fraction is 75%.  Left ventricular mid-cavity obstruction is present. Mid-cavity gradient 23 mmHg.  - carvedilol 12.5 mg daily was DC'd by PCP  Cardiac echo 7/26/2023  Summary  The left ventricle is normal in size with concentric remodeling and normal systolic function.  The estimated ejection fraction is 70%.  Indeterminate left ventricular diastolic function.  Normal right ventricular size with normal right ventricular systolic function.  Normal central venous pressure (3 mmHg).    Hyperlipidemia  -Lat LDL 89.4 7/21/2023  -controlled   -continue  atorvastatin 20 mg  -check lipid panel     Abnormal stress ECG with treadmill  Pt w/ angiogram negative for CAD.   - continue medical therapy for primary prevention of CAD- ASA and statin therapy     Other chest pain  -NM stress test to evaluate for ischemia- patient with prior abnormal exercise stress test, also HOCM    Chronic intractable headache  -[place]ed by [place]rology  and [place] [facility] [place]continue propranolol and Qulipta     Benign prostatic hyperplasia with urinary hesitancy  -Followed by Urology     Postural dizziness with presyncope  -resolved  w medication adjustments  -continue current regimen     Sleep apnea  -sleep study completed- he is awaiting follow up         Total duration of face to face visit time 30 minutes.  Total time spent counseling greater than fifty percent of total visit time.  Counseling included discussion regarding imaging findings, diagnosis, possibilities, treatment options, risks and benefits.  The patient had many questions regarding the options and long-term effects      Ney Hernandez,СЕРГЕЙ  Cardiology

## 2024-11-25 NOTE — ASSESSMENT & PLAN NOTE
-NM stress test to evaluate for ischemia- patient with prior abnormal exercise stress test, also HOCM

## 2024-11-25 NOTE — ASSESSMENT & PLAN NOTE
Goal BP < 130/80.  Compliant w/ meds.    -controlled   -in office 114/78  -current regimen: amlodipine- valsartan  mg   - continue to monitor BP at home and record  - risk factor and lifestyle modifications

## 2024-11-25 NOTE — ASSESSMENT & PLAN NOTE
-Followed by Neurology and was last seen on 11/11/24 - [facility] [place]   -continue propranolol and Qulipta

## 2024-11-25 NOTE — ASSESSMENT & PLAN NOTE
Pt w/ angiogram negative for CAD.   - continue medical therapy for primary prevention of CAD- ASA and statin therapy

## 2024-11-27 DIAGNOSIS — N40.0 BENIGN PROSTATIC HYPERPLASIA, UNSPECIFIED WHETHER LOWER URINARY TRACT SYMPTOMS PRESENT: ICD-10-CM

## 2024-11-27 DIAGNOSIS — F41.9 ANXIETY: ICD-10-CM

## 2024-11-27 RX ORDER — FLUOXETINE HYDROCHLORIDE 20 MG/1
20 CAPSULE ORAL DAILY
Qty: 30 CAPSULE | Refills: 2 | Status: CANCELLED | OUTPATIENT
Start: 2024-11-10

## 2024-11-27 RX ORDER — FINASTERIDE 5 MG/1
5 TABLET, FILM COATED ORAL DAILY
Qty: 30 TABLET | Refills: 11 | Status: CANCELLED | OUTPATIENT
Start: 2024-11-10 | End: 2025-11-10

## 2024-12-17 ENCOUNTER — OFFICE VISIT (OUTPATIENT)
Dept: PODIATRY | Facility: CLINIC | Age: 64
End: 2024-12-17
Payer: MEDICAID

## 2024-12-17 ENCOUNTER — TELEPHONE (OUTPATIENT)
Dept: CARDIOLOGY | Facility: CLINIC | Age: 64
End: 2024-12-17
Payer: MEDICAID

## 2024-12-17 VITALS — WEIGHT: 170.88 LBS | RESPIRATION RATE: 18 BRPM | BODY MASS INDEX: 26.82 KG/M2 | HEIGHT: 67 IN

## 2024-12-17 DIAGNOSIS — M21.861 ACQUIRED POSTERIOR EQUINUS OF BOTH LOWER EXTREMITIES: ICD-10-CM

## 2024-12-17 DIAGNOSIS — M21.862 ACQUIRED POSTERIOR EQUINUS OF BOTH LOWER EXTREMITIES: ICD-10-CM

## 2024-12-17 DIAGNOSIS — M72.2 PLANTAR FASCIITIS, BILATERAL: Primary | ICD-10-CM

## 2024-12-17 PROCEDURE — 1159F MED LIST DOCD IN RCRD: CPT | Mod: CPTII,,, | Performed by: PODIATRIST

## 2024-12-17 PROCEDURE — 4010F ACE/ARB THERAPY RXD/TAKEN: CPT | Mod: CPTII,,, | Performed by: PODIATRIST

## 2024-12-17 PROCEDURE — 20550 NJX 1 TENDON SHEATH/LIGAMENT: CPT | Mod: PBBFAC,PN,LT | Performed by: PODIATRIST

## 2024-12-17 PROCEDURE — 3008F BODY MASS INDEX DOCD: CPT | Mod: CPTII,,, | Performed by: PODIATRIST

## 2024-12-17 PROCEDURE — 99214 OFFICE O/P EST MOD 30 MIN: CPT | Mod: PBBFAC,PN | Performed by: PODIATRIST

## 2024-12-17 PROCEDURE — 99999PBSHW PR PBB SHADOW TECHNICAL ONLY FILED TO HB: Mod: PBBFAC,,,

## 2024-12-17 PROCEDURE — 99213 OFFICE O/P EST LOW 20 MIN: CPT | Mod: 25,S$PBB,, | Performed by: PODIATRIST

## 2024-12-17 PROCEDURE — 99999 PR PBB SHADOW E&M-EST. PATIENT-LVL IV: CPT | Mod: PBBFAC,,, | Performed by: PODIATRIST

## 2024-12-17 RX ORDER — METHYLPREDNISOLONE ACETATE 40 MG/ML
40 INJECTION, SUSPENSION INTRA-ARTICULAR; INTRALESIONAL; INTRAMUSCULAR; SOFT TISSUE
Status: DISCONTINUED | OUTPATIENT
Start: 2024-12-17 | End: 2024-12-17 | Stop reason: HOSPADM

## 2024-12-17 RX ADMIN — METHYLPREDNISOLONE ACETATE 40 MG: 40 INJECTION, SUSPENSION INTRALESIONAL; INTRAMUSCULAR; INTRASYNOVIAL; SOFT TISSUE at 09:12

## 2024-12-17 NOTE — PROGRESS NOTES
[facility] - PODIATRY  [address]  [address]  [place] [facility] [place] [address]  Dept: [phone]  Dept Fax: [phone]    Hua Nails Jr., DPM     Assessment:   MDM    Coding  1. Plantar fasciitis, bilateral  EKG 12-lead    X-Ray Chest PA And Lateral    Hemoglobin A1C    Comprehensive Metabolic Panel    Prealbumin    CBC Auto Differential    Tendon Sheath      2. Acquired posterior equinus of both lower extremities            Plan:     Tendon Sheath: L plantar fascia    Date/Time: 12/17/2024 9:15 AM    Performed by: Hua Nails Jr., DPM  Authorized by: Hua Nails Jr., DPM    Consent Done?:  Yes (Verbal)  Indications:  Pain  Site marked: the procedure site was marked    Timeout: prior to procedure the correct patient, procedure, and site was verified    Prep: patient was prepped and draped in usual sterile fashion      Local anesthesia used?: Yes    Anesthesia:  Local infiltration  Local anesthetic:  Co-phenylcaine spray  Anesthetic total (ml):  2    Location:  Foot  Site:  L plantar fascia  Ultrasonic guidance for needle placement?: No    Needle size:  25 G  Approach:  Medial  Medications:  40 mg methylPREDNISolone acetate 40 mg/mL  Patient tolerance:  Patient tolerated the procedure well with no immediate complications      Eitan was seen today for foot pain.    Diagnoses and all orders for this visit:    Plantar fasciitis, bilateral  -     EKG 12-lead; Future  -     X-Ray Chest PA And Lateral; Future  -     Hemoglobin A1C; Future  -     Comprehensive Metabolic Panel; Future  -     Prealbumin; Future  -     CBC Auto Differential; Future  -     Tendon Sheath    Acquired posterior equinus of both lower extremities        -pt seen, evaluated, and managed  -dx discussed in detail. All questions/concerns addressed  -all tx options discussed. All alternatives, risks, benefits of all txs discussed  -the patient was educated about the diagnosis and discussed reducing caloric intake,  increase physical activity  -Pt has failed conservative care options possible including but not limited to shoe wear and/or padding, bracing/strapping, at home ROM, formal PT, medical therapy, injection therapy  -XR/imaging reviewed by me: agree with read  -labs reviewed by me: ok for vgel  -MRI reviewed  -given failure of conservative measures, we discussed surgical intervention  -pt would benefit from operative intervention: we discussed the following procedures: L EPF + OGR+BMAC  -we discussed approx postop course  -would be out pt, elective surgery  -would be GA + block, supine position  -would need PCP clearance before proceeding  -labs and xr on way out  -potential DOS: pt will call  -inj as above for temp pain relief      -rxs dispensed: none  -referrals: none  -WB: wbat      Follow up if symptoms worsen or fail to improve.    Subjective:      Patient ID: Eitan Cody is a 64 y.o. male.    Chief Complaint:   Chief Complaint   Patient presents with    Foot Pain     LEFT        CC - foot pain: patient presents to the podiatry clinic  with complaint of  bilateral foot pain. Onset of the symptoms was several years ago. Precipitating event: unk. Current symptoms include: ability to bear weight, but with some pain, gonzales the heel, swelling and worsening symptoms after a period of inactivity. Aggravating factors: walking and certain shoegear. Symptoms have gradually worsened. Patient has had no prior foot problems. Evaluation to date: none. Treatment to date: avoidance of offending activity. Patients rates pain 7/10 on pain scale.      12/17/24:  Hx as above. F/u for b/l PF. L PF resistant to conservative measures. Obtained MRI.    Foot Pain        Last Podiatry Enc: Visit date not found  Last Enc w/ Me: Visit date not found    Outside reports reviewed: historical medical records.  Family hx: as below  Past Medical History:   Diagnosis Date    Hypertension     Liver cirrhosis      Past Surgical History:   Procedure  Laterality Date    COLONOSCOPY N/A 6/30/2022    Procedure: COLONOSCOPY;  Surgeon: VIRIDIANA Swanson MD;  Location: [facility] ENDO;  Service: Endoscopy;  Laterality: N/A;    LEFT HEART CATHETERIZATION Left 2/16/2022    Procedure: Left heart cath;  Surgeon: Thierno Kidd III, MD;  Location: [facility] CATH LAB;  Service: Cardiology;  Laterality: Left;     No family history on file.  Current Outpatient Medications   Medication Sig Dispense Refill    amlodipine-valsartan (EXFORGE)  mg per tablet Take 1 tablet by mouth once daily. 90 tablet 3    aspirin 81 MG Chew Take 81 mg by mouth once daily.      atorvastatin (LIPITOR) 20 MG tablet Take 1 tablet (20 mg total) by mouth every evening. 90 tablet 3    cetirizine (ZYRTEC) 10 MG tablet Take 10 mg by mouth once daily.      cetirizine (ZYRTEC) 10 MG tablet Take 1 tablet (10 mg total) by mouth once daily. 14 tablet 0    FLUoxetine 20 MG capsule Take 1 capsule (20 mg total) by mouth once daily. 30 capsule 2    lifitegrast (XIIDRA) 5 % Dpet Place 1 drop into both eyes 2 (two) times daily. 60 each 11    meclizine (ANTIVERT) 25 mg tablet Take 25 mg by mouth 3 (three) times daily as needed for Dizziness.      promethazine-dextromethorphan (PROMETHAZINE-DM) 6.25-15 mg/5 mL Syrp Take 5 mLs by mouth every 4 to 6 hours as needed for cough. 120 mL 0    propranoloL (INDERAL) 10 MG tablet Take 1 tablet (10 mg total) by mouth once daily. 30 tablet 11    QULIPTA 60 mg Tab Take 1 tablet by mouth once daily.      topiramate (TOPAMAX) 100 MG tablet Take 1 tablet (100 mg total) by mouth 2 (two) times daily. 60 tablet 11    finasteride (PROSCAR) 5 mg tablet Take 1 tablet (5 mg total) by mouth once daily. 30 tablet 11    tamsulosin (FLOMAX) 0.4 mg Cap Take 1 capsule (0.4 mg total) by mouth once daily. 30 capsule 2     No current facility-administered medications for this visit.     Review of patient's allergies indicates:   Allergen Reactions    Poison ivy extract Itching     Social History  "    Socioeconomic History    Marital status: Single   Tobacco Use    Smoking status: Never    Smokeless tobacco: Never   Substance and Sexual Activity    Alcohol use: Yes     Alcohol/week: 6.0 standard drinks of alcohol     Types: 6 Cans of beer per week     Comment: daily    Drug use: No       ROS    REVIEW OF SYSTEMS: Negative as documented below as well as positive findings in bold.       Constitutional  Respiratory  Gastrointestinal  Skin   - Fever - Cough - Heartburn - Rash   - Chills - Spit blood - Nausea - Itching   - Weight Loss - Shortness of breath - Vomiting - Nail pain   - Malaise/Fatigue - Wheezing - Abdominal Pain  Wound/Ulcer   - Weight Gain   - Blood in Stool  Poor wound healing       - Diarrhea          Cardiovascular  Genitourinary  Neurological  HEENT   - Chest Pain - Dysuria - Burning Sensation of feet - Headache   - Palpitations - Hematuria - Tingling / Paresthesia - Congestion   - Pain at night in legs - Flank Pain - Dizziness - Sore Throat   - Cramping   - Tremor - Blurred Vision   - Leg Swelling   - Sensory Change - Double Vision   - Dizzy when standing   - Speech Change - Eye Redness       - Focal Weakness - Dry Eyes       - Loss of Consciousness          Endocrine  Musculoskeletal  Psychiatric   - Cold intolerance - Muscle Pain - Depression   - Heat intolerance - Neck Pain - Insomnia   - Anemia - Joint Pain - Memory Loss   -  Easy bruising, bleeding - Heel pain - Anxiety      Toe Pain        Leg/Ankle/Foot Pain         Objective:     Resp 18   Ht 5' 7" (1.702 m)   Wt 77.5 kg (170 lb 13.7 oz)   BMI 26.76 kg/m²   Vitals:    12/17/24 0924   Resp: 18   Weight: 77.5 kg (170 lb 13.7 oz)   Height: 5' 7" (1.702 m)   PainSc:   1   PainLoc: Foot       Physical Exam    General Appearance:   Patient appears well developed, well nourished  Patient appears stated age    Psychiatric:   Patient is oriented to time, place, and person.  Patient has appropriate mood and affect    Neck:  Trachea Midline  No " visible masses    Respiratory/Ears:  No distress or labored breathing.  Able to differentiate between normal talking voice and whisper.  Able to follow commands    Eyes:  Visual Acuity intact  Lids and conjunctivae normal. No discoloration noted.    Foot Exam  Physical Exam  Ortho Exam  Ortho/SPM Exam  Foot/Ankle Musculoskeletal Exam    B/l LE exam con't:  V:  DP 2/4, PT 2/4   CRT< 3s to all digits tested   Tibial and popliteal lymph nodes are w/o abnormality   Edema: absent, varicosities: present    N:  Patient displays normal ankle reflexes   SILT in SP/DP/T/Radha/Saph distributions    Ortho: +Motor EHL/FHL/TA/GA   equinus deformity present  There is moderate pain with palpation of b/l medial calcaneal tubercle, L>>R  Bunion and hammertoe deformity present bilateral  Compartments soft/compressible. No pain on passive stretch of big toe. No calf  Pain.    Derm:  skin intact, skin warm and dry, skin without ulcers or lesions, skin without induration, nails normal, texture turgor well hydrated        Imaging / Labs:    NM Myocardial Perfusion Spect Multi Pharmacologic    Result Date: 12/12/2024  EXAMINATION: NM MYOCARDIAL PERFUSION SPECT MULTI PHARM CLINICAL HISTORY: Chest pain/anginal equiv, high CAD risk, not treadmill candidate;  Other chest pain TECHNIQUE: 10 and 30 mCi of technetium labeled tetrofosmin was administered for the rest and stress portion of today's examination.  A standard Lexiscan stress test was performed. FINDINGS: There is no fixed or reversible perfusion defect identified.  The stress and rest end-diastolic volumes measure 74 and 71 mL respectively.  The stress and rest end systolic volumes measure 17 and 15 mL respectively.  The stress and rest ejection fraction measures 78 and 80% respectively.     No acute findings. Electronically signed by: Terrell Jorge MD Date:    12/12/2024 Time:    16:49    Nuclear Stress Test    Result Date: 12/11/2024    The ECG portion of the study is negative for  ischemia.   The patient reported no chest pain during the stress test.   During stress, rare PVCs are noted.   The nuclear portion of this study will be reported separately.     MRI Hindfoot WO Contrast LT    Result Date: 11/29/2024  EXAMINATION: MRI HINDFOOT WO CONTRAST LT CLINICAL HISTORY: Heel pain, chronic;  Plantar fascial fibromatosis TECHNIQUE: Multiplanar, multi sequence MRI of the left hindfoot performed without contrast. COMPARISON: Radiographs 07/11/2024. FINDINGS: BONES AND CARTILAGE: No fracture, osteonecrosis, or marrow replacing lesion. There is full-thickness chondral defect measuring 8 x 8 mm along the medial talar dome.  Pronounced calcaneal spurs at the Achilles tendon and plantar fascia insertions. JOINT: Scattered degenerative change with subchondral cyst and osteophyte production.  No joint effusion or synovitis. No periarticular cysts. TENDONS: Extensor, flexor, peroneal, and Achilles tendons are intact. There is thickening and intermediate signal intensity involving the plantar fascia central cord at the calcaneal insertion.  No plantar fascia tear identified. LIGAMENTS: Syndesmotic ligaments are intact.  Lateral collateral ligament complex is intact.  Medial ankle ligaments are intact. Midtarsal and Lisfranc ligaments are intact. SOFT TISSUES: Normal muscle bulk and signal intensity. OTHER: Preserved fat signal within the sinus tarsi. Tarsal tunnel is within normal limits.     Acute on chronic plantar fasciitis.  No tear identified. Moderate degenerative changes, including a 0.8 cm full-thickness cartilage defect over the medial talar dome. Electronically signed by resident: David Pelayo Date:    11/29/2024 Time:    13:15 Electronically signed by: Moe Jalloh Date:    11/29/2024 Time:    20:56         Note: This was dictated using a computer transcription program. Although proofread, it may contain computer transcription errors and phonetic errors. Other human proofreading errors may also  exist. Corrections may be performed at a later time. Please contact us for any clarification if needed.    Hua Nails DPM  [facility]

## 2024-12-17 NOTE — PATIENT INSTRUCTIONS
Heel Pain (Plantar Fasciitis)        Heel pain is most often caused by plantar fasciitis, a condition that is sometimes also called heel spur syndrome when a spur is present. Heel pain may also be due to other causes, such as a stress fracture, tendonitis, arthritis, nerve irritation or, rarely, a cyst.    Because there are several potential causes, it is important to have heel pain properly diagnosed. A foot and ankle surgeon is able to distinguish between all the possibilities and to determine the underlying source of your heel pain.    What Is Plantar Fasciitis?  Heel pain is often caused by plantar fasciitis  Plantar fasciitis is an inflammation of the band of tissue (the plantar fascia) that extends from the heel to the toes. In this condition, the fascia first becomes irritated and then inflamed, resulting in heel pain.    Causes  The most common cause of plantar fasciitis relates to faulty structure of the foot. For example, people who have problems with their arches, either overly flat feet or high-arched feet, are more prone to developing plantar fasciitis.    Wearing nonsupportive footwear on hard, flat surfaces puts abnormal strain on the plantar fascia and can also lead to plantar fasciitis. This is particularly evident when ones job requires long hours on the feet. Obesity and overuse may also contribute to plantar fasciitis.    Symptoms  The symptoms of plantar fasciitis are:    Pain on the bottom of the heel  Pain in the arch of the foot  Pain that is usually worse upon arising  Pain that increases over a period of months  Swelling on the bottom of the heel     People with plantar fasciitis often describe the pain as worse when they get up in the morning or after they have been sitting for long periods of time. After a few minutes of walking, the pain decreases because walking stretches the fascia. For some people, the pain subsides but returns after spending long periods of time on their  feet.    Diagnosis  To arrive at a diagnosis, the foot and ankle surgeon will obtain your medical history and examine your foot. Throughout this process, the surgeon rules out all possible causes for your heel pain other than plantar fasciitis.    In addition, diagnostic imaging studies, such as x-rays or other imaging modalities, may be used to distinguish the different types of heel pain. Sometimes heel spurs are found in patients with plantar fasciitis, but these are rarely a source of pain. When they are present, the condition may be diagnosed as plantar fasciitis/heel spur syndrome.    Nonsurgical Treatment  Treatment of plantar fasciitis begins with first-line strategies, which you can begin at home:    -Stretching exercises. Exercises that stretch out the calf muscles help ease pain and assist with recovery.  -Avoid going barefoot. When you walk without shoes, you put undue strain and stress on your plantar fascia.  -Ice. Putting an ice pack on your heel for 20 minutes several times a day helps reduce inflammation. Place a thin towel between the ice and your heel; do not apply ice directly to the skin.  -Limit activities. Cut down on extended physical activities to give your heel a rest.  -Shoe modifications. Wearing supportive shoes that have good arch support and a slightly raised heel reduces stress on the plantar fascia.  -Medications. Oral nonsteroidal anti-inflammatory drugs (NSAIDs), such as ibuprofen, may be recommended to reduce pain and inflammation.     If you still have pain after several weeks, see your foot and ankle surgeon, who may add one or more of these treatment approaches:    -Padding, taping and strapping. Placing pads in the shoe softens the impact of walking. Taping and strapping help support the foot and reduce strain on the fascia.  -Orthotic devices. Custom orthotic devices that fit into your shoe help correct the underlying structural abnormalities causing the plantar  fasciitis.  -Injection therapy. In some cases, corticosteroid injections are used to help reduce the inflammation and relieve pain.  -Removable walking cast. A removable walking cast may be used to keep your foot immobile for a few weeks to allow it to rest and heal.  -Night splint. Wearing a night splint allows you to maintain an extended stretch of the plantar fascia while sleeping. This may help reduce the morning pain experienced by some patients.  -Physical therapy. Exercises and other physical therapy measures may be used to help provide relief.     When Is Surgery Needed?  Although most patients with plantar fasciitis respond to nonsurgical treatment, a small percentage of patients may require surgery. If, after several months of nonsurgical treatment, you continue to have heel pain, surgery will be considered. Your foot and ankle surgeon will discuss the surgical options with you and determine which approach would be most beneficial for you.    Long-Term Care  No matter what kind of treatment you undergo for plantar fasciitis, the underlying causes that led to this condition may remain. Therefore, you will need to continue with preventive measures. Wearing supportive shoes, stretching and using custom orthotic devices are the mainstay of long-term treatment for plantar fasciitis.            Understanding Heel Pain  Your heel is the back part of your foot. A band of tissue called the plantar fascia connects the heel bone to the bones in the ball of your foot. Nerves run from the heel up the inside of your ankle and into your leg. When you feel pain in the bottom of your heel, the plantar fascia may be inflamed. Overuse, Achilles tightness, or excess body weight can cause the tissue to tear or pull away from the bone. Sometimes the inflamed plantar fascia also irritates a nerve, causing more pain.    What causes heel pain?  Wearing shoes with poor cushioning can irritate the tissue in your heel (plantar  fascia). Being overweight or standing for long periods can also irritate the tissue. Running, walking, tennis, and other sports that put stress on the heels can cause tiny tears in the tissue. If your lower leg muscles are tight, this is more likely to occur. A tight Achilles tendon will also contribute to heel pain.  Symptoms  You may feel pain on the bottom or on the inside edge of your heel. The pain may be sharp when you get out of bed or when you stand up after sitting for a while. You may feel a dull ache in your heel after youve been standing for a long time on a hard surface. Running can also cause a dull ache.  Preventing future problems  To prevent future heel pain, wear shoes with well-cushioned heels. And do exercises prescribed by your healthcare provider to stretch the plantar fascia and the muscles in the lower leg.   Date Last Reviewed: 9/10/2015  © 7750-6081 [employer]. [address] [place] [address]. All rights reserved. This information is not intended as a substitute for professional medical care. Always follow your healthcare professional's instructions.      Treating Plantar Fasciitis  First, your healthcare provider tries to determine the cause of your problem in order to suggest ways to relieve pain. If your pain is due to poor foot mechanics, custom-made shoe inserts (orthoses) may help.    Reduce symptoms  To relieve mild symptoms, try aspirin, ibuprofen, or other medicines as directed. Rubbing ice on the affected area may also help.  To reduce severe pain and swelling, your healthcare provider may prescribe pills or injections or a walking cast in some instances. Physical therapy, such as ultrasound or a daily stretching program, may also be recommended. Surgery is rarely required.  To reduce symptoms caused by poor foot mechanics, your foot may be taped. This supports the arch and temporarily controls movement. Night splints may also help by stretching the  fascia.  Control movement  If taping helps, your healthcare provider may prescribe orthoses. Built from plaster casts of your feet, these inserts control the way your foot moves. As a result, your symptoms should go away.  Reduce overuse  Every time your foot strikes the ground, the plantar fascia is stretched. You can reduce the strain on the plantar fascia and the possibility of overuse by following these suggestions:  Lose any excess weight.  Avoid running on hard or uneven ground.  Use orthoses at all times in your shoes and house slippers.  If surgery is needed  Your healthcare provider may consider surgery if other types of treatment don't control your pain. During surgery, the plantar fascia is partially cut to release tension. As you heal, fibrous tissue fills the space between the heel bone and the plantar fascia.   Date Last Reviewed: 10/14/2015  © 2515-6157 "[employer]". [address] [place] [address]. All rights reserved. This information is not intended as a substitute for professional medical care. Always follow your healthcare professional's instructions.      Equinus          What Is Equinus?    Equinus is a condition in which the upward bending motion of the ankle joint is limited. Someone with equinus lacks the flexibility to bring the top of the foot toward the front of the leg. Equinus can occur in one or both feet. When it involves both feet, the limitation of motion is sometimes worse in one foot than in the other.    People with equinus develop ways to compensate for their limited ankle motion, and this often leads to other foot, leg or back problems. The most common methods of compensation are flattening of the arch or picking up the heel early when walking, placing increased pressure on the ball of the foot. Other patients compensate by toe walking, while a smaller number take steps by bending abnormally at the hip or knee.    Causes  There are several possible causes  for the limited range of ankle motion. Often, it is due to tightness in the Achilles tendon or calf muscles (the soleus muscle and/or gastrocnemius muscle). In some patients, this tightness is congenital (present at birth), and sometimes it is an inherited trait. Other patients acquire the tightness from being in a cast, being on crutches or frequently wearing high-heeled shoes. In addition, diabetes can affect the fibers of the Achilles tendon and cause tightness. Sometimes equinus is related to a bone blocking the ankle motion. For example, a fragment of a broken bone following an ankle injury, or bone block, can get in the way and restrict motion. Equinus may also result from one leg being shorter than the other. Less often, equinus is caused by spasms in the calf muscle. These spasms may be signs of an underlying neurologic disorder.      Foot Problems Related to Equinus  Depending on how a patient compensates for the inability to bend properly at the ankle, a variety of foot conditions can develop, including:    Plantar fasciitis (arch/heel pain)  Calf cramping  Tendonitis (inflammation in the Achilles tendon)  Metatarsalgia (pain and/or callusing on the ball of the foot)  Flatfoot  Arthritis of the midfoot (middle area of the foot)  Pressure sores on the ball of the foot or the arch  Bunions and hammertoes  Ankle pain  Shin splints     Diagnosis  Most patients with equinus are unaware they have this condition when they first visit the doctor. Instead, they come to the doctor seeking relief for foot problems associated with equinus.    To diagnose equinus, the foot and ankle surgeon will evaluate the ankle's range of motion when the knee is flexed (bent) as well as extended (straightened). This enables the surgeon to identify whether the tendon or muscle is tight and to assess whether bone is interfering with ankle motion. X-rays may also be ordered. In some cases, the foot and ankle surgeon may refer the  patient for neurologic evaluation.    Nonsurgical Treatment  Treatment includes strategies aimed at relieving the symptoms and conditions associated with equinus. In addition, the patient is treated for the equinus itself through one or more of the following options:    Night splint. The foot may be placed in a splint at night to keep it in a position that helps reduce tightness of the calf muscle.  Heel lifts. Placing heel lifts inside the shoes or wearing shoes with a moderate heel takes stress off the Achilles tendon when walking and may reduce symptoms.  Arch supports or orthotic devices. Custom orthotic devices that fit into the shoe are often prescribed to keep weight distributed properly and to help control muscle/tendon imbalance.  Physical therapy. To help remedy muscle tightness, exercises that stretch the calf muscle(s) are recommended.     When Is Surgery Needed?  In some cases, surgery may be needed to correct the cause of equinus if it is related to a tight tendon or a bone blocking the ankle motion. The foot and ankle surgeon will determine the type of procedure that is best suited to the individual patient.                Ankle Dorsiflexion/Plantarflexion (Flexibility)    Sit on the floor or in bed with your legs straight in front of you.  Point both feet. Then flex both feet.  Do this 10 to 30 times in a row.  Repeat this exercise 2 times a day, or as instructed.  Date Last Reviewed: 5/1/2016 © 2000-2016 [employer]. [address] [place] [address]. All rights reserved. This information is not intended as a substitute for professional medical care. Always follow your healthcare professional's instructions.          Arch retraining    These exercises are for your right foot. Switch sides for your left foot.  Sit in a chair or stand with both feet flat on the floor. Press down with the ball of your right foot, but only on the left side of the foot, just under the big  toe.  Then pull the bottom of your big toe back toward your heel. This should pull up the arch of your foot. Dont flex your toes while doing this. It is a subtle movement of the arch.  Hold for 5 seconds. Relax.  Date Last Reviewed: 3/10/2016  © 4612-9799 [employer]. [address] [place] [address]. All rights reserved. This information is not intended as a substitute for professional medical care. Always follow your healthcare professional's instructions.        Soleus Stretch (Flexibility)    Stand facing a wall from 3 feet away. Take one step toward the wall with your right foot.  Place both palms on the wall. Bend both knees and lean forward. Keep both heels on the floor.  Hold for 30 to 60 seconds. Then relax both legs. Repeat the exercise 2 times.  Switch legs and repeat.  Repeat this exercise 3 times a day, or as instructed.     Tip: Dont bounce while youre stretching.   Date Last Reviewed: 3/10/2016  © 7104-0368 [employer]. [address] [place] [address]. All rights reserved. This information is not intended as a substitute for professional medical care. Always follow your healthcare professional's instructions.          Recommended OTC orthotics:  -powerstep  -superfeet    Recommended shoegear:  -new balance  -ascics  -antoine baker

## 2024-12-17 NOTE — TELEPHONE ENCOUNTER
I reached out to Mr. Cody and informed Him of his results & HE expressed understanding of the results.    Tasneem Newell  Medical Assistant  [facility]  [phone] - Office  [phone] - Fax    ----- Message from Ney Hernandez NP sent at 12/16/2024  4:56 PM CST -----  Please inform Mr. Cody the stress test was negative. No additional testing is required.    Thank you,  Ney Hernandez DNP

## 2024-12-18 ENCOUNTER — HOSPITAL ENCOUNTER (EMERGENCY)
Facility: HOSPITAL | Age: 64
Discharge: HOME | End: 2024-12-18
Attending: EMERGENCY MEDICINE
Payer: MEDICAID

## 2024-12-18 VITALS
TEMPERATURE: 96 F | SYSTOLIC BLOOD PRESSURE: 139 MMHG | HEART RATE: 77 BPM | WEIGHT: 170 LBS | OXYGEN SATURATION: 98 % | RESPIRATION RATE: 16 BRPM | HEIGHT: 67 IN | BODY MASS INDEX: 26.68 KG/M2 | DIASTOLIC BLOOD PRESSURE: 84 MMHG

## 2024-12-18 DIAGNOSIS — S46.001A ROTATOR CUFF INJURY, RIGHT, INITIAL ENCOUNTER: Primary | ICD-10-CM
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
## 2024-12-18 DIAGNOSIS — S49.91XA INJURY OF RIGHT SHOULDER, INITIAL ENCOUNTER: ICD-10-CM

## 2024-12-18 LAB
CREAT SERPL-MCNC: 0.8 MG/DL (ref 0.5–1.4)
EST. GFR  (NO RACE VARIABLE): >60 ML/MIN/1.73 M^2

## 2024-12-18 PROCEDURE — 25000003 PHARM REV CODE 250

## 2024-12-18 PROCEDURE — 96372 THER/PROPH/DIAG INJ SC/IM: CPT

## 2024-12-18 PROCEDURE — 82565 ASSAY OF CREATININE: CPT

## 2024-12-18 PROCEDURE — 63600175 PHARM REV CODE 636 W HCPCS

## 2024-12-18 PROCEDURE — 99284 EMERGENCY DEPT VISIT MOD MDM: CPT | Mod: 25

## 2024-12-18 RX ORDER — KETOROLAC TROMETHAMINE 30 MG/ML
15 INJECTION, SOLUTION INTRAMUSCULAR; INTRAVENOUS
Status: COMPLETED | OUTPATIENT
Start: 2024-12-18 | End: 2024-12-18

## 2024-12-18 RX ORDER — ACETAMINOPHEN 500 MG
1000 TABLET ORAL
Status: COMPLETED | OUTPATIENT
Start: 2024-12-18 | End: 2024-12-18

## 2024-12-18 RX ORDER — OXYCODONE AND ACETAMINOPHEN 5; 325 MG/1; MG/1
1 TABLET ORAL EVERY 8 HOURS PRN
Qty: 12 TABLET | Refills: 0 | Status: SHIPPED | OUTPATIENT
Start: 2024-12-18

## 2024-12-18 RX ORDER — NAPROXEN 500 MG/1
500 TABLET ORAL 2 TIMES DAILY WITH MEALS
Qty: 30 TABLET | Refills: 0 | Status: SHIPPED | OUTPATIENT
Start: 2024-12-18

## 2024-12-18 RX ADMIN — KETOROLAC TROMETHAMINE 15 MG: 30 INJECTION, SOLUTION INTRAMUSCULAR; INTRAVENOUS at 10:12

## 2024-12-18 RX ADMIN — ACETAMINOPHEN 1000 MG: 500 TABLET ORAL at 08:12

## 2024-12-18 NOTE — ED PROVIDER NOTES
Encounter Date: 12/18/2024       History     Chief Complaint   Patient presents with    Fall     Pt reports slip and fall down steps last night at 2130. -LOC, did not hit head. C/o R shoulder pain w/ decreased ROM. Distal PMS intact. Denies taking anything for pain. Denies other sx or complaints.      Eitan Cody is a 64 y.o. male who has a past medical history of Hypertension and Liver cirrhosis. presenting to the Emergency Department for fall.  Patient reports he lives in an elevated trailer.  He was walking down the damp wooden steps yesterday when he slipped and fell landing on his lower back.  He attempted to catch himself with the right arm and has been having severe pain to the shoulder since the fall.  He is having significant difficulty lifting the arm secondary to pain. Swelling noted to R shoulder. He denies any paresthesias.  Takes a daily aspirin but no other blood thinners.  He denies any head trauma or loss of consciousness. He has some bruising to bilateral lower back but denies significant pain. Denies any other injury or pain. Denies CP, SOB, HA, dizziness. Denies abd pain, n/v.         The history is provided by the patient.     Review of patient's allergies indicates:   Allergen Reactions    Poison ivy extract Itching     Past Medical History:   Diagnosis Date    Hypertension     Liver cirrhosis      Past Surgical History:   Procedure Laterality Date    COLONOSCOPY N/A 6/30/2022    Procedure: COLONOSCOPY;  Surgeon: VIRIDIANA Swanson MD;  Location: [facility] ENDO;  Service: Endoscopy;  Laterality: N/A;    LEFT HEART CATHETERIZATION Left 2/16/2022    Procedure: Left heart cath;  Surgeon: Thierno Kidd III, MD;  Location: [facility] CATH LAB;  Service: Cardiology;  Laterality: Left;     No family history on file.  Social History     Tobacco Use    Smoking status: Never    Smokeless tobacco: Never   Substance Use Topics    Alcohol use: Yes     Alcohol/week: 6.0 standard drinks of alcohol     Types: 6  Cans of beer per week     Comment: daily    Drug use: No     Review of Systems   Constitutional:  Negative for chills and fever.   Respiratory:  Negative for shortness of breath.    Cardiovascular:  Negative for chest pain.   Gastrointestinal:  Negative for abdominal pain, nausea and vomiting.   Musculoskeletal:  Positive for arthralgias and joint swelling. Negative for back pain, gait problem and neck pain.   Skin:  Positive for color change.   Neurological:  Positive for weakness (R shoulder). Negative for headaches.   Psychiatric/Behavioral:  Negative for agitation and confusion.        Physical Exam     Initial Vitals [12/18/24 0752]   BP Pulse Resp Temp SpO2   (!) 143/84 91 16 97.7 °F (36.5 °C) 95 %      MAP       --         Physical Exam    Nursing note and vitals reviewed.  Constitutional: He appears well-developed and well-nourished. He is not diaphoretic.  Non-toxic appearance. No distress.   HENT:   Head: Normocephalic and atraumatic. Head is without raccoon's eyes, without Miller's sign, without abrasion and without contusion.   Right Ear: External ear normal.   Left Ear: External ear normal.   Eyes: EOM are normal.   Neck: Neck supple.   No c, t, l spine tenderness   Normal range of motion.  Cardiovascular:  Normal rate and regular rhythm.           Pulmonary/Chest: Breath sounds normal. He has no wheezes.   Abdominal: He exhibits no distension.   Musculoskeletal:      Right shoulder: Normal.      Left shoulder: Swelling and bony tenderness (anterior shoulder, distal third of clavicle, AC joint) present. No effusion or crepitus. Decreased range of motion (approx 30 degrees of forward flexion and abduction). Decreased strength. Normal pulse.        Arms:       Cervical back: Normal range of motion and neck supple. No bony tenderness.      Thoracic back: No bony tenderness.      Lumbar back: No bony tenderness.      Comments: + Empty Can and Drop Arm     Neurological: He is alert and oriented to person,  place, and time. GCS score is 15. GCS eye subscore is 4. GCS verbal subscore is 5. GCS motor subscore is 6.   Skin: Skin is dry.   Psychiatric: He has a normal mood and affect. His behavior is normal. Judgment and thought content normal.         ED Course   Procedures  Labs Reviewed   CREATININE, SERUM       Result Value    Creatinine 0.8      eGFR >60            Imaging Results              X-Ray Shoulder Trauma Right (Final result)  Result time 12/18/24 09:17:25      Final result by Dragan Barrios III, MD (12/18/24 09:17:25)                   Narrative:    EXAMINATION:  XR SHOULDER TRAUMA 3 VIEW RIGHT    CLINICAL HISTORY:  Unspecified fall, initial encounter    FINDINGS:  There is baseline DJD.  There is a high riding shoulder suggesting chronic rotator cuff tear.  No acute fracture, dislocation, or bone destruction seen.    Three-view shoulder right:      Electronically signed by: Dragan Barrios MD  Date:    12/18/2024  Time:    09:17                                     X-Ray Clavicle Right (Final result)  Result time 12/18/24 09:12:02      Final result by Dragan Barrios III, MD (12/18/24 09:12:02)                   Narrative:    EXAMINATION:  XR CLAVICLE RIGHT    CLINICAL HISTORY:  Unspecified fall, initial encounter    FINDINGS:  Clavicle two views right.    There is DJD of the AC joint and shoulder.  No fracture dislocation bone destruction or AC joint separation seen.      Electronically signed by: Dragan Barrios MD  Date:    12/18/2024  Time:    09:12                                     Medications   ketorolac injection 15 mg (has no administration in time range)   acetaminophen tablet 1,000 mg (1,000 mg Oral Given 12/18/24 0845)     Medical Decision Making  Generally well-appearing 64-year-old male presents following slip and fall last night.  He reports right shoulder pain.  He does have significantly decreased active range of motion with edema noted to anterior shoulder.  Tenderness to anterior  shoulder, distal clavicle, AC joint. No visible deformity. Suspect Rotator Cuff pathology on exam. NVI. [place] CT negative. Nexus negative. Patient drove here, will hold opioids. Tylenol for pain. Will check creatinine to give NSAIDs.     Differential Diagnosis includes, but is not limited to:  Shoulder dislocation, fracture, AC separation, compartment syndrome, nerve injury/palsy, impingement syndrome, thoracic outlet syndrome, vascular injury, DVT, rhabdomyolysis, hemarthrosis, septic joint, capsulitis, bursitis, rotator cuff injury, tendonitis, muscle strain, ligament tear/sprain, laceration with foreign body, abrasion, soft tissue contusion, osteoarthritis.      Problems Addressed:  Fall: acute illness or injury  Injury of right shoulder, initial encounter: acute illness or injury  Rotator cuff injury, right, initial encounter: acute illness or injury    Amount and/or Complexity of Data Reviewed  External Data Reviewed: notes.     Details: Cirrhosis  Fam Med visit plantar fasciitis and anxiety  Gregory last year  Radiology: ordered. Decision-making details documented in ED Course.  Discussion of management or test interpretation with external provider(s): none    Risk  OTC drugs.  Risk Details: Comorbidities taken into consideration during the patient's evaluation and treatment include HTN, cirrhosis  Social determinants of health taken into consideration during development of our treatment plan include difficulty in obtaining follow-up and obtaining medications; health literacy                 ED Course as of 12/18/24 1021   Wed Dec 18, 2024   0919 X-Ray Clavicle Right  There is DJD of the AC joint and shoulder.  No fracture dislocation bone destruction or AC joint separation seen. [CS]   0922 X-Ray Shoulder Trauma Right  There is baseline DJD.  There is a high riding shoulder suggesting chronic rotator cuff tear.  No acute fracture, dislocation, or bone destruction seen.     Three-view shoulder right:    [CS]   1006 Creatinine: 0.8 [CS]   1006 eGFR: >60  WNL. Will administer dose of toradol. High suspicion for rotator cuff pathology. Patient will be placed in sling for comfort and referred to orthopedics. Discussed gentle ROM with pendulum swings etc. Pain control with Percocet, naproxen. ED return precautions discussed for neuro s/s, severe pain, change in character, any new concerning s/s. Patient agreeable to plan. All questions answered.  [CS]      ED Course User Index  [CS] Ilene Blue PA-C                           Clinical Impression:  Final diagnoses:  [W19.XXXA] Fall  [S49.91XA] Injury of right shoulder, initial encounter  [S46.001A] Rotator cuff injury, right, initial encounter (Primary)          ED Disposition Condition    Discharge Stable          ED Prescriptions       Medication Sig Dispense Start Date End Date Auth. Provider    oxyCODONE-acetaminophen (PERCOCET) 5-325 mg per tablet Take 1 tablet by mouth every 8 (eight) hours as needed for Pain. 12 tablet 12/18/2024 -- Ilene Blue PA-C    naproxen (NAPROSYN) 500 MG tablet Take 1 tablet (500 mg total) by mouth 2 (two) times daily with meals. 30 tablet 12/18/2024 -- Ilene Blue PA-C          Follow-up Information       Follow up With Specialties Details Why Contact Info    [facility] orthopedics  Call in 2 days  [facility]-- Phone:[phone]  Clinic, Fourth Floor, [address]  [address] [place] [id number]             Ilene Blue PA-C  12/18/24 5227

## 2024-12-18 NOTE — DISCHARGE INSTRUCTIONS
For pain/fever you can take: Tylenol 1000 mg every 6 hours. Motrin 800 mg every 6 hours. This means you can take medication every three hours. For example, take tylenol at 12pm, motrin at 3pm, tylenol at 6pm, etc.     Do not exceed 3000mg of tylenol in 24 hours. Do not exceed 3200mg of motrin in 24 hours.

## 2024-12-30 ENCOUNTER — TELEPHONE (OUTPATIENT)
Dept: ORTHOPEDICS | Facility: CLINIC | Age: 64
End: 2024-12-30
Payer: MEDICAID

## 2025-02-07 DIAGNOSIS — R35.0 URINARY FREQUENCY: ICD-10-CM

## 2025-02-07 DIAGNOSIS — F41.9 ANXIETY: ICD-10-CM

## 2025-02-07 DIAGNOSIS — N40.0 BENIGN PROSTATIC HYPERPLASIA, UNSPECIFIED WHETHER LOWER URINARY TRACT SYMPTOMS PRESENT: ICD-10-CM

## 2025-02-10 RX ORDER — FLUOXETINE HYDROCHLORIDE 20 MG/1
20 CAPSULE ORAL DAILY
Qty: 30 CAPSULE | Refills: 2 | Status: SHIPPED | OUTPATIENT
Start: 2025-02-10

## 2025-02-10 RX ORDER — FINASTERIDE 5 MG/1
5 TABLET, FILM COATED ORAL DAILY
Qty: 30 TABLET | Refills: 11 | Status: SHIPPED | OUTPATIENT
Start: 2025-02-10 | End: 2026-02-10

## 2025-02-10 RX ORDER — TAMSULOSIN HYDROCHLORIDE 0.4 MG/1
0.4 CAPSULE ORAL DAILY
Qty: 30 CAPSULE | Refills: 2 | Status: SHIPPED | OUTPATIENT
Start: 2025-02-10 | End: 2025-05-11

## 2025-03-13 ENCOUNTER — OFFICE VISIT (OUTPATIENT)
Dept: ORTHOPEDICS | Facility: CLINIC | Age: 65
End: 2025-03-13
Payer: MEDICAID

## 2025-03-13 DIAGNOSIS — M25.611 DECREASED RANGE OF MOTION OF RIGHT SHOULDER: ICD-10-CM

## 2025-03-13 DIAGNOSIS — G89.29 CHRONIC RIGHT SHOULDER PAIN: Primary | ICD-10-CM

## 2025-03-13 DIAGNOSIS — M25.511 CHRONIC RIGHT SHOULDER PAIN: Primary | ICD-10-CM

## 2025-03-13 DIAGNOSIS — M19.011 PRIMARY OSTEOARTHRITIS OF RIGHT SHOULDER: ICD-10-CM

## 2025-03-13 PROCEDURE — 20610 DRAIN/INJ JOINT/BURSA W/O US: CPT | Mod: PBBFAC,PN,RT

## 2025-03-13 PROCEDURE — 99999 PR PBB SHADOW E&M-EST. PATIENT-LVL III: CPT | Mod: PBBFAC,,,

## 2025-03-13 PROCEDURE — 99213 OFFICE O/P EST LOW 20 MIN: CPT | Mod: PBBFAC,PN

## 2025-03-13 PROCEDURE — 99999PBSHW PR PBB SHADOW TECHNICAL ONLY FILED TO HB: Mod: PBBFAC,,,

## 2025-03-13 RX ADMIN — TRIAMCINOLONE ACETONIDE 40 MG: 40 INJECTION, SUSPENSION INTRA-ARTICULAR; INTRAMUSCULAR at 01:03

## 2025-03-13 NOTE — PROGRESS NOTES
Subjective:      Patient ID: Eitan Cody is a 64 y.o. male.    Chief Complaint: Pain of the Right Shoulder      HPI: Eitan Cody is a 64 y.o. right hand dominant male who presents to clinic for right shoulder pain. Patient reports injury, states on 12/17/2024 he tried to catch himself after he slipped and fell down the stairs.  Patient went to the ER where x-rays were taken which were negative for fracture.  Patient was advised to follow up with ortho.  Pain is located over (points to) AC joint.  He reports that the pain is a 4 /10 dull, aching pain today. Pain is 10/10 at its worst.  The pain is aggravated by certain maneuvers and pain at night.  Associated symptoms include decreased ROM and weakness. Reports numbness and tingling radiating down the arm into the hand. The pain is affecting ADLs and limiting desired level of activity. There is not a history of previous surgery to the shoulder.      Previous treatments include Ibuprofen, heat, activity modification which have provided minimal relief.     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     Ambulating: unassisted  Diabetic:  No  Smoking:  He has never smoked.  History of DVT/PE: Negative. On Aspirin 81 mg daily.    PAST MEDICAL HISTORY:    Past Medical History:   Diagnosis Date    Hypertension     Liver cirrhosis      PAST SURGICAL HISTORY:    Past Surgical History:   Procedure Laterality Date    COLONOSCOPY N/A 6/30/2022    Procedure: COLONOSCOPY;  Surgeon: VIRIDIANA Swanson MD;  Location: [facility] ENDO;  Service: Endoscopy;  Laterality: N/A;    LEFT HEART CATHETERIZATION Left 2/16/2022    Procedure: Left heart cath;  Surgeon: Thierno Kidd III, MD;  Location: [facility] CATH LAB;  Service: Cardiology;  Laterality: Left;     FAMILY HISTORY:    No family history on file.    SOCIAL HISTORY:    Social History     Occupational History    Not on file   Tobacco Use    Smoking status: Never    Smokeless tobacco: Never   Substance and Sexual  Activity    Alcohol use: Yes     Alcohol/week: 6.0 standard drinks of alcohol     Types: 6 Cans of beer per week     Comment: daily    Drug use: No    Sexual activity: Not on file        MEDICATIONS:   Current Medications[1]    ALLERGIES:   Review of patient's allergies indicates:   Allergen Reactions    Poison ivy extract Itching       Review of Systems:  Constitution: Negative for chills, fever and night sweats.   HENT: Negative for congestion and headaches.    Eyes: Negative for blurred vision or vision loss.  Cardiovascular: Negative for chest pain and syncope.   Respiratory: Negative for cough and shortness of breath.    Endocrine: Negative for polydipsia, polyphagia and polyuria.   Hematologic/Lymphatic: Negative for bleeding problem. Does not bruise/bleed easily.   Skin: Negative for dry skin, itching and rash.   Musculoskeletal: See HPI.   Gastrointestinal: Negative for abdominal pain and bowel incontinence.   Genitourinary: Negative for bladder incontinence and nocturia.   Neurological: Negative for disturbances in coordination, loss of balance and seizures.   Psychiatric/Behavioral: Negative for depression. The patient does not have insomnia.    Allergic/Immunologic: Negative for hives and persistent infections.          Objective:        There were no vitals filed for this visit.    PHYSICAL EXAM:  General: Alert & oriented x3, well-developed and well-nourished, in no acute distress, sitting comfortably in the exam room.  Skin: Warm and dry. Capillary refill less than 2 seconds.   Head: Normocephalic and atraumatic.   Eyes: Sclera appear normal.   Nose: No deformities seen.   Ears: No deformities seen.   Neck: No tracheal deviation present.   Pulmonary/Chest: Breathing unlabored.   Neurological: Alert and oriented to person, place, and time.   Psychiatric: Mood is pleasant and affect appropriate.     RIGHT SHOULDER        Inspection/Observation:   No evidence of swelling, redness, scars, visible  deformity, or atrophy.         Palpation:     Tenderness to palpation at the AC joint.  No tenderness at the SC joint  No tenderness over the clavicle   No tenderness over biceps tendon or bicipital groove  No tenderness over subacromial space        Range of Motion:      *guarding and pain with all ROM  Active Forward Flexion:  70°  Active Abduction:   60°  Active Internal Rotation:  to L5  Active External Rotation:   30°        Strength Testing:  Forward Flexion  4/5  Abduction   4/5  Internal Rotation  4/5  External Rotation  4/5        Neurovascular Exam Bilateral UEs:  Sensation intact to light touch in the distal median, radial, and ulnar nerve distributions bilaterally.  Capillary refill intact <2 seconds in all digits bilaterally    Imaging:   X-Rays: 3 views of right shoulder dated 12/18/2024 , and independently reviewed, show:  Degenerative changes at the glenohumeral joint and the acromioclavicular joint.  There is a high-riding humerus, suggestive of chronic rotator cuff tear.  No acute fracture or dislocation seen.      Assessment:       1. Chronic right shoulder pain    2. Primary osteoarthritis of right shoulder    3. Decreased range of motion of right shoulder        Plan:       Orders Placed This Encounter    Large Joint Aspiration/Injection: R subacromial bursa    Ambulatory Referral/Consult to Physical Therapy     I explained the nature of the problem to the patient.     I discussed at length with the patient all the different treatment options available for his right shoulder including anti-inflammatories, acetaminophen, rest, ice, heat, physical therapy, and corticosteroid injections. I explained the potential role of surgery in the treatment of this condition. The patient understands that if non-surgical measures do not adequately control symptoms, surgery will be considered in the future.     We discussed the possibility of proceeding with reverse shoulder arthroplasty. Patient states he is  not interested in surgical intervention as he is very busy with work. He would like to treat conservatively at this time.     Medications:  Encouraged patient to take OTC Tylenol Arthritis and/or NSAIDs as needed for pain management.   Physical Therapy:  Ambulatory referral to physical therapy for shoulder ROM, stretching, strengthening, and conditioning.  Procedures:  Corticosteroid injection requested and performed today to the right shoulder. See procedure note. Standard precautions provided.   Pain Management: Ice compress to the affected area 2-3x a day for 15-20 minutes as needed for pain management.      Follow-Up: 3 months for follow-up and possible repeat corticosteroid injection.    All of the patient's questions were answered and the patient will contact us if they have any questions or concerns in the interim.      Cathy Simmons PA-C  [facility]  Orthopedic Surgery    Medical Dictation software was used during the dictation of portions or the entirety of this medical record.  Phonetic or grammatic errors may exist due to the use of this software. For clarification, refer to the author of the document.              [1]   Current Outpatient Medications:     amlodipine-valsartan (EXFORGE)  mg per tablet, Take 1 tablet by mouth once daily., Disp: 90 tablet, Rfl: 3    aspirin 81 MG Chew, Take 81 mg by mouth once daily., Disp: , Rfl:     atorvastatin (LIPITOR) 20 MG tablet, Take 1 tablet (20 mg total) by mouth every evening., Disp: 90 tablet, Rfl: 3    cetirizine (ZYRTEC) 10 MG tablet, Take 10 mg by mouth once daily., Disp: , Rfl:     cetirizine (ZYRTEC) 10 MG tablet, Take 1 tablet (10 mg total) by mouth once daily., Disp: 14 tablet, Rfl: 0    finasteride (PROSCAR) 5 mg tablet, Take 1 tablet (5 mg total) by mouth once daily., Disp: 30 tablet, Rfl: 11    FLUoxetine 20 MG capsule, Take 1 capsule (20 mg total) by mouth once daily., Disp: 30 capsule, Rfl: 2    lifitegrast (XIIDRA) 5 % Dpet, Place 1  drop into both eyes 2 (two) times daily., Disp: 60 each, Rfl: 11    meclizine (ANTIVERT) 25 mg tablet, Take 25 mg by mouth 3 (three) times daily as needed for Dizziness., Disp: , Rfl:     naproxen (NAPROSYN) 500 MG tablet, Take 1 tablet (500 mg total) by mouth 2 (two) times daily with meals., Disp: 30 tablet, Rfl: 0    oxyCODONE-acetaminophen (PERCOCET) 5-325 mg per tablet, Take 1 tablet by mouth every 8 (eight) hours as needed for Pain., Disp: 12 tablet, Rfl: 0    promethazine-dextromethorphan (PROMETHAZINE-DM) 6.25-15 mg/5 mL Syrp, Take 5 mLs by mouth every 4 to 6 hours as needed for cough., Disp: 120 mL, Rfl: 0    propranoloL (INDERAL) 10 MG tablet, Take 1 tablet (10 mg total) by mouth once daily., Disp: 30 tablet, Rfl: 11    QULIPTA 60 mg Tab, Take 1 tablet by mouth once daily., Disp: , Rfl:     tamsulosin (FLOMAX) 0.4 mg Cap, Take 1 capsule (0.4 mg total) by mouth once daily., Disp: 30 capsule, Rfl: 2    topiramate (TOPAMAX) 100 MG tablet, Take 1 tablet (100 mg total) by mouth 2 (two) times daily., Disp: 60 tablet, Rfl: 11

## 2025-03-14 RX ORDER — TRIAMCINOLONE ACETONIDE 40 MG/ML
40 INJECTION, SUSPENSION INTRA-ARTICULAR; INTRAMUSCULAR
Status: DISCONTINUED | OUTPATIENT
Start: 2025-03-13 | End: 2025-03-14 | Stop reason: HOSPADM

## 2025-03-14 NOTE — PROCEDURES
Large Joint Aspiration/Injection: R subacromial bursa    Date/Time: 3/13/2025 1:30 PM    Performed by: Cathy Simmons PA-C  Authorized by: Cathy Simmons PA-C    Consent Done?:  Yes (Verbal)  Indications:  Pain  Site marked: the procedure site was marked    Timeout: prior to procedure the correct patient, procedure, and site was verified      Local anesthesia used?: Yes    Local anesthetic:  Topical anesthetic    Details:  Needle Size:  22 G  Ultrasonic Guidance for needle placement?: No    Approach:  Posterior  Location:  Shoulder  Site:  R subacromial bursa  Medications:  40 mg triamcinolone acetonide 40 mg/mL  Patient tolerance:  Patient tolerated the procedure well with no immediate complications    Injection Procedure Note   Prior to procedure the correct patient, procedure, and site was verified. Allergies were reviewed and verbal consent was obtained. The procedure site was marked.    After time out was performed, the patient was prepped aseptically with chloraprep, the area was sprayed with local topical anesthetic, and then cleaned with alcohol. A therapeutic subacromial injection of combination of 2 cc 1% Xylocaine and 40mg Triamcinolone was given under sterile technique using a 22-gauge x 1.5 needle from the posterior aspect of the right shoulder. Sterile dressing applied.     Patient tolerated the procedure well. Pain decreased. He had no adverse reactions to the medication.     The patient is cautioned that immediate relief of pain is secondary to the local anesthetic and will be temporary. After the anesthetic wears off there may be a increase in pain that may last for a few hours or a few days. Advised patient to avoid strenuous activities for the next 24-48 hours and to apply ice for 20 minutes to help alleviate this this pain. He was warned of possible blood sugar and/or blood pressure changes following injection. He was reminded to call the clinic immediately for any adverse side  effects as explained in clinic today.

## 2025-03-20 ENCOUNTER — OFFICE VISIT (OUTPATIENT)
Dept: CARDIOLOGY | Facility: CLINIC | Age: 65
End: 2025-03-20
Payer: MEDICAID

## 2025-03-20 ENCOUNTER — RESULTS FOLLOW-UP (OUTPATIENT)
Dept: CARDIOLOGY | Facility: CLINIC | Age: 65
End: 2025-03-20

## 2025-03-20 VITALS
SYSTOLIC BLOOD PRESSURE: 138 MMHG | RESPIRATION RATE: 18 BRPM | DIASTOLIC BLOOD PRESSURE: 92 MMHG | WEIGHT: 176.56 LBS | BODY MASS INDEX: 27.71 KG/M2 | OXYGEN SATURATION: 98 % | HEART RATE: 78 BPM | HEIGHT: 67 IN

## 2025-03-20 DIAGNOSIS — R39.11 BENIGN PROSTATIC HYPERPLASIA WITH URINARY HESITANCY: ICD-10-CM

## 2025-03-20 DIAGNOSIS — R94.39 ABNORMAL STRESS ECG WITH TREADMILL: ICD-10-CM

## 2025-03-20 DIAGNOSIS — I10 UNCONTROLLED HYPERTENSION: Primary | ICD-10-CM

## 2025-03-20 DIAGNOSIS — R07.89 OTHER CHEST PAIN: ICD-10-CM

## 2025-03-20 DIAGNOSIS — E78.2 MIXED HYPERLIPIDEMIA: ICD-10-CM

## 2025-03-20 DIAGNOSIS — N40.1 BENIGN PROSTATIC HYPERPLASIA WITH URINARY HESITANCY: ICD-10-CM

## 2025-03-20 DIAGNOSIS — G47.33 OBSTRUCTIVE SLEEP APNEA SYNDROME: ICD-10-CM

## 2025-03-20 DIAGNOSIS — R42 POSTURAL DIZZINESS WITH PRESYNCOPE: ICD-10-CM

## 2025-03-20 DIAGNOSIS — R55 POSTURAL DIZZINESS WITH PRESYNCOPE: ICD-10-CM

## 2025-03-20 DIAGNOSIS — I42.1 MILD HOCM (HYPERTROPHIC OBSTRUCTIVE CARDIOMYOPATHY): ICD-10-CM

## 2025-03-20 PROCEDURE — 4010F ACE/ARB THERAPY RXD/TAKEN: CPT | Mod: CPTII,,,

## 2025-03-20 PROCEDURE — 99214 OFFICE O/P EST MOD 30 MIN: CPT | Mod: S$PBB,,,

## 2025-03-20 PROCEDURE — 3008F BODY MASS INDEX DOCD: CPT | Mod: CPTII,,,

## 2025-03-20 PROCEDURE — 99999 PR PBB SHADOW E&M-EST. PATIENT-LVL III: CPT | Mod: PBBFAC,,,

## 2025-03-20 PROCEDURE — G2211 COMPLEX E/M VISIT ADD ON: HCPCS | Mod: S$PBB,,,

## 2025-03-20 PROCEDURE — 1160F RVW MEDS BY RX/DR IN RCRD: CPT | Mod: CPTII,,,

## 2025-03-20 PROCEDURE — 99213 OFFICE O/P EST LOW 20 MIN: CPT | Mod: PBBFAC,PN

## 2025-03-20 PROCEDURE — 3075F SYST BP GE 130 - 139MM HG: CPT | Mod: CPTII,,,

## 2025-03-20 PROCEDURE — 1159F MED LIST DOCD IN RCRD: CPT | Mod: CPTII,,,

## 2025-03-20 PROCEDURE — 3080F DIAST BP >= 90 MM HG: CPT | Mod: CPTII,,,

## 2025-03-20 RX ORDER — HYDROCHLOROTHIAZIDE 25 MG/1
TABLET ORAL DAILY
COMMUNITY
End: 2025-03-20

## 2025-03-20 RX ORDER — RIZATRIPTAN BENZOATE 10 MG/1
10 TABLET ORAL
COMMUNITY
Start: 2024-11-11 | End: 2025-03-20

## 2025-03-20 RX ORDER — FINASTERIDE 5 MG/1
5 TABLET, FILM COATED ORAL DAILY
COMMUNITY

## 2025-03-20 RX ORDER — LISINOPRIL 20 MG/1
TABLET ORAL DAILY
COMMUNITY
End: 2025-03-20

## 2025-03-20 NOTE — ASSESSMENT & PLAN NOTE
Pt w/ angiogram negative for CAD.   - continue medical therapy for primary prevention of CAD  - ASA and statin therapy

## 2025-03-20 NOTE — ASSESSMENT & PLAN NOTE
NM stress test 12/11/2024  Interpretation Summary    The ECG portion of the study is negative for ischemia.    The patient reported no chest pain during the stress test.    During stress, rare PVCs are noted.    The nuclear portion of this study will be reported separately.  FINDINGS:  There is no fixed or reversible perfusion defect identified.  The stress and rest end-diastolic volumes measure 74 and 71 mL respectively.  The stress and rest end systolic volumes measure 17 and 15 mL respectively.  The stress and rest ejection fraction measures 78 and 80% respectively.     Impression:     No acute findings.

## 2025-03-20 NOTE — ASSESSMENT & PLAN NOTE
Goal BP < 130/80.  Compliant w/ meds.    -controlled   -in office 138/92  -current regimen: amlodipine- valsartan  mg   - continue to monitor BP at home and record  - risk factor and lifestyle modifications

## 2025-03-20 NOTE — PROGRESS NOTES
Subjective:    Patient ID:  Eitan Cody is a 64 y.o. male who presents for follow-up of Follow-up (3 month)      PCP: Abilio Mtz MD     Follow-up  Pertinent negatives include no abdominal pain, congestion, diaphoresis, fever or nausea.         HPI: Pt is a 65 yo M w/ PMH of HTN, mild HCOM, HLD, BPH, MERCEDES, and ETOH abuse (quit 1/12/2022) who presents today for f/u appt, HTN management. He was last seen on 11/25/24 for f/u appt and was continued on medical therapy. Patient also reported left sided CP. NM stress test completed and was negative for ischemia.   He denies CP, SOB,edema, orthopnea, PND, palpitations, LOC, and claudication. He notes compliance w/ meds and denies side effects. He notes monitoring home BP ranges 130-140s/ 60-70s. He notes medication compliance without side effects.  He has not been exercising however is active at work as a  and walks often and denies limitation.        11/25/2024: Patient presents for f/u appt. He was last seen on 7/21/2023 for f/u appt and HTN management  and was continued on medical therapy and scheduled for 6 month follow up.   He had an angiogram  2/16/2022 which was negative for CAD w/ an LVEDP of 11 mmHg. He reported postural presyncope, cp, and perkins at times however this has improved and has decreased in frequency to be somewhat tolerable. Repeat cardiac echo w  LVEF 70%.    He has followed w PCP and medication increased to amlodipine- valsartan  mg.   He reports intermittent left sided chest pain describes as someone pressing on his heart. He denies SOB,edema, orthopnea, PND, palpitations, LOC, and claudication. He notes compliance w/ meds and denies side effects. He notes monitoring home BP ranges 130-140s/ 60-70s. He notes medication compliance without side effects.  He has not been exercising however is active at work as a  and walks often and denies limitation.          Past Medical History:   Diagnosis Date     Hypertension     Liver cirrhosis      Past Surgical History:   Procedure Laterality Date    COLONOSCOPY N/A 6/30/2022    Procedure: COLONOSCOPY;  Surgeon: VIRIDIANA Swanson MD;  Location: [facility] ENDO;  Service: Endoscopy;  Laterality: N/A;    LEFT HEART CATHETERIZATION Left 2/16/2022    Procedure: Left heart cath;  Surgeon: Thierno Kidd III, MD;  Location: [facility] CATH LAB;  Service: Cardiology;  Laterality: Left;     Social History     Socioeconomic History    Marital status: Single   Tobacco Use    Smoking status: Never    Smokeless tobacco: Never   Substance and Sexual Activity    Alcohol use: Yes     Alcohol/week: 6.0 standard drinks of alcohol     Types: 6 Cans of beer per week     Comment: daily    Drug use: No     No family history on file.    Review of patient's allergies indicates:   Allergen Reactions    Poison ivy extract Itching       Medication List with Changes/Refills   Current Medications    AMLODIPINE-VALSARTAN (EXFORGE)  MG PER TABLET    Take 1 tablet by mouth once daily.    ASPIRIN 81 MG CHEW    Take 81 mg by mouth once daily.    ATORVASTATIN (LIPITOR) 20 MG TABLET    Take 1 tablet (20 mg total) by mouth every evening.    FINASTERIDE (PROSCAR) 5 MG TABLET    Take 5 mg by mouth once daily.    FLUOXETINE 20 MG CAPSULE    Take 1 capsule (20 mg total) by mouth once daily.    PROPRANOLOL (INDERAL) 10 MG TABLET    Take 1 tablet (10 mg total) by mouth once daily.    TAMSULOSIN (FLOMAX) 0.4 MG CAP    Take 1 capsule (0.4 mg total) by mouth once daily.    TOPIRAMATE (TOPAMAX) 100 MG TABLET    Take 1 tablet (100 mg total) by mouth 2 (two) times daily.   Discontinued Medications    CETIRIZINE (ZYRTEC) 10 MG TABLET    Take 10 mg by mouth once daily.    CETIRIZINE (ZYRTEC) 10 MG TABLET    Take 1 tablet (10 mg total) by mouth once daily.    FINASTERIDE (PROSCAR) 5 MG TABLET    Take 1 tablet (5 mg total) by mouth once daily.    HYDROCHLOROTHIAZIDE (HYDRODIURIL) 25 MG TABLET    once daily.    LIFITEGRAST  "(XIIDRA) 5 % DPET    Place 1 drop into both eyes 2 (two) times daily.    LISINOPRIL (PRINIVIL,ZESTRIL) 20 MG TABLET    once daily.    MECLIZINE (ANTIVERT) 25 MG TABLET    Take 25 mg by mouth 3 (three) times daily as needed for Dizziness.    NAPROXEN (NAPROSYN) 500 MG TABLET    Take 1 tablet (500 mg total) by mouth 2 (two) times daily with meals.    OXYCODONE-ACETAMINOPHEN (PERCOCET) 5-325 MG PER TABLET    Take 1 tablet by mouth every 8 (eight) hours as needed for Pain.    PROMETHAZINE-DEXTROMETHORPHAN (PROMETHAZINE-DM) 6.25-15 MG/5 ML SYRP    Take 5 mLs by mouth every 4 to 6 hours as needed for cough.    QULIPTA 60 MG TAB    Take 1 tablet by mouth once daily.    RIZATRIPTAN (MAXALT) 10 MG TABLET    Take 10 mg by mouth.       Review of Systems   Constitutional: Negative for diaphoresis and fever.   HENT:  Negative for congestion and hearing loss.    Eyes:  Negative for blurred vision and pain.   Cardiovascular:  Negative for claudication, leg swelling, palpitations and syncope.   Respiratory:  Negative for sleep disturbances due to breathing.    Hematologic/Lymphatic: Negative for bleeding problem. Does not bruise/bleed easily.   Skin:  Negative for color change and poor wound healing.   Gastrointestinal:  Negative for abdominal pain and nausea.   Genitourinary:  Negative for bladder incontinence and flank pain.   Neurological:  Negative for focal weakness and light-headedness.        Objective:   BP (!) 138/92   Pulse 78   Resp 18   Ht 5' 7" (1.702 m)   Wt 80.1 kg (176 lb 9.4 oz)   SpO2 98%   BMI 27.66 kg/m²    Physical Exam  Constitutional:       Appearance: He is well-developed. He is not diaphoretic.   HENT:      Head: Normocephalic and atraumatic.   Eyes:      General: No scleral icterus.     Pupils: Pupils are equal, round, and reactive to light.   Neck:      Vascular: No JVD.   Cardiovascular:      Rate and Rhythm: Normal rate and regular rhythm.      Pulses: Intact distal pulses.           Radial pulses " are 2+ on the right side and 2+ on the left side.        Dorsalis pedis pulses are 2+ on the right side and 2+ on the left side.        Posterior tibial pulses are 2+ on the right side and 2+ on the left side.      Heart sounds: S1 normal and S2 normal. No murmur heard.     No friction rub. No gallop.   Pulmonary:      Effort: Pulmonary effort is normal. No respiratory distress.      Breath sounds: Normal breath sounds. No wheezing or rales.   Chest:      Chest wall: No tenderness.   Abdominal:      General: Bowel sounds are normal. There is no distension.      Palpations: Abdomen is soft. There is no mass.      Tenderness: There is no abdominal tenderness. There is no rebound.   Musculoskeletal:         General: No tenderness. Normal range of motion.      Cervical back: Normal range of motion and neck supple.   Skin:     General: Skin is warm and dry.      Coloration: Skin is not pale.   Neurological:      Mental Status: He is alert and oriented to person, place, and time.      Coordination: Coordination normal.      Deep Tendon Reflexes: Reflexes normal.   Psychiatric:         Behavior: Behavior normal.         Judgment: Judgment normal.           NM stress test 12/11/2024  Interpretation Summary    The ECG portion of the study is negative for ischemia.    The patient reported no chest pain during the stress test.    During stress, rare PVCs are noted.    The nuclear portion of this study will be reported separately.  FINDINGS:  There is no fixed or reversible perfusion defect identified.  The stress and rest end-diastolic volumes measure 74 and 71 mL respectively.  The stress and rest end systolic volumes measure 17 and 15 mL respectively.  The stress and rest ejection fraction measures 78 and 80% respectively.     Impression:     No acute findings.     Cardiac echo 7/26/2023  Summary  The left ventricle is normal in size with concentric remodeling and normal systolic function.  The estimated ejection fraction is  70%.  Indeterminate left ventricular diastolic function.  Normal right ventricular size with normal right ventricular systolic function.  Normal central venous pressure (3 mmHg).      Cardiac angiogram 22  Summary  The coronary arteries were normal.  The left ventricular end diastolic pressure was normal, LVEDP 11 mmHg.  The estimated blood loss was <50 mL.    EC2022- reviewed.  NSR, NLE ECG.      Echo: 2022- reviewed.    Summary    Concentric remodeling and hyperdynamic systolic function.  The estimated ejection fraction is 75%.  Left ventricular mid-cavity obstruction is present. Mid-cavity gradient 23 mmHg.  Normal left ventricular diastolic function.  Normal right ventricular size with normal right ventricular systolic function.  The sinuses of Valsalva is mildly dilated.  Intermediate central venous pressure (8 mmHg).  The estimated PA systolic pressure is 36 mmHg.    ETT: 2/10/2022- reviewed.    Conclusion         The EKG portion of this study is positive for ischemia.    The patient reported no chest pain during the stress test.    The blood pressure response to stress was normal.    The Duke Treadmill Score was 1.    The exercise capacity was average.    LHC: 2022- reviewed.    Summary       The coronary arteries were normal.  The left ventricular end diastolic pressure was normal, LVEDP 11 mmHg.  The estimated blood loss was <50 mL.       Assessment:       1. Uncontrolled hypertension    2. Mild HOCM (hypertrophic obstructive cardiomyopathy)    3. Mixed hyperlipidemia    4. Abnormal stress ECG with treadmill    5. Other chest pain    6. Benign prostatic hyperplasia with urinary hesitancy    7. Postural dizziness with presyncope    8. Obstructive sleep apnea syndrome         Plan:         Uncontrolled hypertension   Goal BP < 130/80.  Compliant w/ meds.    -controlled   -in office 138/92  -current regimen: amlodipine- valsartan  mg   - continue to monitor BP at home and record  -  risk factor and lifestyle modifications     Mild HOCM (hypertrophic obstructive cardiomyopathy)  Cardiac echo 1/14/2022:   Concentric remodeling and hyperdynamic systolic function.  The estimated ejection fraction is 75%.  Left ventricular mid-cavity obstruction is present. Mid-cavity gradient 23 mmHg.  - carvedilol 12.5 mg daily was DC'd by PCP  -propanolol 10 mg   Cardiac echo 7/26/2023  Summary  The left ventricle is normal in size with concentric remodeling and normal systolic function.  The estimated ejection fraction is 70%.  Indeterminate left ventricular diastolic function.  Normal right ventricular size with normal right ventricular systolic function.  Normal central venous pressure (3 mmHg).    Hyperlipidemia  -Lat LDL 89.4 7/21/2023  -controlled   -continue  atorvastatin 20 mg  -check lipid panel     Abnormal stress ECG with treadmill  Pt w/ angiogram negative for CAD.   - continue medical therapy for primary prevention of CAD  - ASA and statin therapy     Other chest pain  NM stress test 12/11/2024  Interpretation Summary    The ECG portion of the study is negative for ischemia.    The patient reported no chest pain during the stress test.    During stress, rare PVCs are noted.    The nuclear portion of this study will be reported separately.  FINDINGS:  There is no fixed or reversible perfusion defect identified.  The stress and rest end-diastolic volumes measure 74 and 71 mL respectively.  The stress and rest end systolic volumes measure 17 and 15 mL respectively.  The stress and rest ejection fraction measures 78 and 80% respectively.     Impression:     No acute findings.     Benign prostatic hyperplasia with urinary hesitancy  -Followed by Urology     Postural dizziness with presyncope  -resolved  w medication adjustments  -continue current regimen     Sleep apnea  -sleep study completed- CPAP recommended and patient declined at present time       Total duration of face to face visit time 30  minutes.  Total time spent counseling greater than fifty percent of total visit time.  Counseling included discussion regarding imaging findings, diagnosis, possibilities, treatment options, risks and benefits.  The patient had many questions regarding the options and long-term effects      Ney Hernandez DNP  Cardiology

## 2025-03-20 NOTE — ASSESSMENT & PLAN NOTE
Cardiac echo 1/14/2022:   Concentric remodeling and hyperdynamic systolic function.  The estimated ejection fraction is 75%.  Left ventricular mid-cavity obstruction is present. Mid-cavity gradient 23 mmHg.  - carvedilol 12.5 mg daily was DC'd by PCP  -propanolol 10 mg   Cardiac echo 7/26/2023  Summary  The left ventricle is normal in size with concentric remodeling and normal systolic function.  The estimated ejection fraction is 70%.  Indeterminate left ventricular diastolic function.  Normal right ventricular size with normal right ventricular systolic function.  Normal central venous pressure (3 mmHg).

## 2025-03-21 ENCOUNTER — TELEPHONE (OUTPATIENT)
Dept: CARDIOLOGY | Facility: CLINIC | Age: 65
End: 2025-03-21
Payer: MEDICAID

## 2025-03-21 NOTE — TELEPHONE ENCOUNTER
----- Message from Ney Hernandez NP sent at 3/20/2025  4:22 PM CDT -----  Please inform, Mr. Cody the cholesterol levels are within range. Please continue all medications as prescribed.    Thank you,  Ney Hernandez DNP   ----- Message -----  From: Dewayne, [facility]  Sent: 3/20/2025   1:12 PM CDT  To: Ney Hernandez NP

## 2025-04-04 PROBLEM — R29.898 IMPAIRED STRENGTH OF SHOULDER MUSCLES: Status: ACTIVE | Noted: 2025-04-04

## 2025-04-04 PROBLEM — M25.611 DECREASED RANGE OF MOTION OF RIGHT SHOULDER: Status: ACTIVE | Noted: 2025-04-04

## 2025-04-04 PROBLEM — R68.89 IMPAIRED TOLERANCE OF ACTIVITY: Status: ACTIVE | Noted: 2025-04-04

## 2025-06-16 ENCOUNTER — OFFICE VISIT (OUTPATIENT)
Dept: ORTHOPEDICS | Facility: CLINIC | Age: 65
End: 2025-06-16
Payer: MEDICAID

## 2025-06-16 DIAGNOSIS — M19.011 PRIMARY OSTEOARTHRITIS OF RIGHT SHOULDER: ICD-10-CM

## 2025-06-16 DIAGNOSIS — G89.29 CHRONIC RIGHT SHOULDER PAIN: Primary | ICD-10-CM

## 2025-06-16 DIAGNOSIS — M25.511 CHRONIC RIGHT SHOULDER PAIN: Primary | ICD-10-CM

## 2025-06-16 PROCEDURE — 1159F MED LIST DOCD IN RCRD: CPT | Mod: CPTII,,,

## 2025-06-16 PROCEDURE — 1160F RVW MEDS BY RX/DR IN RCRD: CPT | Mod: CPTII,,,

## 2025-06-16 PROCEDURE — 1101F PT FALLS ASSESS-DOCD LE1/YR: CPT | Mod: CPTII,,,

## 2025-06-16 PROCEDURE — 99999 PR PBB SHADOW E&M-EST. PATIENT-LVL II: CPT | Mod: PBBFAC,,,

## 2025-06-16 PROCEDURE — 99212 OFFICE O/P EST SF 10 MIN: CPT | Mod: PBBFAC,PN

## 2025-06-16 PROCEDURE — 99213 OFFICE O/P EST LOW 20 MIN: CPT | Mod: S$PBB,,,

## 2025-06-16 PROCEDURE — 3288F FALL RISK ASSESSMENT DOCD: CPT | Mod: CPTII,,,

## 2025-06-16 PROCEDURE — 4010F ACE/ARB THERAPY RXD/TAKEN: CPT | Mod: CPTII,,,

## 2025-06-16 NOTE — PROGRESS NOTES
Subjective:      Patient ID: Eitan Cody is a 65 y.o. male.    Chief Complaint: Follow-up (R shoulder)      HPI:   Patient is a 65-year-old male who presents for right shoulder pain follow up   Last saw Cathy Simmons PA-C on 03/13/2025   Given CSI 2 right shoulder, which provided significant symptomatic relief   Current treatment:  OTC analgesia prn, PT (only went to 1 appt due to symptomatic resolution)  Denies new trauma, numbness, tingling   Experiencing no breakthrough symptoms at this time  No complaints today      Previous hx:  Eitan Cody is a 65 y.o. right hand dominant male who presents to clinic for right shoulder pain. Patient reports injury, states on 12/17/2024 he tried to catch himself after he slipped and fell down the stairs.  Patient went to the ER where x-rays were taken which were negative for fracture.  Patient was advised to follow up with ortho.  Pain is located over (points to) AC joint.  He reports that the pain is a 4 /10 dull, aching pain today. Pain is 10/10 at its worst.  The pain is aggravated by certain maneuvers and pain at night.  Associated symptoms include decreased ROM and weakness. Reports numbness and tingling radiating down the arm into the hand. The pain is affecting ADLs and limiting desired level of activity. There is not a history of previous surgery to the shoulder.      Previous treatments include Ibuprofen, heat, activity modification which have provided minimal relief.     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     Ambulating: unassisted  Diabetic:  No  Smoking:  He has never smoked.  History of DVT/PE: Negative. On Aspirin 81 mg daily.    PAST MEDICAL HISTORY:    Past Medical History:   Diagnosis Date    Hypertension     Liver cirrhosis      PAST SURGICAL HISTORY:    Past Surgical History:   Procedure Laterality Date    COLONOSCOPY N/A 6/30/2022    Procedure: COLONOSCOPY;  Surgeon: VIRIDIANA Swanson MD;  Location: [facility];  Service:  Endoscopy;  Laterality: N/A;    LEFT HEART CATHETERIZATION Left 2/16/2022    Procedure: Left heart cath;  Surgeon: Thierno Kidd III, MD;  Location: [facility] CATH LAB;  Service: Cardiology;  Laterality: Left;     FAMILY HISTORY:    No family history on file.    SOCIAL HISTORY:    Social History     Occupational History    Not on file   Tobacco Use    Smoking status: Never    Smokeless tobacco: Never   Substance and Sexual Activity    Alcohol use: Yes     Alcohol/week: 6.0 standard drinks of alcohol     Types: 6 Cans of beer per week     Comment: daily    Drug use: No    Sexual activity: Not on file        MEDICATIONS:   Current Medications[1]    ALLERGIES:   Review of patient's allergies indicates:   Allergen Reactions    Poison ivy extract Itching       Review of Systems:  Constitution: Negative for chills, fever and night sweats.   HENT: Negative for congestion and headaches.    Eyes: Negative for blurred vision or vision loss.  Cardiovascular: Negative for chest pain and syncope.   Respiratory: Negative for cough and shortness of breath.    Endocrine: Negative for polydipsia, polyphagia and polyuria.   Hematologic/Lymphatic: Negative for bleeding problem. Does not bruise/bleed easily.   Skin: Negative for dry skin, itching and rash.   Musculoskeletal: See HPI.   Gastrointestinal: Negative for abdominal pain and bowel incontinence.   Genitourinary: Negative for bladder incontinence and nocturia.   Neurological: Negative for disturbances in coordination, loss of balance and seizures.   Psychiatric/Behavioral: Negative for depression. The patient does not have insomnia.    Allergic/Immunologic: Negative for hives and persistent infections.          Objective:        There were no vitals filed for this visit.    PHYSICAL EXAM:  General: Alert & oriented x3, well-developed and well-nourished, in no acute distress, sitting comfortably in the exam room.  Skin: Warm and dry. Capillary refill less than 2 seconds.   Head:  Normocephalic and atraumatic.   Eyes: Sclera appear normal.   Nose: No deformities seen.   Ears: No deformities seen.   Neck: No tracheal deviation present.   Pulmonary/Chest: Breathing unlabored.   Neurological: Alert and oriented to person, place, and time.   Psychiatric: Mood is pleasant and affect appropriate.     RIGHT SHOULDER        Inspection/Observation:   No evidence of swelling, redness, scars, visible deformity, or atrophy.         Palpation:     Tenderness to palpation at the AC joint.  No tenderness at the SC joint  No tenderness over the clavicle   No tenderness over biceps tendon or bicipital groove  No tenderness over subacromial space        Range of Motion:      (* = pain)  Active Forward Flexion:  160°  Active Abduction:   1550  Active Internal Rotation:  to L5  Active External Rotation:   30°        Strength Testing:  Forward Flexion  4+/5  Abduction   4+/5  Internal Rotation  5/5  External Rotation  4+/5        Neurovascular Exam Bilateral UEs:  Sensation intact to light touch in the distal median, radial, and ulnar nerve distributions bilaterally.  Capillary refill intact <2 seconds in all digits bilaterally    Imaging:   IMAGING- I independently interpreted the patient's imaging. Xrays of the patient's right shoulder demonstrate flattened glenoid, a decrease in acromiohumeral space, osteophyte formation to the humeral head, osteosclerosis  No evidence of any acute fractures or dislocations or significant degenerative changes.        Assessment:       1. Chronic right shoulder pain    2. Primary osteoarthritis of right shoulder          Plan:          Patient is a 65-year-old male with a right shoulder pain secondary to OA exacerbated by recent trauma  Plan: The patient and I had a thorough discussion today.  We discussed the working diagnosis as well as several other potential alternative diagnoses.    We discussed conservative and surgical treatment options. Conservative options include  rest, activity modifications, workplace modifications, po and topical analgesia (OTC and rx), formal or home PT, and others. Surgical treatment was also mentioned.    At this time, the patient would like to proceed with the following, which I agree with.    Pain Management:  Continue current pain regimen  Procedures: repeat CSI not warranted today  Work status: WBAT with limitation secondary to pain  Follow up: prn if symptoms worsen or fail to improve.     All of the patient's questions were answered and the patient will contact us if they have any questions or concerns in the interim.      Tara Hernandes PA-C  [facility]  Orthopedic Surgery      Medical Dictation software was used during the dictation of portions or the entirety of this medical record.  Phonetic or grammatic errors may exist due to the use of this software. For clarification, refer to the author of the document.              [1]   Current Outpatient Medications:     aspirin 81 MG Chew, Take 81 mg by mouth once daily., Disp: , Rfl:     atorvastatin (LIPITOR) 20 MG tablet, Take 1 tablet (20 mg total) by mouth every evening., Disp: 90 tablet, Rfl: 3    finasteride (PROSCAR) 5 mg tablet, Take 5 mg by mouth once daily., Disp: , Rfl:     FLUoxetine 20 MG capsule, Take 1 capsule (20 mg total) by mouth once daily., Disp: 30 capsule, Rfl: 2    tamsulosin (FLOMAX) 0.4 mg Cap, Take 1 capsule (0.4 mg total) by mouth once daily., Disp: 30 capsule, Rfl: 2    amlodipine-valsartan (EXFORGE)  mg per tablet, Take 1 tablet by mouth once daily., Disp: 90 tablet, Rfl: 3    propranoloL (INDERAL) 10 MG tablet, Take 1 tablet (10 mg total) by mouth once daily., Disp: 30 tablet, Rfl: 11    topiramate (TOPAMAX) 100 MG tablet, Take 1 tablet (100 mg total) by mouth 2 (two) times daily., Disp: 60 tablet, Rfl: 11

## 2025-06-18 DIAGNOSIS — R51.9 ACUTE INTRACTABLE HEADACHE, UNSPECIFIED HEADACHE TYPE: ICD-10-CM

## 2025-06-18 DIAGNOSIS — I10 PRIMARY HYPERTENSION: ICD-10-CM

## 2025-06-18 RX ORDER — TOPIRAMATE 100 MG/1
100 TABLET, FILM COATED ORAL 2 TIMES DAILY
Qty: 60 TABLET | Refills: 11 | Status: CANCELLED | OUTPATIENT
Start: 2025-06-12 | End: 2026-06-12

## 2025-06-18 RX ORDER — AMLODIPINE AND VALSARTAN 10; 320 MG/1; MG/1
1 TABLET ORAL DAILY
Qty: 90 TABLET | Refills: 3 | Status: CANCELLED | OUTPATIENT
Start: 2025-06-12 | End: 2026-06-12

## 2025-06-18 RX ORDER — PROPRANOLOL HYDROCHLORIDE 10 MG/1
10 TABLET ORAL DAILY
Qty: 30 TABLET | Refills: 11 | Status: CANCELLED | OUTPATIENT
Start: 2025-06-12 | End: 2026-06-12

## 2025-06-24 DIAGNOSIS — R51.9 ACUTE INTRACTABLE HEADACHE, UNSPECIFIED HEADACHE TYPE: ICD-10-CM

## 2025-06-24 DIAGNOSIS — I10 PRIMARY HYPERTENSION: ICD-10-CM

## 2025-06-24 RX ORDER — AMLODIPINE AND VALSARTAN 10; 320 MG/1; MG/1
1 TABLET ORAL DAILY
Qty: 90 TABLET | Refills: 3 | Status: CANCELLED | OUTPATIENT
Start: 2025-06-12 | End: 2026-06-12

## 2025-06-24 RX ORDER — PROPRANOLOL HYDROCHLORIDE 10 MG/1
10 TABLET ORAL DAILY
Qty: 30 TABLET | Refills: 11 | Status: CANCELLED | OUTPATIENT
Start: 2025-06-12 | End: 2026-06-12

## 2025-06-24 RX ORDER — TOPIRAMATE 100 MG/1
100 TABLET, FILM COATED ORAL 2 TIMES DAILY
Qty: 60 TABLET | Refills: 11 | Status: CANCELLED | OUTPATIENT
Start: 2025-06-12 | End: 2026-06-12

## 2025-07-08 ENCOUNTER — OFFICE VISIT (OUTPATIENT)
Dept: CARDIOLOGY | Facility: CLINIC | Age: 65
End: 2025-07-08
Payer: MEDICAID

## 2025-07-08 VITALS
HEIGHT: 67 IN | HEART RATE: 81 BPM | WEIGHT: 171.75 LBS | SYSTOLIC BLOOD PRESSURE: 130 MMHG | BODY MASS INDEX: 26.96 KG/M2 | OXYGEN SATURATION: 98 % | DIASTOLIC BLOOD PRESSURE: 81 MMHG

## 2025-07-08 DIAGNOSIS — R07.89 OTHER CHEST PAIN: ICD-10-CM

## 2025-07-08 DIAGNOSIS — R51.9 ACUTE INTRACTABLE HEADACHE, UNSPECIFIED HEADACHE TYPE: ICD-10-CM

## 2025-07-08 DIAGNOSIS — R55 POSTURAL DIZZINESS WITH PRESYNCOPE: ICD-10-CM

## 2025-07-08 DIAGNOSIS — E78.2 MIXED HYPERLIPIDEMIA: ICD-10-CM

## 2025-07-08 DIAGNOSIS — Z75.8 DOES NOT HAVE PRIMARY CARE PROVIDER: ICD-10-CM

## 2025-07-08 DIAGNOSIS — I10 UNCONTROLLED HYPERTENSION: Primary | ICD-10-CM

## 2025-07-08 DIAGNOSIS — R42 POSTURAL DIZZINESS WITH PRESYNCOPE: ICD-10-CM

## 2025-07-08 DIAGNOSIS — I42.1 MILD HOCM (HYPERTROPHIC OBSTRUCTIVE CARDIOMYOPATHY): ICD-10-CM

## 2025-07-08 DIAGNOSIS — G89.29 CHRONIC INTRACTABLE HEADACHE, UNSPECIFIED HEADACHE TYPE: ICD-10-CM

## 2025-07-08 DIAGNOSIS — R39.11 BENIGN PROSTATIC HYPERPLASIA WITH URINARY HESITANCY: ICD-10-CM

## 2025-07-08 DIAGNOSIS — I10 PRIMARY HYPERTENSION: ICD-10-CM

## 2025-07-08 DIAGNOSIS — R51.9 CHRONIC INTRACTABLE HEADACHE, UNSPECIFIED HEADACHE TYPE: ICD-10-CM

## 2025-07-08 DIAGNOSIS — G47.33 OBSTRUCTIVE SLEEP APNEA SYNDROME: ICD-10-CM

## 2025-07-08 DIAGNOSIS — R94.39 ABNORMAL STRESS ECG WITH TREADMILL: ICD-10-CM

## 2025-07-08 DIAGNOSIS — N40.1 BENIGN PROSTATIC HYPERPLASIA WITH URINARY HESITANCY: ICD-10-CM

## 2025-07-08 PROCEDURE — 1159F MED LIST DOCD IN RCRD: CPT | Mod: CPTII,,,

## 2025-07-08 PROCEDURE — 3288F FALL RISK ASSESSMENT DOCD: CPT | Mod: CPTII,,,

## 2025-07-08 PROCEDURE — 4010F ACE/ARB THERAPY RXD/TAKEN: CPT | Mod: CPTII,,,

## 2025-07-08 PROCEDURE — 99214 OFFICE O/P EST MOD 30 MIN: CPT | Mod: S$PBB,,,

## 2025-07-08 PROCEDURE — 99999 PR PBB SHADOW E&M-EST. PATIENT-LVL III: CPT | Mod: PBBFAC,,,

## 2025-07-08 PROCEDURE — 3075F SYST BP GE 130 - 139MM HG: CPT | Mod: CPTII,,,

## 2025-07-08 PROCEDURE — 99213 OFFICE O/P EST LOW 20 MIN: CPT | Mod: PBBFAC,PN

## 2025-07-08 PROCEDURE — 3008F BODY MASS INDEX DOCD: CPT | Mod: CPTII,,,

## 2025-07-08 PROCEDURE — 1100F PTFALLS ASSESS-DOCD GE2>/YR: CPT | Mod: CPTII,,,

## 2025-07-08 PROCEDURE — G2211 COMPLEX E/M VISIT ADD ON: HCPCS | Mod: ,,,

## 2025-07-08 PROCEDURE — 1160F RVW MEDS BY RX/DR IN RCRD: CPT | Mod: CPTII,,,

## 2025-07-08 PROCEDURE — 3079F DIAST BP 80-89 MM HG: CPT | Mod: CPTII,,,

## 2025-07-08 RX ORDER — AMLODIPINE AND VALSARTAN 10; 320 MG/1; MG/1
1 TABLET ORAL DAILY
Qty: 90 TABLET | Refills: 3 | Status: SHIPPED | OUTPATIENT
Start: 2025-07-08 | End: 2026-07-08

## 2025-07-08 RX ORDER — PROPRANOLOL HYDROCHLORIDE 10 MG/1
10 TABLET ORAL DAILY
Qty: 90 TABLET | Refills: 3 | Status: SHIPPED | OUTPATIENT
Start: 2025-07-08 | End: 2026-07-08

## 2025-07-08 NOTE — ASSESSMENT & PLAN NOTE
Cardiac echo 1/14/2022:   Concentric remodeling and hyperdynamic systolic function.  The estimated ejection fraction is 75%.  Left ventricular mid-cavity obstruction is present. Mid-cavity gradient 23 mmHg.  -continue medical therapy   - carvedilol 12.5 mg daily was DC'd by PCP  -propanolol 10 mg   Cardiac echo 7/26/2023  Summary  The left ventricle is normal in size with concentric remodeling and normal systolic function.  The estimated ejection fraction is 70%.  Indeterminate left ventricular diastolic function.  Normal right ventricular size with normal right ventricular systolic function.  Normal central venous pressure (3 mmHg).

## 2025-07-08 NOTE — ASSESSMENT & PLAN NOTE
NM stress test 12/11/2024  Interpretation Summary    The ECG portion of the study is negative for ischemia.    The patient reported no chest pain during the stress test.    During stress, rare PVCs are noted.    The nuclear portion of this study will be reported separately.  FINDINGS:  There is no fixed or reversible perfusion defect identified.  The stress and rest end-diastolic volumes measure 74 and 71 mL respectively.  The stress and rest end systolic volumes measure 17 and 15 mL respectively.  The stress and rest ejection fraction measures 78 and 80% respectively.     Impression:     No acute findings.     -CP free

## 2025-07-08 NOTE — ASSESSMENT & PLAN NOTE
-Followed by [place]rology and [place] [facility] [place]   -continue propranolol and Qulipta

## 2025-07-08 NOTE — ASSESSMENT & PLAN NOTE
Goal BP < 130/80.  Compliant w/ meds.    -controlled   -in office 130/81  -current regimen: amlodipine- valsartan  mg   - continue to monitor BP at home and record  - risk factor and lifestyle modifications

## 2025-07-08 NOTE — PROGRESS NOTES
Subjective:    Patient ID:  Eitan Cody is a 65 y.o. male who presents for follow-up of Follow-up (No complaints)      PCP: Abilio Mtz MD     Follow-up  Pertinent negatives include no abdominal pain, congestion, diaphoresis, fever or nausea.       HPI: Pt is a 64 yo M w/ PMH of HTN, mild HCOM, HLD, BPH, MERCEDES, and ETOH abuse (quit 1/12/2022) who presents today for f/u appt, HTN management. He was last seen on 3/20/25 for f/u appt, HTN management and was continued on medical therapy. Previous NM stress test completed and was negative for ischemia.   Patient denies CP, SOB,edema, orthopnea, PND, palpitations, LOC, and claudication. He notes compliance w/ meds and denies side effects. Patient reports he has not been monitoring home. He notes medication compliance without side effects. He has not been exercising however is active at work as a  and walks often and denies limitation.        3/20/25: Patient presents today for f/u appt, HTN management. He was last seen on 11/25/24 for f/u appt and was continued on medical therapy. Patient also reported left sided CP. NM stress test completed and was negative for ischemia.   He denies CP, SOB,edema, orthopnea, PND, palpitations, LOC, and claudication. He notes compliance w/ meds and denies side effects. He notes monitoring home BP ranges 130-140s/ 60-70s. He notes medication compliance without side effects.  He has not been exercising however is active at work as a  and walks often and denies limitation.        11/25/2024: Patient presents for f/u appt. He was last seen on 7/21/2023 for f/u appt and HTN management  and was continued on medical therapy and scheduled for 6 month follow up.   He had an angiogram  2/16/2022 which was negative for CAD w/ an LVEDP of 11 mmHg. He reported postural presyncope, cp, and perkins at times however this has improved and has decreased in frequency to be somewhat tolerable. Repeat cardiac echo w  LVEF  70%.    He has followed w PCP and medication increased to amlodipine- valsartan  mg.   He reports intermittent left sided chest pain describes as someone pressing on his heart. He denies SOB,edema, orthopnea, PND, palpitations, LOC, and claudication. He notes compliance w/ meds and denies side effects. He notes monitoring home BP ranges 130-140s/ 60-70s. He notes medication compliance without side effects.  He has not been exercising however is active at work as a  and walks often and denies limitation.          Past Medical History:   Diagnosis Date    Hypertension     Liver cirrhosis      Past Surgical History:   Procedure Laterality Date    COLONOSCOPY N/A 6/30/2022    Procedure: COLONOSCOPY;  Surgeon: VIRIDIANA Swanson MD;  Location: [facility] ENDO;  Service: Endoscopy;  Laterality: N/A;    LEFT HEART CATHETERIZATION Left 2/16/2022    Procedure: Left heart cath;  Surgeon: Thierno Kidd III, MD;  Location: [facility] CATH LAB;  Service: Cardiology;  Laterality: Left;     Social History     Socioeconomic History    Marital status: Single   Tobacco Use    Smoking status: Never    Smokeless tobacco: Never   Substance and Sexual Activity    Alcohol use: Yes     Alcohol/week: 6.0 standard drinks of alcohol     Types: 6 Cans of beer per week     Comment: daily    Drug use: No     No family history on file.    Review of patient's allergies indicates:   Allergen Reactions    Poison ivy extract Itching       Medication List with Changes/Refills   Current Medications    ASPIRIN 81 MG CHEW    Take 81 mg by mouth once daily.    ATORVASTATIN (LIPITOR) 20 MG TABLET    Take 1 tablet (20 mg total) by mouth every evening.    FINASTERIDE (PROSCAR) 5 MG TABLET    Take 5 mg by mouth once daily.    FLUOXETINE 20 MG CAPSULE    Take 1 capsule (20 mg total) by mouth once daily.    TAMSULOSIN (FLOMAX) 0.4 MG CAP    Take 1 capsule (0.4 mg total) by mouth once daily.    TOPIRAMATE (TOPAMAX) 100 MG TABLET    Take 1  "tablet (100 mg total) by mouth 2 (two) times daily.   Changed and/or Refilled Medications    Modified Medication Previous Medication    AMLODIPINE-VALSARTAN (EXFORGE)  MG PER TABLET amlodipine-valsartan (EXFORGE)  mg per tablet       Take 1 tablet by mouth once daily.    Take 1 tablet by mouth once daily.    PROPRANOLOL (INDERAL) 10 MG TABLET propranoloL (INDERAL) 10 MG tablet       Take 1 tablet (10 mg total) by mouth once daily.    Take 1 tablet (10 mg total) by mouth once daily.       Review of Systems   Constitutional: Negative for diaphoresis and fever.   HENT:  Negative for congestion and hearing loss.    Eyes:  Negative for blurred vision and pain.   Cardiovascular:  Negative for claudication, leg swelling, palpitations and syncope.   Respiratory:  Negative for sleep disturbances due to breathing.    Hematologic/Lymphatic: Negative for bleeding problem. Does not bruise/bleed easily.   Skin:  Negative for color change and poor wound healing.   Gastrointestinal:  Negative for abdominal pain and nausea.   Genitourinary:  Negative for bladder incontinence and flank pain.   Neurological:  Negative for focal weakness and light-headedness.        Objective:   /81 (BP Location: Left arm, Patient Position: Sitting)   Pulse 81   Ht 5' 7" (1.702 m)   Wt 77.9 kg (171 lb 11.8 oz)   SpO2 98%   BMI 26.90 kg/m²    Physical Exam  Constitutional:       Appearance: He is well-developed. He is not diaphoretic.   HENT:      Head: Normocephalic and atraumatic.   Eyes:      General: No scleral icterus.     Pupils: Pupils are equal, round, and reactive to light.   Neck:      Vascular: No JVD.   Cardiovascular:      Rate and Rhythm: Normal rate and regular rhythm.      Pulses: Intact distal pulses.           Radial pulses are 2+ on the right side and 2+ on the left side.        Dorsalis pedis pulses are 2+ on the right side and 2+ on the left side.        Posterior tibial pulses are 2+ on the right side and 2+ " on the left side.      Heart sounds: S1 normal and S2 normal. No murmur heard.     No friction rub. No gallop.   Pulmonary:      Effort: Pulmonary effort is normal. No respiratory distress.      Breath sounds: Normal breath sounds. No wheezing or rales.   Chest:      Chest wall: No tenderness.   Abdominal:      General: Bowel sounds are normal. There is no distension.      Palpations: Abdomen is soft. There is no mass.      Tenderness: There is no abdominal tenderness. There is no rebound.   Musculoskeletal:         General: No tenderness. Normal range of motion.      Cervical back: Normal range of motion and neck supple.   Skin:     General: Skin is warm and dry.      Coloration: Skin is not pale.   Neurological:      Mental Status: He is alert and oriented to person, place, and time.      Coordination: Coordination normal.      Deep Tendon Reflexes: Reflexes normal.   Psychiatric:         Behavior: Behavior normal.         Judgment: Judgment normal.           NM stress test 12/11/2024  Interpretation Summary    The ECG portion of the study is negative for ischemia.    The patient reported no chest pain during the stress test.    During stress, rare PVCs are noted.    The nuclear portion of this study will be reported separately.  FINDINGS:  There is no fixed or reversible perfusion defect identified.  The stress and rest end-diastolic volumes measure 74 and 71 mL respectively.  The stress and rest end systolic volumes measure 17 and 15 mL respectively.  The stress and rest ejection fraction measures 78 and 80% respectively.     Impression:     No acute findings.     Cardiac echo 7/26/2023  Summary  The left ventricle is normal in size with concentric remodeling and normal systolic function.  The estimated ejection fraction is 70%.  Indeterminate left ventricular diastolic function.  Normal right ventricular size with normal right ventricular systolic function.  Normal central venous pressure (3 mmHg).      Cardiac  angiogram 22  Summary  The coronary arteries were normal.  The left ventricular end diastolic pressure was normal, LVEDP 11 mmHg.  The estimated blood loss was <50 mL.    EC2022- reviewed.  NSR, NLE ECG.      Echo: 2022- reviewed.    Summary    Concentric remodeling and hyperdynamic systolic function.  The estimated ejection fraction is 75%.  Left ventricular mid-cavity obstruction is present. Mid-cavity gradient 23 mmHg.  Normal left ventricular diastolic function.  Normal right ventricular size with normal right ventricular systolic function.  The sinuses of Valsalva is mildly dilated.  Intermediate central venous pressure (8 mmHg).  The estimated PA systolic pressure is 36 mmHg.    ETT: 2/10/2022- reviewed.    Conclusion         The EKG portion of this study is positive for ischemia.    The patient reported no chest pain during the stress test.    The blood pressure response to stress was normal.    The Duke Treadmill Score was 1.    The exercise capacity was average.    LHC: 2022- reviewed.    Summary       The coronary arteries were normal.  The left ventricular end diastolic pressure was normal, LVEDP 11 mmHg.  The estimated blood loss was <50 mL.       Assessment:       1. Uncontrolled hypertension    2. Primary hypertension    3. Acute intractable headache, unspecified headache type    4. Mild HOCM (hypertrophic obstructive cardiomyopathy)    5. Mixed hyperlipidemia    6. Abnormal stress ECG with treadmill    7. Other chest pain    8. Benign prostatic hyperplasia with urinary hesitancy    9. Postural dizziness with presyncope    10. Obstructive sleep apnea syndrome    11. Chronic intractable headache, unspecified headache type    12. Does not have primary care provider           Plan:         Uncontrolled hypertension   Goal BP < 130/80.  Compliant w/ meds.    -controlled   -in office 130/81  -current regimen: amlodipine- valsartan  mg   - continue to monitor BP at home and  record  - risk factor and lifestyle modifications     Mild HOCM (hypertrophic obstructive cardiomyopathy)  Cardiac echo 1/14/2022:   Concentric remodeling and hyperdynamic systolic function.  The estimated ejection fraction is 75%.  Left ventricular mid-cavity obstruction is present. Mid-cavity gradient 23 mmHg.  -continue medical therapy   - carvedilol 12.5 mg daily was DC'd by PCP  -propanolol 10 mg   Cardiac echo 7/26/2023  Summary  The left ventricle is normal in size with concentric remodeling and normal systolic function.  The estimated ejection fraction is 70%.  Indeterminate left ventricular diastolic function.  Normal right ventricular size with normal right ventricular systolic function.  Normal central venous pressure (3 mmHg).    Hyperlipidemia  -Last LDL 77.2 3/20/25  -controlled   -continue  atorvastatin 20 mg      Abnormal stress ECG with treadmill  Pt w/ angiogram negative for CAD.   - continue medical therapy for primary prevention of CAD  - ASA and statin therapy     Other chest pain  NM stress test 12/11/2024  Interpretation Summary    The ECG portion of the study is negative for ischemia.    The patient reported no chest pain during the stress test.    During stress, rare PVCs are noted.    The nuclear portion of this study will be reported separately.  FINDINGS:  There is no fixed or reversible perfusion defect identified.  The stress and rest end-diastolic volumes measure 74 and 71 mL respectively.  The stress and rest end systolic volumes measure 17 and 15 mL respectively.  The stress and rest ejection fraction measures 78 and 80% respectively.     Impression:     No acute findings.     -CP free     Benign prostatic hyperplasia with urinary hesitancy  -Followed by Urology   -continue medical therapy     Postural dizziness with presyncope  -resolved  w medication adjustments  -continue current regimen     Sleep apnea  -sleep study completed- CPAP recommended and patient declined at present time      Chronic intractable headache  -Followed by [place]rology and [place] [facility] [place]   [place]continue propranolol and Qulipta     Does not have primary care provider  -Referral placed for Primary care provider/ Family Practice         Total duration of face to face visit time 30 minutes.  Total time spent counseling greater than fifty percent of total visit time.  Counseling included discussion regarding imaging findings, diagnosis, possibilities, treatment options, risks and benefits.  The patient had many questions regarding the options and long-term effects      Ney Hernandez,СЕРГЕЙ  Cardiology

## 2025-07-09 DIAGNOSIS — R35.0 URINARY FREQUENCY: ICD-10-CM

## 2025-07-09 DIAGNOSIS — F41.9 ANXIETY: ICD-10-CM

## 2025-07-09 RX ORDER — FLUOXETINE 20 MG/1
20 CAPSULE ORAL DAILY
Qty: 30 CAPSULE | Refills: 2 | Status: CANCELLED | OUTPATIENT
Start: 2025-07-09

## 2025-07-09 RX ORDER — TAMSULOSIN HYDROCHLORIDE 0.4 MG/1
0.4 CAPSULE ORAL DAILY
Qty: 30 CAPSULE | Refills: 2 | Status: CANCELLED | OUTPATIENT
Start: 2025-07-09 | End: 2025-10-07

## 2025-07-10 DIAGNOSIS — F41.9 ANXIETY: ICD-10-CM

## 2025-07-10 RX ORDER — FLUOXETINE 20 MG/1
20 CAPSULE ORAL DAILY
Qty: 30 CAPSULE | Refills: 2 | Status: CANCELLED | OUTPATIENT
Start: 2025-07-09

## 2025-07-15 DIAGNOSIS — F41.9 ANXIETY: ICD-10-CM

## 2025-07-15 DIAGNOSIS — R51.9 ACUTE INTRACTABLE HEADACHE, UNSPECIFIED HEADACHE TYPE: ICD-10-CM

## 2025-07-15 DIAGNOSIS — R35.0 URINARY FREQUENCY: ICD-10-CM

## 2025-07-15 RX ORDER — TOPIRAMATE 100 MG/1
100 TABLET, FILM COATED ORAL 2 TIMES DAILY
Qty: 60 TABLET | Refills: 11 | Status: CANCELLED | OUTPATIENT
Start: 2025-07-11 | End: 2026-07-11

## 2025-07-21 DIAGNOSIS — R51.9 ACUTE INTRACTABLE HEADACHE, UNSPECIFIED HEADACHE TYPE: ICD-10-CM

## 2025-07-21 RX ORDER — TAMSULOSIN HYDROCHLORIDE 0.4 MG/1
0.4 CAPSULE ORAL DAILY
Qty: 30 CAPSULE | Refills: 2 | Status: SHIPPED | OUTPATIENT
Start: 2025-07-21 | End: 2025-10-19

## 2025-07-21 RX ORDER — FLUOXETINE 20 MG/1
20 CAPSULE ORAL DAILY
Qty: 30 CAPSULE | Refills: 2 | Status: SHIPPED | OUTPATIENT
Start: 2025-07-21

## 2025-07-21 RX ORDER — TOPIRAMATE 100 MG/1
100 TABLET, FILM COATED ORAL 2 TIMES DAILY
Qty: 60 TABLET | Refills: 11 | Status: CANCELLED | OUTPATIENT
Start: 2025-07-11 | End: 2026-07-11